# Patient Record
Sex: FEMALE | Race: WHITE | NOT HISPANIC OR LATINO | Employment: UNEMPLOYED | URBAN - METROPOLITAN AREA
[De-identification: names, ages, dates, MRNs, and addresses within clinical notes are randomized per-mention and may not be internally consistent; named-entity substitution may affect disease eponyms.]

---

## 2017-01-17 RX ORDER — HYDROCHLOROTHIAZIDE 25 MG/1
25 TABLET ORAL DAILY
Qty: 90 TABLET | Refills: 0 | Status: SHIPPED | OUTPATIENT
Start: 2017-01-17 | End: 2017-01-18 | Stop reason: SDUPTHER

## 2017-01-18 ENCOUNTER — OFFICE VISIT (OUTPATIENT)
Dept: CARDIOLOGY | Facility: CLINIC | Age: 57
End: 2017-01-18

## 2017-01-18 VITALS
SYSTOLIC BLOOD PRESSURE: 132 MMHG | HEART RATE: 64 BPM | DIASTOLIC BLOOD PRESSURE: 80 MMHG | BODY MASS INDEX: 24.99 KG/M2 | HEIGHT: 65 IN | WEIGHT: 150 LBS

## 2017-01-18 DIAGNOSIS — I10 ESSENTIAL HYPERTENSION: Primary | ICD-10-CM

## 2017-01-18 PROCEDURE — 99213 OFFICE O/P EST LOW 20 MIN: CPT | Performed by: INTERNAL MEDICINE

## 2017-01-18 PROCEDURE — 93000 ELECTROCARDIOGRAM COMPLETE: CPT | Performed by: INTERNAL MEDICINE

## 2017-01-18 RX ORDER — PAROXETINE HYDROCHLORIDE 20 MG/1
1 TABLET, FILM COATED ORAL DAILY
COMMUNITY
Start: 2017-01-04 | End: 2018-01-23 | Stop reason: ALTCHOICE

## 2017-01-18 RX ORDER — LOSARTAN POTASSIUM 50 MG/1
1 TABLET ORAL DAILY
COMMUNITY
Start: 2016-12-31 | End: 2017-01-31 | Stop reason: SDUPTHER

## 2017-01-18 RX ORDER — HYDROCHLOROTHIAZIDE 25 MG/1
25 TABLET ORAL DAILY
Qty: 90 TABLET | Refills: 3 | Status: SHIPPED | OUTPATIENT
Start: 2017-01-18 | End: 2018-02-13 | Stop reason: SDUPTHER

## 2017-01-18 NOTE — PROGRESS NOTES
Date of Office Visit: 2017  Encounter Provider: Yue Maradiaga MD  Place of Service: [facility]  Patient Name: Amanda Sherwood  :1960      Patient ID:  Amanda Sherwood is a 56 y.o. female is here for  followup for cardiac risks.         History of Present Illness    She has known hypertension for which she takes 3 medications. About 1-2 months ago, she also  had premature ventricular complexes which were new for her. She has a history of SVT.        She does get low platelets and low white counts when she is using ibuprofen. She has had  some gastroesophageal reflux disease, but it has been fairly well controlled.     Her father had myocardial infarction when he was 60s years old requiring angioplasty and  then went on to have coronary bypass grafting. She has never had vascular screening or  coronary calcium score done.     She had a lipid panel done in 2016 which revealed a total cholesterol of 227, triglycerides 61, HDL 91, VLDL 12.2, .  Her CMP was normal and her TSH was also normal at 1.22.      She is here today for followup.  She had bunion surgery and it failed.  She had to have a second bunion surgery and she is still dealing with the consequences of that, and because she missed so much work she lost her job at [employer].  She has gained about 25 pounds because she has not been able to exercise and she has not been eating well.  She denies any heart racing or skipping.  She has had no exertional chest tightness or pressure.  She has no exertional short windedness.  She feels like she has done okay from a cardiac standpoint; she is just very frustrated with everything that has happened with her left foot.          Past Medical History   Diagnosis Date   • Acute bilateral knee pain    • Allergic rhinitis    • Anemia    • Arthralgia of multiple sites    • Bilateral knee pain    • Chronic pain syndrome    • Chronic tension-type headache    •  Disease of jaw    • Fear of flying    • Food allergy    • GERD (gastroesophageal reflux disease)    • Headache, tension-type    • Health care maintenance    • Hyperlipidemia    • Hypertension    • Knee osteoarthritis    • Leukopenia    • Long-term use of high-risk medication    • Migraine    • Neck pain    • Osteoarthritis of knee          Past Surgical History   Procedure Laterality Date   • Tubal abdominal ligation     • Breast biopsy     • Salpingo oophorectomy     • Tonsillectomy         Current Outpatient Prescriptions on File Prior to Visit   Medication Sig Dispense Refill   • aspirin 81 MG tablet Take  by mouth daily.     • calcium carbonate (TUMS) 500 MG chewable tablet Chew 1 tablet daily.     • Cholecalciferol (VITAMIN D3) 5000 UNITS capsule capsule Take 5,000 Units by mouth daily.     • esomeprazole (NexIUM) 20 MG capsule Take  by mouth daily.     • Glucosamine HCl 1000 MG tablet Take 2 tablets by mouth daily.     • hydrochlorothiazide (HYDRODIURIL) 25 MG tablet Take 1 tablet by mouth Daily. Needs an appointment to receive more refills 90 tablet 0   • levocetirizine (XYZAL) 5 MG tablet TAKE ONE TABLET BY MOUTH ONCE DAILY 90 tablet 1   • Multiple Vitamins-Minerals (MULTIVITAMINS) chewable tablet Chew daily.     • Omega-3 Fatty Acids (FISH OIL) 1000 MG capsule capsule Take  by mouth daily.     • propranolol LA (INDERAL LA) 160 MG 24 hr capsule Take  by mouth daily.     • [DISCONTINUED] ALPRAZolam (XANAX) 0.5 MG tablet Take 1 tablet by mouth 2 (two) times a day as needed for anxiety. 2 tablet 0     No current facility-administered medications on file prior to visit.        Social History     Social History   • Marital status:      Spouse name: N/A   • Number of children: N/A   • Years of education: N/A     Occupational History   • Not on file.     Social History Main Topics   • Smoking status: Former Smoker   • Smokeless tobacco: Not on file      Comment: no caffine use   • Alcohol use Yes       "Comment: occasional use   • Drug use: No   • Sexual activity: Not on file     Other Topics Concern   • Not on file     Social History Narrative           Review of Systems   Constitution: Negative.   HENT: Negative for congestion and headaches.    Eyes: Negative for vision loss in left eye and vision loss in right eye.   Respiratory: Positive for cough. Negative for hemoptysis, shortness of breath, sleep disturbances due to breathing, snoring, sputum production and wheezing.    Endocrine: Negative.    Hematologic/Lymphatic: Negative.    Skin: Negative for poor wound healing and rash.   Musculoskeletal: Negative for falls, gout, muscle cramps and myalgias.   Gastrointestinal: Negative for abdominal pain, diarrhea, dysphagia, hematemesis, melena, nausea and vomiting.   Neurological: Negative for excessive daytime sleepiness, dizziness, light-headedness, loss of balance, seizures and vertigo.   Psychiatric/Behavioral: Negative for depression and substance abuse. The patient is not nervous/anxious.        Procedures    ECG 12 Lead  Date/Time: 1/18/2017 1:26 PM  Performed by: RUPINDER LIANG  Authorized by: RUPINDER LIANG   Comparison: compared with previous ECG   Similar to previous ECG  Rhythm: sinus rhythm  Clinical impression: normal ECG               Objective:      Vitals:    01/18/17 1317   BP: 132/80   BP Location: Right arm   Patient Position: Sitting   Pulse: 64   Weight: 150 lb (68 kg)   Height: 65\" (165.1 cm)     Body mass index is 24.96 kg/(m^2).    Physical Exam   Constitutional: She is oriented to person, place, and time. She appears well-developed and well-nourished. No distress.   HENT:   Head: Normocephalic and atraumatic.   Eyes: Conjunctivae are normal. No scleral icterus.   Neck: Neck supple. No JVD present. Carotid bruit is not present. No thyromegaly present.   Cardiovascular: Normal rate, regular rhythm, S1 normal, S2 normal, normal heart sounds and intact distal pulses.   No " extrasystoles are present. PMI is not displaced.  Exam reveals no gallop.    No murmur heard.  Pulses:       Carotid pulses are 2+ on the right side, and 2+ on the left side.       Radial pulses are 2+ on the right side, and 2+ on the left side.        Dorsalis pedis pulses are 2+ on the right side, and 2+ on the left side.        Posterior tibial pulses are 2+ on the right side, and 2+ on the left side.   Pulmonary/Chest: Effort normal and breath sounds normal. No respiratory distress. She has no wheezes. She has no rhonchi. She has no rales. She exhibits no tenderness.   Abdominal: Soft. Bowel sounds are normal. She exhibits no distension, no abdominal bruit and no mass. There is no tenderness.   Musculoskeletal: She exhibits no edema or deformity.   Lymphadenopathy:     She has no cervical adenopathy.   Neurological: She is alert and oriented to person, place, and time. No cranial nerve deficit.   Skin: Skin is warm and dry. No rash noted. She is not diaphoretic. No cyanosis. No pallor. Nails show no clubbing.   Psychiatric: She has a normal mood and affect. Judgment normal.   Vitals reviewed.      Lab Review:       Assessment:      Diagnosis Plan   1. Essential hypertension       1. Hypertension, well controlled. She is currently on hydrochlorothiazide, losartan and  propranolol for that.  2. Elevated LDL.   3. PVCs. Will check TSH and CBC, as well as CMP for this.  4. Family history of cardiovascular disease in her father.     Plan:       See back in 1 year, check labs in 6 months. Try to lose weight and exercise to control lipids.

## 2017-01-30 RX ORDER — PROPRANOLOL HYDROCHLORIDE 160 MG/1
CAPSULE, EXTENDED RELEASE ORAL
Qty: 90 CAPSULE | Refills: 2 | Status: SHIPPED | OUTPATIENT
Start: 2017-01-30 | End: 2017-10-23 | Stop reason: SDUPTHER

## 2017-01-31 RX ORDER — LOSARTAN POTASSIUM 50 MG/1
TABLET ORAL
Qty: 90 TABLET | Refills: 2 | Status: SHIPPED | OUTPATIENT
Start: 2017-01-31 | End: 2017-10-07 | Stop reason: SDUPTHER

## 2017-02-13 ENCOUNTER — OFFICE VISIT (OUTPATIENT)
Dept: FAMILY MEDICINE CLINIC | Facility: CLINIC | Age: 57
End: 2017-02-13

## 2017-02-13 VITALS
HEIGHT: 65 IN | HEART RATE: 65 BPM | OXYGEN SATURATION: 96 % | RESPIRATION RATE: 18 BRPM | BODY MASS INDEX: 28.32 KG/M2 | WEIGHT: 170 LBS | SYSTOLIC BLOOD PRESSURE: 140 MMHG | TEMPERATURE: 97.7 F | DIASTOLIC BLOOD PRESSURE: 85 MMHG

## 2017-02-13 DIAGNOSIS — F41.0 ANXIETY DISORDER DUE TO GENERAL MEDICAL CONDITION WITH PANIC ATTACK: ICD-10-CM

## 2017-02-13 DIAGNOSIS — I10 ESSENTIAL HYPERTENSION: Primary | ICD-10-CM

## 2017-02-13 DIAGNOSIS — F06.4 ANXIETY DISORDER DUE TO GENERAL MEDICAL CONDITION WITH PANIC ATTACK: ICD-10-CM

## 2017-02-13 DIAGNOSIS — M79.672 LEFT FOOT PAIN: ICD-10-CM

## 2017-02-13 DIAGNOSIS — R05.3 CHRONIC COUGH: ICD-10-CM

## 2017-02-13 PROCEDURE — 99214 OFFICE O/P EST MOD 30 MIN: CPT | Performed by: FAMILY MEDICINE

## 2017-06-23 RX ORDER — LEVOCETIRIZINE DIHYDROCHLORIDE 5 MG/1
TABLET, FILM COATED ORAL
Qty: 30 TABLET | Refills: 0 | Status: SHIPPED | OUTPATIENT
Start: 2017-06-23 | End: 2017-07-25 | Stop reason: SDUPTHER

## 2017-07-06 ENCOUNTER — OFFICE VISIT (OUTPATIENT)
Dept: FAMILY MEDICINE CLINIC | Facility: CLINIC | Age: 57
End: 2017-07-06

## 2017-07-06 VITALS
DIASTOLIC BLOOD PRESSURE: 78 MMHG | HEART RATE: 63 BPM | BODY MASS INDEX: 28.32 KG/M2 | SYSTOLIC BLOOD PRESSURE: 128 MMHG | OXYGEN SATURATION: 97 % | WEIGHT: 170 LBS | RESPIRATION RATE: 14 BRPM | HEIGHT: 65 IN | TEMPERATURE: 98.1 F

## 2017-07-06 DIAGNOSIS — M25.561 ACUTE PAIN OF RIGHT KNEE: Primary | ICD-10-CM

## 2017-07-06 DIAGNOSIS — H61.23 BILATERAL IMPACTED CERUMEN: ICD-10-CM

## 2017-07-06 PROCEDURE — 99214 OFFICE O/P EST MOD 30 MIN: CPT | Performed by: FAMILY MEDICINE

## 2017-07-06 NOTE — PROGRESS NOTES
Subjective   Amanda Sherwood is a 56 y.o. female.     Chief Complaint   Patient presents with   • Knee Pain     Patyient has right knee pain last week. She has done PT on it helped little bit.    • Ear Drainage     Patient feels she has water in her ears.        HPI     Patient is here to discuss a couple of problems that are new to me.  Patient states that she's had some on-and-off right knee pain for a few years.  Has a history of torn meniscus which was remedied with physical therapy.  She plays pretty competitive tennis.  Over the last 2 weeks she's had 2 incidents of twisting with the right knee which caused swelling and quite a bit of pain.  She tries to take ibuprofen and stay off of it but continues to have issues.  She has done physical therapy with good results in the past.  Patient also states that she feels like she's got irritation in bilateral ears.  Discuss some hearing loss and muffled sounds.    The following portions of the patient's history were reviewed and updated as appropriate: allergies, current medications, past family history, past medical history, past social history, past surgical history and problem list.    Review of Systems   HENT: Positive for ear discharge.    Musculoskeletal: Positive for joint swelling.        Right knee pain   All other systems reviewed and are negative.      Objective  Vitals:    07/06/17 1036   BP: 128/78   Pulse: 63   Resp: 14   Temp: 98.1 °F (36.7 °C)   SpO2: 97%        Physical Exam   Constitutional: She is oriented to person, place, and time. She appears well-developed and well-nourished. No distress.   HENT:   Head: Normocephalic and atraumatic.   Right Ear: External ear normal. A foreign body is present.   Left Ear: External ear normal. A foreign body is present.   Nose: Nose normal.   Mouth/Throat: Oropharynx is clear and moist.   Bilateral cerumen impaction   Eyes: Conjunctivae and EOM are normal. Pupils are equal, round, and reactive to light. Right eye  exhibits no discharge. Left eye exhibits no discharge. No scleral icterus.   Cardiovascular: Normal rate, regular rhythm and normal heart sounds.    Pulmonary/Chest: Effort normal and breath sounds normal. No respiratory distress. She has no wheezes. She has no rales.   Abdominal: Soft. Bowel sounds are normal. She exhibits no distension. There is no tenderness.   Musculoskeletal:        Right knee: She exhibits decreased range of motion, swelling, effusion and abnormal meniscus. She exhibits no ecchymosis, no deformity, no laceration, no erythema, normal alignment, no LCL laxity, normal patellar mobility, no bony tenderness and no MCL laxity. Tenderness found. Medial joint line and lateral joint line tenderness noted. No MCL, no LCL and no patellar tendon tenderness noted.   Neurological: She is alert and oriented to person, place, and time.   Skin: Skin is warm and dry. She is not diaphoretic.   Nursing note and vitals reviewed.        Current Outpatient Prescriptions:   •  aspirin 81 MG tablet, Take  by mouth daily., Disp: , Rfl:   •  calcium carbonate (TUMS) 500 MG chewable tablet, Chew 1 tablet daily., Disp: , Rfl:   •  Cholecalciferol (VITAMIN D3) 5000 UNITS capsule capsule, Take 5,000 Units by mouth daily., Disp: , Rfl:   •  esomeprazole (NexIUM) 20 MG capsule, Take  by mouth daily., Disp: , Rfl:   •  Glucosamine HCl 1000 MG tablet, Take 2 tablets by mouth daily., Disp: , Rfl:   •  hydrochlorothiazide (HYDRODIURIL) 25 MG tablet, Take 1 tablet by mouth Daily. Needs an appointment to receive more refills, Disp: 90 tablet, Rfl: 3  •  IBUPROFEN PO, Take 1 tablet by mouth As Needed., Disp: , Rfl:   •  losartan (COZAAR) 50 MG tablet, TAKE ONE TABLET BY MOUTH DAILY, Disp: 90 tablet, Rfl: 2  •  Multiple Vitamins-Minerals (MULTIVITAMINS) chewable tablet, Chew daily., Disp: , Rfl:   •  Omega-3 Fatty Acids (FISH OIL) 1000 MG capsule capsule, Take  by mouth daily., Disp: , Rfl:   •  PARoxetine (PAXIL) 20 MG tablet, Take  1 tablet by mouth Daily., Disp: , Rfl:   •  propranolol LA (INDERAL LA) 160 MG 24 hr capsule, TAKE ONE CAPSULE BY MOUTH DAILY, Disp: 90 capsule, Rfl: 2  •  albuterol (PROVENTIL HFA;VENTOLIN HFA) 108 (90 BASE) MCG/ACT inhaler, Inhale 2 puffs Every 4 (Four) Hours As Needed for wheezing., Disp: , Rfl:   •  levocetirizine (XYZAL) 5 MG tablet, TAKE ONE TABLET BY MOUTH DAILY, Disp: 30 tablet, Rfl: 0    Procedures    Lab Results (most recent)     None          Assessment/Plan   Amanda was seen today for knee pain and ear drainage.    Diagnoses and all orders for this visit:    Acute pain of right knee  -     MRI Knee Right Without Contrast; Future    Bilateral impacted cerumen    We'll get MRI to evaluate acute on chronic knee pain with swelling.  I anticipate that this may be an exacerbation of her previous meniscal injury but there may be some ligamentous damage as well due to the unstable bowel sensation in her knee.  Patient elects to use home drops for her bilateral cerumen impaction.  She declined removal in our office.    Return in about 4 weeks (around 8/3/2017) for Recheck.      Finesse Dao MD

## 2017-07-25 RX ORDER — LEVOCETIRIZINE DIHYDROCHLORIDE 5 MG/1
TABLET, FILM COATED ORAL
Qty: 30 TABLET | Refills: 0 | Status: SHIPPED | OUTPATIENT
Start: 2017-07-25 | End: 2017-08-21 | Stop reason: SDUPTHER

## 2017-07-25 RX ORDER — LEVOCETIRIZINE DIHYDROCHLORIDE 5 MG/1
TABLET, FILM COATED ORAL
Qty: 30 TABLET | Refills: 0 | Status: SHIPPED | OUTPATIENT
Start: 2017-07-25 | End: 2017-08-28 | Stop reason: SDUPTHER

## 2017-07-27 ENCOUNTER — HOSPITAL ENCOUNTER (OUTPATIENT)
Dept: MRI IMAGING | Facility: HOSPITAL | Age: 57
Discharge: HOME | End: 2017-07-27
Admitting: FAMILY MEDICINE

## 2017-07-27 DIAGNOSIS — M25.561 ACUTE PAIN OF RIGHT KNEE: ICD-10-CM

## 2017-07-27 PROCEDURE — 73721 MRI JNT OF LWR EXTRE W/O DYE: CPT

## 2017-08-21 ENCOUNTER — OFFICE VISIT (OUTPATIENT)
Dept: ORTHOPEDIC SURGERY | Facility: CLINIC | Age: 57
End: 2017-08-21

## 2017-08-21 VITALS — HEIGHT: 65 IN | TEMPERATURE: 100.6 F

## 2017-08-21 DIAGNOSIS — S83.209A MENISCUS TEAR: ICD-10-CM

## 2017-08-21 DIAGNOSIS — M17.11 OSTEOARTHROSIS, LOCALIZED, PRIMARY, KNEE, RIGHT: Primary | ICD-10-CM

## 2017-08-21 PROBLEM — M17.10 OSTEOARTHROSIS, LOCALIZED, PRIMARY, KNEE: Status: ACTIVE | Noted: 2017-08-21

## 2017-08-21 PROCEDURE — 73562 X-RAY EXAM OF KNEE 3: CPT | Performed by: ORTHOPAEDIC SURGERY

## 2017-08-21 PROCEDURE — 99203 OFFICE O/P NEW LOW 30 MIN: CPT | Performed by: ORTHOPAEDIC SURGERY

## 2017-08-21 NOTE — PROGRESS NOTES
New Right Knee      Patient: Amanda Sherwood        YOB: 1960    Medical Record Number: [id number]        Chief Complaints: Right Knee Pain  Chief Complaint   Patient presents with   • Right Knee - Establish Care           History of Present Illness: This is a  56 y.o. female who presents complaining of right knee pain that began in May she was playing tennis she ran for about felt immediate pain anterior medial.  She did have swelling she states she complaint tennis which she does take Advil to calm her symptoms down.  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   • aspirin 81 MG tablet Take  by mouth daily.   • calcium carbonate (TUMS) 500 MG chewable tablet Chew 1 tablet daily.   • Cholecalciferol (VITAMIN D3) 5000 UNITS capsule capsule Take 5,000 Units by mouth daily.   • esomeprazole (NexIUM) 20 MG capsule Take  by mouth daily.   • Glucosamine HCl 1000 MG tablet Take 2 tablets by mouth daily.   • hydrochlorothiazide (HYDRODIURIL) 25 MG tablet Take 1 tablet by mouth Daily. Needs an appointment to receive more refills   • IBUPROFEN PO Take 1 tablet by mouth As Needed.   • levocetirizine (XYZAL) 5 MG tablet TAKE ONE TABLET BY MOUTH DAILY   • losartan (COZAAR) 50 MG tablet TAKE ONE TABLET BY MOUTH DAILY   • Multiple Vitamins-Minerals (MULTIVITAMINS) chewable tablet Chew daily.   • Omega-3 Fatty Acids (FISH OIL) 1000 MG capsule capsule Take  by mouth daily.   • PARoxetine (PAXIL) 20 MG tablet Take 1 tablet by mouth  Daily.   • propranolol LA (INDERAL LA) 160 MG 24 hr capsule TAKE ONE CAPSULE BY MOUTH DAILY   • [DISCONTINUED] levocetirizine (XYZAL) 5 MG tablet TAKE ONE TABLET BY MOUTH DAILY     No current facility-administered medications on file prior to visit.      Current Medications:  Scheduled Meds:  Continuous Infusions:  No current facility-administered medications for this visit.   PRN Meds:.    Past Medical History:   Diagnosis Date   • Acute bilateral knee pain    • Allergic rhinitis    • Anemia    • Arthralgia of multiple sites    • Bilateral knee pain    • Chronic pain syndrome    • Chronic tension-type headache    • Disease of jaw    • Fear of flying    • Food allergy    • GERD (gastroesophageal reflux disease)    • Headache, tension-type    • Health care maintenance    • Hyperlipidemia    • Hypertension    • Knee osteoarthritis    • Leukocytopenia    • Leukopenia    • Long-term use of high-risk medication    • Migraine    • Neck pain    • Osteoarthritis of knee    • Reactive airway disease         Past Surgical History:   Procedure Laterality Date   • BREAST BIOPSY     • SALPINGO OOPHORECTOMY     • TONSILLECTOMY     • TUBAL ABDOMINAL LIGATION          Social History     Occupational History   • Not on file.     Social History Main Topics   • Smoking status: Former Smoker   • Smokeless tobacco: Never Used      Comment: no caffine use   • Alcohol use Yes      Comment: occasional use   • Drug use: No   • Sexual activity: Not on file    Social History     Social History Narrative        Family History   Problem Relation Age of Onset   • Dementia Mother    • Arthritis Mother    • Stroke Father    • Heart disease Father    • Hypertension Father    • Diabetes Father    • Cancer Father    • Colon cancer Father    • Breast cancer Sister              Review of Systems: 14 point review of systems are remarkable for the pertinent positives listed in the chart by the patient the remainder are negative    Review of  "Systems      Physical Exam: 56 y.o. female  General Appearance:    Alert, cooperative, in no acute distress                 Vitals:    08/21/17 1452   Temp: (!) 100.6 °F (38.1 °C)   TempSrc: Temporal Artery    Height: 65\" (165.1 cm)      Patient is alert and read ×3 no acute distress appears her above-listed at height weight and age.  Affect is normal respiratory rate is normal unlabored. Heart rate regular rate rhythm, sclera, dentition and hearing are normal for the purpose of this exam.        Ortho Exam Physical exam of the right knee reveals no effusion, no erythema.  The patient has no palpable tenderness along the medial joint line, no tenderness about the lateral joint line.  Patient does have crepitus with patellofemoral range of motion.  They also have subjective symptoms anteriorly during exam.  The patient has a negative bounce home, negative Cisco and a stable ligamentous exam.  Quad tone is reasonable and symmetric.  There are no overlying skin changes no lymphedema no lymphadenopathy.  There is good hip range of motion which is full symmetric and asymptomatic and a normal ankle exam.  Hamstrings and IT band are tight bilaterally.         Procedures             Radiology:   AP, Lateral and merchant views of the right knee  were ordered/reviewed to evauateknee pain.  I've no comparative films these show mild patellofemoral OA no evidence of any acute bony pathology      Assessment/Plan:    Right knee pain I really think this is more patellofemoral however meniscal pathology has to remain in the differential.  She is doing a little bit better.  I would like to put her on a therapeutic dose of an anti-inflammatory therefore I will give her Mobic with strict precautions were talked about stretching strengthening braces if she fails to improve we would consider an injection or an MRI                                "

## 2017-08-25 RX ORDER — MELOXICAM 15 MG/1
TABLET ORAL
Qty: 30 TABLET | Refills: 3 | Status: SHIPPED | OUTPATIENT
Start: 2017-08-25 | End: 2018-01-23

## 2017-08-28 RX ORDER — LEVOCETIRIZINE DIHYDROCHLORIDE 5 MG/1
TABLET, FILM COATED ORAL
Qty: 30 TABLET | Refills: 0 | Status: SHIPPED | OUTPATIENT
Start: 2017-08-28 | End: 2017-09-25 | Stop reason: SDUPTHER

## 2017-08-29 ENCOUNTER — TELEPHONE (OUTPATIENT)
Dept: FAMILY MEDICINE CLINIC | Facility: CLINIC | Age: 57
End: 2017-08-29

## 2017-08-29 NOTE — TELEPHONE ENCOUNTER
Informed pt that she needs to be seen for this per Dr Dao. She is going to schedule appt for this week.

## 2017-08-29 NOTE — TELEPHONE ENCOUNTER
----- Message from Finesse Dao MD sent at 8/28/2017  4:03 PM EDT -----  Regarding: RE: Xanax   Contact: [phone]  She will need to be seen for this.  ----- Message -----     From: Franklin Crain MA     Sent: 8/28/2017   2:51 PM       To: Finesse Dao MD  Subject: Xanax                                            Pt is going to fly on 09/08/2017 she needs to have some pills of Xanax for going and coming back.

## 2017-08-30 ENCOUNTER — OFFICE VISIT (OUTPATIENT)
Dept: FAMILY MEDICINE CLINIC | Facility: CLINIC | Age: 57
End: 2017-08-30

## 2017-08-30 VITALS
OXYGEN SATURATION: 99 % | HEIGHT: 65 IN | BODY MASS INDEX: 28.32 KG/M2 | TEMPERATURE: 98.3 F | SYSTOLIC BLOOD PRESSURE: 138 MMHG | RESPIRATION RATE: 14 BRPM | HEART RATE: 68 BPM | WEIGHT: 170 LBS | DIASTOLIC BLOOD PRESSURE: 86 MMHG

## 2017-08-30 DIAGNOSIS — F41.8 SITUATIONAL ANXIETY: Primary | ICD-10-CM

## 2017-08-30 PROCEDURE — 99214 OFFICE O/P EST MOD 30 MIN: CPT | Performed by: FAMILY MEDICINE

## 2017-08-30 RX ORDER — ALPRAZOLAM 0.25 MG/1
0.25 TABLET ORAL 2 TIMES DAILY PRN
Qty: 5 TABLET | Refills: 0 | Status: SHIPPED | OUTPATIENT
Start: 2017-08-30 | End: 2018-05-11 | Stop reason: SDUPTHER

## 2017-08-30 NOTE — PROGRESS NOTES
Subjective   Amanda Sherwood is a 56 y.o. female.     Chief Complaint   Patient presents with   • Med Refill     Patient needs new Rx Xanax for flying .        HPI     Patient presents today to discuss problem that is new to me.  Patient states that she suffers from situational anxiety surrounding airline travel.  Previously she's traveled via airplane and had a very difficult time.  She gets extremely anxious anticipating the flight and was well has difficulty on the flight with what she describes as panic attacks.  She has tried benzodiazepines for travel in the past with excellent results.  She has a trip that is upcoming and is wondering about possible medications for the flight.  She reports no adverse side effects or allergies to benzodiazepines.    The following portions of the patient's history were reviewed and updated as appropriate: allergies, current medications, past family history, past medical history, past social history, past surgical history and problem list.    Review of Systems   Constitutional: Negative for fatigue.   All other systems reviewed and are negative.      Objective  Vitals:    08/30/17 0822   BP: 138/86   Pulse: 68   Resp: 14   Temp: 98.3 °F (36.8 °C)   SpO2: 99%        Physical Exam   Constitutional: She is oriented to person, place, and time. She appears well-developed and well-nourished. No distress.   Neurological: She is alert and oriented to person, place, and time.   Skin: She is not diaphoretic.   Psychiatric: She has a normal mood and affect. Her behavior is normal.   Nursing note and vitals reviewed.        Current Outpatient Prescriptions:   •  aspirin 81 MG tablet, Take  by mouth daily., Disp: , Rfl:   •  calcium carbonate (TUMS) 500 MG chewable tablet, Chew 1 tablet daily., Disp: , Rfl:   •  Cholecalciferol (VITAMIN D3) 5000 UNITS capsule capsule, Take 5,000 Units by mouth daily., Disp: , Rfl:   •  esomeprazole (NexIUM) 20 MG capsule, Take  by mouth daily., Disp: , Rfl:    •  Glucosamine HCl 1000 MG tablet, Take 2 tablets by mouth daily., Disp: , Rfl:   •  hydrochlorothiazide (HYDRODIURIL) 25 MG tablet, Take 1 tablet by mouth Daily. Needs an appointment to receive more refills, Disp: 90 tablet, Rfl: 3  •  levocetirizine (XYZAL) 5 MG tablet, TAKE ONE TABLET BY MOUTH DAILY, Disp: 30 tablet, Rfl: 0  •  losartan (COZAAR) 50 MG tablet, TAKE ONE TABLET BY MOUTH DAILY, Disp: 90 tablet, Rfl: 2  •  meloxicam (MOBIC) 15 MG tablet, 1 PO Daily with food., Disp: 30 tablet, Rfl: 3  •  Multiple Vitamins-Minerals (MULTIVITAMINS) chewable tablet, Chew daily., Disp: , Rfl:   •  Omega-3 Fatty Acids (FISH OIL) 1000 MG capsule capsule, Take  by mouth daily., Disp: , Rfl:   •  PARoxetine (PAXIL) 20 MG tablet, Take 1 tablet by mouth Daily., Disp: , Rfl:   •  propranolol LA (INDERAL LA) 160 MG 24 hr capsule, TAKE ONE CAPSULE BY MOUTH DAILY, Disp: 90 capsule, Rfl: 2  •  albuterol (PROVENTIL HFA;VENTOLIN HFA) 108 (90 BASE) MCG/ACT inhaler, Inhale 2 puffs Every 4 (Four) Hours As Needed for wheezing., Disp: , Rfl:   •  ALPRAZolam (XANAX) 0.25 MG tablet, Take 1 tablet by mouth 2 (Two) Times a Day As Needed for Anxiety., Disp: 5 tablet, Rfl: 0    Procedures    Lab Results (most recent)     None          Assessment/Plan   Amanda was seen today for med refill.    Diagnoses and all orders for this visit:    Situational anxiety  -     ALPRAZolam (XANAX) 0.25 MG tablet; Take 1 tablet by mouth 2 (Two) Times a Day As Needed for Anxiety.      Prescription given to the patient for 5 pills of Xanax 0.25 mg as above.  Discussed potential adverse side effects.    Return for As needed.      Finesse Dao MD

## 2017-09-25 RX ORDER — LEVOCETIRIZINE DIHYDROCHLORIDE 5 MG/1
TABLET, FILM COATED ORAL
Qty: 30 TABLET | Refills: 0 | Status: SHIPPED | OUTPATIENT
Start: 2017-09-25 | End: 2017-10-26 | Stop reason: SDUPTHER

## 2017-10-09 RX ORDER — LOSARTAN POTASSIUM 50 MG/1
TABLET ORAL
Qty: 90 TABLET | Refills: 0 | Status: SHIPPED | OUTPATIENT
Start: 2017-10-09 | End: 2018-01-31 | Stop reason: SDUPTHER

## 2017-10-20 ENCOUNTER — TRANSCRIBE ORDERS (OUTPATIENT)
Dept: ADMINISTRATIVE | Facility: HOSPITAL | Age: 57
End: 2017-10-20

## 2017-10-20 DIAGNOSIS — Z12.31 ENCOUNTER FOR MAMMOGRAM TO ESTABLISH BASELINE MAMMOGRAM: Primary | ICD-10-CM

## 2017-10-23 RX ORDER — PROPRANOLOL HYDROCHLORIDE 160 MG/1
CAPSULE, EXTENDED RELEASE ORAL
Qty: 30 CAPSULE | Refills: 6 | Status: SHIPPED | OUTPATIENT
Start: 2017-10-23 | End: 2018-02-05 | Stop reason: SDUPTHER

## 2017-10-23 RX ORDER — LEVOCETIRIZINE DIHYDROCHLORIDE 5 MG/1
TABLET, FILM COATED ORAL
Qty: 30 TABLET | Refills: 0 | OUTPATIENT
Start: 2017-10-23

## 2017-10-24 ENCOUNTER — APPOINTMENT (OUTPATIENT)
Dept: MAMMOGRAPHY | Facility: HOSPITAL | Age: 57
End: 2017-10-24
Attending: OBSTETRICS & GYNECOLOGY

## 2017-10-26 RX ORDER — LEVOCETIRIZINE DIHYDROCHLORIDE 5 MG/1
5 TABLET, FILM COATED ORAL EVERY EVENING
Qty: 30 TABLET | Refills: 5 | Status: SHIPPED | OUTPATIENT
Start: 2017-10-26 | End: 2018-05-01 | Stop reason: SDUPTHER

## 2017-11-09 ENCOUNTER — OFFICE VISIT (OUTPATIENT)
Dept: FAMILY MEDICINE CLINIC | Facility: CLINIC | Age: 57
End: 2017-11-09

## 2017-11-09 VITALS
RESPIRATION RATE: 14 BRPM | HEART RATE: 73 BPM | OXYGEN SATURATION: 99 % | WEIGHT: 174 LBS | DIASTOLIC BLOOD PRESSURE: 72 MMHG | HEIGHT: 65 IN | SYSTOLIC BLOOD PRESSURE: 128 MMHG | TEMPERATURE: 98.3 F | BODY MASS INDEX: 28.99 KG/M2

## 2017-11-09 DIAGNOSIS — G89.29 CHRONIC RIGHT-SIDED LOW BACK PAIN WITHOUT SCIATICA: Primary | ICD-10-CM

## 2017-11-09 DIAGNOSIS — M54.50 CHRONIC RIGHT-SIDED LOW BACK PAIN WITHOUT SCIATICA: Primary | ICD-10-CM

## 2017-11-09 PROCEDURE — 73502 X-RAY EXAM HIP UNI 2-3 VIEWS: CPT | Performed by: FAMILY MEDICINE

## 2017-11-09 PROCEDURE — 99214 OFFICE O/P EST MOD 30 MIN: CPT | Performed by: FAMILY MEDICINE

## 2017-11-09 NOTE — PROGRESS NOTES
Subjective   Amanda Sherwood is a 56 y.o. female.     Chief Complaint   Patient presents with   • Back Pain     Patient is having lower back pain for 6 months. Patient is ganing weight since that is making it worse.        HPI     Patient presents today to the office with 6 month history of low back pain.  Pain is located on the right side.  Patient tates that the pain is a dull ache but can become sharp at times and radiates to the right hip.  It does limit her ability to do the activities that she would like to do.  She has a friend who is a physical therapist who has been doing some things which actually have helped a little bit and also NSAIDs seemed to help.    Th se following portions of the patient's history were reviewed and updated as appropriate: allergies, current medications, past family history, past medical history, past social history, past surgical history and problem list.    Review of Systems   Musculoskeletal: Positive for back pain.   All other systems reviewed and are negative.      Objective  Vitals:    11/09/17 1449   BP: 128/72   Pulse: 73   Resp: 14   Temp: 98.3 °F (36.8 °C)   SpO2: 99%        Physical Exam   Constitutional: She is oriented to person, place, and time. She appears well-developed and well-nourished. No distress.   Musculoskeletal:        Lumbar back: She exhibits decreased range of motion, tenderness, bony tenderness and pain. She exhibits no swelling, no edema, no deformity, no laceration, no spasm and normal pulse.        Back:    Neurological: She is alert and oriented to person, place, and time.   Skin: She is not diaphoretic.   Psychiatric: She has a normal mood and affect. Her behavior is normal.   Nursing note and vitals reviewed.        Current Outpatient Prescriptions:   •  aspirin 81 MG tablet, Take  by mouth daily., Disp: , Rfl:   •  calcium carbonate (TUMS) 500 MG chewable tablet, Chew 1 tablet daily., Disp: , Rfl:   •  Cholecalciferol (VITAMIN D3) 5000 UNITS  capsule capsule, Take 5,000 Units by mouth daily., Disp: , Rfl:   •  esomeprazole (NexIUM) 20 MG capsule, Take  by mouth daily., Disp: , Rfl:   •  Glucosamine HCl 1000 MG tablet, Take 2 tablets by mouth daily., Disp: , Rfl:   •  hydrochlorothiazide (HYDRODIURIL) 25 MG tablet, Take 1 tablet by mouth Daily. Needs an appointment to receive more refills, Disp: 90 tablet, Rfl: 3  •  levocetirizine (XYZAL) 5 MG tablet, Take 1 tablet by mouth Every Evening., Disp: 30 tablet, Rfl: 5  •  losartan (COZAAR) 50 MG tablet, TAKE ONE TABLET BY MOUTH DAILY, Disp: 90 tablet, Rfl: 0  •  Multiple Vitamins-Minerals (MULTIVITAMINS) chewable tablet, Chew daily., Disp: , Rfl:   •  Omega-3 Fatty Acids (FISH OIL) 1000 MG capsule capsule, Take  by mouth daily., Disp: , Rfl:   •  PARoxetine (PAXIL) 20 MG tablet, Take 1 tablet by mouth Daily., Disp: , Rfl:   •  propranolol LA (INDERAL LA) 160 MG 24 hr capsule, TAKE ONE CAPSULE BY MOUTH DAILY, Disp: 30 capsule, Rfl: 6  •  albuterol (PROVENTIL HFA;VENTOLIN HFA) 108 (90 BASE) MCG/ACT inhaler, Inhale 2 puffs Every 4 (Four) Hours As Needed for wheezing., Disp: , Rfl:   •  ALPRAZolam (XANAX) 0.25 MG tablet, Take 1 tablet by mouth 2 (Two) Times a Day As Needed for Anxiety., Disp: 5 tablet, Rfl: 0  •  meloxicam (MOBIC) 15 MG tablet, 1 PO Daily with food., Disp: 30 tablet, Rfl: 3    Procedures    Lab Results (most recent)     None          Assessment/Plan   Amanda was seen today for back pain.    Diagnoses and all orders for this visit:    Chronic right-sided low back pain without sciatica  -     XR Hip With or Without Pelvis 2 - 3 View Right      2 view x-ray done of the pelvis and right hip done in the office for pain.  No previous images available for comparison.  No acute osseous abnormality noted.  Evidence of possible SI joint arthritis.    I do believe that this isn't SI joint arthropathy.  Samples were given for ibuprofen 800 mg in combination with stomach acid reducer.  Advised patient to take  1 tablet before bed.  If no improvement will consider referral for SI joint injection.      Return in about 4 weeks (around 12/7/2017) for Recheck.      Finesse Dao MD

## 2017-11-28 ENCOUNTER — HOSPITAL ENCOUNTER (OUTPATIENT)
Dept: MAMMOGRAPHY | Facility: HOSPITAL | Age: 57
Discharge: HOME | End: 2017-11-28
Attending: OBSTETRICS & GYNECOLOGY | Admitting: OBSTETRICS & GYNECOLOGY

## 2017-11-28 DIAGNOSIS — Z12.31 ENCOUNTER FOR MAMMOGRAM TO ESTABLISH BASELINE MAMMOGRAM: ICD-10-CM

## 2017-11-28 PROCEDURE — G0202 SCR MAMMO BI INCL CAD: HCPCS

## 2018-01-23 ENCOUNTER — OFFICE VISIT (OUTPATIENT)
Dept: CARDIOLOGY | Facility: CLINIC | Age: 58
End: 2018-01-23

## 2018-01-23 VITALS
SYSTOLIC BLOOD PRESSURE: 148 MMHG | BODY MASS INDEX: 30.57 KG/M2 | HEART RATE: 65 BPM | DIASTOLIC BLOOD PRESSURE: 98 MMHG | HEIGHT: 65 IN | WEIGHT: 183.5 LBS

## 2018-01-23 DIAGNOSIS — I10 ESSENTIAL HYPERTENSION: Primary | ICD-10-CM

## 2018-01-23 DIAGNOSIS — I49.3 PVC (PREMATURE VENTRICULAR CONTRACTION): ICD-10-CM

## 2018-01-23 DIAGNOSIS — E78.5 HYPERLIPIDEMIA, UNSPECIFIED HYPERLIPIDEMIA TYPE: ICD-10-CM

## 2018-01-23 PROCEDURE — 99214 OFFICE O/P EST MOD 30 MIN: CPT | Performed by: NURSE PRACTITIONER

## 2018-01-23 PROCEDURE — 93000 ELECTROCARDIOGRAM COMPLETE: CPT | Performed by: NURSE PRACTITIONER

## 2018-01-23 NOTE — PROGRESS NOTES
Date of Office Visit: 2018  Encounter Provider: OLVIN Morgan  Place of Service: [facility] CARDIOLOGY  Patient Name: Amanda Sherwood  :1960    Chief Complaint   Patient presents with   • Hypertension   :     HPI: Amanda Sherwood is a 57 y.o. female comes in today for yearly follow-up for hypertension.  She is a patient of Dr. Maradiaga.  I'm seeing in her for the first time today and have reviewed her records.    She has a history of hypertension, hyperlipidemia, PVCs and a family history of cardiovascular disease.    She has a history of SVT and PVCs in the past.    Her father had a myocardial infarction when he was in his 60s requiring angioplasty then had coronary bypass grafting.    Denies palpitations or tachycardia,  edema, dizziness, chest pain, fatigue, orthopnea or PND. Has chronic cough.  Has had excessive weight gain.  This is due to a foot surgery and steroid use.  Does feel short of breath at time while playing tennis but more due to her weight gain and being out of shape.    Past Medical History:   Diagnosis Date   • Acute bilateral knee pain    • Allergic rhinitis    • Anemia    • Arthralgia of multiple sites    • Bilateral knee pain    • Chronic pain syndrome    • Chronic tension-type headache    • Disease of jaw    • Fear of flying    • Food allergy    • GERD (gastroesophageal reflux disease)    • Headache, tension-type    • Health care maintenance    • Hyperlipidemia    • Hypertension    • Knee osteoarthritis    • Leukocytopenia    • Leukopenia    • Long-term use of high-risk medication    • Migraine    • Neck pain    • Osteoarthritis of knee    • PVC (premature ventricular contraction)    • Reactive airway disease        Past Surgical History:   Procedure Laterality Date   • BREAST BIOPSY     • SALPINGO OOPHORECTOMY     • TONSILLECTOMY     • TUBAL ABDOMINAL LIGATION             Review of Systems   Respiratory: Positive for cough.      All other  "systems reviewed and are negative    Allergies   Allergen Reactions   • Celecoxib Hives and Swelling     hives   • Lisinopril Cough       All aspects of family and social history reviewed.          Objective:     Vitals:    01/23/18 0913   BP: 148/98   BP Location: Left arm   Pulse: 65   Weight: 83.2 kg (183 lb 8 oz)   Height: 165.1 cm (65\")     Body mass index is 30.54 kg/(m^2).    PHYSICAL EXAM:  Physical Exam   Constitutional: She is oriented to person, place, and time. She appears well-developed and well-nourished. No distress.   HENT:   Head: Normocephalic and atraumatic.   Eyes: Conjunctivae are normal. No scleral icterus.   Neck: Neck supple. No JVD present. Carotid bruit is not present. No thyromegaly present.   Cardiovascular: Normal rate, regular rhythm, S1 normal, S2 normal, normal heart sounds and intact distal pulses.   No extrasystoles are present. PMI is not displaced.  Exam reveals no gallop.    No murmur heard.  Pulses:       Carotid pulses are 2+ on the right side, and 2+ on the left side.       Radial pulses are 2+ on the right side, and 2+ on the left side.        Dorsalis pedis pulses are 2+ on the right side, and 2+ on the left side.        Posterior tibial pulses are 2+ on the right side, and 2+ on the left side.   Pulmonary/Chest: Effort normal and breath sounds normal. No respiratory distress. She has no wheezes. She has no rhonchi. She has no rales. She exhibits no tenderness.   Abdominal: Soft. Bowel sounds are normal. She exhibits no distension, no abdominal bruit and no mass. There is no tenderness.   Musculoskeletal: She exhibits no edema or deformity.   Lymphadenopathy:     She has no cervical adenopathy.   Neurological: She is alert and oriented to person, place, and time. No cranial nerve deficit.   Skin: Skin is warm and dry. No rash noted. She is not diaphoretic. No cyanosis. No pallor. Nails show no clubbing.   Psychiatric: She has a normal mood and affect. Judgment normal. "   Vitals reviewed.        ECG 12 Lead  Date/Time: 1/23/2018 9:40 AM  Performed by: NORTH RODRIGUEZ  Authorized by: NORTH RODRIGUEZ   Comparison: compared with previous ECG from 1/18/2017  Similar to previous ECG  Rhythm: sinus rhythm  Rate: normal  BPM: 65  Conduction: conduction normal  ST Segments: ST segments normal  T Waves: T waves normal  QRS axis: normal  Other: no other findings  Clinical impression: normal ECG  Comments: Indication: HTN                Assessment:       Diagnosis Plan   1. Essential hypertension     2. Hyperlipidemia, unspecified hyperlipidemia type     3. PVC (premature ventricular contraction)          Orders Placed This Encounter   Procedures   • ECG 12 Lead     This order was created via procedure documentation       Current Outpatient Prescriptions   Medication Sig Dispense Refill   • albuterol (PROVENTIL HFA;VENTOLIN HFA) 108 (90 BASE) MCG/ACT inhaler Inhale 2 puffs Every 4 (Four) Hours As Needed for wheezing.     • ALPRAZolam (XANAX) 0.25 MG tablet Take 1 tablet by mouth 2 (Two) Times a Day As Needed for Anxiety. 5 tablet 0   • aspirin 81 MG tablet Take  by mouth daily.     • calcium carbonate (TUMS) 500 MG chewable tablet Chew 1 tablet daily.     • Cholecalciferol (VITAMIN D3) 5000 UNITS capsule capsule Take 5,000 Units by mouth daily.     • esomeprazole (NexIUM) 20 MG capsule Take  by mouth daily.     • Glucosamine HCl 1000 MG tablet Take 2 tablets by mouth daily.     • hydrochlorothiazide (HYDRODIURIL) 25 MG tablet Take 1 tablet by mouth Daily. Needs an appointment to receive more refills 90 tablet 3   • levocetirizine (XYZAL) 5 MG tablet Take 1 tablet by mouth Every Evening. 30 tablet 5   • losartan (COZAAR) 50 MG tablet TAKE ONE TABLET BY MOUTH DAILY 90 tablet 0   • Multiple Vitamins-Minerals (MULTIVITAMINS) chewable tablet Chew daily.     • propranolol LA (INDERAL LA) 160 MG 24 hr capsule TAKE ONE CAPSULE BY MOUTH DAILY 30 capsule 6   • Vortioxetine HBr (TRINTELLIX) 10 MG  tablet Take 10 mg by mouth Daily.       No current facility-administered medications for this visit.             Plan:       1.  Hypertension-blood pressure high here today but controlled normally.  120s over 70s.  Continue current therapy.    2. Hyperlipidemia-follows with PCP.  Having lab work drawn today.    3. PVCs-on propanolol.  Has had no issues.    4. Weight gain-advised weight loss        Follow up in office in 1 year    As always, it has been a pleasure to participate in this patient's care.      Sincerely,      OLVIN Morgan

## 2018-01-31 RX ORDER — LOSARTAN POTASSIUM 50 MG/1
TABLET ORAL
Qty: 90 TABLET | Refills: 0 | Status: SHIPPED | OUTPATIENT
Start: 2018-01-31 | End: 2018-02-14 | Stop reason: SDUPTHER

## 2018-01-31 NOTE — TELEPHONE ENCOUNTER
Pt called and states that her BP has been elevated. BP reading Friday 190/120. Today BP is  140/94. Pt denies any symptoms.    Current med list   Losartan 50 mg pt is taking 100 mg  QD since Friday  HCTZ 25 mg QD  INDERAL 160 mg QD.      Thanks   Glo HOOPER

## 2018-02-05 ENCOUNTER — TELEPHONE (OUTPATIENT)
Dept: CARDIOLOGY | Facility: CLINIC | Age: 58
End: 2018-02-05

## 2018-02-05 RX ORDER — PROPRANOLOL HYDROCHLORIDE 160 MG/1
160 CAPSULE, EXTENDED RELEASE ORAL 2 TIMES DAILY
Qty: 60 CAPSULE | Refills: 6 | Status: SHIPPED | OUTPATIENT
Start: 2018-02-05 | End: 2018-09-24 | Stop reason: HOSPADM

## 2018-02-13 RX ORDER — HYDROCHLOROTHIAZIDE 25 MG/1
25 TABLET ORAL DAILY
Qty: 30 TABLET | Refills: 5 | Status: SHIPPED | OUTPATIENT
Start: 2018-02-13 | End: 2018-09-13 | Stop reason: SDUPTHER

## 2018-02-14 ENCOUNTER — TELEPHONE (OUTPATIENT)
Dept: CARDIOLOGY | Facility: CLINIC | Age: 58
End: 2018-02-14

## 2018-02-14 RX ORDER — LOSARTAN POTASSIUM 50 MG/1
100 TABLET ORAL DAILY
Qty: 180 TABLET | Refills: 1 | Status: SHIPPED | OUTPATIENT
Start: 2018-02-14 | End: 2018-06-15 | Stop reason: SDUPTHER

## 2018-02-14 NOTE — TELEPHONE ENCOUNTER
2/14/18  Pt left vmsg on [employer]'s vm - states she has almost run out of her losartan 50 and needs the new Rx for 100 mg called in to [facility].  I do not see where this was increased to 100 mg.  So I did not refill.  Please advise.  Pt ph [phone]/philippe

## 2018-05-01 RX ORDER — LEVOCETIRIZINE DIHYDROCHLORIDE 5 MG/1
TABLET, FILM COATED ORAL
Qty: 30 TABLET | Refills: 4 | Status: SHIPPED | OUTPATIENT
Start: 2018-05-01 | End: 2018-07-19

## 2018-05-11 ENCOUNTER — OFFICE VISIT (OUTPATIENT)
Dept: FAMILY MEDICINE CLINIC | Facility: CLINIC | Age: 58
End: 2018-05-11

## 2018-05-11 VITALS
RESPIRATION RATE: 14 BRPM | TEMPERATURE: 98.4 F | SYSTOLIC BLOOD PRESSURE: 124 MMHG | WEIGHT: 186 LBS | BODY MASS INDEX: 30.99 KG/M2 | DIASTOLIC BLOOD PRESSURE: 70 MMHG | HEART RATE: 74 BPM | OXYGEN SATURATION: 97 % | HEIGHT: 65 IN

## 2018-05-11 DIAGNOSIS — J40 SINOBRONCHITIS: Primary | ICD-10-CM

## 2018-05-11 DIAGNOSIS — F41.8 SITUATIONAL ANXIETY: ICD-10-CM

## 2018-05-11 DIAGNOSIS — J32.9 SINOBRONCHITIS: Primary | ICD-10-CM

## 2018-05-11 PROCEDURE — 90632 HEPA VACCINE ADULT IM: CPT | Performed by: FAMILY MEDICINE

## 2018-05-11 PROCEDURE — 90471 IMMUNIZATION ADMIN: CPT | Performed by: FAMILY MEDICINE

## 2018-05-11 PROCEDURE — 99214 OFFICE O/P EST MOD 30 MIN: CPT | Performed by: FAMILY MEDICINE

## 2018-05-11 RX ORDER — PREDNISONE 20 MG/1
40 TABLET ORAL DAILY
Qty: 10 TABLET | Refills: 0 | Status: SHIPPED | OUTPATIENT
Start: 2018-05-11 | End: 2018-05-17 | Stop reason: SDUPTHER

## 2018-05-11 RX ORDER — GUAIFENESIN AND CODEINE PHOSPHATE 100; 10 MG/5ML; MG/5ML
10 SOLUTION ORAL 3 TIMES DAILY PRN
Qty: 180 ML | Refills: 0 | Status: SHIPPED | OUTPATIENT
Start: 2018-05-11 | End: 2018-07-19

## 2018-05-11 RX ORDER — ALBUTEROL SULFATE 90 UG/1
2 AEROSOL, METERED RESPIRATORY (INHALATION) EVERY 4 HOURS PRN
Qty: 1 INHALER | Refills: 2 | Status: SHIPPED | OUTPATIENT
Start: 2018-05-11 | End: 2018-07-19

## 2018-05-11 RX ORDER — AZITHROMYCIN 250 MG/1
TABLET, FILM COATED ORAL
Qty: 6 TABLET | Refills: 0 | Status: SHIPPED | OUTPATIENT
Start: 2018-05-11 | End: 2018-07-19

## 2018-05-11 RX ORDER — ALPRAZOLAM 0.25 MG/1
0.25 TABLET ORAL 2 TIMES DAILY PRN
Qty: 5 TABLET | Refills: 0 | Status: SHIPPED | OUTPATIENT
Start: 2018-05-11 | End: 2018-10-12 | Stop reason: SDUPTHER

## 2018-05-11 NOTE — PROGRESS NOTES
Amanda Sherwood is a 57 y.o. female.     Chief Complaint   Patient presents with   • Cough     Patient has a cough, congestion, and sob.    • Hepatitis A     Patient needs a hep A vaccine.    • Anxiety     Patient needs a refill on Xanax she is going on trip next week.        HPI     Patient presents office today to discuss an issue that is new to me as well as history of anxiety.  Patient uses Xanax on a when necessary basis mostly when she is traveling.  She's about to go on a trip and would like a refill.  No adverse side effects from this medication.  Patient has 6 days of increasing shortness of breath, cough, congestion.  She's tried some over-the-counter therapies have not improved her symptoms.  Symptoms are getting worse.    The following portions of the patient's history were reviewed and updated as appropriate: allergies, current medications, past family history, past medical history, past social history, past surgical history and problem list.    Review of Systems   Respiratory: Positive for cough, chest tightness and shortness of breath.    All other systems reviewed and are negative.      Objective  Vitals:    05/11/18 1409   BP: 124/70   Pulse: 74   Resp: 14   Temp: 98.4 °F (36.9 °C)   SpO2: 97%       Physical Exam   Constitutional: She is oriented to person, place, and time. She appears well-developed and well-nourished. No distress.   HENT:   Head: Normocephalic and atraumatic.   Right Ear: Tympanic membrane, external ear and ear canal normal.   Left Ear: Tympanic membrane, external ear and ear canal normal.   Nose: Mucosal edema and rhinorrhea present. Right sinus exhibits no maxillary sinus tenderness and no frontal sinus tenderness. Left sinus exhibits no maxillary sinus tenderness and no frontal sinus tenderness.   Mouth/Throat: Uvula is midline. Posterior oropharyngeal erythema present. No oropharyngeal exudate, posterior oropharyngeal edema or tonsillar abscesses. No tonsillar exudate.    Eyes: Conjunctivae, EOM and lids are normal. Pupils are equal, round, and reactive to light.   Cardiovascular: Normal rate, regular rhythm and normal heart sounds.  Exam reveals no friction rub.    No murmur heard.  Pulmonary/Chest: Effort normal and breath sounds normal. No respiratory distress. She has no wheezes. She has no rales.   Abdominal: Soft. Bowel sounds are normal. She exhibits no distension. There is no tenderness. There is no rebound and no guarding.   Neurological: She is alert and oriented to person, place, and time.   Skin: Skin is warm and dry. She is not diaphoretic.   Nursing note and vitals reviewed.        Current Outpatient Prescriptions:   •  albuterol (PROVENTIL HFA;VENTOLIN HFA) 108 (90 Base) MCG/ACT inhaler, Inhale 2 puffs Every 4 (Four) Hours As Needed for Wheezing., Disp: 1 inhaler, Rfl: 02  •  ALPRAZolam (XANAX) 0.25 MG tablet, Take 1 tablet by mouth 2 (Two) Times a Day As Needed for Anxiety., Disp: 5 tablet, Rfl: 0  •  aspirin 81 MG tablet, Take  by mouth daily., Disp: , Rfl:   •  calcium carbonate (TUMS) 500 MG chewable tablet, Chew 1 tablet daily., Disp: , Rfl:   •  Cholecalciferol (VITAMIN D3) 5000 UNITS capsule capsule, Take 5,000 Units by mouth daily., Disp: , Rfl:   •  esomeprazole (NexIUM) 20 MG capsule, Take  by mouth daily., Disp: , Rfl:   •  Glucosamine HCl 1000 MG tablet, Take 2 tablets by mouth daily., Disp: , Rfl:   •  hydrochlorothiazide (HYDRODIURIL) 25 MG tablet, Take 1 tablet by mouth Daily., Disp: 30 tablet, Rfl: 5  •  levocetirizine (XYZAL) 5 MG tablet, TAKE ONE TABLET BY MOUTH EVERY EVENING, Disp: 30 tablet, Rfl: 4  •  losartan (COZAAR) 50 MG tablet, Take 2 tablets by mouth Daily., Disp: 180 tablet, Rfl: 1  •  Multiple Vitamins-Minerals (MULTIVITAMINS) chewable tablet, Chew daily., Disp: , Rfl:   •  propranolol LA (INDERAL LA) 160 MG 24 hr capsule, Take 1 capsule by mouth 2 (Two) Times a Day., Disp: 60 capsule, Rfl: 6  •  azithromycin (ZITHROMAX Z-ENRRIQUE) 250 MG  tablet, Take 2 tablets the first day, then 1 tablet daily for 4 days., Disp: 6 tablet, Rfl: 0  •  guaifenesin-codeine (GUAIFENESIN AC) 100-10 MG/5ML liquid, Take 10 mL by mouth 3 (Three) Times a Day As Needed for Cough., Disp: 180 mL, Rfl: 0  •  predniSONE (DELTASONE) 20 MG tablet, Take 2 tablets by mouth Daily., Disp: 10 tablet, Rfl: 0  •  Vortioxetine HBr (TRINTELLIX) 10 MG tablet, Take 10 mg by mouth Daily., Disp: , Rfl:     Procedures    Lab Results (most recent)     None              Amanda was seen today for cough, hepatitis a and anxiety.    Diagnoses and all orders for this visit:    Sinobronchitis  -     ALPRAZolam (XANAX) 0.25 MG tablet; Take 1 tablet by mouth 2 (Two) Times a Day As Needed for Anxiety.  -     predniSONE (DELTASONE) 20 MG tablet; Take 2 tablets by mouth Daily.  -     azithromycin (ZITHROMAX Z-ENRRIQUE) 250 MG tablet; Take 2 tablets the first day, then 1 tablet daily for 4 days.  -     guaifenesin-codeine (GUAIFENESIN AC) 100-10 MG/5ML liquid; Take 10 mL by mouth 3 (Three) Times a Day As Needed for Cough.  -     albuterol (PROVENTIL HFA;VENTOLIN HFA) 108 (90 Base) MCG/ACT inhaler; Inhale 2 puffs Every 4 (Four) Hours As Needed for Wheezing.    Situational anxiety  -     ALPRAZolam (XANAX) 0.25 MG tablet; Take 1 tablet by mouth 2 (Two) Times a Day As Needed for Anxiety.    Other orders  -     Hepatitis A Vaccine Adult IM    Extensive conversation had with patient getting treatment options.  We'll give steroids and azithromycin as above.  Also cough syrup.  Refills given for Xanax.      Return in about 2 weeks (around 5/25/2018) for Recheck.      Finesse Dao MD

## 2018-05-17 ENCOUNTER — OFFICE VISIT (OUTPATIENT)
Dept: FAMILY MEDICINE CLINIC | Facility: CLINIC | Age: 58
End: 2018-05-17

## 2018-05-17 VITALS
TEMPERATURE: 98.7 F | DIASTOLIC BLOOD PRESSURE: 82 MMHG | HEART RATE: 71 BPM | WEIGHT: 186 LBS | OXYGEN SATURATION: 99 % | RESPIRATION RATE: 14 BRPM | SYSTOLIC BLOOD PRESSURE: 150 MMHG | HEIGHT: 65 IN | BODY MASS INDEX: 30.99 KG/M2

## 2018-05-17 DIAGNOSIS — R06.2 WHEEZING: ICD-10-CM

## 2018-05-17 DIAGNOSIS — R06.02 SOB (SHORTNESS OF BREATH): ICD-10-CM

## 2018-05-17 DIAGNOSIS — J40 SINOBRONCHITIS: ICD-10-CM

## 2018-05-17 DIAGNOSIS — J32.9 SINOBRONCHITIS: ICD-10-CM

## 2018-05-17 DIAGNOSIS — R05.9 COUGH: Primary | ICD-10-CM

## 2018-05-17 PROCEDURE — 99214 OFFICE O/P EST MOD 30 MIN: CPT | Performed by: FAMILY MEDICINE

## 2018-05-17 PROCEDURE — 71046 X-RAY EXAM CHEST 2 VIEWS: CPT | Performed by: FAMILY MEDICINE

## 2018-05-17 RX ORDER — PREDNISONE 20 MG/1
40 TABLET ORAL DAILY
Qty: 10 TABLET | Refills: 0 | Status: SHIPPED | OUTPATIENT
Start: 2018-05-17 | End: 2018-07-19

## 2018-05-17 NOTE — PROGRESS NOTES
Amanda Sherwood is a 57 y.o. female.     Chief Complaint   Patient presents with   • Cough     Patient is following upon cough, sob, and wheezing. She is not feeling any better she did use Z laz ans prednison.        HPI     Patient presents the office today with acute exacerbation of cough, shortness of breath, wheezing.  She was seen in our office last week and diagnosed sinobronchitis.  She was given a prescription for azithromycin, prednisone, some cough syrup, and inhaler.  She's been using these as prescribed.  She states she feels about 50% better but still has a terrible cough and shortness of breath.    The following portions of the patient's history were reviewed and updated as appropriate: allergies, current medications, past family history, past medical history, past social history, past surgical history and problem list.    Review of Systems   HENT: Positive for congestion.    Respiratory: Positive for cough, chest tightness and shortness of breath.    All other systems reviewed and are negative.      Objective  Vitals:    05/17/18 1350   BP: 150/82   Pulse: 71   Resp: 14   Temp: 98.7 °F (37.1 °C)   SpO2: 99%       Physical Exam   Constitutional: She is oriented to person, place, and time. She appears well-developed and well-nourished. No distress.   HENT:   Head: Normocephalic and atraumatic.   Right Ear: Tympanic membrane, external ear and ear canal normal.   Left Ear: Tympanic membrane, external ear and ear canal normal.   Nose: Mucosal edema and rhinorrhea present. Right sinus exhibits no maxillary sinus tenderness and no frontal sinus tenderness. Left sinus exhibits no maxillary sinus tenderness and no frontal sinus tenderness.   Mouth/Throat: Uvula is midline. Posterior oropharyngeal erythema present. No oropharyngeal exudate, posterior oropharyngeal edema or tonsillar abscesses. No tonsillar exudate.   Eyes: Conjunctivae, EOM and lids are normal. Pupils are equal, round, and reactive to light.    Cardiovascular: Normal rate, regular rhythm and normal heart sounds.  Exam reveals no friction rub.    No murmur heard.  Pulmonary/Chest: Effort normal and breath sounds normal. No respiratory distress. She has no wheezes. She has no rales.   Abdominal: Soft. Bowel sounds are normal. She exhibits no distension. There is no tenderness. There is no rebound and no guarding.   Neurological: She is alert and oriented to person, place, and time.   Skin: Skin is warm and dry. She is not diaphoretic.   Nursing note and vitals reviewed.        Current Outpatient Prescriptions:   •  albuterol (PROVENTIL HFA;VENTOLIN HFA) 108 (90 Base) MCG/ACT inhaler, Inhale 2 puffs Every 4 (Four) Hours As Needed for Wheezing., Disp: 1 inhaler, Rfl: 02  •  ALPRAZolam (XANAX) 0.25 MG tablet, Take 1 tablet by mouth 2 (Two) Times a Day As Needed for Anxiety., Disp: 5 tablet, Rfl: 0  •  aspirin 81 MG tablet, Take  by mouth daily., Disp: , Rfl:   •  calcium carbonate (TUMS) 500 MG chewable tablet, Chew 1 tablet daily., Disp: , Rfl:   •  Cholecalciferol (VITAMIN D3) 5000 UNITS capsule capsule, Take 5,000 Units by mouth daily., Disp: , Rfl:   •  esomeprazole (NexIUM) 20 MG capsule, Take  by mouth daily., Disp: , Rfl:   •  Glucosamine HCl 1000 MG tablet, Take 2 tablets by mouth daily., Disp: , Rfl:   •  guaifenesin-codeine (GUAIFENESIN AC) 100-10 MG/5ML liquid, Take 10 mL by mouth 3 (Three) Times a Day As Needed for Cough., Disp: 180 mL, Rfl: 0  •  hydrochlorothiazide (HYDRODIURIL) 25 MG tablet, Take 1 tablet by mouth Daily., Disp: 30 tablet, Rfl: 5  •  levocetirizine (XYZAL) 5 MG tablet, TAKE ONE TABLET BY MOUTH EVERY EVENING, Disp: 30 tablet, Rfl: 4  •  losartan (COZAAR) 50 MG tablet, Take 2 tablets by mouth Daily., Disp: 180 tablet, Rfl: 1  •  propranolol LA (INDERAL LA) 160 MG 24 hr capsule, Take 1 capsule by mouth 2 (Two) Times a Day., Disp: 60 capsule, Rfl: 6  •  Vortioxetine HBr (TRINTELLIX) 10 MG tablet, Take 10 mg by mouth Daily., Disp: ,  Rfl:   •  azithromycin (ZITHROMAX Z-ENRRIQUE) 250 MG tablet, Take 2 tablets the first day, then 1 tablet daily for 4 days., Disp: 6 tablet, Rfl: 0  •  Multiple Vitamins-Minerals (MULTIVITAMINS) chewable tablet, Chew daily., Disp: , Rfl:   •  predniSONE (DELTASONE) 20 MG tablet, Take 2 tablets by mouth Daily., Disp: 10 tablet, Rfl: 0    Procedures    Lab Results (most recent)     None              Amanda was seen today for cough.    Diagnoses and all orders for this visit:    Cough  -     XR Chest PA & Lateral (In Office)    SOB (shortness of breath)  -     XR Chest PA & Lateral (In Office)    Wheezing  -     XR Chest PA & Lateral (In Office)    Sinobronchitis  -     predniSONE (DELTASONE) 20 MG tablet; Take 2 tablets by mouth Daily.    Chest x-ray done in the office 2 view for cough.  No comparisons available.  No acute pulmonary process noted.  Clear costophrenic angles.  Heart size is normal.    Physical exam is reassuring.  We'll try another 5 days of oral steroids.  If no improvement will try Augmentin.  Will also consider referral to specialist.      Return in about 1 week (around 5/24/2018) for Recheck.      Finesse Dao MD

## 2018-05-22 ENCOUNTER — TELEPHONE (OUTPATIENT)
Dept: FAMILY MEDICINE CLINIC | Facility: CLINIC | Age: 58
End: 2018-05-22

## 2018-05-23 ENCOUNTER — TELEPHONE (OUTPATIENT)
Dept: FAMILY MEDICINE CLINIC | Facility: CLINIC | Age: 58
End: 2018-05-23

## 2018-05-23 NOTE — TELEPHONE ENCOUNTER
Pt left message saying that she needs a letter to get reimbursement on her flight. She said she talked to Dr. mao about this during her visit.     She also said that she has conjunctivitis she needs a eye drop to be called into the pharmacy.

## 2018-05-25 RX ORDER — POLYMYXIN B SULFATE AND TRIMETHOPRIM 1; 10000 MG/ML; [USP'U]/ML
1 SOLUTION OPHTHALMIC EVERY 4 HOURS
Qty: 10 ML | Refills: 1 | Status: SHIPPED | OUTPATIENT
Start: 2018-05-25 | End: 2018-07-19

## 2018-05-25 NOTE — TELEPHONE ENCOUNTER
Would you please contact the patient and get the specifics for the note she needs for her flights?  I'm happy to write that but I can't remember the specific details as to when she had to cancel her trip so I can put those in the note.

## 2018-05-25 NOTE — TELEPHONE ENCOUNTER
Patient left another message requesting eye drops for pink eye. Will you prescribe something for this?      Also, checking the status for note to airlines.    Please advise.

## 2018-06-13 DIAGNOSIS — J44.9 CHRONIC RECURRENT BRONCHIOLITIS (HCC): Primary | ICD-10-CM

## 2018-06-13 DIAGNOSIS — J40 BRONCHITIS: Primary | ICD-10-CM

## 2018-06-15 RX ORDER — LOSARTAN POTASSIUM 50 MG/1
TABLET ORAL
Qty: 60 TABLET | Refills: 5 | Status: SHIPPED | OUTPATIENT
Start: 2018-06-15 | End: 2018-08-15

## 2018-07-10 ENCOUNTER — OFFICE VISIT (OUTPATIENT)
Dept: FAMILY MEDICINE CLINIC | Facility: CLINIC | Age: 58
End: 2018-07-10

## 2018-07-10 VITALS
RESPIRATION RATE: 14 BRPM | TEMPERATURE: 98 F | WEIGHT: 186 LBS | DIASTOLIC BLOOD PRESSURE: 60 MMHG | HEART RATE: 77 BPM | OXYGEN SATURATION: 97 % | SYSTOLIC BLOOD PRESSURE: 118 MMHG | HEIGHT: 65 IN | BODY MASS INDEX: 30.99 KG/M2

## 2018-07-10 DIAGNOSIS — M25.562 ACUTE PAIN OF LEFT KNEE: Primary | ICD-10-CM

## 2018-07-10 PROCEDURE — 73562 X-RAY EXAM OF KNEE 3: CPT | Performed by: FAMILY MEDICINE

## 2018-07-10 PROCEDURE — 99214 OFFICE O/P EST MOD 30 MIN: CPT | Performed by: FAMILY MEDICINE

## 2018-07-10 NOTE — PROGRESS NOTES
Amanda Sherwood is a 57 y.o. female.     Chief Complaint   Patient presents with   • Knee Pain     Patient has left knee pain she was playing tennis yesterday and stated hurting her.        HPI     Patient presented to the office today to discuss a new problem.  Patient experienced acute onset of left knee pain after playing tennis over the course of 4 days.  She does not recall any twisting injury but states that her knee began to feel unstable and became more and more painful over time.  Pain is worse getting up from sitting or lifting her leg up to get into her car.  Maybe noticed some minimal swelling.  Has tried some ibuprofen which has not helped her symptoms.  She is mostly concerned because the instability of the joint makes her nervous.    The following portions of the patient's history were reviewed and updated as appropriate: allergies, current medications, past family history, past medical history, past social history, past surgical history and problem list.    Review of Systems   Musculoskeletal: Positive for joint swelling.   All other systems reviewed and are negative.      Objective  Vitals:    07/10/18 1435   BP: 118/60   Pulse: 77   Resp: 14   Temp: 98 °F (36.7 °C)   SpO2: 97%       Physical Exam   Constitutional: She is oriented to person, place, and time. She appears well-developed and well-nourished. No distress.   Musculoskeletal:        Left knee: She exhibits decreased range of motion, swelling, effusion, bony tenderness and abnormal meniscus. She exhibits no ecchymosis, no deformity, no laceration, no erythema, normal alignment, no LCL laxity, normal patellar mobility and no MCL laxity. Tenderness found. Medial joint line, lateral joint line, MCL, LCL and patellar tendon tenderness noted.   Neurological: She is alert and oriented to person, place, and time.   Skin: She is not diaphoretic.   Psychiatric: She has a normal mood and affect. Her behavior is normal.   Nursing note and vitals  reviewed.        Current Outpatient Prescriptions:   •  albuterol (PROVENTIL HFA;VENTOLIN HFA) 108 (90 Base) MCG/ACT inhaler, Inhale 2 puffs Every 4 (Four) Hours As Needed for Wheezing., Disp: 1 inhaler, Rfl: 02  •  ALPRAZolam (XANAX) 0.25 MG tablet, Take 1 tablet by mouth 2 (Two) Times a Day As Needed for Anxiety., Disp: 5 tablet, Rfl: 0  •  aspirin 81 MG tablet, Take  by mouth daily., Disp: , Rfl:   •  azithromycin (ZITHROMAX Z-ENRRIQUE) 250 MG tablet, Take 2 tablets the first day, then 1 tablet daily for 4 days., Disp: 6 tablet, Rfl: 0  •  calcium carbonate (TUMS) 500 MG chewable tablet, Chew 1 tablet daily., Disp: , Rfl:   •  Cholecalciferol (VITAMIN D3) 5000 UNITS capsule capsule, Take 5,000 Units by mouth daily., Disp: , Rfl:   •  esomeprazole (NexIUM) 20 MG capsule, Take  by mouth daily., Disp: , Rfl:   •  Glucosamine HCl 1000 MG tablet, Take 2 tablets by mouth daily., Disp: , Rfl:   •  guaifenesin-codeine (GUAIFENESIN AC) 100-10 MG/5ML liquid, Take 10 mL by mouth 3 (Three) Times a Day As Needed for Cough., Disp: 180 mL, Rfl: 0  •  hydrochlorothiazide (HYDRODIURIL) 25 MG tablet, Take 1 tablet by mouth Daily., Disp: 30 tablet, Rfl: 5  •  levocetirizine (XYZAL) 5 MG tablet, TAKE ONE TABLET BY MOUTH EVERY EVENING, Disp: 30 tablet, Rfl: 4  •  losartan (COZAAR) 50 MG tablet, TAKE TWO TABLETS BY MOUTH DAILY, Disp: 60 tablet, Rfl: 5  •  Multiple Vitamins-Minerals (MULTIVITAMINS) chewable tablet, Chew daily., Disp: , Rfl:   •  predniSONE (DELTASONE) 20 MG tablet, Take 2 tablets by mouth Daily., Disp: 10 tablet, Rfl: 0  •  propranolol LA (INDERAL LA) 160 MG 24 hr capsule, Take 1 capsule by mouth 2 (Two) Times a Day., Disp: 60 capsule, Rfl: 6  •  trimethoprim-polymyxin b (POLYTRIM) 77576-6.1 UNIT/ML-% ophthalmic solution, Administer 1 drop to both eyes Every 4 (Four) Hours., Disp: 10 mL, Rfl: 1  •  Vortioxetine HBr (TRINTELLIX) 10 MG tablet, Take 10 mg by mouth Daily., Disp: , Rfl:     Procedures    Lab Results (most  recent)     None              Amanda was seen today for knee pain.    Diagnoses and all orders for this visit:    Acute pain of left knee  -     XR Knee 3 View Left  -     MRI knee left arthrogram; Future  -     FL Contrast Injection CT / MRI; Future    3 view x-rays of the left knee done in the office for pain.  No previous comparisons available..  To be a small joint effusion.  Some minimal osteoarthritis noted in the medial compartment.  Good joint spacing.  No acute osseous abnormality noted.    Physical exam somewhat concerning for possible ligamentous injury.  We'll proceed with MRI.  Discussed the use of nonsteroidal anti-inflammatories, rest, and ice.      Return in about 4 weeks (around 8/7/2018) for Recheck.      Finesse Dao MD

## 2018-07-11 ENCOUNTER — TELEPHONE (OUTPATIENT)
Dept: FAMILY MEDICINE CLINIC | Facility: CLINIC | Age: 58
End: 2018-07-11

## 2018-07-11 NOTE — TELEPHONE ENCOUNTER
Pt called and said she will be getting her arthrogram  Done next week and she cannot wait that long first she is in much pain and  has to work and second her daughter is getting  soon. She said if it ok to get MRI done instead since they will get her in sooner?

## 2018-07-12 NOTE — TELEPHONE ENCOUNTER
I'm not confident that a regular MRI would show exactly the ligaments and structure that we need with an arthrogram.  I'm happy to see if it can get you into an orthopedic surgeon before then though.  Please let us know.

## 2018-07-13 DIAGNOSIS — M25.562 ACUTE PAIN OF LEFT KNEE: Primary | ICD-10-CM

## 2018-07-13 NOTE — TELEPHONE ENCOUNTER
Pt said she needs to have regular MRI for now and after that she will get arthrogram. She thinks she has tear meniscus in her knee. She has appt scheduled already with orthopedic surgeon.   She is going to [facility] needs her order to be faxed over there.     Per Dr mao it is ok to order regular MRI.

## 2018-07-16 ENCOUNTER — APPOINTMENT (OUTPATIENT)
Dept: MRI IMAGING | Facility: HOSPITAL | Age: 58
End: 2018-07-16

## 2018-07-18 ENCOUNTER — APPOINTMENT (OUTPATIENT)
Dept: MRI IMAGING | Facility: HOSPITAL | Age: 58
End: 2018-07-18

## 2018-07-18 ENCOUNTER — HOSPITAL ENCOUNTER (OUTPATIENT)
Dept: GENERAL RADIOLOGY | Facility: HOSPITAL | Age: 58
Discharge: HOME | End: 2018-07-18

## 2018-07-19 ENCOUNTER — OFFICE VISIT (OUTPATIENT)
Dept: ORTHOPEDIC SURGERY | Facility: CLINIC | Age: 58
End: 2018-07-19

## 2018-07-19 VITALS — BODY MASS INDEX: 32.26 KG/M2 | HEIGHT: 65 IN | WEIGHT: 193.6 LBS

## 2018-07-19 DIAGNOSIS — S83.242A ACUTE MEDIAL MENISCUS TEAR OF LEFT KNEE, INITIAL ENCOUNTER: Primary | ICD-10-CM

## 2018-07-19 PROCEDURE — 99213 OFFICE O/P EST LOW 20 MIN: CPT | Performed by: ORTHOPAEDIC SURGERY

## 2018-07-19 RX ORDER — CETIRIZINE HYDROCHLORIDE 10 MG/1
10 TABLET ORAL AS NEEDED
COMMUNITY

## 2018-07-19 NOTE — PROGRESS NOTES
New Left Knee      Patient: Amanda Sherwood        YOB: 1960    Medical Record Number: [id number]        Chief Complaints: Left knee pain  Chief Complaint   Patient presents with   • Left Knee - Establish Care           History of Present Illness: This is a  57 y.o. 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   • calcium carbonate (TUMS) 500 MG chewable tablet Chew 1 tablet daily.   • Cholecalciferol (VITAMIN D3) 5000 UNITS capsule capsule Take 5,000 Units by mouth daily.   • esomeprazole (NexIUM) 20 MG capsule Take  by mouth daily.   • Glucosamine HCl 1000 MG tablet Take 2 tablets by mouth daily.   • hydrochlorothiazide (HYDRODIURIL) 25 MG tablet Take 1 tablet by mouth Daily.   • losartan (COZAAR) 50 MG tablet TAKE TWO TABLETS BY MOUTH DAILY   • Multiple Vitamins-Minerals (MULTIVITAMINS) chewable tablet Chew daily.   • propranolol LA (INDERAL LA) 160 MG 24 hr capsule Take 1 capsule by mouth 2 (Two) Times a Day.   • ALPRAZolam (XANAX) 0.25 MG tablet Take 1 tablet by mouth 2 (Two) Times a Day As Needed for Anxiety.   • [DISCONTINUED] albuterol (PROVENTIL HFA;VENTOLIN HFA) 108 (90 Base) MCG/ACT inhaler Inhale 2 puffs Every 4 (Four) Hours As Needed for Wheezing.   • [DISCONTINUED] azithromycin (ZITHROMAX Z-ENRRIQUE) 250 MG tablet Take 2 tablets the first day, then 1 tablet daily for 4 days.   • [DISCONTINUED] guaifenesin-codeine (GUAIFENESIN AC) 100-10 MG/5ML liquid Take 10 mL by mouth  3 (Three) Times a Day As Needed for Cough.   • [DISCONTINUED] levocetirizine (XYZAL) 5 MG tablet TAKE ONE TABLET BY MOUTH EVERY EVENING   • [DISCONTINUED] predniSONE (DELTASONE) 20 MG tablet Take 2 tablets by mouth Daily.   • [DISCONTINUED] trimethoprim-polymyxin b (POLYTRIM) 72372-1.1 UNIT/ML-% ophthalmic solution Administer 1 drop to both eyes Every 4 (Four) Hours.   • [DISCONTINUED] Vortioxetine HBr (TRINTELLIX) 10 MG tablet Take 10 mg by mouth Daily.     No current facility-administered medications on file prior to visit.      Current Medications:  Scheduled Meds:  Continuous Infusions:  No current facility-administered medications for this visit.   PRN Meds:.    Past Medical History:   Diagnosis Date   • Acute bilateral knee pain    • Allergic rhinitis    • Anemia    • Arthralgia of multiple sites    • Bilateral knee pain    • Chronic pain syndrome    • Chronic tension-type headache    • Disease of jaw    • Fear of flying    • Food allergy    • GERD (gastroesophageal reflux disease)    • Headache, tension-type    • Health care maintenance    • Hyperlipidemia    • Hypertension    • Knee osteoarthritis    • Leukocytopenia    • Leukopenia    • Long-term use of high-risk medication    • Migraine    • Neck pain    • Osteoarthritis of knee    • PVC (premature ventricular contraction)    • Reactive airway disease         Past Surgical History:   Procedure Laterality Date   • BREAST BIOPSY     • SALPINGO OOPHORECTOMY     • TONSILLECTOMY     • TUBAL ABDOMINAL LIGATION          Social History     Occupational History   • Not on file.     Social History Main Topics   • Smoking status: Former Smoker   • Smokeless tobacco: Never Used      Comment: no caffine use   • Alcohol use Yes      Comment: occasional use   • Drug use: No   • Sexual activity: Not on file    Social History     Social History Narrative   • No narrative on file        Family History   Problem Relation Age of Onset   • Dementia Mother    • Arthritis Mother  "   • Stroke Father    • Heart disease Father    • Hypertension Father    • Diabetes Father    • Cancer Father    • Colon cancer Father    • Breast cancer Sister              Review of Systems: 14 point review of systems remarkable for the knee pain only the remainder are negative    Review of Systems      Physical Exam: 57 y.o. female  General Appearance:    Alert, cooperative, in no acute distress                 Vitals:    07/19/18 1633   Weight: 87.8 kg (193 lb 9.6 oz)   Height: 165.1 cm (65\")      Patient is alert and read ×3 no acute distress appears her above-listed at height weight and age.  Affect is normal respiratory rate is normal unlabored. Heart rate regular rate rhythm, sclera, dentition and hearing are normal for the purpose of this exam.        Ortho Exam  Physical exam of the left knee reveals no effusion no redness.  The patient does have tenderness about the Medial joint line.  No tenderness about the lateral joint line.  A negative bounce home and a positive medial and lateral Cisco.    Patient has a stable ligamentous exam.  The patient has a negative Lachman and negative anterior drawer and a negative pivot shift.  Quads are reasonable and symmetric bilaterally.  Calf is soft and nontender.  There is no overlying skin changes no lymphedema lymphadenopathy.  Patient has good hip range of motion full symmetric and asymptomatic and a normal ankle exam      Procedures             Radiology:   AP, Lateral  of the left knee  were ordered/reviewed to evauateknee pain.  He able to review the reports of these these show some early degenerative changes of patellofemoral compartment also some marginal osteophyte formation around the medial compartment  Imaging Results (most recent)     None        Assessment/Plan:    Severe left knee pain I think this lady has a meniscus tear like to proceed with an MRI.  Her primary care as ordered an MR arthrogram of the knee do not think we need to arthrogram I will " attempt to get them to cancel this we'll put in a straightforward MRI we will proceed based on that we are going to call at [facility] and have him try to send her exercise of her x-rays to the PACS system

## 2018-07-20 ENCOUNTER — TELEPHONE (OUTPATIENT)
Dept: ORTHOPEDIC SURGERY | Facility: CLINIC | Age: 58
End: 2018-07-20

## 2018-07-20 NOTE — TELEPHONE ENCOUNTER
She has already been approved to have a left knee MRI that was ordered by Dr. Dao. [facility] will not approve our order unless Dr. Dao cancels his order and voids his auth, or calls [facility] and changes the facility to [facility] and schedules her there.  Those are our only two options at this point.  Patient was so advised, and is going to call Dr. Dao office to get this taken care of. Dr. Crockett is also aware of the situation.

## 2018-07-27 ENCOUNTER — TELEPHONE (OUTPATIENT)
Dept: FAMILY MEDICINE CLINIC | Facility: CLINIC | Age: 58
End: 2018-07-27

## 2018-08-06 ENCOUNTER — TELEPHONE (OUTPATIENT)
Dept: ORTHOPEDIC SURGERY | Facility: CLINIC | Age: 58
End: 2018-08-06

## 2018-08-06 DIAGNOSIS — S83.242D ACUTE MEDIAL MENISCUS TEAR OF LEFT KNEE, SUBSEQUENT ENCOUNTER: Primary | ICD-10-CM

## 2018-08-06 NOTE — TELEPHONE ENCOUNTER
Can you advise of MRI result that went to Dr. Dao since he was the one that ordered it. I can have her come in for f/u on Thursday or f/u. It shows meniscus tear.

## 2018-08-09 PROBLEM — S83.242A ACUTE MEDIAL MENISCUS TEAR OF LEFT KNEE: Status: ACTIVE | Noted: 2018-08-09

## 2018-08-15 ENCOUNTER — APPOINTMENT (OUTPATIENT)
Dept: PREADMISSION TESTING | Facility: HOSPITAL | Age: 58
End: 2018-08-15

## 2018-08-15 VITALS
HEIGHT: 65 IN | WEIGHT: 189 LBS | RESPIRATION RATE: 16 BRPM | OXYGEN SATURATION: 96 % | DIASTOLIC BLOOD PRESSURE: 91 MMHG | BODY MASS INDEX: 31.49 KG/M2 | TEMPERATURE: 97.8 F | HEART RATE: 68 BPM | SYSTOLIC BLOOD PRESSURE: 147 MMHG

## 2018-08-15 LAB
ANION GAP SERPL CALCULATED.3IONS-SCNC: 14.9 MMOL/L
BUN BLD-MCNC: 12 MG/DL (ref 6–20)
BUN/CREAT SERPL: 11.7 (ref 7–25)
CALCIUM SPEC-SCNC: 9.7 MG/DL (ref 8.6–10.5)
CHLORIDE SERPL-SCNC: 98 MMOL/L (ref 98–107)
CO2 SERPL-SCNC: 28.1 MMOL/L (ref 22–29)
CREAT BLD-MCNC: 1.03 MG/DL (ref 0.57–1)
DEPRECATED RDW RBC AUTO: 40.8 FL (ref 37–54)
ERYTHROCYTE [DISTWIDTH] IN BLOOD BY AUTOMATED COUNT: 11.5 % (ref 11.7–13)
GFR SERPL CREATININE-BSD FRML MDRD: 56 ML/MIN/1.73
GLUCOSE BLD-MCNC: 102 MG/DL (ref 65–99)
HCT VFR BLD AUTO: 42.8 % (ref 35.6–45.5)
HGB BLD-MCNC: 13.8 G/DL (ref 11.9–15.5)
MCH RBC QN AUTO: 31.4 PG (ref 26.9–32)
MCHC RBC AUTO-ENTMCNC: 32.2 G/DL (ref 32.4–36.3)
MCV RBC AUTO: 97.5 FL (ref 80.5–98.2)
PLATELET # BLD AUTO: 164 10*3/MM3 (ref 140–500)
PMV BLD AUTO: 11 FL (ref 6–12)
POTASSIUM BLD-SCNC: 4 MMOL/L (ref 3.5–5.2)
RBC # BLD AUTO: 4.39 10*6/MM3 (ref 3.9–5.2)
SODIUM BLD-SCNC: 141 MMOL/L (ref 136–145)
WBC NRBC COR # BLD: 2.74 10*3/MM3 (ref 4.5–10.7)

## 2018-08-15 PROCEDURE — 85027 COMPLETE CBC AUTOMATED: CPT | Performed by: ORTHOPAEDIC SURGERY

## 2018-08-15 PROCEDURE — 93005 ELECTROCARDIOGRAM TRACING: CPT

## 2018-08-15 PROCEDURE — 80048 BASIC METABOLIC PNL TOTAL CA: CPT | Performed by: ORTHOPAEDIC SURGERY

## 2018-08-15 PROCEDURE — 93010 ELECTROCARDIOGRAM REPORT: CPT | Performed by: INTERNAL MEDICINE

## 2018-08-15 PROCEDURE — 36415 COLL VENOUS BLD VENIPUNCTURE: CPT

## 2018-08-15 RX ORDER — BUDESONIDE AND FORMOTEROL FUMARATE DIHYDRATE 160; 4.5 UG/1; UG/1
2 AEROSOL RESPIRATORY (INHALATION)
COMMUNITY
End: 2019-06-06

## 2018-08-15 RX ORDER — LOSARTAN POTASSIUM 50 MG/1
100 TABLET ORAL DAILY
COMMUNITY
End: 2018-10-22

## 2018-08-15 NOTE — DISCHARGE INSTRUCTIONS
Take the following medications the morning of surgery with a small sip of water: SYMBICORT, NEXIUM, LOSARTAN, PROPRANOLOL        General Instructions:  • Do not eat solid food after midnight the night before surgery.  • You may drink clear liquids day of surgery but must stop at least one hour before your hospital arrival time.  • It is beneficial for you to have a clear drink that contains carbohydrates the day of surgery.  We suggest a 12 to 20 ounce bottle of Gatorade or Powerade for non-diabetic patients or a 12 to 20 ounce bottle of G2 or Powerade Zero for diabetic patients.     Clear liquids are liquids you can see through.  Nothing red in color.     Plain water                               Sports drinks  Sodas                                   Gelatin (Jell-O)  Fruit juices without pulp such as white grape juice and apple juice  Popsicles that contain no fruit or yogurt  Tea or coffee (no cream or milk added)  Gatorade / Powerade  G2 / Powerade Zero    • Do not smoke, use chewing tobacco or drink alcohol the day of surgery.   • Bring any papers given to you in the doctor’s office.  • Wear clean comfortable clothes and socks.  • Do not wear contact lenses or make-up.  Bring a case for your glasses.   • Remove all piercings.  Leave jewelry and any other valuables at home.  • The Pre-Admission Testing nurse will instruct you to bring medications if unable to obtain an accurate list in Pre-Admission Testing.            Preventing a Surgical Site Infection:  • For 2 to 3 days before surgery, avoid shaving with a razor because the razor can irritate skin and make it easier to develop an infection.    • Any areas of open skin can increase the risk of a post-operative wound infection by allowing bacteria to enter and travel throughout the body.  Notify your surgeon if you have any skin wounds / rashes even if it is not near the expected surgical site.  The area will need assessed to determine if surgery should be  delayed until it is healed.  • The night prior to surgery sleep in a clean bed with clean clothing.  Do not allow pets to sleep with you.  • Shower on the morning of surgery using a fresh bar of anti-bacterial soap (such as Dial) and clean washcloth.  Dry with a clean towel and dress in clean clothing.  • Ask your surgeon if you will be receiving antibiotics prior to surgery.  • Make sure you, your family, and all healthcare providers clean their hands with soap and water or an alcohol based hand  before caring for you or your wound.    Day of surgery: 8/22/2018. [facility]. ARRIVAL TIME 745AM  Upon arrival, a Pre-op nurse and Anesthesiologist will review your health history, obtain vital signs, and answer questions you may have.  The only belongings needed at this time will be your home medications and if applicable your C-PAP/BI-PAP machine.  If you are staying overnight your family can leave the rest of your belongings in the car and bring them to your room later.  A Pre-op nurse will start an IV and you may receive medication in preparation for surgery, including something to help you relax.  Your family will be able to see you in the Pre-op area.  While you are in surgery your family should notify the waiting room  if they leave the waiting room area and provide a contact phone number.    Please be aware that surgery does come with discomfort.  We want to make every effort to control your discomfort so please discuss any uncontrolled symptoms with your nurse.   Your doctor will most likely have prescribed pain medications.      If you are going home after surgery you will receive individualized written care instructions before being discharged.  A responsible adult must drive you to and from the hospital on the day of your surgery and stay with you for 24 hours.        You have received a list of surgical assistants for your reference.  If you have any questions please call Pre-Admission Testing  at [phone].  Deductibles and co-payments are collected on the day of service. Please be prepared to pay the required co-pay, deductible or deposit on the day of service as defined by your plan.

## 2018-08-22 ENCOUNTER — ANESTHESIA (OUTPATIENT)
Dept: PERIOP | Facility: HOSPITAL | Age: 58
End: 2018-08-22

## 2018-08-22 ENCOUNTER — HOSPITAL ENCOUNTER (OUTPATIENT)
Facility: HOSPITAL | Age: 58
Setting detail: HOSPITAL OUTPATIENT SURGERY
Discharge: HOME | End: 2018-08-22
Attending: ORTHOPAEDIC SURGERY | Admitting: ORTHOPAEDIC SURGERY

## 2018-08-22 ENCOUNTER — ANESTHESIA EVENT (OUTPATIENT)
Dept: PERIOP | Facility: HOSPITAL | Age: 58
End: 2018-08-22

## 2018-08-22 VITALS
RESPIRATION RATE: 16 BRPM | DIASTOLIC BLOOD PRESSURE: 92 MMHG | WEIGHT: 188.49 LBS | BODY MASS INDEX: 31.37 KG/M2 | OXYGEN SATURATION: 99 % | TEMPERATURE: 97.2 F | SYSTOLIC BLOOD PRESSURE: 157 MMHG | HEART RATE: 56 BPM

## 2018-08-22 PROCEDURE — 25010000002 FENTANYL CITRATE (PF) 100 MCG/2ML SOLUTION: Performed by: NURSE ANESTHETIST, CERTIFIED REGISTERED

## 2018-08-22 PROCEDURE — 25010000003 CEFAZOLIN IN DEXTROSE 2-4 GM/100ML-% SOLUTION: Performed by: ORTHOPAEDIC SURGERY

## 2018-08-22 PROCEDURE — 25010000002 PROPOFOL 10 MG/ML EMULSION: Performed by: NURSE ANESTHETIST, CERTIFIED REGISTERED

## 2018-08-22 PROCEDURE — 25010000002 ONDANSETRON PER 1 MG: Performed by: NURSE ANESTHETIST, CERTIFIED REGISTERED

## 2018-08-22 PROCEDURE — 25010000002 DEXAMETHASONE PER 1 MG: Performed by: NURSE ANESTHETIST, CERTIFIED REGISTERED

## 2018-08-22 PROCEDURE — 25010000002 METHYLPREDNISOLONE PER 80 MG: Performed by: ORTHOPAEDIC SURGERY

## 2018-08-22 PROCEDURE — 25010000002 MIDAZOLAM PER 1 MG: Performed by: ANESTHESIOLOGY

## 2018-08-22 PROCEDURE — 29881 ARTHRS KNE SRG MNISECTMY M/L: CPT | Performed by: ORTHOPAEDIC SURGERY

## 2018-08-22 RX ORDER — PROMETHAZINE HYDROCHLORIDE 25 MG/1
25 SUPPOSITORY RECTAL ONCE AS NEEDED
Status: DISCONTINUED | OUTPATIENT
Start: 2018-08-22 | End: 2018-08-22 | Stop reason: HOSPADM

## 2018-08-22 RX ORDER — FENTANYL CITRATE 50 UG/ML
50 INJECTION, SOLUTION INTRAMUSCULAR; INTRAVENOUS
Status: DISCONTINUED | OUTPATIENT
Start: 2018-08-22 | End: 2018-08-22 | Stop reason: HOSPADM

## 2018-08-22 RX ORDER — METHYLPREDNISOLONE ACETATE 80 MG/ML
INJECTION, SUSPENSION INTRA-ARTICULAR; INTRALESIONAL; INTRAMUSCULAR; SOFT TISSUE AS NEEDED
Status: DISCONTINUED | OUTPATIENT
Start: 2018-08-22 | End: 2018-08-22 | Stop reason: HOSPADM

## 2018-08-22 RX ORDER — EPHEDRINE SULFATE 50 MG/ML
5 INJECTION, SOLUTION INTRAVENOUS ONCE AS NEEDED
Status: DISCONTINUED | OUTPATIENT
Start: 2018-08-22 | End: 2018-08-22 | Stop reason: HOSPADM

## 2018-08-22 RX ORDER — PROMETHAZINE HYDROCHLORIDE 25 MG/ML
12.5 INJECTION, SOLUTION INTRAMUSCULAR; INTRAVENOUS ONCE AS NEEDED
Status: DISCONTINUED | OUTPATIENT
Start: 2018-08-22 | End: 2018-08-22 | Stop reason: HOSPADM

## 2018-08-22 RX ORDER — GLYCOPYRROLATE 0.2 MG/ML
INJECTION INTRAMUSCULAR; INTRAVENOUS AS NEEDED
Status: DISCONTINUED | OUTPATIENT
Start: 2018-08-22 | End: 2018-08-22 | Stop reason: SURG

## 2018-08-22 RX ORDER — SODIUM CHLORIDE, SODIUM LACTATE, POTASSIUM CHLORIDE, CALCIUM CHLORIDE 600; 310; 30; 20 MG/100ML; MG/100ML; MG/100ML; MG/100ML
9 INJECTION, SOLUTION INTRAVENOUS CONTINUOUS
Status: DISCONTINUED | OUTPATIENT
Start: 2018-08-22 | End: 2018-08-22 | Stop reason: HOSPADM

## 2018-08-22 RX ORDER — SODIUM CHLORIDE 0.9 % (FLUSH) 0.9 %
1-10 SYRINGE (ML) INJECTION AS NEEDED
Status: DISCONTINUED | OUTPATIENT
Start: 2018-08-22 | End: 2018-08-22 | Stop reason: HOSPADM

## 2018-08-22 RX ORDER — FENTANYL CITRATE 50 UG/ML
INJECTION, SOLUTION INTRAMUSCULAR; INTRAVENOUS AS NEEDED
Status: DISCONTINUED | OUTPATIENT
Start: 2018-08-22 | End: 2018-08-22 | Stop reason: SURG

## 2018-08-22 RX ORDER — ACETAMINOPHEN 325 MG/1
650 TABLET ORAL ONCE AS NEEDED
Status: DISCONTINUED | OUTPATIENT
Start: 2018-08-22 | End: 2018-08-22 | Stop reason: HOSPADM

## 2018-08-22 RX ORDER — FLUMAZENIL 0.1 MG/ML
0.2 INJECTION INTRAVENOUS AS NEEDED
Status: DISCONTINUED | OUTPATIENT
Start: 2018-08-22 | End: 2018-08-22 | Stop reason: HOSPADM

## 2018-08-22 RX ORDER — ONDANSETRON 2 MG/ML
INJECTION INTRAMUSCULAR; INTRAVENOUS AS NEEDED
Status: DISCONTINUED | OUTPATIENT
Start: 2018-08-22 | End: 2018-08-22 | Stop reason: SURG

## 2018-08-22 RX ORDER — DIPHENHYDRAMINE HYDROCHLORIDE 50 MG/ML
12.5 INJECTION INTRAMUSCULAR; INTRAVENOUS
Status: DISCONTINUED | OUTPATIENT
Start: 2018-08-22 | End: 2018-08-22 | Stop reason: HOSPADM

## 2018-08-22 RX ORDER — ONDANSETRON 2 MG/ML
4 INJECTION INTRAMUSCULAR; INTRAVENOUS ONCE AS NEEDED
Status: DISCONTINUED | OUTPATIENT
Start: 2018-08-22 | End: 2018-08-22 | Stop reason: HOSPADM

## 2018-08-22 RX ORDER — NALBUPHINE HCL 10 MG/ML
10 AMPUL (ML) INJECTION EVERY 4 HOURS PRN
Status: DISCONTINUED | OUTPATIENT
Start: 2018-08-22 | End: 2018-08-22 | Stop reason: HOSPADM

## 2018-08-22 RX ORDER — PROMETHAZINE HYDROCHLORIDE 25 MG/1
25 TABLET ORAL ONCE AS NEEDED
Status: DISCONTINUED | OUTPATIENT
Start: 2018-08-22 | End: 2018-08-22 | Stop reason: HOSPADM

## 2018-08-22 RX ORDER — ACETAMINOPHEN 650 MG/1
650 SUPPOSITORY RECTAL ONCE AS NEEDED
Status: DISCONTINUED | OUTPATIENT
Start: 2018-08-22 | End: 2018-08-22 | Stop reason: HOSPADM

## 2018-08-22 RX ORDER — SODIUM CHLORIDE, SODIUM LACTATE, POTASSIUM CHLORIDE, AND CALCIUM CHLORIDE .6; .31; .03; .02 G/100ML; G/100ML; G/100ML; G/100ML
IRRIGANT IRRIGATION AS NEEDED
Status: DISCONTINUED | OUTPATIENT
Start: 2018-08-22 | End: 2018-08-22 | Stop reason: HOSPADM

## 2018-08-22 RX ORDER — DEXAMETHASONE SODIUM PHOSPHATE 4 MG/ML
INJECTION, SOLUTION INTRA-ARTICULAR; INTRALESIONAL; INTRAMUSCULAR; INTRAVENOUS; SOFT TISSUE AS NEEDED
Status: DISCONTINUED | OUTPATIENT
Start: 2018-08-22 | End: 2018-08-22 | Stop reason: SURG

## 2018-08-22 RX ORDER — NALBUPHINE HCL 10 MG/ML
2 AMPUL (ML) INJECTION EVERY 4 HOURS PRN
Status: DISCONTINUED | OUTPATIENT
Start: 2018-08-22 | End: 2018-08-22 | Stop reason: HOSPADM

## 2018-08-22 RX ORDER — CEFAZOLIN SODIUM 2 G/100ML
2 INJECTION, SOLUTION INTRAVENOUS ONCE
Status: COMPLETED | OUTPATIENT
Start: 2018-08-22 | End: 2018-08-22

## 2018-08-22 RX ORDER — HYDROCODONE BITARTRATE AND ACETAMINOPHEN 5; 325 MG/1; MG/1
1 TABLET ORAL EVERY 4 HOURS PRN
Qty: 50 TABLET | Refills: 0 | Status: SHIPPED | OUTPATIENT
Start: 2018-08-22 | End: 2018-09-21

## 2018-08-22 RX ORDER — EPHEDRINE SULFATE 50 MG/ML
INJECTION, SOLUTION INTRAVENOUS AS NEEDED
Status: DISCONTINUED | OUTPATIENT
Start: 2018-08-22 | End: 2018-08-22 | Stop reason: SURG

## 2018-08-22 RX ORDER — OXYCODONE HYDROCHLORIDE AND ACETAMINOPHEN 5; 325 MG/1; MG/1
1 TABLET ORAL ONCE AS NEEDED
Status: DISCONTINUED | OUTPATIENT
Start: 2018-08-22 | End: 2018-08-22 | Stop reason: HOSPADM

## 2018-08-22 RX ORDER — NALOXONE HCL 0.4 MG/ML
0.4 VIAL (ML) INJECTION AS NEEDED
Status: DISCONTINUED | OUTPATIENT
Start: 2018-08-22 | End: 2018-08-22 | Stop reason: HOSPADM

## 2018-08-22 RX ORDER — PROMETHAZINE HYDROCHLORIDE 25 MG/ML
6.25 INJECTION, SOLUTION INTRAMUSCULAR; INTRAVENOUS ONCE AS NEEDED
Status: DISCONTINUED | OUTPATIENT
Start: 2018-08-22 | End: 2018-08-22 | Stop reason: HOSPADM

## 2018-08-22 RX ORDER — PROPOFOL 10 MG/ML
VIAL (ML) INTRAVENOUS AS NEEDED
Status: DISCONTINUED | OUTPATIENT
Start: 2018-08-22 | End: 2018-08-22 | Stop reason: SURG

## 2018-08-22 RX ORDER — NALOXONE HCL 0.4 MG/ML
0.2 VIAL (ML) INJECTION AS NEEDED
Status: DISCONTINUED | OUTPATIENT
Start: 2018-08-22 | End: 2018-08-22 | Stop reason: HOSPADM

## 2018-08-22 RX ORDER — ACETAMINOPHEN 325 MG/1
650 TABLET ORAL ONCE
Status: DISCONTINUED | OUTPATIENT
Start: 2018-08-22 | End: 2018-08-22 | Stop reason: HOSPADM

## 2018-08-22 RX ORDER — LIDOCAINE HYDROCHLORIDE 10 MG/ML
0.5 INJECTION, SOLUTION EPIDURAL; INFILTRATION; INTRACAUDAL; PERINEURAL ONCE AS NEEDED
Status: COMPLETED | OUTPATIENT
Start: 2018-08-22 | End: 2018-08-22

## 2018-08-22 RX ORDER — HYDROCODONE BITARTRATE AND ACETAMINOPHEN 7.5; 325 MG/1; MG/1
1 TABLET ORAL ONCE AS NEEDED
Status: COMPLETED | OUTPATIENT
Start: 2018-08-22 | End: 2018-08-22

## 2018-08-22 RX ORDER — BUPIVACAINE HYDROCHLORIDE AND EPINEPHRINE 2.5; 5 MG/ML; UG/ML
INJECTION, SOLUTION INFILTRATION; PERINEURAL AS NEEDED
Status: DISCONTINUED | OUTPATIENT
Start: 2018-08-22 | End: 2018-08-22 | Stop reason: HOSPADM

## 2018-08-22 RX ORDER — OXYCODONE AND ACETAMINOPHEN 7.5; 325 MG/1; MG/1
1 TABLET ORAL ONCE AS NEEDED
Status: DISCONTINUED | OUTPATIENT
Start: 2018-08-22 | End: 2018-08-22 | Stop reason: HOSPADM

## 2018-08-22 RX ORDER — MIDAZOLAM HYDROCHLORIDE 1 MG/ML
2 INJECTION INTRAMUSCULAR; INTRAVENOUS
Status: DISCONTINUED | OUTPATIENT
Start: 2018-08-22 | End: 2018-08-22 | Stop reason: HOSPADM

## 2018-08-22 RX ORDER — MIDAZOLAM HYDROCHLORIDE 1 MG/ML
1 INJECTION INTRAMUSCULAR; INTRAVENOUS
Status: DISCONTINUED | OUTPATIENT
Start: 2018-08-22 | End: 2018-08-22 | Stop reason: HOSPADM

## 2018-08-22 RX ORDER — LABETALOL HYDROCHLORIDE 5 MG/ML
5 INJECTION, SOLUTION INTRAVENOUS
Status: DISCONTINUED | OUTPATIENT
Start: 2018-08-22 | End: 2018-08-22 | Stop reason: HOSPADM

## 2018-08-22 RX ORDER — LIDOCAINE HYDROCHLORIDE 20 MG/ML
INJECTION, SOLUTION INFILTRATION; PERINEURAL AS NEEDED
Status: DISCONTINUED | OUTPATIENT
Start: 2018-08-22 | End: 2018-08-22 | Stop reason: SURG

## 2018-08-22 RX ORDER — PROMETHAZINE HYDROCHLORIDE 25 MG/1
12.5 TABLET ORAL ONCE AS NEEDED
Status: DISCONTINUED | OUTPATIENT
Start: 2018-08-22 | End: 2018-08-22 | Stop reason: HOSPADM

## 2018-08-22 RX ADMIN — GLYCOPYRROLATE 0.1 MG: 0.2 INJECTION INTRAMUSCULAR; INTRAVENOUS at 10:26

## 2018-08-22 RX ADMIN — EPHEDRINE SULFATE 10 MG: 50 INJECTION INTRAMUSCULAR; INTRAVENOUS; SUBCUTANEOUS at 10:26

## 2018-08-22 RX ADMIN — DEXAMETHASONE SODIUM PHOSPHATE 4 MG: 4 INJECTION, SOLUTION INTRAMUSCULAR; INTRAVENOUS at 10:14

## 2018-08-22 RX ADMIN — HYDROCODONE BITARTRATE AND ACETAMINOPHEN 1 TABLET: 7.5; 325 TABLET ORAL at 11:09

## 2018-08-22 RX ADMIN — GLYCOPYRROLATE 0.1 MG: 0.2 INJECTION INTRAMUSCULAR; INTRAVENOUS at 10:29

## 2018-08-22 RX ADMIN — LIDOCAINE HYDROCHLORIDE 100 MG: 20 INJECTION, SOLUTION INFILTRATION; PERINEURAL at 10:09

## 2018-08-22 RX ADMIN — LIDOCAINE HYDROCHLORIDE 0.5 ML: 10 INJECTION, SOLUTION EPIDURAL; INFILTRATION; INTRACAUDAL; PERINEURAL at 08:04

## 2018-08-22 RX ADMIN — ONDANSETRON 4 MG: 2 INJECTION INTRAMUSCULAR; INTRAVENOUS at 10:36

## 2018-08-22 RX ADMIN — MIDAZOLAM 2 MG: 1 INJECTION INTRAMUSCULAR; INTRAVENOUS at 08:11

## 2018-08-22 RX ADMIN — FENTANYL CITRATE 25 MCG: 50 INJECTION INTRAMUSCULAR; INTRAVENOUS at 10:09

## 2018-08-22 RX ADMIN — FENTANYL CITRATE 25 MCG: 50 INJECTION INTRAMUSCULAR; INTRAVENOUS at 10:13

## 2018-08-22 RX ADMIN — FENTANYL CITRATE 25 MCG: 50 INJECTION INTRAMUSCULAR; INTRAVENOUS at 10:35

## 2018-08-22 RX ADMIN — CEFAZOLIN SODIUM 2 G: 2 INJECTION, SOLUTION INTRAVENOUS at 10:09

## 2018-08-22 RX ADMIN — PROPOFOL 200 MG: 10 INJECTION, EMULSION INTRAVENOUS at 10:09

## 2018-08-22 RX ADMIN — EPHEDRINE SULFATE 10 MG: 50 INJECTION INTRAMUSCULAR; INTRAVENOUS; SUBCUTANEOUS at 10:21

## 2018-08-22 RX ADMIN — SODIUM CHLORIDE, POTASSIUM CHLORIDE, SODIUM LACTATE AND CALCIUM CHLORIDE 9 ML/HR: 600; 310; 30; 20 INJECTION, SOLUTION INTRAVENOUS at 08:04

## 2018-08-22 RX ADMIN — FENTANYL CITRATE 25 MCG: 50 INJECTION INTRAMUSCULAR; INTRAVENOUS at 10:36

## 2018-08-22 NOTE — ANESTHESIA POSTPROCEDURE EVALUATION
Patient: Amanda Sherwood    Procedure Summary     Date:  08/22/18 Room / Location:   [facility]  [facility]  [facility]    Anesthesia Start:  1006 Anesthesia Stop:  1058    Procedure:  KNEE ARTHROSCOPY LATERAL MENISECTOMY DEBRIDEMENT OF MEDIAL FEMORAL CONDYLE DEFECT DEBRIDEMENT OF ARTHRITIS (Left Knee) Diagnosis:       Acute medial meniscus tear of left knee, subsequent encounter      (Acute medial meniscus tear of left knee, subsequent encounter [S83.242D])    Surgeon:  Graciela Crockett MD Provider:  Lizzie, Alber Flower MD    Anesthesia Type:  general ASA Status:  2          Anesthesia Type: general  Last vitals  BP   157/92 (08/22/18 1154)   Temp   36.2 °C (97.2 °F) (08/22/18 1145)   Pulse   56 (08/22/18 1154)   Resp   16 (08/22/18 1154)     SpO2   99 % (08/22/18 1154)     Post Anesthesia Care and Evaluation    Patient location during evaluation: bedside  Patient participation: complete - patient participated  Level of consciousness: awake and alert  Pain management: adequate  Airway patency: patent  Anesthetic complications: No anesthetic complications    Cardiovascular status: acceptable  Respiratory status: acceptable  Hydration status: acceptable    Comments: /92 (BP Location: Right arm, Patient Position: Sitting)   Pulse 56   Temp 36.2 °C (97.2 °F) (Temporal Artery )   Resp 16   Wt 85.5 kg (188 lb 7.9 oz)   LMP 06/20/2016   SpO2 99%   BMI 31.37 kg/m²

## 2018-08-22 NOTE — ANESTHESIA POSTPROCEDURE EVALUATION
Patient: Amanda Sherwood    Procedure Summary     Date:  08/22/18 Room / Location:   [facility]  [facility]    Anesthesia Start:  1006 Anesthesia Stop:  1058    Procedure:  KNEE ARTHROSCOPY LATERAL MENISECTOMY DEBRIDEMENT OF MEDIAL FEMORAL CONDYLE DEFECT DEBRIDEMENT OF ARTHRITIS (Left Knee) Diagnosis:       Acute medial meniscus tear of left knee, subsequent encounter      (Acute medial meniscus tear of left knee, subsequent encounter [K91.420G])    Surgeon:  Graciela Crockett MD Provider:  Lizzie, Alber Flower MD    Anesthesia Type:  general ASA Status:  2          Anesthesia Type: general  Last vitals  BP   157/92 (08/22/18 1154)   Temp   36.2 °C (97.2 °F) (08/22/18 1145)   Pulse   56 (08/22/18 1154)   Resp   16 (08/22/18 1154)     SpO2   99 % (08/22/18 1154)     Post Anesthesia Care and Evaluation    Patient location during evaluation: bedside  Patient participation: complete - patient participated  Level of consciousness: awake  Pain score: 1  Pain management: adequate  Airway patency: patent  Anesthetic complications: No anesthetic complications    Cardiovascular status: acceptable  Respiratory status: acceptable  Hydration status: acceptable    Comments: --------------------            08/22/18               1154     --------------------   BP:       157/92     Pulse:      56       Resp:       16       Temp:                SpO2:      99%      --------------------

## 2018-08-22 NOTE — ANESTHESIA PREPROCEDURE EVALUATION
Anesthesia Evaluation     NPO Solid Status: > 8 hours  NPO Liquid Status: > 2 hours           Airway   Mallampati: II  TM distance: >3 FB  Neck ROM: full  Dental - normal exam     Pulmonary - normal exam     ROS comment: Hx of Bronchitis  Cardiovascular   Exercise tolerance: excellent (>7 METS)    ECG reviewed  Rhythm: regular    (+) hypertension, hyperlipidemia,   (-) murmur      Neuro/Psych  (+) headaches, psychiatric history Anxiety,     GI/Hepatic/Renal/Endo    (+)  GERD,      ROS Comment: Took nexium this am    Musculoskeletal     (+) neck pain,       ROS comment: Hx of TMJ but has good ROM  Abdominal  - normal exam   Substance History      OB/GYN          Other   (+) arthritis                     Anesthesia Plan    ASA 2     general   (  D/W R&B of GA including but not limited to: heart, lung, liver, kidney, neurologic problems, positioning injuries, dental damage, corneal abrasion and TMJ.  .)  intravenous induction

## 2018-08-22 NOTE — ANESTHESIA PROCEDURE NOTES
Airway  Urgency: elective    Date/Time: 8/22/2018 10:10 AM  Airway not difficult    General Information and Staff    Patient location during procedure: OR  Anesthesiologist: RENDER, MARTHA RAY  CRNA: JAVIER LOPEZ    Indications and Patient Condition  Indications for airway management: airway protection    Preoxygenated: yes  MILS maintained throughout  Mask difficulty assessment: 1 - vent by mask    Final Airway Details  Final airway type: supraglottic airway      Successful airway: classic      Number of attempts at approach: 1    Additional Comments  Patient in OR. Monitors on. BLVS. Pre 02 100%. SIVI. LMA placed with ease. No leak present. BBS and ETCO2 present. Secured. Teeth/lips as preop.

## 2018-08-31 ENCOUNTER — OFFICE VISIT (OUTPATIENT)
Dept: ORTHOPEDIC SURGERY | Facility: CLINIC | Age: 58
End: 2018-08-31

## 2018-08-31 VITALS — BODY MASS INDEX: 31.32 KG/M2 | WEIGHT: 188 LBS | HEIGHT: 65 IN | TEMPERATURE: 97.9 F

## 2018-08-31 DIAGNOSIS — Z98.890 S/P LEFT KNEE ARTHROSCOPY: Primary | ICD-10-CM

## 2018-08-31 PROCEDURE — 99024 POSTOP FOLLOW-UP VISIT: CPT | Performed by: ORTHOPAEDIC SURGERY

## 2018-09-04 DIAGNOSIS — Z78.0 POSTMENOPAUSAL: ICD-10-CM

## 2018-09-04 DIAGNOSIS — Z13.820 SCREENING FOR OSTEOPOROSIS: Primary | ICD-10-CM

## 2018-09-04 DIAGNOSIS — Z00.00 HEALTH MAINTENANCE EXAMINATION: ICD-10-CM

## 2018-09-04 PROBLEM — Z98.890 S/P LEFT KNEE ARTHROSCOPY: Status: ACTIVE | Noted: 2018-09-04

## 2018-09-13 RX ORDER — HYDROCHLOROTHIAZIDE 25 MG/1
TABLET ORAL
Qty: 30 TABLET | Refills: 4 | Status: SHIPPED | OUTPATIENT
Start: 2018-09-13 | End: 2018-10-22

## 2018-09-21 ENCOUNTER — OFFICE VISIT (OUTPATIENT)
Dept: ORTHOPEDIC SURGERY | Facility: CLINIC | Age: 58
End: 2018-09-21

## 2018-09-21 VITALS — WEIGHT: 193 LBS | HEIGHT: 65 IN | TEMPERATURE: 98.1 F | BODY MASS INDEX: 32.15 KG/M2

## 2018-09-21 DIAGNOSIS — Z98.890 S/P ARTHROSCOPY OF KNEE: Primary | ICD-10-CM

## 2018-09-21 PROCEDURE — 99024 POSTOP FOLLOW-UP VISIT: CPT | Performed by: ORTHOPAEDIC SURGERY

## 2018-09-21 RX ORDER — NAPROXEN 500 MG/1
500 TABLET ORAL 2 TIMES DAILY
Qty: 60 TABLET | Refills: 0 | Status: SHIPPED | OUTPATIENT
Start: 2018-09-21 | End: 2018-09-24 | Stop reason: HOSPADM

## 2018-09-21 NOTE — PROGRESS NOTES
Left Knee Scope follow Up       Patient: Amanda Sherwood        YOB: 1960      Chief Complaints:left  knee pain  Chief Complaint   Patient presents with   • Left Knee - Post-op         History of Present Illness: Pt is here f/u knee arthroscopy she was doing great but thinks she overdid it she doing a lot of activity and I suspect irritated her knee.  She is currently not taking any dedicated anti-inflammatory to therapeutic dose since she has backed off a little her symptoms have improved        Allergies:   Allergies   Allergen Reactions   • Celecoxib Hives and Swelling     hives   • Lisinopril Cough       Medications:   Home Medications:  Current Outpatient Prescriptions on File Prior to Visit   Medication Sig   • ALPRAZolam (XANAX) 0.25 MG tablet Take 1 tablet by mouth 2 (Two) Times a Day As Needed for Anxiety.   • aspirin 81 MG tablet Take 81 mg by mouth Daily. HOLD FOR SURGERY   • budesonide-formoterol (SYMBICORT) 160-4.5 MCG/ACT inhaler Inhale 2 puffs 2 (Two) Times a Day.   • calcium carbonate (TUMS) 500 MG chewable tablet Chew 1 tablet daily.   • cetirizine (zyrTEC) 10 MG tablet Take 10 mg by mouth Daily.   • Cholecalciferol (VITAMIN D3) 5000 UNITS capsule capsule Take 5,000 Units by mouth daily.   • esomeprazole (NexIUM) 20 MG capsule Take 20 mg by mouth Daily.   • Glucosamine HCl 1000 MG tablet Take 2 tablets by mouth daily.   • hydrochlorothiazide (HYDRODIURIL) 25 MG tablet TAKE ONE TABLET BY MOUTH DAILY   • losartan (COZAAR) 50 MG tablet Take 100 mg by mouth Daily.   • Multiple Vitamins-Minerals (MULTIVITAMINS) chewable tablet Chew 1 tablet Daily.   • propranolol LA (INDERAL LA) 160 MG 24 hr capsule Take 1 capsule by mouth 2 (Two) Times a Day.   • [DISCONTINUED] HYDROcodone-acetaminophen (NORCO) 5-325 MG per tablet Take 1 tablet by mouth Every 4 (Four) Hours As Needed for Severe Pain .     No current facility-administered medications on file prior to visit.      Current  "Medications:  Scheduled Meds:  Continuous Infusions:  No current facility-administered medications for this visit.   PRN Meds:.          Physical Exam: 57 y.o. female  General Appearance:    Alert, cooperative, in no acute distress                 Vitals:    09/21/18 1032   Temp: 98.1 °F (36.7 °C)   Weight: 87.5 kg (193 lb)   Height: 165.1 cm (65\")      Patient is alert and oriented ×3 no acute distress normal mood physical exam.  Physical exam of the knee, incisions looked good there is no erythema, calf is soft and non-tender.  No sign or sx of DVT      Assessment  S/P knee scope.  Overall doing well.  I do think she's overdone it a little bit        Plan: Continue with strengthening, I would like her backing off her activities little bit I will put her on Naprosyn with strict precautions I will see her back in 4 weeks    "

## 2018-09-22 ENCOUNTER — HOSPITAL ENCOUNTER (OUTPATIENT)
Facility: HOSPITAL | Age: 58
Setting detail: OBSERVATION
Discharge: HOME | End: 2018-09-24
Attending: EMERGENCY MEDICINE | Admitting: EMERGENCY MEDICINE

## 2018-09-22 ENCOUNTER — APPOINTMENT (OUTPATIENT)
Dept: GENERAL RADIOLOGY | Facility: HOSPITAL | Age: 58
End: 2018-09-22

## 2018-09-22 DIAGNOSIS — R07.9 CHEST PAIN, UNSPECIFIED TYPE: Primary | ICD-10-CM

## 2018-09-22 LAB
ALBUMIN SERPL-MCNC: 4.7 G/DL (ref 3.5–5.2)
ALBUMIN/GLOB SERPL: 1.8 G/DL
ALP SERPL-CCNC: 63 U/L (ref 39–117)
ALT SERPL W P-5'-P-CCNC: 59 U/L (ref 1–33)
ANION GAP SERPL CALCULATED.3IONS-SCNC: 15.2 MMOL/L
AST SERPL-CCNC: 78 U/L (ref 1–32)
BASOPHILS # BLD AUTO: 0.01 10*3/MM3 (ref 0–0.2)
BASOPHILS NFR BLD AUTO: 0.3 % (ref 0–1.5)
BILIRUB SERPL-MCNC: 1 MG/DL (ref 0.1–1.2)
BUN BLD-MCNC: 17 MG/DL (ref 6–20)
BUN/CREAT SERPL: 16.8 (ref 7–25)
CALCIUM SPEC-SCNC: 9.6 MG/DL (ref 8.6–10.5)
CHLORIDE SERPL-SCNC: 98 MMOL/L (ref 98–107)
CO2 SERPL-SCNC: 28.8 MMOL/L (ref 22–29)
CREAT BLD-MCNC: 1.01 MG/DL (ref 0.57–1)
DEPRECATED RDW RBC AUTO: 40.1 FL (ref 37–54)
EOSINOPHIL # BLD AUTO: 0.04 10*3/MM3 (ref 0–0.7)
EOSINOPHIL NFR BLD AUTO: 1 % (ref 0.3–6.2)
ERYTHROCYTE [DISTWIDTH] IN BLOOD BY AUTOMATED COUNT: 11.9 % (ref 11.7–13)
GFR SERPL CREATININE-BSD FRML MDRD: 57 ML/MIN/1.73
GLOBULIN UR ELPH-MCNC: 2.6 GM/DL
GLUCOSE BLD-MCNC: 113 MG/DL (ref 65–99)
HCT VFR BLD AUTO: 41.6 % (ref 35.6–45.5)
HGB BLD-MCNC: 14.7 G/DL (ref 11.9–15.5)
IMM GRANULOCYTES # BLD: 0.01 10*3/MM3 (ref 0–0.03)
IMM GRANULOCYTES NFR BLD: 0.3 % (ref 0–0.5)
LYMPHOCYTES # BLD AUTO: 1.01 10*3/MM3 (ref 0.9–4.8)
LYMPHOCYTES NFR BLD AUTO: 26.2 % (ref 19.6–45.3)
MCH RBC QN AUTO: 32.9 PG (ref 26.9–32)
MCHC RBC AUTO-ENTMCNC: 35.3 G/DL (ref 32.4–36.3)
MCV RBC AUTO: 93.1 FL (ref 80.5–98.2)
MONOCYTES # BLD AUTO: 0.33 10*3/MM3 (ref 0.2–1.2)
MONOCYTES NFR BLD AUTO: 8.5 % (ref 5–12)
NEUTROPHILS # BLD AUTO: 2.47 10*3/MM3 (ref 1.9–8.1)
NEUTROPHILS NFR BLD AUTO: 64 % (ref 42.7–76)
PLATELET # BLD AUTO: 169 10*3/MM3 (ref 140–500)
PMV BLD AUTO: 10.7 FL (ref 6–12)
POTASSIUM BLD-SCNC: 3.5 MMOL/L (ref 3.5–5.2)
PROT SERPL-MCNC: 7.3 G/DL (ref 6–8.5)
RBC # BLD AUTO: 4.47 10*6/MM3 (ref 3.9–5.2)
SODIUM BLD-SCNC: 142 MMOL/L (ref 136–145)
TROPONIN T SERPL-MCNC: <0.01 NG/ML (ref 0–0.03)
WBC NRBC COR # BLD: 3.86 10*3/MM3 (ref 4.5–10.7)

## 2018-09-22 PROCEDURE — 80053 COMPREHEN METABOLIC PANEL: CPT | Performed by: EMERGENCY MEDICINE

## 2018-09-22 PROCEDURE — G0378 HOSPITAL OBSERVATION PER HR: HCPCS

## 2018-09-22 PROCEDURE — 93010 ELECTROCARDIOGRAM REPORT: CPT | Performed by: INTERNAL MEDICINE

## 2018-09-22 PROCEDURE — 25010000002 ONDANSETRON PER 1 MG: Performed by: EMERGENCY MEDICINE

## 2018-09-22 PROCEDURE — 96374 THER/PROPH/DIAG INJ IV PUSH: CPT

## 2018-09-22 PROCEDURE — 99284 EMERGENCY DEPT VISIT MOD MDM: CPT

## 2018-09-22 PROCEDURE — 93005 ELECTROCARDIOGRAM TRACING: CPT | Performed by: EMERGENCY MEDICINE

## 2018-09-22 PROCEDURE — 71046 X-RAY EXAM CHEST 2 VIEWS: CPT

## 2018-09-22 PROCEDURE — 84484 ASSAY OF TROPONIN QUANT: CPT | Performed by: EMERGENCY MEDICINE

## 2018-09-22 PROCEDURE — 93005 ELECTROCARDIOGRAM TRACING: CPT

## 2018-09-22 PROCEDURE — 85025 COMPLETE CBC W/AUTO DIFF WBC: CPT | Performed by: EMERGENCY MEDICINE

## 2018-09-22 RX ORDER — SODIUM CHLORIDE 0.9 % (FLUSH) 0.9 %
10 SYRINGE (ML) INJECTION AS NEEDED
Status: DISCONTINUED | OUTPATIENT
Start: 2018-09-22 | End: 2018-09-24 | Stop reason: HOSPADM

## 2018-09-22 RX ORDER — ONDANSETRON 2 MG/ML
4 INJECTION INTRAMUSCULAR; INTRAVENOUS ONCE
Status: COMPLETED | OUTPATIENT
Start: 2018-09-22 | End: 2018-09-22

## 2018-09-22 RX ADMIN — ONDANSETRON 4 MG: 2 INJECTION INTRAMUSCULAR; INTRAVENOUS at 20:24

## 2018-09-22 NOTE — ED TRIAGE NOTES
"Ambulates to triage with steady gait and c/o hypertension at home with SOA and \"a feeling or pressure in my chest\". First look vitals obtained. PT placed in RM 23. Report to RN.  "

## 2018-09-22 NOTE — ED PROVIDER NOTES
EMERGENCY DEPARTMENT ENCOUNTER    CHIEF COMPLAINT  Chief Complaint: Chest pain  History given by: patient   History limited by: n/a  Room Number: 2210/1  PMD: Finesse Dao MD      HPI:  Pt is a 57 y.o. female who presents complaining of intermittent chest pressure for the past day. Pt states that she has noticed that she has been hypertensive along with the episodes of pressure. She also reports associated SOA and nausea. Currently, she denies CP. Pt states that she is being treated for asthma by Dr Jordon Seth, and for hypertension by Dr Maradiaga.     Duration:  1 day   Onset: gradual  Timing: intermittent   Location: mid sternal   Radiation: n/a  Quality: pressure   Intensity/Severity: mild   Progression: unchanged   Associated Symptoms: nausea, SOA, hypertension   Aggravating Factors: none  Alleviating Factors: none    PAST MEDICAL HISTORY  Active Ambulatory Problems     Diagnosis Date Noted   • Headache 03/18/2016   • Neck pain 03/18/2016   • Temporomandibular joint (TMJ) pain 03/18/2016   • Chronic cough 09/08/2016   • Anxiety disorder due to general medical condition with panic attack 09/08/2016   • Pain of left thigh 09/08/2016   • Bone pain 09/08/2016   • Vitamin D deficiency disease 09/08/2016   • Screening for osteoporosis 09/08/2016   • Health care maintenance 09/08/2016   • Essential hypertension 09/08/2016   • Thinning hair 09/08/2016   • Osteoarthrosis, localized, primary, knee 08/21/2017   • PVC (premature ventricular contraction)    • Hyperlipidemia    • Acute medial meniscus tear of left knee 08/09/2018   • S/P left knee arthroscopy 09/04/2018     Resolved Ambulatory Problems     Diagnosis Date Noted   • No Resolved Ambulatory Problems     Past Medical History:   Diagnosis Date   • Arthritis    • GERD (gastroesophageal reflux disease)    • History of bronchitis    • History of migraine    • Hyperlipidemia    • Hypertension    • Leukopenia    • Neck pain    • PVC (premature ventricular  contraction)    • Seasonal allergies    • Torn meniscus        PAST SURGICAL HISTORY  Past Surgical History:   Procedure Laterality Date   • BREAST BIOPSY      BENIGN. HORACE BREAST   • BUNIONECTOMY Left    • BUNIONECTOMY Left     REVISION   • KNEE ARTHROSCOPY Left 8/22/2018    Procedure: KNEE ARTHROSCOPY LATERAL MENISECTOMY DEBRIDEMENT OF MEDIAL FEMORAL CONDYLE DEFECT DEBRIDEMENT OF ARTHRITIS;  Surgeon: Graciela Crockett MD;  Location: [facility] OR [facility] [place];  Service: Orthopedics   • SALPINGO OOPHORECTOMY Left    • TONSILLECTOMY     • TUBAL ABDOMINAL LIGATION         FAMILY HISTORY  Family History   Problem Relation Age of Onset   • Dementia Mother    • Arthritis Mother    • Stroke Father    • Heart disease Father    • Hypertension Father    • Diabetes Father    • Cancer Father    • Colon cancer Father    • Breast cancer Sister    • Malig Hyperthermia Neg Hx        SOCIAL HISTORY  Social History     Social History   • Marital status:      Spouse name: N/A   • Number of children: N/A   • Years of education: N/A     Occupational History   • Not on file.     Social History Main Topics   • Smoking status: Former Smoker   • Smokeless tobacco: Never Used      Comment: IN [employer]IGHS[employer]OL   • Alcohol use Yes      Comment: occasional use   • Drug use: No   • Sexual activity: Not on file     Other Topics Concern   • Not on file     Social History Narrative   • No narrative on file       ALLERGIES  Celecoxib; Ace inhibitors; and Lisinopril    REVIEW OF SYSTEMS  Review of Systems   Constitutional: Negative for fever.   HENT: Negative for sore throat.    Eyes: Negative.    Respiratory: Positive for shortness of breath. Negative for cough.    Cardiovascular: Positive for chest pain.   Gastrointestinal: Positive for nausea. Negative for abdominal pain, diarrhea and vomiting.   Genitourinary: Negative for dysuria.   Musculoskeletal: Negative for neck pain.   Skin: Negative for rash.   Allergic/Immunologic: Negative.    Neurological:  Negative for weakness, numbness and headaches.   Hematological: Negative.    Psychiatric/Behavioral: Negative.    All other systems reviewed and are negative.      PHYSICAL EXAM  ED Triage Vitals   Temp Heart Rate Resp BP SpO2   09/22/18 1922 09/22/18 1922 09/22/18 1922 09/22/18 1932 09/22/18 1922   97.9 °F (36.6 °C) 83 18 (!) 175/108 99 %      Temp src Heart Rate Source Patient Position BP Location FiO2 (%)   09/22/18 1922 09/22/18 1922 -- -- --   Tympanic Monitor          Physical Exam   Constitutional: She is oriented to person, place, and time. No distress.   HENT:   Head: Normocephalic and atraumatic.   Eyes: Pupils are equal, round, and reactive to light. EOM are normal.   Neck: Normal range of motion. Neck supple.   Cardiovascular: Normal rate, regular rhythm and normal heart sounds.    Pulmonary/Chest: Effort normal and breath sounds normal. No respiratory distress.   Abdominal: Soft. There is no tenderness. There is no rebound and no guarding.   Musculoskeletal: Normal range of motion. She exhibits no edema.   Neurological: She is alert and oriented to person, place, and time. She has normal sensation and normal strength.   Skin: Skin is warm and dry. No rash noted.   Psychiatric: Mood and affect normal.   Nursing note and vitals reviewed.      LAB RESULTS  Lab Results (last 24 hours)     Procedure Component Value Units Date/Time    CBC & Differential [[id number]] Collected:  09/22/18 2015    Specimen:  Blood Updated:  09/22/18 2032    Narrative:       The following orders were created for panel order CBC & Differential.  Procedure                               Abnormality         Status                     ---------                               -----------         ------                     CBC Auto Differential[[id number]]        Abnormal            Final result                 Please view results for these tests on the individual orders.    Comprehensive Metabolic Panel [[id number]]  (Abnormal) Collected:   09/22/18 2015    Specimen:  Blood Updated:  09/22/18 2052     Glucose 113 (H) mg/dL      BUN 17 mg/dL      Creatinine 1.01 (H) mg/dL      Sodium 142 mmol/L      Potassium 3.5 mmol/L      Chloride 98 mmol/L      CO2 28.8 mmol/L      Calcium 9.6 mg/dL      Total Protein 7.3 g/dL      Albumin 4.70 g/dL      ALT (SGPT) 59 (H) U/L      AST (SGOT) 78 (H) U/L      Alkaline Phosphatase 63 U/L      Total Bilirubin 1.0 mg/dL      eGFR Non African Amer 57 (L) mL/min/1.73      Globulin 2.6 gm/dL      A/G Ratio 1.8 g/dL      BUN/Creatinine Ratio 16.8     Anion Gap 15.2 mmol/L     Troponin [[id number]]  (Normal) Collected:  09/22/18 2015    Specimen:  Blood Updated:  09/22/18 2052     Troponin T <0.010 ng/mL     Narrative:       Troponin T Reference Ranges:  Less than 0.03 ng/mL:    Negative for AMI  0.03 to 0.09 ng/mL:      Indeterminant for AMI  Greater than 0.09 ng/mL: Positive for AMI    CBC Auto Differential [[id number]]  (Abnormal) Collected:  09/22/18 2015    Specimen:  Blood Updated:  09/22/18 2032     WBC 3.86 (L) 10*3/mm3      RBC 4.47 10*6/mm3      Hemoglobin 14.7 g/dL      Hematocrit 41.6 %      MCV 93.1 fL      MCH 32.9 (H) pg      MCHC 35.3 g/dL      RDW 11.9 %      RDW-SD 40.1 fl      MPV 10.7 fL      Platelets 169 10*3/mm3      Neutrophil % 64.0 %      Lymphocyte % 26.2 %      Monocyte % 8.5 %      Eosinophil % 1.0 %      Basophil % 0.3 %      Immature Grans % 0.3 %      Neutrophils, Absolute 2.47 10*3/mm3      Lymphocytes, Absolute 1.01 10*3/mm3      Monocytes, Absolute 0.33 10*3/mm3      Eosinophils, Absolute 0.04 10*3/mm3      Basophils, Absolute 0.01 10*3/mm3      Immature Grans, Absolute 0.01 10*3/mm3           I ordered the above labs and reviewed the results    RADIOLOGY  XR Chest 2 View   Preliminary Result   No acute findings.                   I ordered the above noted radiological studies. Interpreted by radiologist. Reviewed by me in PACS.       PROCEDURES  Procedures    EKG           EKG time:  19:24  Rhythm/Rate: NSR 69  P waves and OK: nml  QRS, axis: nml   ST and T waves: nml     Interpreted Contemporaneously by me, independently viewed  Unchanged compared to prior 8/15/18    PROGRESS AND CONSULTS       19:31  CXR, EKG, CBC, CMP, troponin, and EKG ordered.     19:40  BP- (!) 175/108 HR- 83 Temp- 97.9 °F (36.6 °C) (Tympanic) O2 sat- 99%  Informed pt of the plan for labs and CXR, EKG shows NAD. Pt understands and agrees with the plan, all questions answered.    20:53  Call placed to cardiology for consult.     21:27  Discussed pt's case with Dr Cosme (cardiology), who agrees to admit the pt     21:31  BP- 172/100 HR- 66 Temp- 97.9 °F (36.6 °C) (Tympanic) O2 sat- 95%  Rechecked the patient who is in NAD and is resting comfortably. Informed pt that the work up in the ED shows NAD. Informed her of my consult with cardiology, and the plan for admission. Pt understands and agrees with the plan, all questions answered.    MEDICAL DECISION MAKING  Results were reviewed/discussed with the patient and they were also made aware of online access. Pt also made aware that some labs, such as cultures, will not be resulted during ER visit and follow up with PMD is necessary.     MDM  Number of Diagnoses or Management Options  Chest pain, unspecified type:      Amount and/or Complexity of Data Reviewed  Clinical lab tests: ordered and reviewed (Troponin is <0.010)  Tests in the radiology section of CPT®: reviewed and ordered (CXR shows NAD)  Tests in the medicine section of CPT®: ordered and reviewed (See EKG procedure note. )  Decide to obtain previous medical records or to obtain history from someone other than the patient: yes  Review and summarize past medical records: yes (No prior ED visits )  Discuss the patient with other providers: yes (Dr Cosme, cardiology)    Patient Progress  Patient progress: stable         DIAGNOSIS  Final diagnoses:   Chest pain, unspecified type        DISPOSITION  ADMISSION    Discussed treatment plan and reason for admission with pt/family and admitting physician.  Pt/family voiced understanding of the plan for admission for further testing/treatment as needed.     Latest Documented Vital Signs:  As of 2:18 AM  BP- (!) 172/111 HR- 79 Temp- 97.6 °F (36.4 °C) (Oral) O2 sat- 97%    --  Documentation assistance provided by nabil Walker for Dr Pat.  Information recorded by the scribe was done at my direction and has been verified and validated by me.       Jess Walker  09/22/18 9209       Smith Pat MD  09/23/18 3448

## 2018-09-23 ENCOUNTER — APPOINTMENT (OUTPATIENT)
Dept: NUCLEAR MEDICINE | Facility: HOSPITAL | Age: 58
End: 2018-09-23

## 2018-09-23 ENCOUNTER — APPOINTMENT (OUTPATIENT)
Dept: CARDIOLOGY | Facility: HOSPITAL | Age: 58
End: 2018-09-23
Attending: INTERNAL MEDICINE

## 2018-09-23 LAB
BH CV ECHO MEAS - ACS: 1.8 CM
BH CV ECHO MEAS - AO MAX PG (FULL): 3.2 MMHG
BH CV ECHO MEAS - AO MAX PG: 7.4 MMHG
BH CV ECHO MEAS - AO MEAN PG (FULL): 1 MMHG
BH CV ECHO MEAS - AO MEAN PG: 3 MMHG
BH CV ECHO MEAS - AO ROOT AREA (BSA CORRECTED): 1.4
BH CV ECHO MEAS - AO ROOT AREA: 6.2 CM^2
BH CV ECHO MEAS - AO ROOT DIAM: 2.8 CM
BH CV ECHO MEAS - AO V2 MAX: 136 CM/SEC
BH CV ECHO MEAS - AO V2 MEAN: 81.7 CM/SEC
BH CV ECHO MEAS - AO V2 VTI: 27.4 CM
BH CV ECHO MEAS - AVA(I,A): 2.4 CM^2
BH CV ECHO MEAS - AVA(I,D): 2.4 CM^2
BH CV ECHO MEAS - AVA(V,A): 2.4 CM^2
BH CV ECHO MEAS - AVA(V,D): 2.4 CM^2
BH CV ECHO MEAS - BSA(HAYCOCK): 2 M^2
BH CV ECHO MEAS - BSA: 2 M^2
BH CV ECHO MEAS - BZI_BMI: 32.4 KILOGRAMS/M^2
BH CV ECHO MEAS - BZI_METRIC_HEIGHT: 165.1 CM
BH CV ECHO MEAS - BZI_METRIC_WEIGHT: 88.5 KG
BH CV ECHO MEAS - EDV(CUBED): 39.3 ML
BH CV ECHO MEAS - EDV(MOD-SP2): 41 ML
BH CV ECHO MEAS - EDV(MOD-SP4): 54 ML
BH CV ECHO MEAS - EDV(TEICH): 47.4 ML
BH CV ECHO MEAS - EF(CUBED): 64.8 %
BH CV ECHO MEAS - EF(MOD-BP): 62 %
BH CV ECHO MEAS - EF(MOD-SP2): 61 %
BH CV ECHO MEAS - EF(MOD-SP4): 61.1 %
BH CV ECHO MEAS - EF(TEICH): 57.5 %
BH CV ECHO MEAS - ESV(CUBED): 13.8 ML
BH CV ECHO MEAS - ESV(MOD-SP2): 16 ML
BH CV ECHO MEAS - ESV(MOD-SP4): 21 ML
BH CV ECHO MEAS - ESV(TEICH): 20.2 ML
BH CV ECHO MEAS - FS: 29.4 %
BH CV ECHO MEAS - IVS/LVPW: 1
BH CV ECHO MEAS - IVSD: 1.1 CM
BH CV ECHO MEAS - LAT PEAK E' VEL: 8 CM/SEC
BH CV ECHO MEAS - LV DIASTOLIC VOL/BSA (35-75): 27.6 ML/M^2
BH CV ECHO MEAS - LV MASS(C)D: 114 GRAMS
BH CV ECHO MEAS - LV MASS(C)DI: 58.3 GRAMS/M^2
BH CV ECHO MEAS - LV MAX PG: 4.2 MMHG
BH CV ECHO MEAS - LV MEAN PG: 2 MMHG
BH CV ECHO MEAS - LV SYSTOLIC VOL/BSA (12-30): 10.7 ML/M^2
BH CV ECHO MEAS - LV V1 MAX: 103 CM/SEC
BH CV ECHO MEAS - LV V1 MEAN: 61 CM/SEC
BH CV ECHO MEAS - LV V1 VTI: 20.5 CM
BH CV ECHO MEAS - LVIDD: 3.4 CM
BH CV ECHO MEAS - LVIDS: 2.4 CM
BH CV ECHO MEAS - LVLD AP2: 6.7 CM
BH CV ECHO MEAS - LVLD AP4: 6.8 CM
BH CV ECHO MEAS - LVLS AP2: 5.6 CM
BH CV ECHO MEAS - LVLS AP4: 5.5 CM
BH CV ECHO MEAS - LVOT AREA (M): 3.1 CM^2
BH CV ECHO MEAS - LVOT AREA: 3.1 CM^2
BH CV ECHO MEAS - LVOT DIAM: 2 CM
BH CV ECHO MEAS - LVPWD: 1.1 CM
BH CV ECHO MEAS - MED PEAK E' VEL: 8 CM/SEC
BH CV ECHO MEAS - MV A DUR: 0.11 SEC
BH CV ECHO MEAS - MV A MAX VEL: 62.9 CM/SEC
BH CV ECHO MEAS - MV DEC SLOPE: 513 CM/SEC^2
BH CV ECHO MEAS - MV DEC TIME: 0.22 SEC
BH CV ECHO MEAS - MV E MAX VEL: 62.9 CM/SEC
BH CV ECHO MEAS - MV E/A: 1
BH CV ECHO MEAS - MV MAX PG: 3.5 MMHG
BH CV ECHO MEAS - MV MEAN PG: 1 MMHG
BH CV ECHO MEAS - MV P1/2T MAX VEL: 88.6 CM/SEC
BH CV ECHO MEAS - MV P1/2T: 50.6 MSEC
BH CV ECHO MEAS - MV V2 MAX: 93.1 CM/SEC
BH CV ECHO MEAS - MV V2 MEAN: 49.7 CM/SEC
BH CV ECHO MEAS - MV V2 VTI: 23.7 CM
BH CV ECHO MEAS - MVA P1/2T LCG: 2.5 CM^2
BH CV ECHO MEAS - MVA(P1/2T): 4.3 CM^2
BH CV ECHO MEAS - MVA(VTI): 2.7 CM^2
BH CV ECHO MEAS - PA ACC TIME: 0.14 SEC
BH CV ECHO MEAS - PA MAX PG (FULL): 1.5 MMHG
BH CV ECHO MEAS - PA MAX PG: 3.5 MMHG
BH CV ECHO MEAS - PA PR(ACCEL): 17.4 MMHG
BH CV ECHO MEAS - PA V2 MAX: 93 CM/SEC
BH CV ECHO MEAS - PULM A REVS DUR: 0.14 SEC
BH CV ECHO MEAS - PULM A REVS VEL: 21.9 CM/SEC
BH CV ECHO MEAS - PULM DIAS VEL: 28.4 CM/SEC
BH CV ECHO MEAS - PULM S/D: 1.5
BH CV ECHO MEAS - PULM SYS VEL: 42.2 CM/SEC
BH CV ECHO MEAS - PVA(V,A): 1.7 CM^2
BH CV ECHO MEAS - PVA(V,D): 1.7 CM^2
BH CV ECHO MEAS - QP/QS: 0.6
BH CV ECHO MEAS - RAP SYSTOLE: 3 MMHG
BH CV ECHO MEAS - RV MAX PG: 1.9 MMHG
BH CV ECHO MEAS - RV MEAN PG: 1 MMHG
BH CV ECHO MEAS - RV V1 MAX: 69.5 CM/SEC
BH CV ECHO MEAS - RV V1 MEAN: 43.7 CM/SEC
BH CV ECHO MEAS - RV V1 VTI: 17 CM
BH CV ECHO MEAS - RVOT AREA: 2.3 CM^2
BH CV ECHO MEAS - RVOT DIAM: 1.7 CM
BH CV ECHO MEAS - RVSP: 19.3 MMHG
BH CV ECHO MEAS - SI(AO): 86.2 ML/M^2
BH CV ECHO MEAS - SI(CUBED): 13 ML/M^2
BH CV ECHO MEAS - SI(LVOT): 32.9 ML/M^2
BH CV ECHO MEAS - SI(MOD-SP2): 12.8 ML/M^2
BH CV ECHO MEAS - SI(MOD-SP4): 16.9 ML/M^2
BH CV ECHO MEAS - SI(TEICH): 13.9 ML/M^2
BH CV ECHO MEAS - SV(AO): 168.7 ML
BH CV ECHO MEAS - SV(CUBED): 25.5 ML
BH CV ECHO MEAS - SV(LVOT): 64.4 ML
BH CV ECHO MEAS - SV(MOD-SP2): 25 ML
BH CV ECHO MEAS - SV(MOD-SP4): 33 ML
BH CV ECHO MEAS - SV(RVOT): 38.6 ML
BH CV ECHO MEAS - SV(TEICH): 27.3 ML
BH CV ECHO MEAS - TAPSE (>1.6): 1.9 CM2
BH CV ECHO MEAS - TR MAX VEL: 202 CM/SEC
BH CV ECHO MEASUREMENTS AVERAGE E/E' RATIO: 7.86
BH CV NUCLEAR PRIOR STUDY: 3
BH CV STRESS COMMENTS STAGE 1: NORMAL
BH CV STRESS DOSE REGADENOSON STAGE 1: 0.4
BH CV STRESS DURATION MIN STAGE 1: 0
BH CV STRESS DURATION SEC STAGE 1: 10
BH CV STRESS PROTOCOL 1: NORMAL
BH CV STRESS RECOVERY BP: NORMAL MMHG
BH CV STRESS RECOVERY HR: 83 BPM
BH CV STRESS STAGE 1: 1
BH CV VAS BP RIGHT ARM: NORMAL MMHG
BH CV XLRA - RV BASE: 3.4 CM
BH CV XLRA - TDI S': 11 CM/SEC
LEFT ATRIUM VOLUME INDEX: 11 ML/M2
LV EF NUC BP: 60 %
MAXIMAL PREDICTED HEART RATE: 165 BPM
MAXIMAL PREDICTED HEART RATE: 165 BPM
NT-PROBNP SERPL-MCNC: 338.7 PG/ML (ref 5–900)
PERCENT MAX PREDICTED HR: 59.39 %
STRESS BASELINE BP: NORMAL MMHG
STRESS BASELINE HR: 68 BPM
STRESS PERCENT HR: 70 %
STRESS POST PEAK BP: NORMAL MMHG
STRESS POST PEAK HR: 98 BPM
STRESS TARGET HR: 140 BPM
STRESS TARGET HR: 140 BPM
TROPONIN T SERPL-MCNC: <0.01 NG/ML (ref 0–0.03)

## 2018-09-23 PROCEDURE — 78452 HT MUSCLE IMAGE SPECT MULT: CPT

## 2018-09-23 PROCEDURE — 84484 ASSAY OF TROPONIN QUANT: CPT | Performed by: INTERNAL MEDICINE

## 2018-09-23 PROCEDURE — 96375 TX/PRO/DX INJ NEW DRUG ADDON: CPT

## 2018-09-23 PROCEDURE — 78452 HT MUSCLE IMAGE SPECT MULT: CPT | Performed by: INTERNAL MEDICINE

## 2018-09-23 PROCEDURE — 93306 TTE W/DOPPLER COMPLETE: CPT

## 2018-09-23 PROCEDURE — 93005 ELECTROCARDIOGRAM TRACING: CPT | Performed by: INTERNAL MEDICINE

## 2018-09-23 PROCEDURE — 99219 PR INITIAL OBSERVATION CARE/DAY 50 MINUTES: CPT | Performed by: INTERNAL MEDICINE

## 2018-09-23 PROCEDURE — G0378 HOSPITAL OBSERVATION PER HR: HCPCS

## 2018-09-23 PROCEDURE — 93017 CV STRESS TEST TRACING ONLY: CPT

## 2018-09-23 PROCEDURE — 93018 CV STRESS TEST I&R ONLY: CPT | Performed by: INTERNAL MEDICINE

## 2018-09-23 PROCEDURE — 94799 UNLISTED PULMONARY SVC/PX: CPT

## 2018-09-23 PROCEDURE — A9500 TC99M SESTAMIBI: HCPCS | Performed by: INTERNAL MEDICINE

## 2018-09-23 PROCEDURE — 93016 CV STRESS TEST SUPVJ ONLY: CPT | Performed by: INTERNAL MEDICINE

## 2018-09-23 PROCEDURE — 0 TECHNETIUM SESTAMIBI: Performed by: INTERNAL MEDICINE

## 2018-09-23 PROCEDURE — 83880 ASSAY OF NATRIURETIC PEPTIDE: CPT | Performed by: INTERNAL MEDICINE

## 2018-09-23 PROCEDURE — 25010000002 REGADENOSON 0.4 MG/5ML SOLUTION: Performed by: INTERNAL MEDICINE

## 2018-09-23 PROCEDURE — 63710000001 DIPHENHYDRAMINE PER 50 MG: Performed by: INTERNAL MEDICINE

## 2018-09-23 PROCEDURE — 93010 ELECTROCARDIOGRAM REPORT: CPT | Performed by: INTERNAL MEDICINE

## 2018-09-23 PROCEDURE — 93306 TTE W/DOPPLER COMPLETE: CPT | Performed by: INTERNAL MEDICINE

## 2018-09-23 RX ORDER — DIPHENHYDRAMINE HCL 25 MG
25 CAPSULE ORAL NIGHTLY PRN
Status: DISCONTINUED | OUTPATIENT
Start: 2018-09-23 | End: 2018-09-24 | Stop reason: HOSPADM

## 2018-09-23 RX ORDER — AMLODIPINE BESYLATE 2.5 MG/1
2.5 TABLET ORAL
Status: DISCONTINUED | OUTPATIENT
Start: 2018-09-23 | End: 2018-09-24 | Stop reason: HOSPADM

## 2018-09-23 RX ORDER — ACETAMINOPHEN 325 MG/1
650 TABLET ORAL EVERY 4 HOURS PRN
Status: DISCONTINUED | OUTPATIENT
Start: 2018-09-23 | End: 2018-09-24 | Stop reason: HOSPADM

## 2018-09-23 RX ORDER — SODIUM CHLORIDE 0.9 % (FLUSH) 0.9 %
1-10 SYRINGE (ML) INJECTION AS NEEDED
Status: DISCONTINUED | OUTPATIENT
Start: 2018-09-23 | End: 2018-09-24 | Stop reason: HOSPADM

## 2018-09-23 RX ORDER — ALPRAZOLAM 0.25 MG/1
0.25 TABLET ORAL 2 TIMES DAILY PRN
Status: DISCONTINUED | OUTPATIENT
Start: 2018-09-23 | End: 2018-09-24 | Stop reason: HOSPADM

## 2018-09-23 RX ORDER — CALCIUM CARBONATE 200(500)MG
1 TABLET,CHEWABLE ORAL DAILY
Status: DISCONTINUED | OUTPATIENT
Start: 2018-09-23 | End: 2018-09-24 | Stop reason: HOSPADM

## 2018-09-23 RX ORDER — ASPIRIN 81 MG/1
81 TABLET, CHEWABLE ORAL ONCE
Status: COMPLETED | OUTPATIENT
Start: 2018-09-23 | End: 2018-09-23

## 2018-09-23 RX ORDER — LOSARTAN POTASSIUM 100 MG/1
100 TABLET ORAL DAILY
Status: DISCONTINUED | OUTPATIENT
Start: 2018-09-23 | End: 2018-09-23

## 2018-09-23 RX ORDER — LOSARTAN POTASSIUM 100 MG/1
100 TABLET ORAL
Status: DISCONTINUED | OUTPATIENT
Start: 2018-09-23 | End: 2018-09-24 | Stop reason: HOSPADM

## 2018-09-23 RX ORDER — NEBIVOLOL 10 MG/1
20 TABLET ORAL
Status: DISCONTINUED | OUTPATIENT
Start: 2018-09-23 | End: 2018-09-24 | Stop reason: HOSPADM

## 2018-09-23 RX ORDER — BUDESONIDE AND FORMOTEROL FUMARATE DIHYDRATE 160; 4.5 UG/1; UG/1
2 AEROSOL RESPIRATORY (INHALATION)
Status: DISCONTINUED | OUTPATIENT
Start: 2018-09-23 | End: 2018-09-24 | Stop reason: HOSPADM

## 2018-09-23 RX ORDER — DIPHENHYDRAMINE HCL 25 MG
25 CAPSULE ORAL NIGHTLY PRN
COMMUNITY
End: 2021-08-09 | Stop reason: HOSPADM

## 2018-09-23 RX ORDER — ONDANSETRON 4 MG/1
4 TABLET, FILM COATED ORAL EVERY 6 HOURS PRN
Status: DISCONTINUED | OUTPATIENT
Start: 2018-09-23 | End: 2018-09-24 | Stop reason: HOSPADM

## 2018-09-23 RX ORDER — LABETALOL HYDROCHLORIDE 5 MG/ML
10 INJECTION, SOLUTION INTRAVENOUS EVERY 4 HOURS PRN
Status: DISCONTINUED | OUTPATIENT
Start: 2018-09-23 | End: 2018-09-24 | Stop reason: HOSPADM

## 2018-09-23 RX ORDER — PROPRANOLOL HYDROCHLORIDE 80 MG/1
160 CAPSULE, EXTENDED RELEASE ORAL 2 TIMES DAILY
Status: DISCONTINUED | OUTPATIENT
Start: 2018-09-23 | End: 2018-09-23

## 2018-09-23 RX ORDER — PANTOPRAZOLE SODIUM 40 MG/1
40 TABLET, DELAYED RELEASE ORAL EVERY MORNING
Status: DISCONTINUED | OUTPATIENT
Start: 2018-09-23 | End: 2018-09-24 | Stop reason: HOSPADM

## 2018-09-23 RX ORDER — MULTIPLE VITAMINS W/ MINERALS TAB 9MG-400MCG
1 TAB ORAL DAILY
Status: DISCONTINUED | OUTPATIENT
Start: 2018-09-23 | End: 2018-09-24 | Stop reason: HOSPADM

## 2018-09-23 RX ORDER — HYDROCHLOROTHIAZIDE 25 MG/1
25 TABLET ORAL DAILY
Status: DISCONTINUED | OUTPATIENT
Start: 2018-09-23 | End: 2018-09-24 | Stop reason: HOSPADM

## 2018-09-23 RX ORDER — CETIRIZINE HYDROCHLORIDE 10 MG/1
10 TABLET ORAL DAILY
Status: DISCONTINUED | OUTPATIENT
Start: 2018-09-23 | End: 2018-09-24 | Stop reason: HOSPADM

## 2018-09-23 RX ADMIN — TECHNETIUM TC 99M SESTAMIBI 1 DOSE: 1 INJECTION INTRAVENOUS at 09:30

## 2018-09-23 RX ADMIN — MULTIPLE VITAMINS W/ MINERALS TAB 1 TABLET: TAB at 11:28

## 2018-09-23 RX ADMIN — TECHNETIUM TC 99M SESTAMIBI 1 DOSE: 1 INJECTION INTRAVENOUS at 07:20

## 2018-09-23 RX ADMIN — REGADENOSON 0.4 MG: 0.08 INJECTION, SOLUTION INTRAVENOUS at 09:30

## 2018-09-23 RX ADMIN — LABETALOL HYDROCHLORIDE 10 MG: 5 INJECTION, SOLUTION INTRAVENOUS at 04:50

## 2018-09-23 RX ADMIN — ONDANSETRON 4 MG: 4 TABLET, FILM COATED ORAL at 04:49

## 2018-09-23 RX ADMIN — LOSARTAN POTASSIUM 100 MG: 100 TABLET, FILM COATED ORAL at 08:21

## 2018-09-23 RX ADMIN — CETIRIZINE HYDROCHLORIDE 10 MG: 10 TABLET, FILM COATED ORAL at 11:27

## 2018-09-23 RX ADMIN — METOPROLOL TARTRATE 25 MG: 25 TABLET ORAL at 02:27

## 2018-09-23 RX ADMIN — HYDROCHLOROTHIAZIDE 25 MG: 25 TABLET ORAL at 11:27

## 2018-09-23 RX ADMIN — NEBIVOLOL HYDROCHLORIDE 20 MG: 10 TABLET ORAL at 20:11

## 2018-09-23 RX ADMIN — Medication 1 TABLET: at 08:21

## 2018-09-23 RX ADMIN — ASPIRIN 81 MG: 81 TABLET, CHEWABLE ORAL at 14:36

## 2018-09-23 RX ADMIN — PANTOPRAZOLE SODIUM 40 MG: 40 TABLET, DELAYED RELEASE ORAL at 07:41

## 2018-09-23 RX ADMIN — AMLODIPINE BESYLATE 2.5 MG: 2.5 TABLET ORAL at 14:37

## 2018-09-23 RX ADMIN — BUDESONIDE AND FORMOTEROL FUMARATE DIHYDRATE 2 PUFF: 160; 4.5 AEROSOL RESPIRATORY (INHALATION) at 21:02

## 2018-09-23 RX ADMIN — DIPHENHYDRAMINE HYDROCHLORIDE 25 MG: 25 CAPSULE ORAL at 02:27

## 2018-09-23 RX ADMIN — DIPHENHYDRAMINE HYDROCHLORIDE 25 MG: 25 CAPSULE ORAL at 22:48

## 2018-09-23 RX ADMIN — ALPRAZOLAM 0.25 MG: 0.25 TABLET ORAL at 22:48

## 2018-09-23 RX ADMIN — ONDANSETRON 4 MG: 4 TABLET, FILM COATED ORAL at 17:40

## 2018-09-23 RX ADMIN — ALPRAZOLAM 0.25 MG: 0.25 TABLET ORAL at 07:41

## 2018-09-23 NOTE — PLAN OF CARE
Problem: Patient Care Overview  Goal: Plan of Care Review  Outcome: Ongoing (interventions implemented as appropriate)   09/23/18 1578   Coping/Psychosocial   Plan of Care Reviewed With patient   OTHER   Outcome Summary vss. without c/o pain. npo except sips with meds. cardiolite stress today.

## 2018-09-23 NOTE — PLAN OF CARE
Problem: Patient Care Overview  Goal: Plan of Care Review  Outcome: Ongoing (interventions implemented as appropriate)   09/23/18 0077   Coping/Psychosocial   Plan of Care Reviewed With patient   OTHER   Outcome Summary Stress test today. BP better afte meds adjusted. No c/o pain. Poss DC home tomorrow. Will continue to monitor    Plan of Care Review   Progress improving

## 2018-09-23 NOTE — ED NOTES
Attempted to call report to floor, was told nurse was in another patient's room and unable to take report at this time. Will call back in 5 minutes      Jill Santamaria RN  09/22/18 6000

## 2018-09-23 NOTE — PLAN OF CARE
Problem: Cardiac: ACS (Acute Coronary Syndrome) (Adult)  Goal: Signs and Symptoms of Listed Potential Problems Will be Absent, Minimized or Managed (Cardiac: ACS)  Outcome: Ongoing (interventions implemented as appropriate)   09/23/18 0245   Goal/Outcome Evaluation   Problems Assessed (Acute Coronary Syndrome) all   Problems Present (Acute Coronary Syn) none       Problem: Hypertensive Disease/Crisis (Arterial) (Adult)  Goal: Signs and Symptoms of Listed Potential Problems Will be Absent, Minimized or Managed (Hypertensive Disease/Crisis)  Outcome: Ongoing (interventions implemented as appropriate)   09/23/18 0245   Goal/Outcome Evaluation   Problems Assessed (Hypertensive Disease/Crisis (Arterial)) all   Problems Present (Hypertensive Disease) situational response

## 2018-09-23 NOTE — H&P
Date of Hospital Visit: 18  Encounter Provider: Medina Moncada, RN EXTENDER  Place of Service: [facility] CARDIOLOGY  Patient Name: Amanda Sherwood  :[id number]  [id number]      Chief complaint: chest pain and high blood pressure.      History of Present Illness:  Ms. Sherwood is a 57 year old woman who follows with Dr. Maradiaga and has a history of hypertension, hyperlipidemia, PVCs, SVT, low platelet and WBC counts, asthma, and GERD. Her rhythm issues have been treated with propranolol. She was last seen in office by OLVIN Garrison in 2018; her BP was controlled and rhythm regular.     She presented to ED last night with intermittent chest pressure for one day. These episodes are associated with elevated BP, SOA, and nausea. Upon arrival, /108 and HR 83; ekg showed sinus rhythm. Labs: troponin <0.010 x 2, proBNP 339, BUN 17, Crea 1.01, ALT 59, AST 78, WBC 3.86, Hgb 14.7, Hct 41.6, Plt 469. CXR showed no acute findings. She was treated with IV labetalol. She is currently sinus rhythm on monitor with HR 64 and /105.    Past Medical History:   Diagnosis Date   • Arthritis     OSTEO   • GERD (gastroesophageal reflux disease)     ON OCC   • History of bronchitis    • History of migraine    • Hyperlipidemia     NO MEDS CURRENTLY. WORKING ON   • Hypertension    • Leukopenia     PER HISTORY. HAS SEEN CBC IN THE PAST   • Neck pain    • PVC (premature ventricular contraction)    • Seasonal allergies    • Torn meniscus     LEFT         Past Surgical History:   Procedure Laterality Date   • BREAST BIOPSY      BENIGN. HORACE BREAST   • BUNIONECTOMY Left    • BUNIONECTOMY Left     REVISION   • KNEE ARTHROSCOPY Left 2018    Procedure: KNEE ARTHROSCOPY LATERAL MENISECTOMY DEBRIDEMENT OF MEDIAL FEMORAL CONDYLE DEFECT DEBRIDEMENT OF ARTHRITIS;  Surgeon: Graciela Crockett MD;  [place] [facility] [place] [facility] [place]  Service: Orthopedics   • SALPINGO OOPHORECTOMY Left    •  TONSILLECTOMY     • TUBAL ABDOMINAL LIGATION         Prescriptions Prior to Admission   Medication Sig Dispense Refill Last Dose   • ALPRAZolam (XANAX) 0.25 MG tablet Take 1 tablet by mouth 2 (Two) Times a Day As Needed for Anxiety. 5 tablet 0 Taking   • aspirin 81 MG tablet Take 81 mg by mouth Daily. HOLD FOR SURGERY   Past Month at Unknown time   • budesonide-formoterol (SYMBICORT) 160-4.5 MCG/ACT inhaler Inhale 2 puffs 2 (Two) Times a Day.   9/22/2018 at  0900   • calcium carbonate (TUMS) 500 MG chewable tablet Chew 1 tablet daily.   9/23/2018 at 0900   • cetirizine (zyrTEC) 10 MG tablet Take 10 mg by mouth Daily.   9/23/2018 at 0900   • Cholecalciferol (VITAMIN D3) 5000 UNITS capsule capsule Take 5,000 Units by mouth daily.   9/23/2018 at 0900   • diphenhydrAMINE (BENADRYL) 25 mg capsule Take 25 mg by mouth At Night As Needed for Sleep.   9/22/2018 at 2100   • esomeprazole (NexIUM) 20 MG capsule Take 20 mg by mouth Daily.   9/23/2018 at 0900   • Glucosamine HCl 1000 MG tablet Take 2 tablets by mouth daily.   Taking   • hydrochlorothiazide (HYDRODIURIL) 25 MG tablet TAKE ONE TABLET BY MOUTH DAILY 30 tablet 4 9/23/2018 at 0900   • losartan (COZAAR) 50 MG tablet Take 100 mg by mouth Daily.   9/23/2018 at 0900   • Multiple Vitamins-Minerals (MULTIVITAMINS) chewable tablet Chew 1 tablet Daily.   Taking   • naproxen (NAPROSYN) 500 MG tablet Take 1 tablet by mouth 2 (Two) Times a Day. 60 tablet 0    • propranolol LA (INDERAL LA) 160 MG 24 hr capsule Take 1 capsule by mouth 2 (Two) Times a Day. 60 capsule 6 9/23/2018 at 1700       Current Meds  No current facility-administered medications on file prior to encounter.      Current Outpatient Prescriptions on File Prior to Encounter   Medication Sig Dispense Refill   • ALPRAZolam (XANAX) 0.25 MG tablet Take 1 tablet by mouth 2 (Two) Times a Day As Needed for Anxiety. 5 tablet 0   • aspirin 81 MG tablet Take 81 mg by mouth Daily. HOLD FOR SURGERY     • budesonide-formoterol  (SYMBICORT) 160-4.5 MCG/ACT inhaler Inhale 2 puffs 2 (Two) Times a Day.     • calcium carbonate (TUMS) 500 MG chewable tablet Chew 1 tablet daily.     • cetirizine (zyrTEC) 10 MG tablet Take 10 mg by mouth Daily.     • Cholecalciferol (VITAMIN D3) 5000 UNITS capsule capsule Take 5,000 Units by mouth daily.     • esomeprazole (NexIUM) 20 MG capsule Take 20 mg by mouth Daily.     • Glucosamine HCl 1000 MG tablet Take 2 tablets by mouth daily.     • hydrochlorothiazide (HYDRODIURIL) 25 MG tablet TAKE ONE TABLET BY MOUTH DAILY 30 tablet 4   • losartan (COZAAR) 50 MG tablet Take 100 mg by mouth Daily.     • Multiple Vitamins-Minerals (MULTIVITAMINS) chewable tablet Chew 1 tablet Daily.     • naproxen (NAPROSYN) 500 MG tablet Take 1 tablet by mouth 2 (Two) Times a Day. 60 tablet 0   • propranolol LA (INDERAL LA) 160 MG 24 hr capsule Take 1 capsule by mouth 2 (Two) Times a Day. 60 capsule 6         Social History     Social History   • Marital status:      Spouse name: N/A   • Number of children: N/A   • Years of education: N/A     Occupational History   • Not on file.     Social History Main Topics   • Smoking status: Former Smoker   • Smokeless tobacco: Never Used      Comment: IN [employer]IG[employer]   • Alcohol use Yes      Comment: occasional use   • Drug use: No   • Sexual activity: Not on file     Other Topics Concern   • Not on file     Social History Narrative   • No narrative on file       Family Hx: Non-contributory      REVIEW OF SYSTEMS:   ROS was performed and is negative except as outlined in HPI     REVIEW OF SYSTEMS:   CONSTITUTIONAL: No weight loss, fever, chills, weakness or fatigue.   HEENT: Eyes: No visual loss, blurred vision, double vision or yellow sclerae. Ears, Nose, Throat: No hearing loss, sneezing, congestion, runny nose or sore throat.   SKIN: No rash or itching.     RESPIRATORY: No shortness of breath, hemoptysis, cough or sputum.   GASTROINTESTINAL: No anorexia, nausea, vomiting or diarrhea. No  "abdominal pain, bright red blood per rectum or melena.  NEUROLOGICAL: No headache, dizziness, syncope, paralysis, numbness or tingling in the extremities.  MUSCULOSKELETAL: No muscle, back pain, joint pain or stiffness.   HEMATOLOGIC: No anemia, bleeding or bruising.   LYMPHATICS: No enlarged nodes.  PSYCHIATRIC: No history of depression, anxiety, hallucinations.   ENDOCRINOLOGIC: No reports of sweating, cold or heat intolerance. No polyuria or polydipsia.        Objective:     Vitals:    09/22/18 2320 09/23/18 0028 09/23/18 0227 09/23/18 0449   BP:  (!) 172/111 (!) 181/98 (!) 167/105   BP Location:  Right arm     Patient Position:  Lying     Pulse: 65 79 69 64   Resp:  20     Temp:  97.6 °F (36.4 °C)     TempSrc:  Oral     SpO2: 95% 97%     Weight:  88.2 kg (194 lb 8 oz)     Height:  165.1 cm (65\")       Body mass index is 32.37 kg/m².  Flowsheet Rows      First Filed Value   Admission Height  165.1 cm (65\") Documented at 09/22/2018 1922   Admission Weight  87.5 kg (193 lb) Documented at 09/22/2018 2025          General Appearance:    Alert, oriented x 3, in no acute distress   Head:    Normocephalic, without obvious abnormality, atraumatic   Ears:    Ears appear intact with no abnormalities noted   Throat:   No oral lesions, dentition good   Neck:   No adenopathy, supple, trachea midline, no thyromegaly, no carotid bruit, no JVD   Lungs:    Breath sounds are equal and  clear to auscultation    Heart:   Normal S1 and S2, RRR, no murmur/gallop or rub   Abdomen:    Normal bowel sounds, obese, soft non-tender, non-distended, no organomegaly, no guarding   Extremities:   Moves all extremities well, no edema, no cyanosis, no redness   Pulses:   Pulses palpable and equal bilaterally. Normal radial pulses   Skin:   No bleeding, bruising or rash   Lymph nodes:   No palpable adenopathy     Telemetry:        EKG:            I personally viewed and interpreted the patient's EKG/Telemetry data    Assessment:  Active Hospital " Problems    Diagnosis Date Noted   • Chest pain [R07.9] 09/22/2018      Resolved Hospital Problems    Diagnosis Date Noted Date Resolved   No resolved problems to display.         Plan:  1. Chest pain    57-year-old white female admitted to the hospital with tightness and pressure sensations in the chest moderate in intensity associated with the shortness of breath had a echo and stress test which are normal    2.  HTN    The blood pressure is extremely high    Will change the medication beta blockers to Bystolic 20 mg one once a day    Would like to switch losartan to the Benicar, but not available on formulary    Will add Norvasc 2.5 mg one once a day

## 2018-09-23 NOTE — ED NOTES
IV attempt X1, unsuccessful. Pt requested IV team come insert her IV. Explained to patient that I would need to check with charge nurse why typically will do IV's if nurse is unable, patient again expressed she wanted IV team to come and put her IV in. Charge nurse aware, call placed to IV team, will come down and put IV in. Patient aware.      Jill Santamaria, TASNEEM  09/22/18 2005       Jill Santamaria RN  09/22/18 0135

## 2018-09-23 NOTE — ED NOTES
Pt updated on plan of care. Awaiting inpatient bed to be cleaned. Will continue to monitor pt and will have pt transported upstairs when bed is available.      Lorrie Fisher RN  09/22/18 4627

## 2018-09-24 VITALS
TEMPERATURE: 97.4 F | HEIGHT: 65 IN | BODY MASS INDEX: 32.4 KG/M2 | SYSTOLIC BLOOD PRESSURE: 149 MMHG | DIASTOLIC BLOOD PRESSURE: 90 MMHG | OXYGEN SATURATION: 97 % | HEART RATE: 69 BPM | WEIGHT: 194.5 LBS | RESPIRATION RATE: 20 BRPM

## 2018-09-24 PROCEDURE — 99217 PR OBSERVATION CARE DISCHARGE MANAGEMENT: CPT | Performed by: NURSE PRACTITIONER

## 2018-09-24 PROCEDURE — G0378 HOSPITAL OBSERVATION PER HR: HCPCS

## 2018-09-24 RX ORDER — AMLODIPINE BESYLATE 2.5 MG/1
2.5 TABLET ORAL
Qty: 30 TABLET | Refills: 11 | Status: SHIPPED | OUTPATIENT
Start: 2018-09-25 | End: 2018-10-15 | Stop reason: SDUPTHER

## 2018-09-24 RX ORDER — NEBIVOLOL 20 MG/1
20 TABLET ORAL
Qty: 30 TABLET | Refills: 11 | Status: SHIPPED | OUTPATIENT
Start: 2018-09-25 | End: 2019-09-11 | Stop reason: SDUPTHER

## 2018-09-24 RX ADMIN — NEBIVOLOL HYDROCHLORIDE 20 MG: 10 TABLET ORAL at 08:55

## 2018-09-24 RX ADMIN — BUDESONIDE AND FORMOTEROL FUMARATE DIHYDRATE 2 PUFF: 160; 4.5 AEROSOL RESPIRATORY (INHALATION) at 07:40

## 2018-09-24 RX ADMIN — MULTIPLE VITAMINS W/ MINERALS TAB 1 TABLET: TAB at 08:55

## 2018-09-24 RX ADMIN — LOSARTAN POTASSIUM 100 MG: 100 TABLET, FILM COATED ORAL at 08:54

## 2018-09-24 RX ADMIN — AMLODIPINE BESYLATE 2.5 MG: 2.5 TABLET ORAL at 08:55

## 2018-09-24 NOTE — DISCHARGE SUMMARY
Hospital Discharge    Patient Name: Amanda Sherwood  Age/Sex: 57 y.o. female  : 1960  MRN: [id number]    Encounter Provider: OLVIN Knowles  Referring Provider: Renato Cosme MD  Place of Service: [facility] CARDIOLOGY  Patient Care Team:  Finesse Dao MD as PCP - General (Family Medicine)         Date of Discharge:  2018   Date of Admit: 2018    Discharge Condition: Stable  Discharge Diagnosis:  Active Problems:    Chest pain      Hospital Course:   Amanda Sherwood is a 57 y.o. female followed by Dr. Maradiaga for HTN and SVT. She presented to the ER on  with complaints of chest tightness and was noted to have significantly elevated blood pressure. Stress and echo were essentially unremarkable. BP medications were changed (amlodipine added and propranolol changed to bystolic). Blood pressure gradually improved. She has not had any additional chest tightness since improvement in blood pressure. She is stable for discharge. She has an upcoming appointment with Dr. Maradiaga on 10/22 which she will keep.       Objective:  Temp:  [97.2 °F (36.2 °C)-97.8 °F (36.6 °C)] 97.4 °F (36.3 °C)  Heart Rate:  [66-74] 69  Resp:  [14-20] 20  BP: (143-164)/() 149/90  No intake or output data in the 24 hours ending 18 0953  Body mass index is 32.37 kg/m².  1    18  0028   Weight: 87.5 kg (193 lb) 88.2 kg (194 lb 8 oz)     Weight change:     Physical Exam:  Constitutional: She is oriented to person, place, and time. She appears well-developed. She does not appear ill.   Neck: No JVD present. Carotid bruit is not present.   Cardiovascular: Normal rate, regular rhythm and normal heart sounds.    Pulses:       Posterior tibial pulses are 2+ on the right side, and 2+ on the left side.   Pulmonary/Chest: Effort normal and breath sounds normal.   Abdominal: Soft. Normal appearance and bowel sounds are normal. There is no tenderness.    Musculoskeletal: Normal range of motion.        Right shoulder: She exhibits no deformity.        Left shoulder: She exhibits no deformity.   Neurological: She is alert and oriented to person, place, and time. She has normal strength.   Skin: Skin is warm, dry and intact. No rash noted.   Psychiatric: She has a normal mood and affect. Her behavior is normal. Thought content normal.   Vitals reviewed      Procedures Performed  Stress  · Findings consistent with a normal ECG stress test.  · Left ventricular ejection fraction is normal (Calculated EF = 60%).  · Myocardial perfusion imaging indicates a normal myocardial perfusion study with no evidence of ischemia.  · Impressions are consistent with a low risk study.     Echo  · Left ventricular systolic function is normal.  · Calculated EF = 62%.  · Normal echo  Consults:  Consults     Date and Time Order Name Status Description    9/22/2018 2053 [facility] [place] unless specified[place] Completed           Pertinent Test Results:    Results from last 7 days  Lab Units 09/22/18 2015   SODIUM mmol/L 142   POTASSIUM mmol/L 3.5   CHLORIDE mmol/L 98   CO2 mmol/L 28.8   BUN mg/dL 17   CREATININE mg/dL 1.01*   GLUCOSE mg/dL 113*   CALCIUM mg/dL 9.6   AST (SGOT) U/L 78*   ALT (SGPT) U/L 59*       Results from last 7 days  Lab Units 09/23/18 0517 09/22/18 2015   TROPONIN T ng/mL <0.010 <0.010       Results from last 7 days  Lab Units 09/22/18 2015   WBC 10*3/mm3 3.86*   HEMOGLOBIN g/dL 14.7   HEMATOCRIT % 41.6   PLATELETS 10*3/mm3 169                   Invalid input(s): LDLCALC    Results from last 7 days  Lab Units 09/23/18 0517   PROBNP pg/mL 338.7           Discharge Medications     Discharge Medications      ASK your doctor about these medications      Instructions Start Date   ALPRAZolam 0.25 MG tablet  Commonly known as:  XANAX   0.25 mg, Oral, 2 Times Daily PRN      aspirin 81 MG tablet   81 mg, Oral, Daily, HOLD FOR SURGERY      budesonide-formoterol 160-4.5 MCG/ACT  inhaler  Commonly known as:  SYMBICORT   2 puffs, Inhalation, 2 Times Daily - RT      calcium carbonate 500 MG chewable tablet  Commonly known as:  TUMS   1 tablet, Oral, Daily      cetirizine 10 MG tablet  Commonly known as:  zyrTEC   10 mg, Oral, Daily      diphenhydrAMINE 25 mg capsule  Commonly known as:  BENADRYL   25 mg, Oral, Nightly PRN      esomeprazole 20 MG capsule  Commonly known as:  nexIUM   20 mg, Oral, Daily      Glucosamine HCl 1000 MG tablet   2 tablets, Oral, Daily      hydrochlorothiazide 25 MG tablet  Commonly known as:  HYDRODIURIL   TAKE ONE TABLET BY MOUTH DAILY      losartan 50 MG tablet  Commonly known as:  COZAAR   100 mg, Oral, Daily      MULTIVITAMINS chewable tablet   1 tablet, Oral, Daily      naproxen 500 MG tablet  Commonly known as:  NAPROSYN   500 mg, Oral, 2 Times Daily      propranolol  MG 24 hr capsule  Commonly known as:  INDERAL LA   160 mg, Oral, 2 Times Daily      vitamin D3 5000 units capsule capsule   5,000 Units, Oral, Daily             Discharge Diet:    Dietary Orders     Start     Ordered    09/23/18 1354  Diet Regular  Diet Effective Now     Question:  Diet Texture / Consistency  Answer:  Regular    09/23/18 1353          Activity at Discharge:   as tolerated    Discharge disposition: home     Discharge Instructions and Follow ups:  Future Appointments  Date Time Provider Department Center   10/4/2018 10:50 AM Graciela Crockett MD [facility] None   10/22/2018 2:15 PM Yue Maradiaga MD [facility] None   11/29/2018 10:00 AM [facility] MAMM 1  [facility]   11/29/2018 11:00 AM [facility] DEXA 1  [facility]   1/29/2019 10:00 AM Yue Maradiaga MD [facility] None         Test Results Pending at Discharge: none     OLVIN Knowles  09/24/18  9:53 AM

## 2018-09-24 NOTE — PLAN OF CARE
Problem: Patient Care Overview  Goal: Plan of Care Review  Outcome: Ongoing (interventions implemented as appropriate)   09/24/18 0518   Coping/Psychosocial   Plan of Care Reviewed With patient   OTHER   Outcome Summary Pt's BP remains elevated but improved, dbp in th 90s, denies pain, discomfort or soa, rested off and on through out night, poss discharge today.    Plan of Care Review   Progress no change     Goal: Discharge Needs Assessment  Outcome: Ongoing (interventions implemented as appropriate)      Problem: Cardiac: ACS (Acute Coronary Syndrome) (Adult)  Goal: Signs and Symptoms of Listed Potential Problems Will be Absent, Minimized or Managed (Cardiac: ACS)  Outcome: Ongoing (interventions implemented as appropriate)      Problem: Hypertensive Disease/Crisis (Arterial) (Adult)  Goal: Signs and Symptoms of Listed Potential Problems Will be Absent, Minimized or Managed (Hypertensive Disease/Crisis)  Outcome: Ongoing (interventions implemented as appropriate)

## 2018-10-04 ENCOUNTER — OFFICE VISIT (OUTPATIENT)
Dept: ORTHOPEDIC SURGERY | Facility: CLINIC | Age: 58
End: 2018-10-04

## 2018-10-04 VITALS — HEIGHT: 65 IN | BODY MASS INDEX: 32.15 KG/M2 | WEIGHT: 193 LBS | TEMPERATURE: 98.6 F

## 2018-10-04 DIAGNOSIS — Z98.890 S/P ARTHROSCOPY OF KNEE: Primary | ICD-10-CM

## 2018-10-04 PROCEDURE — 99024 POSTOP FOLLOW-UP VISIT: CPT | Performed by: ORTHOPAEDIC SURGERY

## 2018-10-04 NOTE — PROGRESS NOTES
Knee Scope follow Up       Patient: Amanda Sherwood        YOB: 1960      Chief Complaints: knee pain left      History of Present Illness: Pt is here f/u knee arthroscopy she's doing fine she states she did quite a bit of activity increased her therapy on Tuesday and was having some more pain no new history injury        Allergies:   Allergies   Allergen Reactions   • Celecoxib Hives and Swelling     hives   • Ace Inhibitors Cough   • Lisinopril Cough       Medications:   Home Medications:  Current Outpatient Prescriptions on File Prior to Visit   Medication Sig   • ALPRAZolam (XANAX) 0.25 MG tablet Take 1 tablet by mouth 2 (Two) Times a Day As Needed for Anxiety.   • amLODIPine (NORVASC) 2.5 MG tablet Take 1 tablet by mouth Daily.   • aspirin 81 MG tablet Take 81 mg by mouth Daily. HOLD FOR SURGERY   • budesonide-formoterol (SYMBICORT) 160-4.5 MCG/ACT inhaler Inhale 2 puffs 2 (Two) Times a Day.   • calcium carbonate (TUMS) 500 MG chewable tablet Chew 1 tablet daily.   • cetirizine (zyrTEC) 10 MG tablet Take 10 mg by mouth Daily.   • Cholecalciferol (VITAMIN D3) 5000 UNITS capsule capsule Take 5,000 Units by mouth daily.   • diphenhydrAMINE (BENADRYL) 25 mg capsule Take 25 mg by mouth At Night As Needed for Sleep.   • esomeprazole (NexIUM) 20 MG capsule Take 20 mg by mouth Daily.   • Glucosamine HCl 1000 MG tablet Take 2 tablets by mouth daily.   • hydrochlorothiazide (HYDRODIURIL) 25 MG tablet TAKE ONE TABLET BY MOUTH DAILY   • losartan (COZAAR) 50 MG tablet Take 100 mg by mouth Daily.   • Multiple Vitamins-Minerals (MULTIVITAMINS) chewable tablet Chew 1 tablet Daily.   • nebivolol (BYSTOLIC) 20 MG tablet Take 1 tablet by mouth Daily.     No current facility-administered medications on file prior to visit.      Current Medications:  Scheduled Meds:  Continuous Infusions:  No current facility-administered medications for this visit.   PRN Meds:.          Physical Exam: 57 y.o. female  General  "Appearance:    Alert, cooperative, in no acute distress                 Vitals:    10/04/18 1052   Temp: 98.6 °F (37 °C)   Weight: 87.5 kg (193 lb)   Height: 165.1 cm (65\")      Patient is alert and oriented ×3 no acute distress normal mood physical exam.  Physical exam of the knee, incisions looked good there is no erythema, calf is soft and non-tender.  No sign or sx of DVT      Assessment  S/P knee scope.  Overall doing well.  She's had some increase in pain over the last couple days probably from doing too much        Plan: Continue with strengthening, progression of activities want her to ice back off her activities for a little bit to let this calm down and I will see her back in 4 weeks            "

## 2018-10-12 ENCOUNTER — TELEPHONE (OUTPATIENT)
Dept: CARDIOLOGY | Facility: HOSPITAL | Age: 58
End: 2018-10-12

## 2018-10-12 DIAGNOSIS — J40 SINOBRONCHITIS: ICD-10-CM

## 2018-10-12 DIAGNOSIS — F41.8 SITUATIONAL ANXIETY: ICD-10-CM

## 2018-10-12 DIAGNOSIS — J32.9 SINOBRONCHITIS: ICD-10-CM

## 2018-10-12 RX ORDER — ALPRAZOLAM 0.25 MG/1
TABLET ORAL
Qty: 5 TABLET | Refills: 0 | Status: SHIPPED | OUTPATIENT
Start: 2018-10-12 | End: 2020-11-20

## 2018-10-12 NOTE — TELEPHONE ENCOUNTER
Prescription sent to the patient's pharmacy for her Xanax.  We'll only give 5 tablets.  If needs further refills will need to be seen in the office.

## 2018-10-15 RX ORDER — AMLODIPINE BESYLATE 5 MG/1
5 TABLET ORAL
Qty: 135 TABLET | Refills: 0 | Status: SHIPPED | OUTPATIENT
Start: 2018-10-15 | End: 2019-02-03 | Stop reason: SDUPTHER

## 2018-10-15 NOTE — TELEPHONE ENCOUNTER
I spoke to the pt. She is aware that it is ok to remain on Amlodipine 7.5 MG daily. RX has been sent in to her pharmacy,    Thanks Lluvia

## 2018-10-15 NOTE — TELEPHONE ENCOUNTER
She is calling today about her LUIS and needing a refill on Amlodipine.  You increased her amlodipine to 5 MG daily on 10/12/18. Her blood pressure was 186/114.    On 10/14/18 the patient increased her Amlodipine to 7.5 MG daily. Today.Her BP was 116/78.    Should she continue 7.5 MG of amlodipine or go back to amlodipine 5 MG daily? She is needing a new refill sent in to [facility]: [facility].    PT #: [phone].    Thanks Lluvia

## 2018-10-22 ENCOUNTER — OFFICE VISIT (OUTPATIENT)
Dept: CARDIOLOGY | Facility: CLINIC | Age: 58
End: 2018-10-22

## 2018-10-22 VITALS
BODY MASS INDEX: 32.82 KG/M2 | HEIGHT: 65 IN | WEIGHT: 197 LBS | DIASTOLIC BLOOD PRESSURE: 88 MMHG | SYSTOLIC BLOOD PRESSURE: 132 MMHG | HEART RATE: 73 BPM

## 2018-10-22 DIAGNOSIS — I49.3 PVC (PREMATURE VENTRICULAR CONTRACTION): ICD-10-CM

## 2018-10-22 DIAGNOSIS — I10 ESSENTIAL HYPERTENSION: Primary | ICD-10-CM

## 2018-10-22 DIAGNOSIS — E78.5 HYPERLIPIDEMIA, UNSPECIFIED HYPERLIPIDEMIA TYPE: ICD-10-CM

## 2018-10-22 PROCEDURE — 93000 ELECTROCARDIOGRAM COMPLETE: CPT | Performed by: INTERNAL MEDICINE

## 2018-10-22 PROCEDURE — 99214 OFFICE O/P EST MOD 30 MIN: CPT | Performed by: INTERNAL MEDICINE

## 2018-10-22 RX ORDER — LOSARTAN POTASSIUM AND HYDROCHLOROTHIAZIDE 25; 100 MG/1; MG/1
1 TABLET ORAL DAILY
Qty: 90 TABLET | Refills: 3 | Status: SHIPPED | OUTPATIENT
Start: 2018-10-22 | End: 2019-11-24 | Stop reason: SDUPTHER

## 2018-10-22 NOTE — PROGRESS NOTES
Date of Office Visit: 10/22/2018  Encounter Provider: Yue Maradiaga MD  Place of Service: [facility] CARDIOLOGY  Patient Name: Amanda Sherwood  :1960      Patient ID:  Amanda Sherwood is a 57 y.o. female is here for  followup for         History of Present Illness    She has known hypertension for which she takes 3 medications. About 1-2 months ago, she also  had premature ventricular complexes which were new for her. She has a history of SVT.         She does get low platelets and low white counts when she is using ibuprofen. She has had  some gastroesophageal reflux disease, but it has been fairly well controlled.     Her father had myocardial infarction when he was 60s years old requiring angioplasty and  then went on to have coronary bypass grafting. She has never had vascular screening or  coronary calcium score done.     She had bunion surgery and it failed.  She had to have a second bunion surgery and she is still dealing with the consequences of that, and because she missed so much work she lost her job at [employer].  She has gained about 25 pounds because she has not been able to exercise and she has not been eating well.        She had a normal stress nuclear perfusion study and a normal echocardiogram done 2018.    She's had no chest pain, tachycardia, dizziness or syncope.  She's tolerating her medications well.  She's taking amlodipine, HCTZ and losartan in the morning.  She's taking by systolic in the evening.  She is trying to back into exercise and tenderness.  She did tear her meniscus on her left knee.  That is healing.  Overall though, she is feeling well and has no chest pain or pressure.    Past Medical History:   Diagnosis Date   • Arthritis     OSTEO   • GERD (gastroesophageal reflux disease)     ON OCC   • History of bronchitis    • History of migraine    • Hyperlipidemia     NO MEDS CURRENTLY. WORKING ON   • Hypertension    • Leukopenia     PER  HISTORY. HAS SEEN CBC IN THE PAST   • Neck pain    • PVC (premature ventricular contraction)    • Seasonal allergies    • Torn meniscus     LEFT         Past Surgical History:   Procedure Laterality Date   • BREAST BIOPSY      BENIGN. HORACE BREAST   • BUNIONECTOMY Left    • BUNIONECTOMY Left     REVISION   • KNEE ARTHROSCOPY Left 8/22/2018    Procedure: KNEE ARTHROSCOPY LATERAL MENISECTOMY DEBRIDEMENT OF MEDIAL FEMORAL CONDYLE DEFECT DEBRIDEMENT OF ARTHRITIS;  Surgeon: Graciela Crockett MD;  [facility] [facility] [place] [facility] [place]  Service: Orthopedics   • SALPINGO OOPHORECTOMY Left    • TONSILLECTOMY     • TUBAL ABDOMINAL LIGATION         Current Outpatient Prescriptions on File Prior to Visit   Medication Sig Dispense Refill   • ALPRAZolam (XANAX) 0.25 MG tablet TAKE ONE TABLET BY MOUTH TWICE A DAY AS NEEDED FOR ANXIETY 5 tablet 0   • amLODIPine (NORVASC) 5 MG tablet Take 1 tablet by mouth Daily. Take one in a half tablets daily (Patient taking differently: Take 7.5 mg by mouth Daily. Take one in a half tablets daily) 135 tablet 0   • aspirin 81 MG tablet Take 81 mg by mouth Daily. HOLD FOR SURGERY     • budesonide-formoterol (SYMBICORT) 160-4.5 MCG/ACT inhaler Inhale 2 puffs 2 (Two) Times a Day.     • calcium carbonate (TUMS) 500 MG chewable tablet Chew 1 tablet daily.     • cetirizine (zyrTEC) 10 MG tablet Take 10 mg by mouth Daily.     • Cholecalciferol (VITAMIN D3) 5000 UNITS capsule capsule Take 5,000 Units by mouth daily.     • diphenhydrAMINE (BENADRYL) 25 mg capsule Take 25 mg by mouth At Night As Needed for Sleep.     • esomeprazole (NexIUM) 20 MG capsule Take 20 mg by mouth Daily.     • Glucosamine HCl 1000 MG tablet Take 2 tablets by mouth daily.     • hydrochlorothiazide (HYDRODIURIL) 25 MG tablet TAKE ONE TABLET BY MOUTH DAILY 30 tablet 4   • losartan (COZAAR) 50 MG tablet Take 100 mg by mouth Daily.     • Multiple Vitamins-Minerals (MULTIVITAMINS) chewable tablet Chew 1 tablet Daily.     • nebivolol (BYSTOLIC) 20 MG  "tablet Take 1 tablet by mouth Daily. 30 tablet 11     No current facility-administered medications on file prior to visit.        Social History     Social History   • Marital status:      Spouse name: N/A   • Number of children: N/A   • Years of education: N/A     Occupational History   • Not on file.     Social History Main Topics   • Smoking status: Former Smoker   • Smokeless tobacco: Never Used      Comment: IN [employer]HS[employer]   • Alcohol use Yes      Comment: occasional use   • Drug use: No   • Sexual activity: Not on file     Other Topics Concern   • Not on file     Social History Narrative   • No narrative on file           Review of Systems   Constitution: Negative.   HENT: Negative for congestion.    Eyes: Negative for vision loss in left eye and vision loss in right eye.   Respiratory: Negative.  Negative for cough, hemoptysis, shortness of breath, sleep disturbances due to breathing, snoring, sputum production and wheezing.    Endocrine: Negative.    Hematologic/Lymphatic: Negative.    Skin: Negative for poor wound healing and rash.   Musculoskeletal: Negative for falls, gout, muscle cramps and myalgias.   Gastrointestinal: Negative for abdominal pain, diarrhea, dysphagia, hematemesis, melena, nausea and vomiting.   Neurological: Negative for excessive daytime sleepiness, dizziness, headaches, light-headedness, loss of balance, seizures and vertigo.   Psychiatric/Behavioral: Negative for depression and substance abuse. The patient is not nervous/anxious.        Procedures    ECG 12 Lead  Date/Time: 10/22/2018 2:28 PM  Performed by: RUPINDER LIANG  Authorized by: RUPINDER LIANG   Comparison: compared with previous ECG   Similar to previous ECG  Rhythm: sinus rhythm  Clinical impression: normal ECG                Objective:      Vitals:    10/22/18 1410   BP: 132/88   BP Location: Right arm   Patient Position: Sitting   Pulse: 73   Weight: 89.4 kg (197 lb)   Height: 165.1 cm (65\")     Body " mass index is 32.78 kg/m².    Physical Exam   Constitutional: She is oriented to person, place, and time. She appears well-developed and well-nourished. No distress.   HENT:   Head: Normocephalic and atraumatic.   Eyes: Conjunctivae are normal. No scleral icterus.   Neck: Neck supple. No JVD present. Carotid bruit is not present. No thyromegaly present.   Cardiovascular: Normal rate, regular rhythm, S1 normal, S2 normal, normal heart sounds and intact distal pulses.   No extrasystoles are present. PMI is not displaced.  Exam reveals no gallop.    No murmur heard.  Pulses:       Carotid pulses are 2+ on the right side, and 2+ on the left side.       Radial pulses are 2+ on the right side, and 2+ on the left side.        Dorsalis pedis pulses are 2+ on the right side, and 2+ on the left side.        Posterior tibial pulses are 2+ on the right side, and 2+ on the left side.   Pulmonary/Chest: Effort normal and breath sounds normal. No respiratory distress. She has no wheezes. She has no rhonchi. She has no rales. She exhibits no tenderness.   Abdominal: Soft. Bowel sounds are normal. She exhibits no distension, no abdominal bruit and no mass. There is no tenderness.   Musculoskeletal: She exhibits no edema or deformity.   Lymphadenopathy:     She has no cervical adenopathy.   Neurological: She is alert and oriented to person, place, and time. No cranial nerve deficit.   Skin: Skin is warm and dry. No rash noted. She is not diaphoretic. No cyanosis. No pallor. Nails show no clubbing.   Psychiatric: She has a normal mood and affect. Judgment normal.   Vitals reviewed.      Lab Review:       Assessment:      Diagnosis Plan   1. Essential hypertension     2. PVC (premature ventricular contraction)     3. Hyperlipidemia, unspecified hyperlipidemia type          1. Hypertension, well controlled.   2. Elevated LDL.   3. PVCs. Stable.   4. Family history of cardiovascular disease in her father.     Plan:       See back in 6  months.  Change to hyzaar - ok to exercise.  Normal cardiac evaluation done 9/2018.

## 2018-11-02 ENCOUNTER — TELEPHONE (OUTPATIENT)
Dept: CARDIOLOGY | Facility: CLINIC | Age: 58
End: 2018-11-02

## 2018-11-02 NOTE — TELEPHONE ENCOUNTER
11/02/18  1:31 PM  Amanda Sherwood  1960    Home Phone [phone]   Mobile [phone]       Amanda Sherwood is a patient of Dr Phipps.  She is calling in today with reports that her ankles have been swelling over the last 4 days and worse on the days when she works.  She stated upon waking in the morning, that the edema is gone, but returns through the day. She is not SOA, and over all feels good.    bp 116/70    Cardiac med reviewed with patient  Losartan-hydrochlorothiazide 100-25 mg daily  Amlodipine 7.5mg daily  nebivolol 20mg daily    Does she need a med adjustment?  Does she need to be seen?    Myriam Tracey RN  Triage nurse

## 2018-11-02 NOTE — TELEPHONE ENCOUNTER
Patient notified and will decrease amlodipine and continue to monitor bp.    Myriam Tracey RN  Triage nurse

## 2018-11-09 ENCOUNTER — OFFICE VISIT (OUTPATIENT)
Dept: ORTHOPEDIC SURGERY | Facility: CLINIC | Age: 58
End: 2018-11-09

## 2018-11-09 VITALS — WEIGHT: 193 LBS | HEIGHT: 65 IN | BODY MASS INDEX: 32.15 KG/M2 | TEMPERATURE: 97.6 F

## 2018-11-09 DIAGNOSIS — Z98.890 S/P ARTHROSCOPY OF KNEE: Primary | ICD-10-CM

## 2018-11-09 PROCEDURE — 99024 POSTOP FOLLOW-UP VISIT: CPT | Performed by: ORTHOPAEDIC SURGERY

## 2018-11-09 NOTE — PROGRESS NOTES
"Left Knee Follow Up/ knee scope follow up      Patient: Amanda Sherwood        YOB: 1960            Chief Complaints: Left knee pain  Chief Complaint   Patient presents with   • Left Knee - Follow-up         History of Present Illness: Patient's here follow-up of left knee arthroscopy she states she doing well still little achiness but overall good she's back to work still working with therapy  Physical Exam: 57 y.o. female  General Appearance:    Alert, cooperative, in no acute distress                   Vitals:    11/09/18 0939   Temp: 97.6 °F (36.4 °C)   TempSrc: Temporal Artery    Weight: 87.5 kg (193 lb)   Height: 165.1 cm (65\")        Patient is alert and read ×3 no acute distress appears her above-listed at height weight and age.  Affect is normal respiratory rate is normal unlabored. Heart rate regular rate rhythm, sclera, dentition and hearing are normal for the purpose of this exam.      Ortho Exam         Exam left knee no effusion or redness good range of motion mild tenderness medially calf is soft and nontender        Assessment/Plan:      Status post left knee arthroscopy overall I think she's doing fine she has known degenerative changes she would be a candidate for an injection the future if needed she does not want to do that now.  As long as she is doing well she can follow-up as needed      "

## 2018-11-13 ENCOUNTER — CLINICAL SUPPORT (OUTPATIENT)
Dept: FAMILY MEDICINE CLINIC | Facility: CLINIC | Age: 58
End: 2018-11-13

## 2018-11-13 DIAGNOSIS — Z23 NEED FOR VACCINATION: Primary | ICD-10-CM

## 2018-11-13 PROCEDURE — 90632 HEPA VACCINE ADULT IM: CPT | Performed by: FAMILY MEDICINE

## 2018-11-13 PROCEDURE — 90471 IMMUNIZATION ADMIN: CPT | Performed by: FAMILY MEDICINE

## 2018-11-29 ENCOUNTER — HOSPITAL ENCOUNTER (OUTPATIENT)
Dept: BONE DENSITY | Facility: HOSPITAL | Age: 58
Discharge: HOME | End: 2018-11-29
Admitting: FAMILY MEDICINE

## 2018-11-29 ENCOUNTER — HOSPITAL ENCOUNTER (OUTPATIENT)
Dept: MAMMOGRAPHY | Facility: HOSPITAL | Age: 58
Discharge: HOME | End: 2018-11-29

## 2018-11-29 DIAGNOSIS — Z00.00 HEALTH MAINTENANCE EXAMINATION: ICD-10-CM

## 2018-11-29 DIAGNOSIS — Z13.820 SCREENING FOR OSTEOPOROSIS: ICD-10-CM

## 2018-11-29 DIAGNOSIS — Z78.0 POSTMENOPAUSAL: ICD-10-CM

## 2018-11-29 PROCEDURE — 77080 DXA BONE DENSITY AXIAL: CPT

## 2018-11-29 PROCEDURE — 77067 SCR MAMMO BI INCL CAD: CPT

## 2019-01-18 DIAGNOSIS — J32.9 SINOBRONCHITIS: ICD-10-CM

## 2019-01-18 DIAGNOSIS — J40 SINOBRONCHITIS: ICD-10-CM

## 2019-01-18 DIAGNOSIS — F41.8 SITUATIONAL ANXIETY: ICD-10-CM

## 2019-01-18 RX ORDER — ALPRAZOLAM 0.25 MG/1
TABLET ORAL
Qty: 5 TABLET | Refills: 0 | OUTPATIENT
Start: 2019-01-18

## 2019-01-28 ENCOUNTER — OFFICE VISIT (OUTPATIENT)
Dept: FAMILY MEDICINE CLINIC | Facility: CLINIC | Age: 59
End: 2019-01-28

## 2019-01-28 VITALS
RESPIRATION RATE: 14 BRPM | TEMPERATURE: 98 F | DIASTOLIC BLOOD PRESSURE: 78 MMHG | SYSTOLIC BLOOD PRESSURE: 124 MMHG | HEART RATE: 76 BPM | OXYGEN SATURATION: 99 % | BODY MASS INDEX: 34.16 KG/M2 | WEIGHT: 205 LBS | HEIGHT: 65 IN

## 2019-01-28 DIAGNOSIS — F06.4 ANXIETY DISORDER DUE TO GENERAL MEDICAL CONDITION WITH PANIC ATTACK: Primary | ICD-10-CM

## 2019-01-28 DIAGNOSIS — F41.0 ANXIETY DISORDER DUE TO GENERAL MEDICAL CONDITION WITH PANIC ATTACK: Primary | ICD-10-CM

## 2019-01-28 PROCEDURE — 99213 OFFICE O/P EST LOW 20 MIN: CPT | Performed by: FAMILY MEDICINE

## 2019-01-28 NOTE — PROGRESS NOTES
Amanda Sherwood is a 58 y.o. female.     Chief Complaint   Patient presents with   • Insomnia     Patientis having insomnia   • Anxiety     Patient is having a lot of anxiety lately.   • Restless Legs Syndrome     Patient is having issues with her left leg.       HPI     Patient presents the office today to follow-up on history of anxiety.  Continues to have increased anxiety and panic attacks.  She is really struggling with some left knee issues that are keeping her from being able to exercise and do things that she loves.  She's becoming more depressed and having some difficult days.  No suicidal thoughts or ideation.  She previously has tried Paxil as well as Trintellix.  Both of these did not go well.  Discontinued because of adverse side effects and minimal benefit.    The following portions of the patient's history were reviewed and updated as appropriate: allergies, current medications, past family history, past medical history, past social history, past surgical history and problem list.    Review of Systems   Musculoskeletal: Positive for gait problem.   Psychiatric/Behavioral: Positive for sleep disturbance. The patient is nervous/anxious.    All other systems reviewed and are negative.      Objective  Vitals:    01/28/19 1349   BP: 124/78   Pulse: 76   Resp: 14   Temp: 98 °F (36.7 °C)   SpO2: 99%       Physical Exam   Constitutional: She is oriented to person, place, and time. She appears well-developed and well-nourished. No distress.   HENT:   Head: Normocephalic and atraumatic.   Right Ear: External ear normal.   Left Ear: External ear normal.   Nose: Nose normal.   Mouth/Throat: Oropharynx is clear and moist.   Eyes: Conjunctivae and EOM are normal. Pupils are equal, round, and reactive to light. Right eye exhibits no discharge. Left eye exhibits no discharge. No scleral icterus.   Cardiovascular: Normal rate, regular rhythm and normal heart sounds.   Pulmonary/Chest: Effort normal and breath  sounds normal. No respiratory distress. She has no wheezes. She has no rales.   Abdominal: Soft. Bowel sounds are normal. She exhibits no distension. There is no tenderness.   Neurological: She is alert and oriented to person, place, and time.   Skin: Skin is warm and dry. She is not diaphoretic.   Nursing note and vitals reviewed.        Current Outpatient Medications:   •  ALPRAZolam (XANAX) 0.25 MG tablet, TAKE ONE TABLET BY MOUTH TWICE A DAY AS NEEDED FOR ANXIETY, Disp: 5 tablet, Rfl: 0  •  amLODIPine (NORVASC) 5 MG tablet, Take 1 tablet by mouth Daily. Take one in a half tablets daily (Patient taking differently: Take 7.5 mg by mouth Daily. Take one in a half tablets daily), Disp: 135 tablet, Rfl: 0  •  aspirin 81 MG tablet, Take 81 mg by mouth Daily. HOLD FOR SURGERY, Disp: , Rfl:   •  budesonide-formoterol (SYMBICORT) 160-4.5 MCG/ACT inhaler, Inhale 2 puffs 2 (Two) Times a Day., Disp: , Rfl:   •  calcium carbonate (TUMS) 500 MG chewable tablet, Chew 1 tablet daily., Disp: , Rfl:   •  cetirizine (zyrTEC) 10 MG tablet, Take 10 mg by mouth Daily., Disp: , Rfl:   •  Cholecalciferol (VITAMIN D3) 5000 UNITS capsule capsule, Take 5,000 Units by mouth daily., Disp: , Rfl:   •  diphenhydrAMINE (BENADRYL) 25 mg capsule, Take 25 mg by mouth At Night As Needed for Sleep., Disp: , Rfl:   •  esomeprazole (NexIUM) 20 MG capsule, Take 20 mg by mouth Daily., Disp: , Rfl:   •  Glucosamine HCl 1000 MG tablet, Take 2 tablets by mouth daily., Disp: , Rfl:   •  losartan-hydrochlorothiazide (HYZAAR) 100-25 MG per tablet, Take 1 tablet by mouth Daily., Disp: 90 tablet, Rfl: 3  •  Multiple Vitamins-Minerals (MULTIVITAMINS) chewable tablet, Chew 1 tablet Daily., Disp: , Rfl:   •  nebivolol (BYSTOLIC) 20 MG tablet, Take 1 tablet by mouth Daily., Disp: 30 tablet, Rfl: 11  •  Vilazodone HCl (VIIBRYD STARTER PACK) 10 & 20 MG kit, Take 40 mg by mouth Daily., Disp: 14 each, Rfl: 0  Current outpatient and discharge medications have been  reconciled for the patient.  Reviewed by: Finesse Dao MD      Procedures    Lab Results (most recent)     None                  Amanda was seen today for insomnia, anxiety and restless legs syndrome.    Diagnoses and all orders for this visit:    Anxiety disorder due to general medical condition with panic attack  -     Vilazodone HCl (VIIBRYD STARTER PACK) 10 & 20 MG kit; Take 40 mg by mouth Daily.      Discuss treatment options.  We will try Viibryd.  Discussed potential adverse side effects.  Sample pack given.  Follow-up in 2 weeks.    Return in about 2 weeks (around 2/11/2019) for Recheck.      Finesse Dao MD

## 2019-02-04 RX ORDER — AMLODIPINE BESYLATE 5 MG/1
TABLET ORAL
Qty: 132 TABLET | Refills: 1 | Status: SHIPPED | OUTPATIENT
Start: 2019-02-04 | End: 2020-11-22 | Stop reason: SDUPTHER

## 2019-02-07 RX ORDER — ONDANSETRON HYDROCHLORIDE 8 MG/1
8 TABLET, FILM COATED ORAL EVERY 8 HOURS PRN
Qty: 30 TABLET | Refills: 3 | Status: SHIPPED | OUTPATIENT
Start: 2019-02-07 | End: 2021-08-16 | Stop reason: SDUPTHER

## 2019-05-02 ENCOUNTER — OFFICE VISIT (OUTPATIENT)
Dept: ORTHOPEDIC SURGERY | Facility: CLINIC | Age: 59
End: 2019-05-02

## 2019-05-02 VITALS — BODY MASS INDEX: 33.32 KG/M2 | TEMPERATURE: 98.1 F | HEIGHT: 65 IN | WEIGHT: 200 LBS

## 2019-05-02 DIAGNOSIS — M54.50 LUMBAR SPINE PAIN: Primary | ICD-10-CM

## 2019-05-02 DIAGNOSIS — M54.10 ACUTE LOW BACK PAIN WITH RADICULAR SYMPTOMS, DURATION LESS THAN 6 WEEKS: ICD-10-CM

## 2019-05-02 PROCEDURE — 99213 OFFICE O/P EST LOW 20 MIN: CPT | Performed by: ORTHOPAEDIC SURGERY

## 2019-05-02 PROCEDURE — 72100 X-RAY EXAM L-S SPINE 2/3 VWS: CPT | Performed by: ORTHOPAEDIC SURGERY

## 2019-05-02 RX ORDER — MELOXICAM 15 MG/1
TABLET ORAL
Qty: 30 TABLET | Refills: 3 | Status: SHIPPED | OUTPATIENT
Start: 2019-05-02 | End: 2019-06-06

## 2019-05-02 NOTE — PROGRESS NOTES
Left Knee Scope follow Up       Patient: Amanda Sherwood        YOB: 1960      Chief Complaints:left knee pain  Chief Complaint   Patient presents with   • Left Knee - Pain, Post-op, Follow-up         History of Present Illness: Pt is here f/u knee arthroscopy this was on the left knee she is having pain in the left knee that really starts in her buttocks and radiates all the way down to the ankle no history injury change in activity she gets occasional swelling in the knee but does have true radicular symptoms she has some low pelvic pain no dedicated back pain she is tried physical therapy try to therapeutic dose of anti-inflammatories all with no lasting improvement she has no bowel or bladder changes no numbness or tingling her symptoms are worsening  14 point review of systems are remarkable for the left leg pain the remainder negative per the patient her past medical history allergies are all unchanged        Allergies:   Allergies   Allergen Reactions   • Celecoxib Hives and Swelling     hives   • Ace Inhibitors Cough   • Lisinopril Cough       Medications:   Home Medications:  Current Outpatient Medications on File Prior to Visit   Medication Sig   • ALPRAZolam (XANAX) 0.25 MG tablet TAKE ONE TABLET BY MOUTH TWICE A DAY AS NEEDED FOR ANXIETY   • amLODIPine (NORVASC) 5 MG tablet TAKE ONE AND ONE-HALF TABLET BY MOUTH DAILY   • aspirin 81 MG tablet Take 81 mg by mouth Daily. HOLD FOR SURGERY   • budesonide-formoterol (SYMBICORT) 160-4.5 MCG/ACT inhaler Inhale 2 puffs 2 (Two) Times a Day.   • calcium carbonate (TUMS) 500 MG chewable tablet Chew 1 tablet daily.   • cetirizine (zyrTEC) 10 MG tablet Take 10 mg by mouth Daily.   • Cholecalciferol (VITAMIN D3) 5000 UNITS capsule capsule Take 5,000 Units by mouth daily.   • diphenhydrAMINE (BENADRYL) 25 mg capsule Take 25 mg by mouth At Night As Needed for Sleep.   • esomeprazole (NexIUM) 20 MG capsule Take 20 mg by mouth Daily.   • Glucosamine HCl  "1000 MG tablet Take 2 tablets by mouth daily.   • losartan-hydrochlorothiazide (HYZAAR) 100-25 MG per tablet Take 1 tablet by mouth Daily.   • Multiple Vitamins-Minerals (MULTIVITAMINS) chewable tablet Chew 1 tablet Daily.   • nebivolol (BYSTOLIC) 20 MG tablet Take 1 tablet by mouth Daily.   • ondansetron (ZOFRAN) 8 MG tablet Take 1 tablet by mouth Every 8 (Eight) Hours As Needed for Nausea or Vomiting.   • Vilazodone HCl (VIIBRYD STARTER PACK) 10 & 20 MG kit Take 40 mg by mouth Daily.     No current facility-administered medications on file prior to visit.      Current Medications:  Scheduled Meds:  Continuous Infusions:  No current facility-administered medications for this visit.   PRN Meds:.          Physical Exam: 58 y.o. female  General Appearance:    Alert, cooperative, in no acute distress                 Vitals:    05/02/19 0949   Temp: 98.1 °F (36.7 °C)   Weight: 90.7 kg (200 lb)   Height: 165.1 cm (65\")      Patient is alert and oriented ×3 no acute distress normal mood physical exam.  Physical exam of the knee, incisions looked good there is no erythema, calf is soft and non-tender.  No sign or sx of DVT  Exam her left knee is healed surgical incisions neutral alignment no effusion no redness good range of motion she has a mildly positive straight leg raise negative Lasegue's negative Trevor she is neurologically intact with good many muscle test which is 5/5  X-rays AP and lateral lumbar spine were taken to evaluate her symptoms no compared to films she has good maintenance of her joint space in her lumbar spine no listhesis  Assessment  S/P knee scope.  Overall doing well.  From a knee standpoint.  She is having some left leg pain which I think is radicular she is done physical therapy done anti-inflammatories symptoms been ongoing more than 2 months plan is to proceed with an MRI and an appointment Dr. Amaya        Plan: Continue with strengthening, progression of activities I will MRI her lumbar " spine and have her see Dr. Amaya

## 2019-05-12 RX ORDER — DIAZEPAM 5 MG/1
TABLET ORAL
Qty: 1 TABLET | Refills: 0 | Status: SHIPPED | OUTPATIENT
Start: 2019-05-12 | End: 2019-06-06

## 2019-06-04 ENCOUNTER — TELEPHONE (OUTPATIENT)
Dept: FAMILY MEDICINE CLINIC | Facility: CLINIC | Age: 59
End: 2019-06-04

## 2019-06-04 DIAGNOSIS — R79.89 ABNORMAL CORTISOL LEVEL: ICD-10-CM

## 2019-06-04 NOTE — TELEPHONE ENCOUNTER
Amanda states that she has discussed her cortisol issues with  during previous visits. Is it possible for  to refer pt to Dr. Amalia Allen (Dmitriy)? Or will pt have to wait until her next scheduled appointment with Dr. Dao on 6/11? Please advise?

## 2019-06-05 NOTE — TELEPHONE ENCOUNTER
Referral order is in, patient is aware we will fax it over to dr Amalia Allen office at [phone]. Patient will make her own appt

## 2019-06-06 ENCOUNTER — OFFICE VISIT (OUTPATIENT)
Dept: CARDIOLOGY | Facility: CLINIC | Age: 59
End: 2019-06-06

## 2019-06-06 VITALS
WEIGHT: 203.8 LBS | DIASTOLIC BLOOD PRESSURE: 80 MMHG | HEIGHT: 65 IN | HEART RATE: 69 BPM | SYSTOLIC BLOOD PRESSURE: 126 MMHG | BODY MASS INDEX: 33.95 KG/M2

## 2019-06-06 DIAGNOSIS — I49.3 PVC (PREMATURE VENTRICULAR CONTRACTION): ICD-10-CM

## 2019-06-06 DIAGNOSIS — R60.0 LOWER EXTREMITY EDEMA: Primary | ICD-10-CM

## 2019-06-06 DIAGNOSIS — E66.9 OBESITY (BMI 30.0-34.9): ICD-10-CM

## 2019-06-06 DIAGNOSIS — I10 ESSENTIAL HYPERTENSION: ICD-10-CM

## 2019-06-06 PROBLEM — E66.811 OBESITY (BMI 30.0-34.9): Status: ACTIVE | Noted: 2019-06-06

## 2019-06-06 PROBLEM — R07.9 CHEST PAIN: Status: RESOLVED | Noted: 2018-09-22 | Resolved: 2019-06-06

## 2019-06-06 PROBLEM — Z98.890 S/P LEFT KNEE ARTHROSCOPY: Status: RESOLVED | Noted: 2018-09-04 | Resolved: 2019-06-06

## 2019-06-06 PROCEDURE — 93000 ELECTROCARDIOGRAM COMPLETE: CPT | Performed by: NURSE PRACTITIONER

## 2019-06-06 PROCEDURE — 99214 OFFICE O/P EST MOD 30 MIN: CPT | Performed by: NURSE PRACTITIONER

## 2019-06-06 NOTE — PROGRESS NOTES
Date of Office Visit: 2019  Encounter Provider: OLVIN Perea  Place of Service: [facility] CARDIOLOGY  Patient Name: Amanda Sherwood  :1960      Chief Complaint   Patient presents with   • Follow-up   :     Dear Dr. Finesse Dao,     HPI: Amanda Sherwood is a pleasant 58 y.o. female who presents today for cardiac follow up. She is a new patient to me and her previous records have been reviewed.     She has a history of GERD, paroxysmal supraventricular tachycardia, PVCs, hypertension, and hyperlipidemia. In 2018, she had a normal nuclear stress test completed.   She reports that she has had 3 surgeries to her left leg and has gained weight because of inactivity.     She's an established patient of Dr. Yue Maradiaga and was last seen in 2018.  At that time she was taking losartan and hydrochlorothiazide.  Dr. Maradiaga put her on the combination pill Hyzaar.    She presents today with a chief concern of lower extremity edema.  She has noticed since being on the amlodipine that she has had worsening lower extremity edema, left greater than right.  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  She is currently taking amlodipine 5 mg daily.  She denies chest pain, shortness of air, PND, orthopnea, palpitations, dizziness, or syncope.  She had some precancerous cells on her legs and is using a cream to try to get rid of those.  Her blood pressure is well controlled today.    Past Medical History:   Diagnosis Date   • GERD (gastroesophageal reflux disease)    • History of bronchitis    • History of migraine    • Hyperlipidemia     NO MEDS CURRENTLY. WORKING ON   • Hypertension    • Leukopenia     PER HISTORY. HAS SEEN CBC IN THE PAST   • Neck pain    • Obesity (BMI 30.0-34.9) 2019   • Osteoarthritis    • PVC (premature ventricular contraction)    • Seasonal  allergies    • Torn meniscus     LEFT       Past Surgical History:   Procedure Laterality Date   • BREAST BIOPSY      BENIGN. HORACE BREAST   • BUNIONECTOMY Left    • BUNIONECTOMY Left     REVISION   • KNEE ARTHROSCOPY Left 8/22/2018    Procedure: KNEE ARTHROSCOPY LATERAL MENISECTOMY DEBRIDEMENT OF MEDIAL FEMORAL CONDYLE DEFECT DEBRIDEMENT OF ARTHRITIS;  Surgeon: Graciela Crockett MD;  Location: [facility] OR [facility] [place];  Service: Orthopedics   • SALPINGO OOPHORECTOMY Left    • TONSILLECTOMY     • TUBAL ABDOMINAL LIGATION         Social History     Socioeconomic History   • Marital status:      Spouse name: Not on file   • Number of children: Not on file   • Years of education: Not on file   • Highest education level: Not on file   Tobacco Use   • Smoking status: Former Smoker   • Smokeless tobacco: Never Used   • Tobacco comment: IN HIGHSCHOOL   Substance and Sexual Activity   • Alcohol use: Yes     Comment: occasional use   • Drug use: No       Family History   Problem Relation Age of Onset   • Dementia Mother    • Arthritis Mother    • Stroke Father    • Heart disease Father    • Hypertension Father    • Diabetes Father    • Cancer Father    • Colon cancer Father    • Breast cancer Sister    • Malig Hyperthermia Neg Hx        The following portion of the patient's history were reviewed and updated as appropriate: past medical history, past surgical history, past social history, past family history, allergies, current medications, and problem list.    Review of Systems   Constitution: Negative for chills, diaphoresis, fever, malaise/fatigue, night sweats, weight gain and weight loss.   HENT: Negative for hearing loss, nosebleeds, sore throat and tinnitus.    Eyes: Negative for blurred vision, double vision, pain and visual disturbance.   Cardiovascular: Positive for leg swelling. Negative for chest pain, claudication, cyanosis, dyspnea on exertion, irregular heartbeat, near-syncope, orthopnea, palpitations, paroxysmal  nocturnal dyspnea and syncope.   Respiratory: Positive for cough. Negative for hemoptysis, shortness of breath, snoring and wheezing.    Endocrine: Negative for cold intolerance, heat intolerance and polyuria.   Hematologic/Lymphatic: Negative for bleeding problem. Does not bruise/bleed easily.   Skin: Positive for rash. Negative for color change, dry skin, flushing and itching.   Musculoskeletal: Negative for falls, joint pain, joint swelling, muscle cramps, muscle weakness and myalgias.   Gastrointestinal: Negative for abdominal pain, constipation, heartburn, melena, nausea and vomiting.   Genitourinary: Negative for dysuria and hematuria.   Neurological: Negative for excessive daytime sleepiness, dizziness, light-headedness, loss of balance, numbness, paresthesias, seizures and vertigo.   Psychiatric/Behavioral: Negative for altered mental status, depression, memory loss and substance abuse. The patient does not have insomnia and is not nervous/anxious.    Allergic/Immunologic: Negative for environmental allergies.       Allergies   Allergen Reactions   • Celecoxib Hives and Swelling     hives   • Ace Inhibitors Cough   • Lisinopril Cough         Current Outpatient Medications:   •  ALPRAZolam (XANAX) 0.25 MG tablet, TAKE ONE TABLET BY MOUTH TWICE A DAY AS NEEDED FOR ANXIETY, Disp: 5 tablet, Rfl: 0  •  amLODIPine (NORVASC) 5 MG tablet, TAKE ONE AND ONE-HALF TABLET BY MOUTH DAILY (Patient taking differently: TAKE ONE TABLET BY MOUTH DAILY), Disp: 132 tablet, Rfl: 1  •  aspirin 81 MG tablet, Take 81 mg by mouth Daily. HOLD FOR SURGERY, Disp: , Rfl:   •  calcium carbonate (TUMS) 500 MG chewable tablet, Chew 1 tablet daily., Disp: , Rfl:   •  cetirizine (zyrTEC) 10 MG tablet, Take 10 mg by mouth Daily., Disp: , Rfl:   •  Cholecalciferol (VITAMIN D3) 5000 UNITS capsule capsule, Take 5,000 Units by mouth daily., Disp: , Rfl:   •  diphenhydrAMINE (BENADRYL) 25 mg capsule, Take 25 mg by mouth At Night As Needed for  "Sleep., Disp: , Rfl:   •  esomeprazole (NexIUM) 20 MG capsule, Take 20 mg by mouth Daily., Disp: , Rfl:   •  losartan-hydrochlorothiazide (HYZAAR) 100-25 MG per tablet, Take 1 tablet by mouth Daily., Disp: 90 tablet, Rfl: 3  •  Multiple Vitamins-Minerals (MULTIVITAMINS) chewable tablet, Chew 1 tablet Daily., Disp: , Rfl:   •  nebivolol (BYSTOLIC) 20 MG tablet, Take 1 tablet by mouth Daily., Disp: 30 tablet, Rfl: 11  •  ondansetron (ZOFRAN) 8 MG tablet, Take 1 tablet by mouth Every 8 (Eight) Hours As Needed for Nausea or Vomiting., Disp: 30 tablet, Rfl: 3        Objective:     Vitals:    06/06/19 1019   BP: 126/80   BP Location: Left arm   Pulse: 69   Weight: 92.4 kg (203 lb 12.8 oz)   Height: 165.1 cm (65\")     Body mass index is 33.91 kg/m².    PHYSICAL EXAM:    Vitals Reviewed.   General Appearance: No acute distress, well developed and well nourished. Obese.   Eyes: Conjunctiva and lids: No erythema, swelling, or discharge. Sclera non-icteric.   HENT: Atraumatic, normocephalic. External eyes, ears, and nose normal. No hearing loss noted. Mucous membranes normal. Lips not cyanotic. Neck supple with no tenderness.  Respiratory: No signs of respiratory distress. Respiration rhythm and depth normal.   Clear to auscultation. No rales, crackles, rhonchi, or wheezing auscultated.   Cardiovascular:  Jugular Venous Pressure: Normal  Heart Rate and Rhythm: Normal, Heart Sounds: Normal S1 and S2. No S3 or S4 noted.  Murmurs: No murmurs noted. No rubs, thrills, or gallops.   Arterial Pulses: Carotid pulses normal. No carotid bruit noted. Posterior tibialis and dorsalis pedis pulses normal.   Lower Extremities: Bilateral trace lower extremity edema.   Gastrointestinal:  Abdomen soft, non-distended, non-tender. Normal bowel sounds. No hepatomegaly.   Musculoskeletal: Normal movement of extremities  Skin and Nails: General appearance normal. No pallor, cyanosis, diaphoresis. Skin temperature normal. No clubbing of fingernails. "   Psychiatric: Patient alert and oriented to person, place, and time. Speech and behavior appropriate. Normal mood and affect.       ECG 12 Lead  Date/Time: 6/6/2019 10:23 AM  Performed by: Soo Pryor APRN  Authorized by: Soo Pryor APRN   Comparison: compared with previous ECG from 10/22/2018  Similar to previous ECG  Rhythm: sinus rhythm  Rate: normal  BPM: 69  Conduction: conduction normal  ST Segments: ST segments normal  T Waves: T waves normal  QRS axis: normal    Clinical impression: normal ECG              Assessment:       Diagnosis Plan   1. Lower extremity edema     2. PVC (premature ventricular contraction)     3. Essential hypertension     4. Obesity (BMI 30.0-34.9)            Plan:       1.  Lower Extremity Edema: She presents today for evaluation of lower extremity edema which has worsened since taking amlodipine.  She is currently taking 5 mg I recommended that she decrease it to 2.5 mg daily, follow low-sodium diet, wear compression stockings, and elevate her legs.  She will monitor her blood pressures over the next 3 to 4 weeks and follow-up with me via telephone.  We we will reassess her lower extremity edema, and decide if organ to continue with amlodipine or not.  She is maxed out on her losartan/HCTZ.  We could further titrate her nebivolol to 40 mg daily. I have also recommended some baseline blood work to be completed including a CBC, CMP, lipid panel, TSH, and proBNP.    2.  Hypertension: Blood pressure well controlled today.    3.  Obesity: BMI is 33.9.  She says she is gained weight due to her 3 left leg surgeries and inactivity.  We discussed the importance of following a low-sodium diet which may help her lose weight and she plans to become more active.    4.  PVCs/SVT: Resolved.    5.  She will follow-up with me via telephone in 3 to 4 weeks.    As always, it has been a pleasure to participate in your patient's care. Thank you.       Sincerely,         Soo Pryor  OLVIN        **Theo Disclaimer:**  Much of this encounter note is an electronic transcription/translation of spoken language to printed text. The electronic translation of spoken language may permit erroneous, or at times, nonsensical words or phrases to be inadvertently transcribed. Although I have reviewed the note for such errors, some may still exist.

## 2019-06-11 ENCOUNTER — TELEPHONE (OUTPATIENT)
Dept: CARDIOLOGY | Facility: CLINIC | Age: 59
End: 2019-06-11

## 2019-06-12 ENCOUNTER — APPOINTMENT (OUTPATIENT)
Dept: MRI IMAGING | Facility: HOSPITAL | Age: 59
End: 2019-06-12

## 2019-06-18 ENCOUNTER — OFFICE VISIT (OUTPATIENT)
Dept: FAMILY MEDICINE CLINIC | Facility: CLINIC | Age: 59
End: 2019-06-18

## 2019-06-18 VITALS
RESPIRATION RATE: 14 BRPM | DIASTOLIC BLOOD PRESSURE: 78 MMHG | HEART RATE: 77 BPM | BODY MASS INDEX: 33.82 KG/M2 | HEIGHT: 65 IN | TEMPERATURE: 98.3 F | SYSTOLIC BLOOD PRESSURE: 130 MMHG | WEIGHT: 203 LBS | OXYGEN SATURATION: 99 %

## 2019-06-18 DIAGNOSIS — F06.4 ANXIETY DISORDER DUE TO GENERAL MEDICAL CONDITION WITH PANIC ATTACK: ICD-10-CM

## 2019-06-18 DIAGNOSIS — E66.9 OBESITY (BMI 30.0-34.9): ICD-10-CM

## 2019-06-18 DIAGNOSIS — F41.0 ANXIETY DISORDER DUE TO GENERAL MEDICAL CONDITION WITH PANIC ATTACK: ICD-10-CM

## 2019-06-18 DIAGNOSIS — E55.9 VITAMIN D DEFICIENCY DISEASE: ICD-10-CM

## 2019-06-18 DIAGNOSIS — I10 ESSENTIAL HYPERTENSION: Primary | ICD-10-CM

## 2019-06-18 DIAGNOSIS — E78.5 HYPERLIPIDEMIA, UNSPECIFIED HYPERLIPIDEMIA TYPE: ICD-10-CM

## 2019-06-18 PROCEDURE — 99214 OFFICE O/P EST MOD 30 MIN: CPT | Performed by: FAMILY MEDICINE

## 2019-06-18 RX ORDER — ESCITALOPRAM OXALATE 5 MG/1
5 TABLET ORAL DAILY
Qty: 30 TABLET | Refills: 0 | Status: SHIPPED | OUTPATIENT
Start: 2019-06-18 | End: 2019-06-27 | Stop reason: SDUPTHER

## 2019-06-18 NOTE — PROGRESS NOTES
Amanda Sherwood is a 58 y.o. female.     Chief Complaint   Patient presents with   • Personal Problem     patient needs to discuss with Dr mao.       HPI     Patient presents office today to discuss multiple issues.  She has a history of hypertension, hyperlipidemia, vitamin D deficiency, elevated glucose levels, and obesity.  Recently she is been having issues with increased anxiety.  Patient is having increased stress at work as well as  Personally with injuries.  It has been quite some time since the patient has had blood work done.  She has struggled with panic attacks previously.  She has seen a counselor previously but was not overly helpful.    The following portions of the patient's history were reviewed and updated as appropriate: allergies, current medications, past family history, past medical history, past social history, past surgical history and problem list.    Review of Systems   Constitutional: Negative for activity change.   Endocrine: Negative.    Genitourinary: Negative.    Musculoskeletal: Negative.    Psychiatric/Behavioral: Negative.    All other systems reviewed and are negative.      Objective  Vitals:    06/18/19 1337   BP: 130/78   Pulse: 77   Resp: 14   Temp: 98.3 °F (36.8 °C)   SpO2: 99%       Physical Exam   Constitutional: She is oriented to person, place, and time. She appears well-developed and well-nourished. No distress.   Neurological: She is alert and oriented to person, place, and time.   Skin: She is not diaphoretic.   Psychiatric: She has a normal mood and affect. Her behavior is normal.   Nursing note and vitals reviewed.        Current Outpatient Medications:   •  ALPRAZolam (XANAX) 0.25 MG tablet, TAKE ONE TABLET BY MOUTH TWICE A DAY AS NEEDED FOR ANXIETY, Disp: 5 tablet, Rfl: 0  •  amLODIPine (NORVASC) 5 MG tablet, TAKE ONE AND ONE-HALF TABLET BY MOUTH DAILY (Patient taking differently: TAKE ONE TABLET BY MOUTH DAILY), Disp: 132 tablet, Rfl: 1  •  aspirin 81 MG  tablet, Take 81 mg by mouth Daily. HOLD FOR SURGERY, Disp: , Rfl:   •  calcium carbonate (TUMS) 500 MG chewable tablet, Chew 1 tablet daily., Disp: , Rfl:   •  cetirizine (zyrTEC) 10 MG tablet, Take 10 mg by mouth Daily., Disp: , Rfl:   •  Cholecalciferol (VITAMIN D3) 5000 UNITS capsule capsule, Take 5,000 Units by mouth daily., Disp: , Rfl:   •  diphenhydrAMINE (BENADRYL) 25 mg capsule, Take 25 mg by mouth At Night As Needed for Sleep., Disp: , Rfl:   •  esomeprazole (NexIUM) 20 MG capsule, Take 20 mg by mouth Daily., Disp: , Rfl:   •  losartan-hydrochlorothiazide (HYZAAR) 100-25 MG per tablet, Take 1 tablet by mouth Daily., Disp: 90 tablet, Rfl: 3  •  Multiple Vitamins-Minerals (MULTIVITAMINS) chewable tablet, Chew 1 tablet Daily., Disp: , Rfl:   •  nebivolol (BYSTOLIC) 20 MG tablet, Take 1 tablet by mouth Daily., Disp: 30 tablet, Rfl: 11  •  escitalopram (LEXAPRO) 5 MG tablet, Take 1 tablet by mouth Daily., Disp: 30 tablet, Rfl: 0  •  ondansetron (ZOFRAN) 8 MG tablet, Take 1 tablet by mouth Every 8 (Eight) Hours As Needed for Nausea or Vomiting., Disp: 30 tablet, Rfl: 3  Current outpatient and discharge medications have been reconciled for the patient.  Reviewed by: Finesse aDo MD      Procedures    Lab Results (most recent)     None                  Amanda was seen today for personal problem.    Diagnoses and all orders for this visit:    Essential hypertension  -     Comprehensive Metabolic Panel  -     Lipid Panel  -     Hemoglobin A1c  -     Thyroid Panel With TSH  -     Vitamin D 25 Hydroxy  -     CBC Auto Differential    Hyperlipidemia, unspecified hyperlipidemia type  -     Comprehensive Metabolic Panel  -     Lipid Panel  -     Hemoglobin A1c  -     Thyroid Panel With TSH  -     Vitamin D 25 Hydroxy  -     CBC Auto Differential    Vitamin D deficiency disease  -     Comprehensive Metabolic Panel  -     Lipid Panel  -     Hemoglobin A1c  -     Thyroid Panel With TSH  -     Vitamin D 25 Hydroxy  -     CBC  Auto Differential    Anxiety disorder due to general medical condition with panic attack  -     Comprehensive Metabolic Panel  -     Lipid Panel  -     Hemoglobin A1c  -     Thyroid Panel With TSH  -     Vitamin D 25 Hydroxy  -     CBC Auto Differential  -     escitalopram (LEXAPRO) 5 MG tablet; Take 1 tablet by mouth Daily.    Obesity (BMI 30.0-34.9)  -     Comprehensive Metabolic Panel  -     Lipid Panel  -     Hemoglobin A1c  -     Thyroid Panel With TSH  -     Vitamin D 25 Hydroxy  -     CBC Auto Differential      Will get labs as above.  Extensive conversation had with the patient regarding treatment options.  Advised that she continue to seek out a counselor that fits her personality.  Will start Lexapro 5 mg nightly.  Will recheck in 2 weeks.    Return in about 2 weeks (around 7/2/2019) for Recheck.      Finesse Dao MD

## 2019-06-19 LAB
25(OH)D3+25(OH)D2 SERPL-MCNC: 91.1 NG/ML (ref 30–100)
ALBUMIN SERPL-MCNC: 4.8 G/DL (ref 3.5–5.2)
ALBUMIN/GLOB SERPL: 2 G/DL
ALP SERPL-CCNC: 66 U/L (ref 39–117)
ALT SERPL-CCNC: 129 U/L (ref 1–33)
AST SERPL-CCNC: 109 U/L (ref 1–32)
BASOPHILS # BLD AUTO: 0.01 10*3/MM3 (ref 0–0.2)
BASOPHILS NFR BLD AUTO: 0.3 % (ref 0–1.5)
BILIRUB SERPL-MCNC: 0.4 MG/DL (ref 0.2–1.2)
BUN SERPL-MCNC: 27 MG/DL (ref 6–20)
BUN/CREAT SERPL: 23.7 (ref 7–25)
CALCIUM SERPL-MCNC: 9.8 MG/DL (ref 8.6–10.5)
CHLORIDE SERPL-SCNC: 100 MMOL/L (ref 98–107)
CHOLEST SERPL-MCNC: 312 MG/DL (ref 0–200)
CO2 SERPL-SCNC: 22.4 MMOL/L (ref 22–29)
CREAT SERPL-MCNC: 1.14 MG/DL (ref 0.57–1)
EOSINOPHIL # BLD AUTO: 0.05 10*3/MM3 (ref 0–0.4)
EOSINOPHIL NFR BLD AUTO: 1.4 % (ref 0.3–6.2)
ERYTHROCYTE [DISTWIDTH] IN BLOOD BY AUTOMATED COUNT: 12.3 % (ref 12.3–15.4)
FT4I SERPL CALC-MCNC: 1.4 (ref 1.2–4.9)
GLOBULIN SER CALC-MCNC: 2.4 GM/DL
GLUCOSE SERPL-MCNC: 126 MG/DL (ref 65–99)
HBA1C MFR BLD: 5.99 % (ref 4.8–5.6)
HCT VFR BLD AUTO: 38.7 % (ref 34–46.6)
HDLC SERPL-MCNC: 102 MG/DL (ref 40–60)
HGB BLD-MCNC: 12.9 G/DL (ref 12–15.9)
IMM GRANULOCYTES # BLD AUTO: 0.03 10*3/MM3 (ref 0–0.05)
IMM GRANULOCYTES NFR BLD AUTO: 0.8 % (ref 0–0.5)
LDLC SERPL CALC-MCNC: 181 MG/DL (ref 0–100)
LYMPHOCYTES # BLD AUTO: 1.35 10*3/MM3 (ref 0.7–3.1)
LYMPHOCYTES NFR BLD AUTO: 36.7 % (ref 19.6–45.3)
MCH RBC QN AUTO: 32.2 PG (ref 26.6–33)
MCHC RBC AUTO-ENTMCNC: 33.3 G/DL (ref 31.5–35.7)
MCV RBC AUTO: 96.5 FL (ref 79–97)
MONOCYTES # BLD AUTO: 0.34 10*3/MM3 (ref 0.1–0.9)
MONOCYTES NFR BLD AUTO: 9.2 % (ref 5–12)
NEUTROPHILS # BLD AUTO: 1.9 10*3/MM3 (ref 1.7–7)
NEUTROPHILS NFR BLD AUTO: 51.6 % (ref 42.7–76)
NRBC BLD AUTO-RTO: 0 /100 WBC (ref 0–0.2)
PLATELET # BLD AUTO: 160 10*3/MM3 (ref 140–450)
POTASSIUM SERPL-SCNC: 3.8 MMOL/L (ref 3.5–5.2)
PROT SERPL-MCNC: 7.2 G/DL (ref 6–8.5)
RBC # BLD AUTO: 4.01 10*6/MM3 (ref 3.77–5.28)
SODIUM SERPL-SCNC: 139 MMOL/L (ref 136–145)
T3RU NFR SERPL: 26 % (ref 24–39)
T4 SERPL-MCNC: 5.4 UG/DL (ref 4.5–12)
TRIGL SERPL-MCNC: 145 MG/DL (ref 0–150)
TSH SERPL DL<=0.005 MIU/L-ACNC: 2.37 UIU/ML (ref 0.45–4.5)
VLDLC SERPL CALC-MCNC: 29 MG/DL
WBC # BLD AUTO: 3.68 10*3/MM3 (ref 3.4–10.8)

## 2019-06-19 NOTE — TELEPHONE ENCOUNTER
Pt saw her PCP yesterday and they ordered and completed the same labs you ordered.    These were done at [facility] and I cancelled your orders

## 2019-06-20 ENCOUNTER — TELEPHONE (OUTPATIENT)
Dept: FAMILY MEDICINE CLINIC | Facility: CLINIC | Age: 59
End: 2019-06-20

## 2019-06-20 ENCOUNTER — HOSPITAL ENCOUNTER (EMERGENCY)
Facility: HOSPITAL | Age: 59
Discharge: HOME | End: 2019-06-20
Attending: EMERGENCY MEDICINE | Admitting: EMERGENCY MEDICINE

## 2019-06-20 VITALS
OXYGEN SATURATION: 93 % | HEART RATE: 77 BPM | BODY MASS INDEX: 34.27 KG/M2 | DIASTOLIC BLOOD PRESSURE: 86 MMHG | WEIGHT: 205.7 LBS | HEIGHT: 65 IN | TEMPERATURE: 98.2 F | SYSTOLIC BLOOD PRESSURE: 153 MMHG | RESPIRATION RATE: 20 BRPM

## 2019-06-20 DIAGNOSIS — F41.9 ANXIETY: ICD-10-CM

## 2019-06-20 DIAGNOSIS — F10.930 ALCOHOL WITHDRAWAL SYNDROME WITHOUT COMPLICATION (HCC): Primary | ICD-10-CM

## 2019-06-20 LAB
ALBUMIN SERPL-MCNC: 4.9 G/DL (ref 3.5–5.2)
ALBUMIN/GLOB SERPL: 2 G/DL
ALP SERPL-CCNC: 67 U/L (ref 39–117)
ALT SERPL W P-5'-P-CCNC: 132 U/L (ref 1–33)
AMPHET+METHAMPHET UR QL: NEGATIVE
ANION GAP SERPL CALCULATED.3IONS-SCNC: 16.6 MMOL/L
AST SERPL-CCNC: 96 U/L (ref 1–32)
BACTERIA UR QL AUTO: ABNORMAL /HPF
BARBITURATES UR QL SCN: NEGATIVE
BASOPHILS # BLD AUTO: 0.01 10*3/MM3 (ref 0–0.2)
BASOPHILS NFR BLD AUTO: 0.2 % (ref 0–1.5)
BENZODIAZ UR QL SCN: NEGATIVE
BILIRUB SERPL-MCNC: 1.1 MG/DL (ref 0.2–1.2)
BILIRUB UR QL STRIP: NEGATIVE
BUN BLD-MCNC: 23 MG/DL (ref 6–20)
BUN/CREAT SERPL: 17.7 (ref 7–25)
CALCIUM SPEC-SCNC: 10.3 MG/DL (ref 8.6–10.5)
CANNABINOIDS SERPL QL: POSITIVE
CHLORIDE SERPL-SCNC: 96 MMOL/L (ref 98–107)
CLARITY UR: CLEAR
CO2 SERPL-SCNC: 24.4 MMOL/L (ref 22–29)
COCAINE UR QL: NEGATIVE
COLOR UR: YELLOW
CREAT BLD-MCNC: 1.3 MG/DL (ref 0.57–1)
DEPRECATED RDW RBC AUTO: 40.3 FL (ref 37–54)
EOSINOPHIL # BLD AUTO: 0.08 10*3/MM3 (ref 0–0.4)
EOSINOPHIL NFR BLD AUTO: 1.8 % (ref 0.3–6.2)
ERYTHROCYTE [DISTWIDTH] IN BLOOD BY AUTOMATED COUNT: 11.9 % (ref 12.3–15.4)
ETHANOL BLD-MCNC: <10 MG/DL (ref 0–10)
ETHANOL UR QL: <0.01 %
GFR SERPL CREATININE-BSD FRML MDRD: 42 ML/MIN/1.73
GLOBULIN UR ELPH-MCNC: 2.4 GM/DL
GLUCOSE BLD-MCNC: 113 MG/DL (ref 65–99)
GLUCOSE UR STRIP-MCNC: NEGATIVE MG/DL
HCT VFR BLD AUTO: 40.6 % (ref 34–46.6)
HGB BLD-MCNC: 13.7 G/DL (ref 12–15.9)
HGB UR QL STRIP.AUTO: ABNORMAL
HYALINE CASTS UR QL AUTO: ABNORMAL /LPF
IMM GRANULOCYTES # BLD AUTO: 0.02 10*3/MM3 (ref 0–0.05)
IMM GRANULOCYTES NFR BLD AUTO: 0.5 % (ref 0–0.5)
KETONES UR QL STRIP: ABNORMAL
LEUKOCYTE ESTERASE UR QL STRIP.AUTO: ABNORMAL
LYMPHOCYTES # BLD AUTO: 1.23 10*3/MM3 (ref 0.7–3.1)
LYMPHOCYTES NFR BLD AUTO: 27.8 % (ref 19.6–45.3)
MAGNESIUM SERPL-MCNC: 1.8 MG/DL (ref 1.6–2.6)
MCH RBC QN AUTO: 31.4 PG (ref 26.6–33)
MCHC RBC AUTO-ENTMCNC: 33.7 G/DL (ref 31.5–35.7)
MCV RBC AUTO: 92.9 FL (ref 79–97)
METHADONE UR QL SCN: NEGATIVE
MONOCYTES # BLD AUTO: 0.44 10*3/MM3 (ref 0.1–0.9)
MONOCYTES NFR BLD AUTO: 10 % (ref 5–12)
NEUTROPHILS # BLD AUTO: 2.64 10*3/MM3 (ref 1.7–7)
NEUTROPHILS NFR BLD AUTO: 59.7 % (ref 42.7–76)
NITRITE UR QL STRIP: NEGATIVE
NRBC BLD AUTO-RTO: 0.2 /100 WBC (ref 0–0.2)
OPIATES UR QL: NEGATIVE
OXYCODONE UR QL SCN: NEGATIVE
PH UR STRIP.AUTO: 7 [PH] (ref 5–8)
PLATELET # BLD AUTO: 180 10*3/MM3 (ref 140–450)
PMV BLD AUTO: 10.4 FL (ref 6–12)
POTASSIUM BLD-SCNC: 3.9 MMOL/L (ref 3.5–5.2)
PROT SERPL-MCNC: 7.3 G/DL (ref 6–8.5)
PROT UR QL STRIP: ABNORMAL
RBC # BLD AUTO: 4.37 10*6/MM3 (ref 3.77–5.28)
RBC # UR: ABNORMAL /HPF
REF LAB TEST METHOD: ABNORMAL
SODIUM BLD-SCNC: 137 MMOL/L (ref 136–145)
SP GR UR STRIP: 1.02 (ref 1–1.03)
SQUAMOUS #/AREA URNS HPF: ABNORMAL /HPF
TROPONIN T SERPL-MCNC: <0.01 NG/ML (ref 0–0.03)
UROBILINOGEN UR QL STRIP: ABNORMAL
WBC NRBC COR # BLD: 4.42 10*3/MM3 (ref 3.4–10.8)
WBC UR QL AUTO: ABNORMAL /HPF

## 2019-06-20 PROCEDURE — 80307 DRUG TEST PRSMV CHEM ANLYZR: CPT | Performed by: EMERGENCY MEDICINE

## 2019-06-20 PROCEDURE — 25010000002 THIAMINE PER 100 MG: Performed by: EMERGENCY MEDICINE

## 2019-06-20 PROCEDURE — 25010000002 MAGNESIUM SULFATE PER 500 MG OF MAGNESIUM: Performed by: EMERGENCY MEDICINE

## 2019-06-20 PROCEDURE — 90791 PSYCH DIAGNOSTIC EVALUATION: CPT

## 2019-06-20 PROCEDURE — 93010 ELECTROCARDIOGRAM REPORT: CPT | Performed by: INTERNAL MEDICINE

## 2019-06-20 PROCEDURE — 85025 COMPLETE CBC W/AUTO DIFF WBC: CPT | Performed by: EMERGENCY MEDICINE

## 2019-06-20 PROCEDURE — 81001 URINALYSIS AUTO W/SCOPE: CPT | Performed by: EMERGENCY MEDICINE

## 2019-06-20 PROCEDURE — 25010000002 LORAZEPAM PER 2 MG: Performed by: EMERGENCY MEDICINE

## 2019-06-20 PROCEDURE — 80053 COMPREHEN METABOLIC PANEL: CPT | Performed by: EMERGENCY MEDICINE

## 2019-06-20 PROCEDURE — 96365 THER/PROPH/DIAG IV INF INIT: CPT

## 2019-06-20 PROCEDURE — 83735 ASSAY OF MAGNESIUM: CPT | Performed by: EMERGENCY MEDICINE

## 2019-06-20 PROCEDURE — 93005 ELECTROCARDIOGRAM TRACING: CPT | Performed by: EMERGENCY MEDICINE

## 2019-06-20 PROCEDURE — 84484 ASSAY OF TROPONIN QUANT: CPT | Performed by: EMERGENCY MEDICINE

## 2019-06-20 PROCEDURE — 96376 TX/PRO/DX INJ SAME DRUG ADON: CPT

## 2019-06-20 PROCEDURE — 99284 EMERGENCY DEPT VISIT MOD MDM: CPT

## 2019-06-20 PROCEDURE — 96375 TX/PRO/DX INJ NEW DRUG ADDON: CPT

## 2019-06-20 PROCEDURE — 96366 THER/PROPH/DIAG IV INF ADDON: CPT

## 2019-06-20 RX ORDER — LORAZEPAM 2 MG/ML
0.5 INJECTION INTRAMUSCULAR ONCE
Status: COMPLETED | OUTPATIENT
Start: 2019-06-20 | End: 2019-06-20

## 2019-06-20 RX ORDER — CHLORDIAZEPOXIDE HYDROCHLORIDE 25 MG/1
25 CAPSULE, GELATIN COATED ORAL 4 TIMES DAILY PRN
Qty: 15 CAPSULE | Refills: 0 | Status: SHIPPED | OUTPATIENT
Start: 2019-06-20 | End: 2019-07-09

## 2019-06-20 RX ORDER — LORAZEPAM 2 MG/ML
1 INJECTION INTRAMUSCULAR ONCE
Status: COMPLETED | OUTPATIENT
Start: 2019-06-20 | End: 2019-06-20

## 2019-06-20 RX ADMIN — LORAZEPAM 1 MG: 2 INJECTION INTRAMUSCULAR; INTRAVENOUS at 17:44

## 2019-06-20 RX ADMIN — SODIUM CHLORIDE 1000 ML: 9 INJECTION, SOLUTION INTRAVENOUS at 17:44

## 2019-06-20 RX ADMIN — MAGNESIUM SULFATE HEPTAHYDRATE 1000 ML/HR: 500 INJECTION, SOLUTION INTRAMUSCULAR; INTRAVENOUS at 18:38

## 2019-06-20 RX ADMIN — LORAZEPAM 0.5 MG: 2 INJECTION INTRAMUSCULAR; INTRAVENOUS at 21:00

## 2019-06-20 NOTE — TELEPHONE ENCOUNTER
Unfortunately I will not be able to prescribe her any controlled substances at this time.  I would like to see her back in the office to discuss her recent visit to the ER.

## 2019-06-20 NOTE — TELEPHONE ENCOUNTER
Patient left message saying she is having sever anxiety, she went to ER yesterday she was told to get Xanax through her PCP till her Lexapro starts working. Herrera advise patient??

## 2019-06-27 ENCOUNTER — OFFICE VISIT (OUTPATIENT)
Dept: FAMILY MEDICINE CLINIC | Facility: CLINIC | Age: 59
End: 2019-06-27

## 2019-06-27 VITALS
OXYGEN SATURATION: 99 % | RESPIRATION RATE: 14 BRPM | WEIGHT: 205 LBS | DIASTOLIC BLOOD PRESSURE: 68 MMHG | SYSTOLIC BLOOD PRESSURE: 124 MMHG | HEIGHT: 65 IN | TEMPERATURE: 98.6 F | HEART RATE: 64 BPM | BODY MASS INDEX: 34.16 KG/M2

## 2019-06-27 DIAGNOSIS — F41.0 ANXIETY DISORDER DUE TO GENERAL MEDICAL CONDITION WITH PANIC ATTACK: ICD-10-CM

## 2019-06-27 DIAGNOSIS — F10.939 ALCOHOL WITHDRAWAL SYNDROME WITH COMPLICATION (HCC): ICD-10-CM

## 2019-06-27 DIAGNOSIS — F10.10 ALCOHOL ABUSE: Primary | ICD-10-CM

## 2019-06-27 DIAGNOSIS — F06.4 ANXIETY DISORDER DUE TO GENERAL MEDICAL CONDITION WITH PANIC ATTACK: ICD-10-CM

## 2019-06-27 PROCEDURE — 99214 OFFICE O/P EST MOD 30 MIN: CPT | Performed by: FAMILY MEDICINE

## 2019-06-27 RX ORDER — CHLORDIAZEPOXIDE HYDROCHLORIDE 10 MG/1
10 CAPSULE, GELATIN COATED ORAL 3 TIMES DAILY PRN
Qty: 60 CAPSULE | Refills: 0 | Status: SHIPPED | OUTPATIENT
Start: 2019-06-27 | End: 2019-08-13 | Stop reason: SDUPTHER

## 2019-06-27 RX ORDER — CHLORDIAZEPOXIDE HYDROCHLORIDE 25 MG/1
25 CAPSULE, GELATIN COATED ORAL 4 TIMES DAILY
COMMUNITY
Start: 2019-06-20 | End: 2019-07-09

## 2019-06-27 RX ORDER — ESCITALOPRAM OXALATE 10 MG/1
5 TABLET ORAL DAILY
Qty: 30 TABLET | Refills: 0 | Status: SHIPPED | OUTPATIENT
Start: 2019-06-27 | End: 2019-07-09

## 2019-06-27 NOTE — PROGRESS NOTES
Amanda Sherwood is a 58 y.o. female.     Chief Complaint   Patient presents with   • Anxiety     patient needs FMLA filled out in regard her anxiety.       HPI     Patient is here today to follow-up on anxiety.  Since her last office visit patient has gone to the ER twice.  He was found that the first time patient had elevated blood alcohol levels in the 300s.  Patient went back the next day after experiencing increased symptoms of shaking.  It was thought that she was experiencing acute alcohol withdrawal.  Patient states that she is been drinking heavily up to 1-2 bottles of wine nightly for roughly 8 months.  She has been in contact with a therapist.  Plans to quit drinking.  Has gone 7 days without alcohol.  Upon discharge from the hospital she was given prescriptions for Librium 25 mg.  She has been taking these tablets but they seem to be too strong and then make her quite sleepy.  She is also continuing to take her Lexapro 5 mg.    The following portions of the patient's history were reviewed and updated as appropriate: allergies, current medications, past family history, past medical history, past social history, past surgical history and problem list.    Review of Systems   Psychiatric/Behavioral: The patient is nervous/anxious.    All other systems reviewed and are negative.      Objective  Vitals:    06/27/19 1331   BP: 124/68   Pulse: 64   Resp: 14   Temp: 98.6 °F (37 °C)   SpO2: 99%       Physical Exam   Constitutional: She is oriented to person, place, and time. She appears well-developed and well-nourished. No distress.   Neurological: She is alert and oriented to person, place, and time.   Skin: She is not diaphoretic.   Psychiatric: She has a normal mood and affect. Her behavior is normal.   Nursing note and vitals reviewed.        Current Outpatient Medications:   •  amLODIPine (NORVASC) 5 MG tablet, TAKE ONE AND ONE-HALF TABLET BY MOUTH DAILY (Patient taking differently: TAKE ONE TABLET BY  MOUTH DAILY), Disp: 132 tablet, Rfl: 1  •  aspirin 81 MG tablet, Take 81 mg by mouth Daily. HOLD FOR SURGERY, Disp: , Rfl:   •  calcium carbonate (TUMS) 500 MG chewable tablet, Chew 1 tablet daily., Disp: , Rfl:   •  chlordiazePOXIDE (LIBRIUM) 25 MG capsule, Take 1 capsule by mouth 4 (Four) Times a Day As Needed for Anxiety., Disp: 15 capsule, Rfl: 0  •  chlordiazePOXIDE (LIBRIUM) 25 MG capsule, Take 25 mg by mouth 4 (Four) Times a Day., Disp: , Rfl:   •  Cholecalciferol (VITAMIN D3) 5000 UNITS capsule capsule, Take 5,000 Units by mouth daily., Disp: , Rfl:   •  diphenhydrAMINE (BENADRYL) 25 mg capsule, Take 25 mg by mouth At Night As Needed for Sleep., Disp: , Rfl:   •  escitalopram (LEXAPRO) 10 MG tablet, Take 0.5 tablets by mouth Daily., Disp: 30 tablet, Rfl: 0  •  esomeprazole (NexIUM) 20 MG capsule, Take 20 mg by mouth Daily., Disp: , Rfl:   •  losartan-hydrochlorothiazide (HYZAAR) 100-25 MG per tablet, Take 1 tablet by mouth Daily., Disp: 90 tablet, Rfl: 3  •  Multiple Vitamins-Minerals (MULTIVITAMINS) chewable tablet, Chew 1 tablet Daily., Disp: , Rfl:   •  nebivolol (BYSTOLIC) 20 MG tablet, Take 1 tablet by mouth Daily., Disp: 30 tablet, Rfl: 11  •  ALPRAZolam (XANAX) 0.25 MG tablet, TAKE ONE TABLET BY MOUTH TWICE A DAY AS NEEDED FOR ANXIETY, Disp: 5 tablet, Rfl: 0  •  cetirizine (zyrTEC) 10 MG tablet, Take 10 mg by mouth Daily., Disp: , Rfl:   •  chlordiazePOXIDE (LIBRIUM) 10 MG capsule, Take 1 capsule by mouth 3 (Three) Times a Day As Needed for Anxiety., Disp: 60 capsule, Rfl: 0  •  ondansetron (ZOFRAN) 8 MG tablet, Take 1 tablet by mouth Every 8 (Eight) Hours As Needed for Nausea or Vomiting., Disp: 30 tablet, Rfl: 3  Current outpatient and discharge medications have been reconciled for the patient.  Reviewed by: Finesse Dao MD      Procedures    Lab Results (most recent)     None                  Amanda was seen today for anxiety.    Diagnoses and all orders for this visit:    Alcohol abuse  -      escitalopram (LEXAPRO) 10 MG tablet; Take 0.5 tablets by mouth Daily.  -     chlordiazePOXIDE (LIBRIUM) 10 MG capsule; Take 1 capsule by mouth 3 (Three) Times a Day As Needed for Anxiety.    Anxiety disorder due to general medical condition with panic attack  -     escitalopram (LEXAPRO) 10 MG tablet; Take 0.5 tablets by mouth Daily.  -     chlordiazePOXIDE (LIBRIUM) 10 MG capsule; Take 1 capsule by mouth 3 (Three) Times a Day As Needed for Anxiety.    Alcohol withdrawal syndrome with complication (CMS/HCC)  -     escitalopram (LEXAPRO) 10 MG tablet; Take 0.5 tablets by mouth Daily.  -     chlordiazePOXIDE (LIBRIUM) 10 MG capsule; Take 1 capsule by mouth 3 (Three) Times a Day As Needed for Anxiety.      I expressed my concern for the patient and her situation.  We will give prescription for Librium 10 mg tablets to be used on an as-needed basis.  We will also increase Lexapro to 10 mg and have short interval follow-up.  Discussed concerning signs and symptoms and reasons for repeat ER visit.    Return in about 1 week (around 7/4/2019) for Recheck.      Finesse Dao MD

## 2019-07-09 ENCOUNTER — OFFICE VISIT (OUTPATIENT)
Dept: FAMILY MEDICINE CLINIC | Facility: CLINIC | Age: 59
End: 2019-07-09

## 2019-07-09 VITALS
OXYGEN SATURATION: 98 % | HEIGHT: 65 IN | TEMPERATURE: 97.6 F | SYSTOLIC BLOOD PRESSURE: 110 MMHG | HEART RATE: 74 BPM | DIASTOLIC BLOOD PRESSURE: 62 MMHG | WEIGHT: 193.3 LBS | BODY MASS INDEX: 32.21 KG/M2

## 2019-07-09 DIAGNOSIS — F10.939 ALCOHOL WITHDRAWAL SYNDROME WITH COMPLICATION (HCC): ICD-10-CM

## 2019-07-09 DIAGNOSIS — F06.4 ANXIETY DISORDER DUE TO GENERAL MEDICAL CONDITION WITH PANIC ATTACK: ICD-10-CM

## 2019-07-09 DIAGNOSIS — F10.10 ALCOHOL ABUSE: ICD-10-CM

## 2019-07-09 DIAGNOSIS — F41.0 ANXIETY DISORDER DUE TO GENERAL MEDICAL CONDITION WITH PANIC ATTACK: ICD-10-CM

## 2019-07-09 PROCEDURE — 99213 OFFICE O/P EST LOW 20 MIN: CPT | Performed by: FAMILY MEDICINE

## 2019-07-09 RX ORDER — ESCITALOPRAM OXALATE 10 MG/1
10 TABLET ORAL DAILY
Qty: 30 TABLET | Refills: 0 | Status: SHIPPED | OUTPATIENT
Start: 2019-07-09 | End: 2019-08-08 | Stop reason: SDUPTHER

## 2019-07-09 NOTE — PROGRESS NOTES
Amanda Sherwood is a 58 y.o. female.     Chief Complaint   Patient presents with   • Anxiety     Patient is here  for f/u        HPI     Patient here today to follow-up on anxiety and alcohol abuse.  Has not had a drink since June 21.  Has been using her Librium sparingly.  Increasing the Lexapro to 10 mg seems to have helped greatly.  Has appointment with psychiatry today.    The following portions of the patient's history were reviewed and updated as appropriate: allergies, current medications, past family history, past medical history, past social history, past surgical history and problem list.    Review of Systems   Cardiovascular: Negative for chest pain, palpitations and leg swelling.   All other systems reviewed and are negative.      Objective  Vitals:    07/09/19 0929   BP: 110/62   Pulse: 74   Temp: 97.6 °F (36.4 °C)   SpO2: 98%       Physical Exam   Constitutional: She is oriented to person, place, and time. She appears well-developed and well-nourished. No distress.   HENT:   Head: Normocephalic and atraumatic.   Right Ear: External ear normal.   Left Ear: External ear normal.   Nose: Nose normal.   Mouth/Throat: Oropharynx is clear and moist.   Eyes: Conjunctivae and EOM are normal. Pupils are equal, round, and reactive to light. Right eye exhibits no discharge. Left eye exhibits no discharge. No scleral icterus.   Cardiovascular: Normal rate, regular rhythm and normal heart sounds.   Pulmonary/Chest: Effort normal and breath sounds normal. No respiratory distress. She has no wheezes. She has no rales.   Abdominal: Soft. Bowel sounds are normal. She exhibits no distension. There is no tenderness.   Neurological: She is alert and oriented to person, place, and time.   Skin: Skin is warm and dry. She is not diaphoretic.   Nursing note and vitals reviewed.        Current Outpatient Medications:   •  ALPRAZolam (XANAX) 0.25 MG tablet, TAKE ONE TABLET BY MOUTH TWICE A DAY AS NEEDED FOR ANXIETY, Disp: 5  tablet, Rfl: 0  •  amLODIPine (NORVASC) 5 MG tablet, TAKE ONE AND ONE-HALF TABLET BY MOUTH DAILY (Patient taking differently: TAKE ONE TABLET BY MOUTH DAILY), Disp: 132 tablet, Rfl: 1  •  aspirin 81 MG tablet, Take 81 mg by mouth Daily. HOLD FOR SURGERY, Disp: , Rfl:   •  calcium carbonate (TUMS) 500 MG chewable tablet, Chew 1 tablet daily., Disp: , Rfl:   •  cetirizine (zyrTEC) 10 MG tablet, Take 10 mg by mouth Daily., Disp: , Rfl:   •  chlordiazePOXIDE (LIBRIUM) 10 MG capsule, Take 1 capsule by mouth 3 (Three) Times a Day As Needed for Anxiety., Disp: 60 capsule, Rfl: 0  •  Cholecalciferol (VITAMIN D3) 5000 UNITS capsule capsule, Take 5,000 Units by mouth daily., Disp: , Rfl:   •  diphenhydrAMINE (BENADRYL) 25 mg capsule, Take 25 mg by mouth At Night As Needed for Sleep., Disp: , Rfl:   •  escitalopram (LEXAPRO) 10 MG tablet, Take 1 tablet by mouth Daily., Disp: 30 tablet, Rfl: 0  •  esomeprazole (NexIUM) 20 MG capsule, Take 20 mg by mouth Daily., Disp: , Rfl:   •  losartan-hydrochlorothiazide (HYZAAR) 100-25 MG per tablet, Take 1 tablet by mouth Daily., Disp: 90 tablet, Rfl: 3  •  Multiple Vitamins-Minerals (MULTIVITAMINS) chewable tablet, Chew 1 tablet Daily., Disp: , Rfl:   •  nebivolol (BYSTOLIC) 20 MG tablet, Take 1 tablet by mouth Daily., Disp: 30 tablet, Rfl: 11  •  ondansetron (ZOFRAN) 8 MG tablet, Take 1 tablet by mouth Every 8 (Eight) Hours As Needed for Nausea or Vomiting., Disp: 30 tablet, Rfl: 3  Current outpatient and discharge medications have been reconciled for the patient.  Reviewed by: Finesse Dao MD      Procedures    Lab Results (most recent)     None                  Amanda was seen today for anxiety.    Diagnoses and all orders for this visit:    Anxiety disorder due to general medical condition with panic attack  -     escitalopram (LEXAPRO) 10 MG tablet; Take 1 tablet by mouth Daily.    Alcohol abuse  -     escitalopram (LEXAPRO) 10 MG tablet; Take 1 tablet by mouth Daily.    Alcohol  withdrawal syndrome with complication (CMS/HCC)  -     escitalopram (LEXAPRO) 10 MG tablet; Take 1 tablet by mouth Daily.      Continue current therapy.    Return in about 2 weeks (around 7/23/2019) for Recheck.      Finesse Dao MD

## 2019-08-08 DIAGNOSIS — F41.0 ANXIETY DISORDER DUE TO GENERAL MEDICAL CONDITION WITH PANIC ATTACK: ICD-10-CM

## 2019-08-08 DIAGNOSIS — F10.10 ALCOHOL ABUSE: ICD-10-CM

## 2019-08-08 DIAGNOSIS — F10.939 ALCOHOL WITHDRAWAL SYNDROME WITH COMPLICATION (HCC): ICD-10-CM

## 2019-08-08 DIAGNOSIS — F06.4 ANXIETY DISORDER DUE TO GENERAL MEDICAL CONDITION WITH PANIC ATTACK: ICD-10-CM

## 2019-08-08 RX ORDER — ESCITALOPRAM OXALATE 10 MG/1
10 TABLET ORAL DAILY
Qty: 90 TABLET | Refills: 3 | Status: SHIPPED | OUTPATIENT
Start: 2019-08-08 | End: 2019-08-13 | Stop reason: SDUPTHER

## 2019-08-13 ENCOUNTER — OFFICE VISIT (OUTPATIENT)
Dept: FAMILY MEDICINE CLINIC | Facility: CLINIC | Age: 59
End: 2019-08-13

## 2019-08-13 VITALS
RESPIRATION RATE: 16 BRPM | DIASTOLIC BLOOD PRESSURE: 68 MMHG | TEMPERATURE: 98.6 F | BODY MASS INDEX: 32.17 KG/M2 | SYSTOLIC BLOOD PRESSURE: 128 MMHG | HEART RATE: 78 BPM | OXYGEN SATURATION: 99 % | HEIGHT: 65 IN

## 2019-08-13 DIAGNOSIS — F10.10 ALCOHOL ABUSE: ICD-10-CM

## 2019-08-13 DIAGNOSIS — F06.4 ANXIETY DISORDER DUE TO GENERAL MEDICAL CONDITION WITH PANIC ATTACK: ICD-10-CM

## 2019-08-13 DIAGNOSIS — F10.939 ALCOHOL WITHDRAWAL SYNDROME WITH COMPLICATION (HCC): ICD-10-CM

## 2019-08-13 DIAGNOSIS — F41.0 ANXIETY DISORDER DUE TO GENERAL MEDICAL CONDITION WITH PANIC ATTACK: ICD-10-CM

## 2019-08-13 PROCEDURE — 99214 OFFICE O/P EST MOD 30 MIN: CPT | Performed by: FAMILY MEDICINE

## 2019-08-13 RX ORDER — ESCITALOPRAM OXALATE 20 MG/1
20 TABLET ORAL DAILY
Qty: 90 TABLET | Refills: 0 | Status: SHIPPED | OUTPATIENT
Start: 2021-10-14 | End: 2022-01-25 | Stop reason: SDUPTHER

## 2019-08-13 RX ORDER — CHLORDIAZEPOXIDE HYDROCHLORIDE 5 MG/1
5 CAPSULE, GELATIN COATED ORAL 3 TIMES DAILY PRN
Qty: 15 CAPSULE | Refills: 0 | Status: SHIPPED | OUTPATIENT
Start: 2019-08-13 | End: 2019-09-18 | Stop reason: SDUPTHER

## 2019-08-13 NOTE — PROGRESS NOTES
Amanda Sherwood is a 58 y.o. female.     Chief Complaint   Patient presents with   • Anxiety     patient is rechecking anxiety and depression. she needs to discuss increasing lexapro .    • Depression       HPI     Patient presents the office today to follow-up on continued anxiety and alcohol use.  Patient has not had alcohol since her last office visit.  Has been taking and tolerating Lexapro 10 mg as well as Librium 10 mg on a as needed basis.  Does feel like the Librium is too strong and sedates her quite a bit.  Continues to have some issues with feeling overwhelmed and anxious.  Is wondering about increasing the dose of Lexapro to 20 mg.  No suicidal thoughts or ideation.    The following portions of the patient's history were reviewed and updated as appropriate: allergies, current medications, past family history, past medical history, past social history, past surgical history and problem list.    Review of Systems   Psychiatric/Behavioral: Positive for dysphoric mood and sleep disturbance. The patient is nervous/anxious.    All other systems reviewed and are negative.    Reviewed and agree with documentation.    Objective  Vitals:    08/13/19 1402   BP: 128/68   Pulse: 78   Resp: 16   Temp: 98.6 °F (37 °C)   SpO2: 99%       Physical Exam   Constitutional: She is oriented to person, place, and time. She appears well-developed and well-nourished. No distress.   HENT:   Head: Normocephalic and atraumatic.   Right Ear: External ear normal.   Left Ear: External ear normal.   Nose: Nose normal.   Mouth/Throat: Oropharynx is clear and moist.   Eyes: Conjunctivae and EOM are normal. Pupils are equal, round, and reactive to light. Right eye exhibits no discharge. Left eye exhibits no discharge. No scleral icterus.   Cardiovascular: Normal rate, regular rhythm and normal heart sounds.   Pulmonary/Chest: Effort normal and breath sounds normal. No respiratory distress. She has no wheezes. She has no rales.    Abdominal: Soft. Bowel sounds are normal. She exhibits no distension. There is no tenderness.   Neurological: She is alert and oriented to person, place, and time.   Skin: Skin is warm and dry. She is not diaphoretic.   Nursing note and vitals reviewed.        Current Outpatient Medications:   •  amLODIPine (NORVASC) 5 MG tablet, TAKE ONE AND ONE-HALF TABLET BY MOUTH DAILY (Patient taking differently: TAKE ONE TABLET BY MOUTH DAILY), Disp: 132 tablet, Rfl: 1  •  aspirin 81 MG tablet, Take 81 mg by mouth Daily. HOLD FOR SURGERY, Disp: , Rfl:   •  calcium carbonate (TUMS) 500 MG chewable tablet, Chew 1 tablet daily., Disp: , Rfl:   •  cetirizine (zyrTEC) 10 MG tablet, Take 10 mg by mouth Daily., Disp: , Rfl:   •  chlordiazePOXIDE (LIBRIUM) 5 MG capsule, Take 1 capsule by mouth 3 (Three) Times a Day As Needed for Anxiety., Disp: 15 capsule, Rfl: 0  •  Cholecalciferol (VITAMIN D3) 5000 UNITS capsule capsule, Take 5,000 Units by mouth daily., Disp: , Rfl:   •  diphenhydrAMINE (BENADRYL) 25 mg capsule, Take 25 mg by mouth At Night As Needed for Sleep., Disp: , Rfl:   •  escitalopram (LEXAPRO) 20 MG tablet, Take 1 tablet by mouth Daily., Disp: 90 tablet, Rfl: 3  •  esomeprazole (NexIUM) 20 MG capsule, Take 20 mg by mouth Daily., Disp: , Rfl:   •  losartan-hydrochlorothiazide (HYZAAR) 100-25 MG per tablet, Take 1 tablet by mouth Daily., Disp: 90 tablet, Rfl: 3  •  Multiple Vitamins-Minerals (MULTIVITAMINS) chewable tablet, Chew 1 tablet Daily., Disp: , Rfl:   •  nebivolol (BYSTOLIC) 20 MG tablet, Take 1 tablet by mouth Daily., Disp: 30 tablet, Rfl: 11  •  ondansetron (ZOFRAN) 8 MG tablet, Take 1 tablet by mouth Every 8 (Eight) Hours As Needed for Nausea or Vomiting., Disp: 30 tablet, Rfl: 3  •  ALPRAZolam (XANAX) 0.25 MG tablet, TAKE ONE TABLET BY MOUTH TWICE A DAY AS NEEDED FOR ANXIETY, Disp: 5 tablet, Rfl: 0  Current outpatient and discharge medications have been reconciled for the patient.  Reviewed by: Finesse Dao,  MD      Procedures    Lab Results (most recent)     None                  Amanda was seen today for anxiety and depression.    Diagnoses and all orders for this visit:    Anxiety disorder due to general medical condition with panic attack  -     chlordiazePOXIDE (LIBRIUM) 5 MG capsule; Take 1 capsule by mouth 3 (Three) Times a Day As Needed for Anxiety.  -     escitalopram (LEXAPRO) 20 MG tablet; Take 1 tablet by mouth Daily.    Alcohol withdrawal syndrome with complication (CMS/HCC)  -     chlordiazePOXIDE (LIBRIUM) 5 MG capsule; Take 1 capsule by mouth 3 (Three) Times a Day As Needed for Anxiety.  -     escitalopram (LEXAPRO) 20 MG tablet; Take 1 tablet by mouth Daily.    Alcohol abuse  -     escitalopram (LEXAPRO) 20 MG tablet; Take 1 tablet by mouth Daily.      Will increase the dose of Lexapro to 20 mg.  We will decrease the dose of Librium to 5 mg.  Prescriptions given as above.  Follow-up in 4 weeks.    Return in about 4 weeks (around 9/10/2019) for Recheck.      Finesse Dao MD

## 2019-09-11 RX ORDER — NEBIVOLOL HYDROCHLORIDE 20 MG/1
TABLET ORAL
Qty: 30 TABLET | Refills: 5 | Status: SHIPPED | OUTPATIENT
Start: 2019-09-11 | End: 2020-03-03

## 2019-09-17 ENCOUNTER — OFFICE VISIT (OUTPATIENT)
Dept: FAMILY MEDICINE CLINIC | Facility: CLINIC | Age: 59
End: 2019-09-17

## 2019-09-17 VITALS
DIASTOLIC BLOOD PRESSURE: 80 MMHG | HEART RATE: 61 BPM | HEIGHT: 65 IN | WEIGHT: 196 LBS | SYSTOLIC BLOOD PRESSURE: 116 MMHG | BODY MASS INDEX: 32.65 KG/M2 | RESPIRATION RATE: 15 BRPM | TEMPERATURE: 98 F | OXYGEN SATURATION: 98 %

## 2019-09-17 DIAGNOSIS — F32.A ANXIETY AND DEPRESSION: ICD-10-CM

## 2019-09-17 DIAGNOSIS — F41.9 ANXIETY AND DEPRESSION: ICD-10-CM

## 2019-09-17 DIAGNOSIS — F10.20 ALCOHOLIC (HCC): Primary | ICD-10-CM

## 2019-09-17 PROCEDURE — 99214 OFFICE O/P EST MOD 30 MIN: CPT | Performed by: FAMILY MEDICINE

## 2019-09-17 RX ORDER — ESTRADIOL 0.1 MG/G
1 CREAM VAGINAL 2 TIMES WEEKLY
COMMUNITY
Start: 2019-09-05 | End: 2020-09-04

## 2019-09-17 NOTE — PROGRESS NOTES
Amanda Sherwood is a 58 y.o. female.     Chief Complaint   Patient presents with   • Anxiety   • Depression       HPI     Patient presents to the office today to follow-up on alcoholism as well as anxiety depression.  Taking Lexapro 20 mg daily.  Patient states that she feels like this has improved.  Had one episode of binge drinking.  Is now going to AA.  Taking Librium as prescribed.  No adverse side effects.    The following portions of the patient's history were reviewed and updated as appropriate: allergies, current medications, past family history, past medical history, past social history, past surgical history and problem list.    Review of Systems   Constitutional: Negative.    HENT: Negative.    Eyes: Negative.    Respiratory: Negative.    Cardiovascular: Negative.    Gastrointestinal: Negative.    Endocrine: Negative.    Genitourinary: Negative.    Musculoskeletal: Negative.    Skin: Negative.    Allergic/Immunologic: Negative.    Neurological: Negative.    Hematological: Negative.    Psychiatric/Behavioral: Negative.    All other systems reviewed and are negative.    I have reviewed and agree with documentation of above ROS.    Objective  Vitals:    09/17/19 1325   BP: 116/80   Pulse: 61   Resp: 15   Temp: 98 °F (36.7 °C)   SpO2: 98%       Physical Exam   Constitutional: She is oriented to person, place, and time. She appears well-developed and well-nourished. No distress.   HENT:   Head: Normocephalic and atraumatic.   Right Ear: External ear normal.   Left Ear: External ear normal.   Nose: Nose normal.   Mouth/Throat: Oropharynx is clear and moist.   Eyes: Conjunctivae and EOM are normal. Pupils are equal, round, and reactive to light. Right eye exhibits no discharge. Left eye exhibits no discharge. No scleral icterus.   Cardiovascular: Normal rate, regular rhythm and normal heart sounds.   Pulmonary/Chest: Effort normal and breath sounds normal. No respiratory distress. She has no wheezes. She  has no rales.   Abdominal: Soft. Bowel sounds are normal. She exhibits no distension. There is no tenderness.   Neurological: She is alert and oriented to person, place, and time.   Skin: Skin is warm and dry. She is not diaphoretic.   Nursing note and vitals reviewed.        Current Outpatient Medications:   •  ALPRAZolam (XANAX) 0.25 MG tablet, TAKE ONE TABLET BY MOUTH TWICE A DAY AS NEEDED FOR ANXIETY, Disp: 5 tablet, Rfl: 0  •  amLODIPine (NORVASC) 5 MG tablet, TAKE ONE AND ONE-HALF TABLET BY MOUTH DAILY (Patient taking differently: TAKE ONE TABLET BY MOUTH DAILY), Disp: 132 tablet, Rfl: 1  •  aspirin 81 MG tablet, Take 81 mg by mouth Daily. HOLD FOR SURGERY, Disp: , Rfl:   •  BYSTOLIC 20 MG tablet, TAKE ONE TABLET BY MOUTH DAILY, Disp: 30 tablet, Rfl: 5  •  calcium carbonate (TUMS) 500 MG chewable tablet, Chew 1 tablet daily., Disp: , Rfl:   •  cetirizine (zyrTEC) 10 MG tablet, Take 10 mg by mouth Daily., Disp: , Rfl:   •  chlordiazePOXIDE (LIBRIUM) 5 MG capsule, Take 1 capsule by mouth 3 (Three) Times a Day As Needed for Anxiety., Disp: 15 capsule, Rfl: 0  •  Cholecalciferol (VITAMIN D3) 5000 UNITS capsule capsule, Take 5,000 Units by mouth daily., Disp: , Rfl:   •  diphenhydrAMINE (BENADRYL) 25 mg capsule, Take 25 mg by mouth At Night As Needed for Sleep., Disp: , Rfl:   •  escitalopram (LEXAPRO) 20 MG tablet, Take 1 tablet by mouth Daily., Disp: 90 tablet, Rfl: 3  •  esomeprazole (NexIUM) 20 MG capsule, Take 20 mg by mouth Daily., Disp: , Rfl:   •  estradiol (ESTRACE) 0.1 MG/GM vaginal cream, Insert 1 g into the vagina 2 (Two) Times a Week., Disp: , Rfl:   •  losartan-hydrochlorothiazide (HYZAAR) 100-25 MG per tablet, Take 1 tablet by mouth Daily., Disp: 90 tablet, Rfl: 3  •  Multiple Vitamins-Minerals (MULTIVITAMINS) chewable tablet, Chew 1 tablet Daily., Disp: , Rfl:   •  ondansetron (ZOFRAN) 8 MG tablet, Take 1 tablet by mouth Every 8 (Eight) Hours As Needed for Nausea or Vomiting., Disp: 30 tablet, Rfl:  3  Current outpatient and discharge medications have been reconciled for the patient.  Reviewed by: Finesse Dao MD      Procedures    Lab Results (most recent)     None                  Amanda was seen today for anxiety and depression.    Diagnoses and all orders for this visit:    Alcoholic (CMS/HCC)    Anxiety and depression    Extensive conversation had with respect to the need to continue to surround herself with good things.  Keep going to AA.  I do advise her to see a psychiatrist.  States that she will make an appointment.  30 minutes was spent of the 30 minute visit in direct face to face counseling and coordination of care regarding:   Encounter Diagnoses   Name Primary?   • Alcoholic (CMS/HCC) Yes   • Anxiety and depression          Return in about 4 weeks (around 10/15/2019) for Recheck.      Finesse Dao MD

## 2019-09-18 DIAGNOSIS — F10.939 ALCOHOL WITHDRAWAL SYNDROME WITH COMPLICATION (HCC): ICD-10-CM

## 2019-09-18 DIAGNOSIS — F41.0 ANXIETY DISORDER DUE TO GENERAL MEDICAL CONDITION WITH PANIC ATTACK: ICD-10-CM

## 2019-09-18 DIAGNOSIS — F06.4 ANXIETY DISORDER DUE TO GENERAL MEDICAL CONDITION WITH PANIC ATTACK: ICD-10-CM

## 2019-09-19 RX ORDER — CHLORDIAZEPOXIDE HYDROCHLORIDE 5 MG/1
CAPSULE, GELATIN COATED ORAL
Qty: 15 CAPSULE | Refills: 0 | Status: SHIPPED | OUTPATIENT
Start: 2019-09-19 | End: 2020-01-07 | Stop reason: ALTCHOICE

## 2019-10-10 ENCOUNTER — OFFICE VISIT (OUTPATIENT)
Dept: ORTHOPEDIC SURGERY | Facility: CLINIC | Age: 59
End: 2019-10-10

## 2019-10-10 VITALS — BODY MASS INDEX: 33.62 KG/M2 | HEIGHT: 65 IN | WEIGHT: 201.8 LBS | TEMPERATURE: 97.5 F

## 2019-10-10 DIAGNOSIS — M25.861 PATELLOFEMORAL DYSFUNCTION OF RIGHT KNEE: ICD-10-CM

## 2019-10-10 DIAGNOSIS — M25.561 RIGHT KNEE PAIN, UNSPECIFIED CHRONICITY: Primary | ICD-10-CM

## 2019-10-10 PROCEDURE — 73562 X-RAY EXAM OF KNEE 3: CPT | Performed by: ORTHOPAEDIC SURGERY

## 2019-10-10 PROCEDURE — 99213 OFFICE O/P EST LOW 20 MIN: CPT | Performed by: ORTHOPAEDIC SURGERY

## 2019-10-10 NOTE — PROGRESS NOTES
New Right Knee      Patient: Amanda Sherwood        YOB: 1960    Medical Record Number: [id number]        Chief Complaints: right knee pain  Chief Complaint   Patient presents with   • Right Knee - Pain           History of Present Illness: This is a 58-year-old who presents complaining of right knee pain began about 1 months ago when she fell onto the anterior aspect of her left knee on the concrete.  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 past medical history smart for anemia high blood pressure and cancer        Allergies:   Allergies   Allergen Reactions   • Celecoxib Hives and Swelling     hives   • Ace Inhibitors Cough   • Lisinopril Cough       Medications:   Home Medications:  Current Outpatient Medications on File Prior to Visit   Medication Sig   • ALPRAZolam (XANAX) 0.25 MG tablet TAKE ONE TABLET BY MOUTH TWICE A DAY AS NEEDED FOR ANXIETY   • amLODIPine (NORVASC) 5 MG tablet TAKE ONE AND ONE-HALF TABLET BY MOUTH DAILY (Patient taking differently: TAKE ONE TABLET BY MOUTH DAILY)   • aspirin 81 MG tablet Take 81 mg by mouth Daily. HOLD FOR SURGERY   • BYSTOLIC 20 MG tablet TAKE ONE TABLET BY MOUTH DAILY   • calcium carbonate (TUMS) 500 MG chewable tablet Chew 1 tablet daily.   • cetirizine (zyrTEC) 10 MG tablet Take 10 mg by mouth Daily.   • chlordiazePOXIDE (LIBRIUM) 5 MG capsule TAKE ONE CAPSULE BY MOUTH THREE TIMES A DAY  AS NEEDED FOR ANXIETY   • Cholecalciferol (VITAMIN D3) 5000 UNITS capsule capsule Take 5,000 Units by mouth daily.   • diphenhydrAMINE (BENADRYL) 25 mg capsule Take 25 mg by mouth At Night As Needed for Sleep.   • escitalopram (LEXAPRO) 20 MG tablet Take 1 tablet by mouth Daily.   • esomeprazole (NexIUM) 20 MG capsule Take 20 mg by mouth Daily.   •  estradiol (ESTRACE) 0.1 MG/GM vaginal cream Insert 1 g into the vagina 2 (Two) Times a Week.   • losartan-hydrochlorothiazide (HYZAAR) 100-25 MG per tablet Take 1 tablet by mouth Daily.   • Multiple Vitamins-Minerals (MULTIVITAMINS) chewable tablet Chew 1 tablet Daily.   • ondansetron (ZOFRAN) 8 MG tablet Take 1 tablet by mouth Every 8 (Eight) Hours As Needed for Nausea or Vomiting.     No current facility-administered medications on file prior to visit.      Current Medications:  Scheduled Meds:  Continuous Infusions:  No current facility-administered medications for this visit.   PRN Meds:.    Past Medical History:   Diagnosis Date   • GERD (gastroesophageal reflux disease)    • History of bronchitis    • History of migraine    • Hyperlipidemia     NO MEDS CURRENTLY. WORKING ON   • Hypertension    • Leukopenia     PER HISTORY. HAS SEEN CBC IN THE PAST   • Neck pain    • Obesity (BMI 30.0-34.9) 6/6/2019   • Osteoarthritis    • PVC (premature ventricular contraction)    • Seasonal allergies    • Torn meniscus     LEFT        Past Surgical History:   Procedure Laterality Date   • BREAST BIOPSY      BENIGN. HORACE BREAST   • BUNIONECTOMY Left    • BUNIONECTOMY Left     REVISION   • KNEE ARTHROSCOPY Left 8/22/2018    Procedure: KNEE ARTHROSCOPY LATERAL MENISECTOMY DEBRIDEMENT OF MEDIAL FEMORAL CONDYLE DEFECT DEBRIDEMENT OF ARTHRITIS;  Surgeon: Graciela Crockett MD;  Location: [facility] OR [facility] [place];  Service: Orthopedics   • SALPINGO OOPHORECTOMY Left    • TONSILLECTOMY     • TUBAL ABDOMINAL LIGATION          Social History     Occupational History   • Not on file   Tobacco Use   • Smoking status: Former Smoker   • Smokeless tobacco: Never Used   • Tobacco comment: IN HIGHSCHOOL   Substance and Sexual Activity   • Alcohol use: Yes     Comment: occasional use   • Drug use: No   • Sexual activity: Not on file      Social History     Social History Narrative   • Not on file        Family History   Problem Relation Age of Onset   •  "Dementia Mother    • Arthritis Mother    • Stroke Father    • Heart disease Father    • Hypertension Father    • Diabetes Father    • Cancer Father    • Colon cancer Father    • Breast cancer Sister    • Malig Hyperthermia Neg Hx              Review of Systems: 14 point review of systems are remarkable for the knee pain only the remainder negative per the patient    Review of Systems      Physical Exam: 58 y.o. female  General Appearance:    Alert, cooperative, in no acute distress                   Vitals:    10/10/19 1038   Temp: 97.5 °F (36.4 °C)   TempSrc: Temporal   Weight: 91.5 kg (201 lb 12.8 oz)   Height: 165.1 cm (65\")      Patient is alert and read ×3 no acute distress appears her above-listed at height weight and age.  Affect is normal respiratory rate is normal unlabored. Heart rate regular rate rhythm, sclera, dentition and hearing are normal for the purpose of this exam.        Ortho Exam Physical exam of the right knee reveals no effusion, no erythema.  The patient has no palpable tenderness along the medial joint line, no tenderness about the lateral joint line.  Patient does have crepitus with patellofemoral range of motion.  They also have subjective symptoms anteriorly during exam.  The patient has a negative bounce home, negative [employer] and a stable ligamentous exam.  Quad tone is reasonable and symmetric.  There are no overlying skin changes no lymphedema no lymphadenopathy.  There is good hip range of motion which is full symmetric and asymptomatic and a normal ankle exam.  Hamstrings and IT band are tight bilaterally.        Procedures             Radiology:   AP, Lateral and merchant views of the right knee  were ordered/reviewed to evauateknee pain.  I have no compared to films readily available she does have mild patellofemoral OA otherwise no acute bony pathology  Imaging Results (most recent)     Procedure Component Value Units Date/Time    XR Knee 3 View Right [[id number]] Resulted:  " 10/10/19 1030     Updated:  10/10/19 1033    Impression:       Ordering physician's impression is located in the Encounter Note dated 10/10/19. X-ray performed in the DR room.          Assessment/Plan:  Right knee pain I think this is mostly patellofemoral she is getting better we talked about injection she does not want to do that today I did show her a J brace which she liked and took with her she will follow-up if her symptoms do not resolve

## 2019-11-19 ENCOUNTER — TELEPHONE (OUTPATIENT)
Dept: FAMILY MEDICINE CLINIC | Facility: CLINIC | Age: 59
End: 2019-11-19

## 2019-11-19 NOTE — TELEPHONE ENCOUNTER
Pt called in regard her anxiety and needs FMLA filled out , she said she is going to loss her job if she does not get this FMLA done soon, Dr mao scheduled if full she cannot get appt any time soon, she is asking if possible working her in or filling out her FMLA without seeing her. Patient had FMLA done previously couple months ago     please advise??

## 2019-11-19 NOTE — TELEPHONE ENCOUNTER
Please inform patient that if she wants to drop off the paperwork with the new dates that she needs I am happy to try and fill out the paperwork best that I can.

## 2019-11-22 ENCOUNTER — TELEPHONE (OUTPATIENT)
Dept: FAMILY MEDICINE CLINIC | Facility: CLINIC | Age: 59
End: 2019-11-22

## 2019-11-22 NOTE — TELEPHONE ENCOUNTER
Pt called again saying she was told that her FMLA denied due not enough hours she makes, she may be qualified for personal medical leave she is asking if this something can be done. Informed pt that Dr mao is out for a week, she has to wait till next week, pt voiced unhappy and crying she said is going to loss her job if she cannot get it done sooner than this.

## 2019-11-25 RX ORDER — LOSARTAN POTASSIUM AND HYDROCHLOROTHIAZIDE 25; 100 MG/1; MG/1
TABLET ORAL
Qty: 30 TABLET | Refills: 5 | Status: SHIPPED | OUTPATIENT
Start: 2019-11-25 | End: 2020-01-07 | Stop reason: ALTCHOICE

## 2020-01-02 ENCOUNTER — TRANSCRIBE ORDERS (OUTPATIENT)
Dept: ADMINISTRATIVE | Facility: HOSPITAL | Age: 60
End: 2020-01-02

## 2020-01-02 DIAGNOSIS — Z12.31 SCREENING MAMMOGRAM, ENCOUNTER FOR: Primary | ICD-10-CM

## 2020-01-07 ENCOUNTER — OFFICE VISIT (OUTPATIENT)
Dept: GASTROENTEROLOGY | Facility: CLINIC | Age: 60
End: 2020-01-07

## 2020-01-07 VITALS — BODY MASS INDEX: 31.16 KG/M2 | HEIGHT: 65 IN | WEIGHT: 187 LBS | TEMPERATURE: 98.1 F

## 2020-01-07 DIAGNOSIS — R12 HEARTBURN: ICD-10-CM

## 2020-01-07 DIAGNOSIS — Z80.0 FH: COLON CANCER: Primary | ICD-10-CM

## 2020-01-07 PROCEDURE — 99203 OFFICE O/P NEW LOW 30 MIN: CPT | Performed by: INTERNAL MEDICINE

## 2020-01-07 RX ORDER — TRAZODONE HYDROCHLORIDE 50 MG/1
50 TABLET ORAL NIGHTLY
COMMUNITY
Start: 2019-12-12 | End: 2021-10-26 | Stop reason: SDUPTHER

## 2020-01-07 RX ORDER — ARIPIPRAZOLE 2 MG/1
TABLET ORAL
COMMUNITY
Start: 2019-12-19 | End: 2020-11-22

## 2020-01-07 NOTE — PROGRESS NOTES
Subjective   Chief Complaint   Patient presents with   • Heartburn   • FHx Colon Cancer       Amanda Sherwood is a  59 y.o. female here for a follow an initial visit for heartburn and family history of colon cancer.  Patient is a family history of colon cancer in her father who was diagnosed at age 78.  She is never had a colonoscopy.  She has no bowel issues or blood in her stool.  She denies abdominal pain.  She has longstanding acid reflux.  It happens more when she eats late or she eats spicy food.  She has been maintained on Nexium for some time with good relief.  No difficulty swallowing.  She has had no issues with anesthesia in the past.  She is lost about 22 pounds with increasing her activity.  HPI  Past Medical History:   Diagnosis Date   • Depression    • GERD (gastroesophageal reflux disease)    • History of bronchitis    • History of migraine    • Hyperlipidemia     NO MEDS CURRENTLY. WORKING ON   • Hypertension    • Leukopenia     PER HISTORY. HAS SEEN CBC IN THE PAST   • Neck pain    • Obesity (BMI 30.0-34.9) 6/6/2019   • Osteoarthritis    • PVC (premature ventricular contraction)    • Seasonal allergies    • Torn meniscus     LEFT     Past Surgical History:   Procedure Laterality Date   • BREAST BIOPSY      BENIGN. HORACE BREAST   • BUNIONECTOMY Left    • BUNIONECTOMY Left     REVISION   • KNEE ARTHROSCOPY Left 8/22/2018    Procedure: KNEE ARTHROSCOPY LATERAL MENISECTOMY DEBRIDEMENT OF MEDIAL FEMORAL CONDYLE DEFECT DEBRIDEMENT OF ARTHRITIS;  Surgeon: Graciela Crockett MD;  Location: [facility] OR [facility] [place];  Service: Orthopedics   • SALPINGO OOPHORECTOMY Left    • TONSILLECTOMY     • TUBAL ABDOMINAL LIGATION         Current Outpatient Medications:   •  ALPRAZolam (XANAX) 0.25 MG tablet, TAKE ONE TABLET BY MOUTH TWICE A DAY AS NEEDED FOR ANXIETY, Disp: 5 tablet, Rfl: 0  •  amLODIPine (NORVASC) 5 MG tablet, TAKE ONE AND ONE-HALF TABLET BY MOUTH DAILY (Patient taking differently: TAKE ONE TABLET BY MOUTH DAILY),  Disp: 132 tablet, Rfl: 1  •  ARIPiprazole (ABILIFY) 2 MG tablet, , Disp: , Rfl:   •  BYSTOLIC 20 MG tablet, TAKE ONE TABLET BY MOUTH DAILY, Disp: 30 tablet, Rfl: 5  •  calcium carbonate (TUMS) 500 MG chewable tablet, Chew 1 tablet daily., Disp: , Rfl:   •  cetirizine (zyrTEC) 10 MG tablet, Take 10 mg by mouth Daily., Disp: , Rfl:   •  Cholecalciferol (VITAMIN D3) 5000 UNITS capsule capsule, Take 5,000 Units by mouth daily., Disp: , Rfl:   •  diphenhydrAMINE (BENADRYL) 25 mg capsule, Take 25 mg by mouth At Night As Needed for Sleep., Disp: , Rfl:   •  escitalopram (LEXAPRO) 20 MG tablet, Take 1 tablet by mouth Daily., Disp: 90 tablet, Rfl: 3  •  esomeprazole (NexIUM) 20 MG capsule, Take 20 mg by mouth Daily., Disp: , Rfl:   •  estradiol (ESTRACE) 0.1 MG/GM vaginal cream, Insert 1 g into the vagina 2 (Two) Times a Week., Disp: , Rfl:   •  Multiple Vitamins-Minerals (MULTIVITAMINS) chewable tablet, Chew 1 tablet Daily., Disp: , Rfl:   •  ondansetron (ZOFRAN) 8 MG tablet, Take 1 tablet by mouth Every 8 (Eight) Hours As Needed for Nausea or Vomiting., Disp: 30 tablet, Rfl: 3  •  traZODone (DESYREL) 50 MG tablet, , Disp: , Rfl:   PRN Meds:.  Allergies   Allergen Reactions   • Celecoxib Hives and Swelling     hives   • Ace Inhibitors Cough   • Lisinopril Cough     Social History     Socioeconomic History   • Marital status:      Spouse name: Not on file   • Number of children: Not on file   • Years of education: Not on file   • Highest education level: Not on file   Tobacco Use   • Smoking status: Former Smoker   • Smokeless tobacco: Never Used   • Tobacco comment: IN HIGHSCHOOL   Substance and Sexual Activity   • Alcohol use: Not Currently   • Drug use: No     Family History   Problem Relation Age of Onset   • Dementia Mother    • Arthritis Mother    • Stroke Father    • Heart disease Father    • Hypertension Father    • Diabetes Father    • Cancer Father    • Colon cancer Father    • Breast cancer Sister    •  Malig Hyperthermia Neg Hx      Review of Systems   Constitutional: Negative for appetite change and unexpected weight change.   Gastrointestinal: Negative for abdominal pain and blood in stool.   All other systems reviewed and are negative.    Vitals:    01/07/20 1306   Temp: 98.1 °F (36.7 °C)         01/07/20  1306   Weight: 84.8 kg (187 lb)       Objective   Physical Exam   Constitutional: She appears well-developed and well-nourished.   HENT:   Head: Normocephalic and atraumatic.   Eyes: No scleral icterus.   Pulmonary/Chest: Effort normal.   Abdominal: Soft. She exhibits no distension.   Neurological: She is alert.   Skin: Skin is warm and dry.   Psychiatric: She has a normal mood and affect.     No images are attached to the encounter.    Assessment/Plan   Diagnoses and all orders for this visit:    FH: colon cancer  -     Case Request; Standing  -     Case Request    Heartburn  -     Case Request; Standing  -     Case Request    Other orders  -     ARIPiprazole (ABILIFY) 2 MG tablet  -     traZODone (DESYREL) 50 MG tablet  -     Follow Anesthesia Guidelines / Standing Orders; Future  -     Obtain Informed Consent; Future  -     Implement Anesthesia orders day of procedure.; Standing  -     Obtain informed consent; Standing  -     Verify bowel prep was successful; Standing  -     Give tap water enema if bowel prep was insufficient; Standing      Plan:  · Continue current medications and lifestyle modification.  Continue efforts at weight loss which will decrease acid reflux as well.  · We will plan for an EGD and colonoscopy at her convenience for further evaluation of her heartburn and for screening due to family history of colorectal cancer

## 2020-01-23 PROBLEM — R12 HEARTBURN: Status: ACTIVE | Noted: 2020-01-23

## 2020-01-23 PROBLEM — Z80.0 FH: COLON CANCER: Status: ACTIVE | Noted: 2020-01-23

## 2020-02-01 ENCOUNTER — ANESTHESIA (OUTPATIENT)
Dept: GASTROENTEROLOGY | Facility: HOSPITAL | Age: 60
End: 2020-02-01

## 2020-02-01 ENCOUNTER — ANESTHESIA EVENT (OUTPATIENT)
Dept: GASTROENTEROLOGY | Facility: HOSPITAL | Age: 60
End: 2020-02-01

## 2020-02-01 ENCOUNTER — HOSPITAL ENCOUNTER (OUTPATIENT)
Facility: HOSPITAL | Age: 60
Setting detail: HOSPITAL OUTPATIENT SURGERY
Discharge: HOME | End: 2020-02-01
Attending: INTERNAL MEDICINE | Admitting: INTERNAL MEDICINE

## 2020-02-01 VITALS
DIASTOLIC BLOOD PRESSURE: 94 MMHG | TEMPERATURE: 98 F | WEIGHT: 188.13 LBS | HEART RATE: 65 BPM | OXYGEN SATURATION: 97 % | HEIGHT: 65 IN | BODY MASS INDEX: 31.34 KG/M2 | SYSTOLIC BLOOD PRESSURE: 142 MMHG | RESPIRATION RATE: 18 BRPM

## 2020-02-01 DIAGNOSIS — Z80.0 FH: COLON CANCER: ICD-10-CM

## 2020-02-01 DIAGNOSIS — R12 HEARTBURN: ICD-10-CM

## 2020-02-01 PROCEDURE — 45380 COLONOSCOPY AND BIOPSY: CPT | Performed by: INTERNAL MEDICINE

## 2020-02-01 PROCEDURE — S0260 H&P FOR SURGERY: HCPCS | Performed by: INTERNAL MEDICINE

## 2020-02-01 PROCEDURE — 43239 EGD BIOPSY SINGLE/MULTIPLE: CPT | Performed by: INTERNAL MEDICINE

## 2020-02-01 PROCEDURE — 25010000002 PROPOFOL 10 MG/ML EMULSION: Performed by: ANESTHESIOLOGY

## 2020-02-01 PROCEDURE — 88305 TISSUE EXAM BY PATHOLOGIST: CPT | Performed by: INTERNAL MEDICINE

## 2020-02-01 RX ORDER — SODIUM CHLORIDE, SODIUM LACTATE, POTASSIUM CHLORIDE, CALCIUM CHLORIDE 600; 310; 30; 20 MG/100ML; MG/100ML; MG/100ML; MG/100ML
1000 INJECTION, SOLUTION INTRAVENOUS CONTINUOUS
Status: DISCONTINUED | OUTPATIENT
Start: 2020-02-01 | End: 2020-02-01 | Stop reason: HOSPADM

## 2020-02-01 RX ORDER — PROPOFOL 10 MG/ML
VIAL (ML) INTRAVENOUS CONTINUOUS PRN
Status: DISCONTINUED | OUTPATIENT
Start: 2020-02-01 | End: 2020-02-01 | Stop reason: SURG

## 2020-02-01 RX ORDER — LIDOCAINE HYDROCHLORIDE 20 MG/ML
INJECTION, SOLUTION INFILTRATION; PERINEURAL AS NEEDED
Status: DISCONTINUED | OUTPATIENT
Start: 2020-02-01 | End: 2020-02-01 | Stop reason: SURG

## 2020-02-01 RX ORDER — PROPOFOL 10 MG/ML
VIAL (ML) INTRAVENOUS AS NEEDED
Status: DISCONTINUED | OUTPATIENT
Start: 2020-02-01 | End: 2020-02-01 | Stop reason: SURG

## 2020-02-01 RX ADMIN — PROPOFOL 40 MG: 10 INJECTION, EMULSION INTRAVENOUS at 09:05

## 2020-02-01 RX ADMIN — PROPOFOL 40 MG: 10 INJECTION, EMULSION INTRAVENOUS at 09:06

## 2020-02-01 RX ADMIN — PROPOFOL 80 MG: 10 INJECTION, EMULSION INTRAVENOUS at 09:03

## 2020-02-01 RX ADMIN — LIDOCAINE HYDROCHLORIDE 60 MG: 20 INJECTION, SOLUTION INFILTRATION; PERINEURAL at 09:02

## 2020-02-01 RX ADMIN — PROPOFOL 180 MCG/KG/MIN: 10 INJECTION, EMULSION INTRAVENOUS at 09:03

## 2020-02-01 RX ADMIN — SODIUM CHLORIDE, POTASSIUM CHLORIDE, SODIUM LACTATE AND CALCIUM CHLORIDE 1000 ML: 600; 310; 30; 20 INJECTION, SOLUTION INTRAVENOUS at 08:29

## 2020-02-01 NOTE — ANESTHESIA PREPROCEDURE EVALUATION
Anesthesia Evaluation                  Airway   Mallampati: II  TM distance: >3 FB  Neck ROM: full  No difficulty expected  Dental - normal exam     Pulmonary    (-) rhonchi, decreased breath sounds, wheezes  Cardiovascular     Rhythm: regular  Rate: normal    (+) hypertension, hyperlipidemia,   (-) murmur      Neuro/Psych  (+) psychiatric history Anxiety,     GI/Hepatic/Renal/Endo    (+)  GERD,      Musculoskeletal     Abdominal     Abdomen: soft.   Substance History      OB/GYN          Other                        Anesthesia Plan    ASA 3     MAC   total IV anesthesia  intravenous induction     Anesthetic plan, all risks, benefits, and alternatives have been provided, discussed and informed consent has been obtained with: patient.

## 2020-02-01 NOTE — H&P
[facility]  Pre Procedure History & Physical    Chief Complaint:   Gerd, fh CRC    Subjective     HPI:   Amanda Sherwood is a  59 y.o. female here for a follow an initial visit for heartburn and family history of colon cancer.  Patient has a family history of colon cancer in her father who was diagnosed at age 78.  She has never had a colonoscopy.  She has no bowel issues or blood in her stool.  She denies abdominal pain.  She has longstanding acid reflux.  It happens more when she eats late or she eats spicy food.  She has been maintained on Nexium for some time with good relief.  No difficulty swallowing.  She has had no issues with anesthesia in the past.  She has lost about 22 pounds with increasing her activity.    Past Medical History:   Past Medical History:   Diagnosis Date   • Depression    • GERD (gastroesophageal reflux disease)    • History of bronchitis    • History of migraine    • Hyperlipidemia     NO MEDS CURRENTLY. WORKING ON   • Hypertension    • Leukopenia     PER HISTORY. HAS SEEN CBC IN THE PAST   • Neck pain    • Obesity (BMI 30.0-34.9) 6/6/2019   • Osteoarthritis    • PVC (premature ventricular contraction)    • Seasonal allergies    • Torn meniscus     LEFT       Past Surgical History:  Past Surgical History:   Procedure Laterality Date   • BREAST BIOPSY      BENIGN. HORACE BREAST   • BUNIONECTOMY Left    • BUNIONECTOMY Left     REVISION   • KNEE ARTHROSCOPY Left 8/22/2018    Procedure: KNEE ARTHROSCOPY LATERAL MENISECTOMY DEBRIDEMENT OF MEDIAL FEMORAL CONDYLE DEFECT DEBRIDEMENT OF ARTHRITIS;  Surgeon: Graciela Crockett MD;  [place] [facility] [place] [facility] [place]  Service: Orthopedics   • SALPINGO OOPHORECTOMY Left    • TONSILLECTOMY     • TUBAL ABDOMINAL LIGATION         Family History:  Family History   Problem Relation Age of Onset   • Dementia Mother    • Arthritis Mother    • Stroke Father    • Heart disease Father    • Hypertension Father    • Diabetes Father    • Cancer  Father    • Colon cancer Father    • Breast cancer Sister    • Malig Hyperthermia Neg Hx        Social History:   reports that she has quit smoking. She has never used smokeless tobacco. She reports that she drank alcohol. She reports that she does not use drugs.    Medications:   Medications Prior to Admission   Medication Sig Dispense Refill Last Dose   • amLODIPine (NORVASC) 5 MG tablet TAKE ONE AND ONE-HALF TABLET BY MOUTH DAILY (Patient taking differently: TAKE ONE TABLET BY MOUTH DAILY) 132 tablet 1 1/31/2020 at Unknown time   • ARIPiprazole (ABILIFY) 2 MG tablet    1/31/2020 at Unknown time   • BYSTOLIC 20 MG tablet TAKE ONE TABLET BY MOUTH DAILY 30 tablet 5 2/1/2020 at Unknown time   • calcium carbonate (TUMS) 500 MG chewable tablet Chew 1 tablet daily.   Past Month at Unknown time   • cetirizine (zyrTEC) 10 MG tablet Take 10 mg by mouth Daily.   1/31/2020 at Unknown time   • Cholecalciferol (VITAMIN D3) 5000 UNITS capsule capsule Take 5,000 Units by mouth daily.   Past Week at Unknown time   • escitalopram (LEXAPRO) 20 MG tablet Take 1 tablet by mouth Daily. 90 tablet 3 1/31/2020 at Unknown time   • Multiple Vitamins-Minerals (MULTIVITAMINS) chewable tablet Chew 1 tablet Daily.   Past Week at Unknown time   • ondansetron (ZOFRAN) 8 MG tablet Take 1 tablet by mouth Every 8 (Eight) Hours As Needed for Nausea or Vomiting. 30 tablet 3 Past Week at Unknown time   • traZODone (DESYREL) 50 MG tablet    1/31/2020 at Unknown time   • ALPRAZolam (XANAX) 0.25 MG tablet TAKE ONE TABLET BY MOUTH TWICE A DAY AS NEEDED FOR ANXIETY 5 tablet 0 More than a month at Unknown time   • diphenhydrAMINE (BENADRYL) 25 mg capsule Take 25 mg by mouth At Night As Needed for Sleep.   More than a month at Unknown time   • esomeprazole (NexIUM) 20 MG capsule Take 20 mg by mouth Daily.   1/30/2020   • estradiol (ESTRACE) 0.1 MG/GM vaginal cream Insert 1 g into the vagina 2 (Two) Times a Week.   Taking       Allergies:  Celecoxib; Ace  "inhibitors; and Lisinopril    ROS:    Pertinent items are noted in HPI, all other systems reviewed and negative     Objective     Blood pressure 145/91, pulse 69, temperature 98 °F (36.7 °C), temperature source Oral, resp. rate 19, height 165.1 cm (65\"), weight 85.3 kg (188 lb 2 oz), last menstrual period 06/20/2016, SpO2 95 %.    Physical Exam   Constitutional: Pt is oriented to person, place, and time and well-developed, well-nourished, and in no distress.   Mouth/Throat: Oropharynx is clear and moist.   Neck: Normal range of motion.   Cardiovascular: Normal rate, regular rhythm and normal heart sounds.    Pulmonary/Chest: Effort normal and breath sounds normal.   Abdominal: Soft. Nontender  Skin: Skin is warm and dry.   Psychiatric: Mood, memory, affect and judgment normal.     Assessment/Plan     Diagnosis:  Gerd, fh CRC    Anticipated Surgical Procedure:  egd/colonoscopy    The risks, benefits, and alternatives of this procedure have been discussed with the patient or the responsible party- the patient understands and agrees to proceed.                                                            "

## 2020-02-01 NOTE — ANESTHESIA POSTPROCEDURE EVALUATION
"Patient: Amanda Sherwood    Procedure Summary     Date:  02/01/20 Room / [facility]:   [facility]    Anesthesia Start:  0858 Anesthesia Stop:  0928    Procedures:       COLONOSCOPY TO CECUM AND INTO TERMINAL ILEUM WITH COLD BIOPSY POLYPECTOMY (N/A )      ESOPHAGOGASTRODUODENOSCOPY WITH COLD BIOPSIES (N/A Esophagus) Diagnosis:       FH: colon cancer      Heartburn      (FH: colon cancer [Z80.0])      (Heartburn [R12])    Surgeon:  Munira Montes MD Provider:  Dillon Harrison MD    Anesthesia Type:  MAC ASA Status:  3          Anesthesia Type: MAC    Vitals  Vitals Value Taken Time   /94 2/1/2020  9:50 AM   Temp     Pulse 65 2/1/2020  9:50 AM   Resp 18 2/1/2020  9:50 AM   SpO2 97 % 2/1/2020  9:50 AM           Post Anesthesia Care and Evaluation    Patient location during evaluation: bedside  Patient participation: complete - patient participated  Level of consciousness: awake and alert  Pain management: adequate  Airway patency: patent  Anesthetic complications: No anesthetic complications  PONV Status: none  Cardiovascular status: acceptable  Respiratory status: acceptable  Hydration status: acceptable    Comments: /94 (BP Location: Left arm, Patient Position: Lying)   Pulse 65   Temp 36.7 °C (98 °F) (Oral)   Resp 18   Ht 165.1 cm (65\")   Wt 85.3 kg (188 lb 2 oz)   LMP 06/20/2016   SpO2 97%   BMI 31.31 kg/m²         "

## 2020-02-03 ENCOUNTER — TELEPHONE (OUTPATIENT)
Dept: GASTROENTEROLOGY | Facility: CLINIC | Age: 60
End: 2020-02-03

## 2020-02-03 LAB
CYTO UR: NORMAL
LAB AP CASE REPORT: NORMAL
PATH REPORT.FINAL DX SPEC: NORMAL
PATH REPORT.GROSS SPEC: NORMAL

## 2020-02-03 NOTE — TELEPHONE ENCOUNTER
Mild inflammation seen in the stomach and small bowel on biopsy.  Recommend continue Nexium.  Avoid NSAIDs if she is taking these.    The polyp(s) showed hyperplastic change, which is non-cancerous and not pre-cancerous. Follow-up colonoscopy in 5 years is advised.

## 2020-02-04 NOTE — TELEPHONE ENCOUNTER
Call to pt.  Advise per Dr Montes that mild inflammation seen in the stomach and small bowel on bx.  Recommend continue nexium.  Avoid NSAIDs if taking these.     Polyp(s) showed hyperplastic change, which is noncancerous and not precancerous.  F/u c/s in 5 ys is advised.  Verb understanding.    C/s for 2/1/25 is in recall.

## 2020-02-06 ENCOUNTER — HOSPITAL ENCOUNTER (OUTPATIENT)
Dept: MAMMOGRAPHY | Facility: HOSPITAL | Age: 60
Discharge: HOME | End: 2020-02-06
Admitting: OBSTETRICS & GYNECOLOGY

## 2020-02-06 DIAGNOSIS — Z12.31 SCREENING MAMMOGRAM, ENCOUNTER FOR: ICD-10-CM

## 2020-02-06 PROCEDURE — 77063 BREAST TOMOSYNTHESIS BI: CPT

## 2020-02-06 PROCEDURE — 77067 SCR MAMMO BI INCL CAD: CPT

## 2020-03-03 RX ORDER — NEBIVOLOL HYDROCHLORIDE 20 MG/1
TABLET ORAL
Qty: 30 TABLET | Refills: 0 | Status: SHIPPED | OUTPATIENT
Start: 2020-03-03 | End: 2020-03-27

## 2020-03-19 ENCOUNTER — TRANSCRIBE ORDERS (OUTPATIENT)
Dept: ADMINISTRATIVE | Facility: HOSPITAL | Age: 60
End: 2020-03-19

## 2020-03-19 DIAGNOSIS — R94.4 DECREASED GFR: Primary | ICD-10-CM

## 2020-03-27 RX ORDER — NEBIVOLOL HYDROCHLORIDE 20 MG/1
TABLET ORAL
Qty: 30 TABLET | Refills: 3 | Status: SHIPPED | OUTPATIENT
Start: 2020-03-27 | End: 2020-03-30

## 2020-03-30 RX ORDER — NEBIVOLOL HYDROCHLORIDE 20 MG/1
TABLET ORAL
Qty: 30 TABLET | Refills: 0 | Status: SHIPPED | OUTPATIENT
Start: 2020-03-30 | End: 2020-09-01

## 2020-04-30 ENCOUNTER — HOSPITAL ENCOUNTER (OUTPATIENT)
Dept: ULTRASOUND IMAGING | Facility: HOSPITAL | Age: 60
End: 2020-04-30

## 2020-05-04 ENCOUNTER — HOSPITAL ENCOUNTER (OUTPATIENT)
Dept: ULTRASOUND IMAGING | Facility: HOSPITAL | Age: 60
End: 2020-05-04

## 2020-06-01 RX ORDER — LOSARTAN POTASSIUM AND HYDROCHLOROTHIAZIDE 25; 100 MG/1; MG/1
TABLET ORAL
Qty: 30 TABLET | Refills: 4 | Status: SHIPPED | OUTPATIENT
Start: 2020-06-01 | End: 2020-11-02

## 2020-08-12 ENCOUNTER — HOSPITAL ENCOUNTER (OUTPATIENT)
Dept: ULTRASOUND IMAGING | Facility: HOSPITAL | Age: 60
Discharge: HOME | End: 2020-08-12
Admitting: FAMILY MEDICINE

## 2020-08-12 DIAGNOSIS — R94.4 DECREASED GFR: ICD-10-CM

## 2020-08-12 PROCEDURE — 76775 US EXAM ABDO BACK WALL LIM: CPT

## 2020-08-13 NOTE — TELEPHONE ENCOUNTER
I reviewed the lab work but will defer to her PCP, Dr. Dao since he order the labs.  FYI-a proBNP was not drawn.   "Radiology Post-Procedure Note    Pre Op Diagnosis: PCN obstruction  Post Op Diagnosis: Same    Procedure: Bilatreral PCN exchnage    Procedure performed by: Silva    Written Informed Consent Obtained: Yes  Specimen Removed: NO  Estimated Blood Loss: Minimal    Findings:   Left PCN was calyceal in position. Successful exchange of bilateral PCNs. Recommend exchange in 8 weeks    Patient tolerated procedure well.    Refugio Ascencio MD ([facility])  Interventional Radiology  [phone]        "

## 2020-09-01 RX ORDER — NEBIVOLOL HYDROCHLORIDE 20 MG/1
TABLET ORAL
Qty: 30 TABLET | Refills: 0 | Status: SHIPPED | OUTPATIENT
Start: 2020-09-01 | End: 2020-10-07

## 2020-10-01 NOTE — TELEPHONE ENCOUNTER
10/1/20- Pt will call back. She is requesting LAG office with Alessandro. Once she gets home, she will call me back.

## 2020-10-07 RX ORDER — NEBIVOLOL HYDROCHLORIDE 20 MG/1
TABLET ORAL
Qty: 30 TABLET | Refills: 0 | Status: SHIPPED | OUTPATIENT
Start: 2020-10-07 | End: 2020-11-03

## 2020-11-02 RX ORDER — LOSARTAN POTASSIUM AND HYDROCHLOROTHIAZIDE 25; 100 MG/1; MG/1
TABLET ORAL
Qty: 30 TABLET | Refills: 11 | Status: SHIPPED | OUTPATIENT
Start: 2020-11-02 | End: 2020-11-22 | Stop reason: SDUPTHER

## 2020-11-02 NOTE — TELEPHONE ENCOUNTER
Next appointment 11/19/2020           Assessment:        Diagnosis Plan   1. Lower extremity edema      2. PVC (premature ventricular contraction)      3. Essential hypertension      4. Obesity (BMI 30.0-34.9)               Plan:       1.  Lower Extremity Edema: She presents today for evaluation of lower extremity edema which has worsened since taking amlodipine.  She is currently taking 5 mg I recommended that she decrease it to 2.5 mg daily, follow low-sodium diet, wear compression stockings, and elevate her legs.  She will monitor her blood pressures over the next 3 to 4 weeks and follow-up with me via telephone.  We we will reassess her lower extremity edema, and decide if organ to continue with amlodipine or not.  She is maxed out on her losartan/HCTZ.  We could further titrate her nebivolol to 40 mg daily. I have also recommended some baseline blood work to be completed including a CBC, CMP, lipid panel, TSH, and proBNP.     2.  Hypertension: Blood pressure well controlled today.     3.  Obesity: BMI is 33.9.  She says she is gained weight due to her 3 left leg surgeries and inactivity.  We discussed the importance of following a low-sodium diet which may help her lose weight and she plans to become more active.     4.  PVCs/SVT: Resolved.     5.  She will follow-up with me via telephone in 3 to 4 weeks.

## 2020-11-03 RX ORDER — NEBIVOLOL HYDROCHLORIDE 20 MG/1
TABLET ORAL
Qty: 90 TABLET | Refills: 3 | Status: SHIPPED | OUTPATIENT
Start: 2020-11-03 | End: 2021-11-08 | Stop reason: SDUPTHER

## 2020-11-20 ENCOUNTER — OFFICE VISIT (OUTPATIENT)
Dept: CARDIOLOGY | Facility: CLINIC | Age: 60
End: 2020-11-20

## 2020-11-20 VITALS
HEIGHT: 65 IN | BODY MASS INDEX: 33.32 KG/M2 | DIASTOLIC BLOOD PRESSURE: 80 MMHG | WEIGHT: 200 LBS | HEART RATE: 76 BPM | SYSTOLIC BLOOD PRESSURE: 134 MMHG

## 2020-11-20 DIAGNOSIS — I10 ESSENTIAL HYPERTENSION: Primary | ICD-10-CM

## 2020-11-20 DIAGNOSIS — E78.2 MIXED HYPERLIPIDEMIA: ICD-10-CM

## 2020-11-20 DIAGNOSIS — I47.1 PSVT (PAROXYSMAL SUPRAVENTRICULAR TACHYCARDIA) (HCC): ICD-10-CM

## 2020-11-20 DIAGNOSIS — I49.3 PVC (PREMATURE VENTRICULAR CONTRACTION): ICD-10-CM

## 2020-11-20 PROCEDURE — 99443 PR PHYS/QHP TELEPHONE EVALUATION 21-30 MIN: CPT | Performed by: NURSE PRACTITIONER

## 2020-11-20 RX ORDER — ARIPIPRAZOLE 10 MG/1
10 TABLET ORAL DAILY
COMMUNITY
End: 2021-08-06

## 2020-11-20 NOTE — PROGRESS NOTES
Telehealth Visit    Date of Visit: 2020  Encounter Provider: OLVIN Perea  Place of Service: [facility] CARDIOLOGY  Patient Name: Amanda Sherwood  :1960  Primary Cardiologist: Dr. Yue Maradiaga     Chief Complaint   Patient presents with   • Essential hypertension   • Follow-up   :     Dear Dr. Angelica Betancourt,     HPI: Amanda Sherwood is a pleasant 59 y.o. female who is an established patient of our practice. Due to COVID-19 virus, I am conducting a telehealth visit via audio only with patient and she has consented to this visit today.      She has a known history of GERD, paroxysmal supraventricular tachycardia, PVCs, hypertension, and hyperlipidemia. In 2018, she had a normal nuclear stress test completed.  She was previously on amlodipine 5 mg daily and developed worsening lower extremity edema so it was decreased to 2.5 mg daily.    Today is her follow-up audio visit.  She feels that her blood pressure has been well controlled and is only taking the amlodipine on an as-needed basis.  She feels that her anxiety and depression are better controlled.  She denies chest pain, shortness of breath, palpitations, edema, dizziness, syncope, or bleeding.  She is a registered nurse at [employer] [address] and and she feels that her unit has been well supported with the COVID-19 pandemic.    I reviewed her last blood work from 2020: TSH normal.  CMP normal except for chloride of 96 and calcium 10.3.    Past Medical History:   Diagnosis Date   • Depression    • GERD (gastroesophageal reflux disease)    • History of bronchitis    • History of migraine    • Hyperlipidemia     NO MEDS CURRENTLY. WORKING ON   • Hypertension    • Leukopenia     PER HISTORY. HAS SEEN CBC IN THE PAST   • Neck pain    • Obesity (BMI 30.0-34.9) 2019   • Osteoarthritis    • PVC (premature ventricular contraction)    • Seasonal allergies    • Torn meniscus     LEFT        Past Surgical History:   Procedure Laterality Date   • BREAST BIOPSY      BENIGN. HORACE BREAST   • BUNIONECTOMY Left    • BUNIONECTOMY Left     REVISION   • COLONOSCOPY N/A 2/1/2020    Procedure: COLONOSCOPY TO CECUM AND INTO TERMINAL ILEUM WITH COLD BIOPSY POLYPECTOMY;  Surgeon: Munira Montes MD;  Location: [facility] ENDOSCOPY;  Service: Gastroenterology   • ENDOSCOPY N/A 2/1/2020    Procedure: ESOPHAGOGASTRODUODENOSCOPY WITH COLD BIOPSIES;  Surgeon: Munira Montes MD;  [facility] END[facility];  Service: Gastroenterology   • KNEE ARTHROSCOPY Left 8/22/2018    Procedure: KNEE ARTHROSCOPY LATERAL MENISECTOMY DEBRIDEMENT OF MEDIAL FEMORAL CONDYLE DEFECT DEBRIDEMENT OF ARTHRITIS;  Surgeon: Graciela Crockett MD;  [place]  [facility] [place] [facility]  Service: Orthopedics   • SALPINGO OOPHORECTOMY Left    • TONSILLECTOMY     • TUBAL ABDOMINAL LIGATION         Social History     Socioeconomic History   • Marital status:      Spouse name: Not on file   • Number of children: Not on file   • Years of education: Not on file   • Highest education level: Not on file   Tobacco Use   • Smoking status: Former Smoker   • Smokeless tobacco: Never Used   • Tobacco comment: caffeine - tea   Substance and Sexual Activity   • Alcohol use: Yes     Alcohol/week: 2.0 standard drinks     Types: 2 Cans of beer per week     Binge frequency: Weekly   • Drug use: No   • Sexual activity: Defer       Family History   Problem Relation Age of Onset   • Dementia Mother    • Arthritis Mother    • Stroke Father    • Heart disease Father    • Hypertension Father    • Diabetes Father    • Cancer Father    • Colon cancer Father    • Breast cancer Sister    • Atrial fibrillation Sister    • Ovarian cancer Maternal Aunt    • Malig Hyperthermia Neg Hx        The following portion of the patient's history were reviewed and updated as appropriate: past medical history, past surgical history, past social history, past family history, allergies, current  "medications, and problem list.    Review of Systems   Constitution: Negative.   Cardiovascular: Negative.    Respiratory: Negative.    Neurological: Negative.        Allergies   Allergen Reactions   • Celecoxib Hives and Swelling     hives   • Ace Inhibitors Cough   • Lisinopril Cough         Current Outpatient Medications:   •  amLODIPine (NORVASC) 5 MG tablet, Take 1 tablet by mouth 1 (One) Time As Needed (for elevated blood pressure)., Disp: 90 tablet, Rfl: 3  •  ARIPiprazole (ABILIFY) 10 MG tablet, Take 10 mg by mouth Daily., Disp: , Rfl:   •  Bystolic 20 MG tablet, TAKE ONE TABLET BY MOUTH DAILY, Disp: 90 tablet, Rfl: 3  •  calcium carbonate (TUMS) 500 MG chewable tablet, Chew 1 tablet daily., Disp: , Rfl:   •  cetirizine (zyrTEC) 10 MG tablet, Take 10 mg by mouth Daily., Disp: , Rfl:   •  Cholecalciferol (VITAMIN D3) 5000 UNITS capsule capsule, Take 5,000 Units by mouth daily., Disp: , Rfl:   •  diphenhydrAMINE (BENADRYL) 25 mg capsule, Take 25 mg by mouth At Night As Needed for Sleep., Disp: , Rfl:   •  escitalopram (LEXAPRO) 20 MG tablet, Take 1 tablet by mouth Daily., Disp: 90 tablet, Rfl: 3  •  esomeprazole (NexIUM) 20 MG capsule, Take 20 mg by mouth Daily., Disp: , Rfl:   •  losartan-hydrochlorothiazide (HYZAAR) 100-25 MG per tablet, TAKE ONE TABLET BY MOUTH DAILY, Disp: 30 tablet, Rfl: 11  •  Multiple Vitamins-Minerals (MULTIVITAMINS) chewable tablet, Chew 1 tablet Daily., Disp: , Rfl:   •  ondansetron (ZOFRAN) 8 MG tablet, Take 1 tablet by mouth Every 8 (Eight) Hours As Needed for Nausea or Vomiting., Disp: 30 tablet, Rfl: 3  •  traZODone (DESYREL) 50 MG tablet, Take 50 mg by mouth Every Night., Disp: , Rfl:         Objective:     Vitals:    11/20/20 1051   BP: 134/80   BP Location: Left arm   Patient Position: Standing   Pulse: 76   Weight: 90.7 kg (200 lb)   Height: 165.1 cm (65\")     Body mass index is 33.28 kg/m².    Due to telehealth visit, there was no EKG, vitals, or weight performed in our " office.  Vitals/Weight were reported by the patient and conducted at home.       Assessment:       Diagnosis Plan   1. Essential hypertension     2. PVC (premature ventricular contraction)     3. PSVT (paroxysmal supraventricular tachycardia) (CMS/HCC)     4. Mixed hyperlipidemia            Plan:       1.  Hypertension: Blood pressure is mildly elevated today.  I recommended a blood pressure goal less than 120/80.  She is currently taking amlodipine 5 mg as needed for elevated blood pressure.  I have encouraged her to monitor her blood pressure over the next month and call with an update.    2.  PVCs: Resolved.    3.  PSVT: Resolved.      4. Hyperlipidemia: She is currently controlled with diet.    5.  I have recommended a follow-up appointment with Dr. Maradiaga in 1 year, unless otherwise needed sooner.     This patient has consented to a telehealth visit via audio only. The visit was scheduled as a audio only visit to comply with patient safety concerns in accordance with CDC recommendations.  All vitals recorded within this visit are reported by the patient.  I spent 25 minutes in total including but not limited to the 10 minutes spent in direct conversation with this patient.      As always, it has been a pleasure to participate in your patient's care. Thank you.       Sincerely,         OLVIN Short        Dictated utilizing Dragon dictation

## 2020-11-22 RX ORDER — AMLODIPINE BESYLATE 5 MG/1
5 TABLET ORAL ONCE AS NEEDED
Qty: 90 TABLET | Refills: 3 | Status: SHIPPED | OUTPATIENT
Start: 2020-11-22 | End: 2021-10-19 | Stop reason: SDUPTHER

## 2020-11-22 RX ORDER — LOSARTAN POTASSIUM AND HYDROCHLOROTHIAZIDE 25; 100 MG/1; MG/1
1 TABLET ORAL DAILY
Qty: 90 TABLET | Refills: 3 | Status: SHIPPED | OUTPATIENT
Start: 2020-11-22 | End: 2021-08-09 | Stop reason: HOSPADM

## 2021-01-28 ENCOUNTER — IMMUNIZATION (OUTPATIENT)
Dept: VACCINE CLINIC | Facility: HOSPITAL | Age: 61
End: 2021-01-28

## 2021-01-28 PROCEDURE — 0001A: CPT | Performed by: INTERNAL MEDICINE

## 2021-01-28 PROCEDURE — 91300 HC SARSCOV02 VAC 30MCG/0.3ML IM: CPT | Performed by: INTERNAL MEDICINE

## 2021-02-18 ENCOUNTER — IMMUNIZATION (OUTPATIENT)
Dept: VACCINE CLINIC | Facility: HOSPITAL | Age: 61
End: 2021-02-18

## 2021-02-18 PROCEDURE — 0002A: CPT | Performed by: INTERNAL MEDICINE

## 2021-02-18 PROCEDURE — 91300 HC SARSCOV02 VAC 30MCG/0.3ML IM: CPT | Performed by: INTERNAL MEDICINE

## 2021-08-06 ENCOUNTER — HOSPITAL ENCOUNTER (INPATIENT)
Facility: HOSPITAL | Age: 61
LOS: 3 days | Discharge: HOME | End: 2021-08-09
Attending: EMERGENCY MEDICINE | Admitting: HOSPITALIST

## 2021-08-06 ENCOUNTER — APPOINTMENT (OUTPATIENT)
Dept: GENERAL RADIOLOGY | Facility: HOSPITAL | Age: 61
End: 2021-08-06

## 2021-08-06 DIAGNOSIS — N17.9 AKI (ACUTE KIDNEY INJURY) (HCC): Primary | ICD-10-CM

## 2021-08-06 DIAGNOSIS — R11.15 CYCLICAL VOMITING: ICD-10-CM

## 2021-08-06 DIAGNOSIS — N39.0 ACUTE UTI: ICD-10-CM

## 2021-08-06 LAB
ALBUMIN SERPL-MCNC: 4 G/DL (ref 3.5–5.2)
ALBUMIN/GLOB SERPL: 1.2 G/DL
ALP SERPL-CCNC: 74 U/L (ref 39–117)
ALT SERPL W P-5'-P-CCNC: 27 U/L (ref 1–33)
AMPHET+METHAMPHET UR QL: NEGATIVE
ANION GAP SERPL CALCULATED.3IONS-SCNC: 18.4 MMOL/L (ref 5–15)
AST SERPL-CCNC: 19 U/L (ref 1–32)
B PARAPERT DNA SPEC QL NAA+PROBE: NOT DETECTED
B PERT DNA SPEC QL NAA+PROBE: NOT DETECTED
BACTERIA UR QL AUTO: ABNORMAL /HPF
BARBITURATES UR QL SCN: NEGATIVE
BASOPHILS # BLD AUTO: 0.02 10*3/MM3 (ref 0–0.2)
BASOPHILS NFR BLD AUTO: 0.4 % (ref 0–1.5)
BENZODIAZ UR QL SCN: NEGATIVE
BILIRUB SERPL-MCNC: 0.8 MG/DL (ref 0–1.2)
BILIRUB UR QL STRIP: NEGATIVE
BUN SERPL-MCNC: 52 MG/DL (ref 6–20)
BUN/CREAT SERPL: 19.9 (ref 7–25)
C PNEUM DNA NPH QL NAA+NON-PROBE: NOT DETECTED
CALCIUM SPEC-SCNC: 10.1 MG/DL (ref 8.6–10.5)
CANNABINOIDS SERPL QL: POSITIVE
CHLORIDE SERPL-SCNC: 93 MMOL/L (ref 98–107)
CLARITY UR: ABNORMAL
CO2 SERPL-SCNC: 20.6 MMOL/L (ref 22–29)
COCAINE UR QL: NEGATIVE
COLOR UR: YELLOW
CREAT SERPL-MCNC: 2.61 MG/DL (ref 0.57–1)
D-LACTATE SERPL-SCNC: 2.4 MMOL/L (ref 0.5–2)
DEPRECATED RDW RBC AUTO: 41.7 FL (ref 37–54)
EOSINOPHIL # BLD AUTO: 0.03 10*3/MM3 (ref 0–0.4)
EOSINOPHIL NFR BLD AUTO: 0.6 % (ref 0.3–6.2)
ERYTHROCYTE [DISTWIDTH] IN BLOOD BY AUTOMATED COUNT: 12.3 % (ref 12.3–15.4)
ETHANOL BLD-MCNC: <10 MG/DL (ref 0–10)
ETHANOL UR QL: <0.01 %
FLUAV SUBTYP SPEC NAA+PROBE: NOT DETECTED
FLUBV RNA ISLT QL NAA+PROBE: NOT DETECTED
GFR SERPL CREATININE-BSD FRML MDRD: 19 ML/MIN/1.73
GLOBULIN UR ELPH-MCNC: 3.3 GM/DL
GLUCOSE SERPL-MCNC: 99 MG/DL (ref 65–99)
GLUCOSE UR STRIP-MCNC: NEGATIVE MG/DL
HADV DNA SPEC NAA+PROBE: NOT DETECTED
HCOV 229E RNA SPEC QL NAA+PROBE: NOT DETECTED
HCOV HKU1 RNA SPEC QL NAA+PROBE: NOT DETECTED
HCOV NL63 RNA SPEC QL NAA+PROBE: NOT DETECTED
HCOV OC43 RNA SPEC QL NAA+PROBE: NOT DETECTED
HCT VFR BLD AUTO: 43.5 % (ref 34–46.6)
HGB BLD-MCNC: 14.9 G/DL (ref 12–15.9)
HGB UR QL STRIP.AUTO: ABNORMAL
HMPV RNA NPH QL NAA+NON-PROBE: NOT DETECTED
HOLD SPECIMEN: NORMAL
HOLD SPECIMEN: NORMAL
HPIV1 RNA SPEC QL NAA+PROBE: NOT DETECTED
HPIV2 RNA SPEC QL NAA+PROBE: NOT DETECTED
HPIV3 RNA NPH QL NAA+PROBE: NOT DETECTED
HPIV4 P GENE NPH QL NAA+PROBE: NOT DETECTED
HYALINE CASTS UR QL AUTO: ABNORMAL /LPF
IMM GRANULOCYTES # BLD AUTO: 0.01 10*3/MM3 (ref 0–0.05)
IMM GRANULOCYTES NFR BLD AUTO: 0.2 % (ref 0–0.5)
KETONES UR QL STRIP: ABNORMAL
LEUKOCYTE ESTERASE UR QL STRIP.AUTO: ABNORMAL
LYMPHOCYTES # BLD AUTO: 1.12 10*3/MM3 (ref 0.7–3.1)
LYMPHOCYTES NFR BLD AUTO: 22.6 % (ref 19.6–45.3)
M PNEUMO IGG SER IA-ACNC: NOT DETECTED
MAGNESIUM SERPL-MCNC: 2.3 MG/DL (ref 1.6–2.6)
MAGNESIUM SERPL-MCNC: 2.4 MG/DL (ref 1.6–2.6)
MCH RBC QN AUTO: 31.8 PG (ref 26.6–33)
MCHC RBC AUTO-ENTMCNC: 34.3 G/DL (ref 31.5–35.7)
MCV RBC AUTO: 92.8 FL (ref 79–97)
METHADONE UR QL SCN: NEGATIVE
MONOCYTES # BLD AUTO: 0.48 10*3/MM3 (ref 0.1–0.9)
MONOCYTES NFR BLD AUTO: 9.7 % (ref 5–12)
NEUTROPHILS NFR BLD AUTO: 3.3 10*3/MM3 (ref 1.7–7)
NEUTROPHILS NFR BLD AUTO: 66.5 % (ref 42.7–76)
NITRITE UR QL STRIP: POSITIVE
NRBC BLD AUTO-RTO: 0 /100 WBC (ref 0–0.2)
OPIATES UR QL: NEGATIVE
OXYCODONE UR QL SCN: NEGATIVE
PH UR STRIP.AUTO: 6 [PH] (ref 5–8)
PLATELET # BLD AUTO: 373 10*3/MM3 (ref 140–450)
PMV BLD AUTO: 10.7 FL (ref 6–12)
POTASSIUM SERPL-SCNC: 3.5 MMOL/L (ref 3.5–5.2)
PROT SERPL-MCNC: 7.3 G/DL (ref 6–8.5)
PROT UR QL STRIP: ABNORMAL
QT INTERVAL: 461 MS
RBC # BLD AUTO: 4.69 10*6/MM3 (ref 3.77–5.28)
RBC # UR: ABNORMAL /HPF
REF LAB TEST METHOD: ABNORMAL
RHINOVIRUS RNA SPEC NAA+PROBE: NOT DETECTED
RSV RNA NPH QL NAA+NON-PROBE: NOT DETECTED
SARS-COV-2 RNA NPH QL NAA+NON-PROBE: NOT DETECTED
SODIUM SERPL-SCNC: 132 MMOL/L (ref 136–145)
SP GR UR STRIP: 1.01 (ref 1–1.03)
SQUAMOUS #/AREA URNS HPF: ABNORMAL /HPF
TROPONIN T SERPL-MCNC: 0.01 NG/ML (ref 0–0.03)
UROBILINOGEN UR QL STRIP: ABNORMAL
WBC # BLD AUTO: 4.96 10*3/MM3 (ref 3.4–10.8)
WBC UR QL AUTO: ABNORMAL /HPF
WHOLE BLOOD HOLD SPECIMEN: NORMAL

## 2021-08-06 PROCEDURE — 82077 ASSAY SPEC XCP UR&BREATH IA: CPT | Performed by: EMERGENCY MEDICINE

## 2021-08-06 PROCEDURE — 80307 DRUG TEST PRSMV CHEM ANLYZR: CPT | Performed by: EMERGENCY MEDICINE

## 2021-08-06 PROCEDURE — 83605 ASSAY OF LACTIC ACID: CPT | Performed by: EMERGENCY MEDICINE

## 2021-08-06 PROCEDURE — 80053 COMPREHEN METABOLIC PANEL: CPT

## 2021-08-06 PROCEDURE — 0202U NFCT DS 22 TRGT SARS-COV-2: CPT | Performed by: EMERGENCY MEDICINE

## 2021-08-06 PROCEDURE — 87186 SC STD MICRODIL/AGAR DIL: CPT | Performed by: EMERGENCY MEDICINE

## 2021-08-06 PROCEDURE — 83735 ASSAY OF MAGNESIUM: CPT

## 2021-08-06 PROCEDURE — 99285 EMERGENCY DEPT VISIT HI MDM: CPT

## 2021-08-06 PROCEDURE — 81001 URINALYSIS AUTO W/SCOPE: CPT | Performed by: EMERGENCY MEDICINE

## 2021-08-06 PROCEDURE — 87086 URINE CULTURE/COLONY COUNT: CPT | Performed by: EMERGENCY MEDICINE

## 2021-08-06 PROCEDURE — 25010000002 CEFTRIAXONE PER 250 MG: Performed by: EMERGENCY MEDICINE

## 2021-08-06 PROCEDURE — 93005 ELECTROCARDIOGRAM TRACING: CPT | Performed by: EMERGENCY MEDICINE

## 2021-08-06 PROCEDURE — 36415 COLL VENOUS BLD VENIPUNCTURE: CPT

## 2021-08-06 PROCEDURE — 87077 CULTURE AEROBIC IDENTIFY: CPT | Performed by: EMERGENCY MEDICINE

## 2021-08-06 PROCEDURE — 84484 ASSAY OF TROPONIN QUANT: CPT

## 2021-08-06 PROCEDURE — 85025 COMPLETE CBC W/AUTO DIFF WBC: CPT

## 2021-08-06 PROCEDURE — 71045 X-RAY EXAM CHEST 1 VIEW: CPT

## 2021-08-06 PROCEDURE — 93005 ELECTROCARDIOGRAM TRACING: CPT

## 2021-08-06 PROCEDURE — 87040 BLOOD CULTURE FOR BACTERIA: CPT | Performed by: EMERGENCY MEDICINE

## 2021-08-06 PROCEDURE — 83735 ASSAY OF MAGNESIUM: CPT | Performed by: EMERGENCY MEDICINE

## 2021-08-06 PROCEDURE — 93010 ELECTROCARDIOGRAM REPORT: CPT | Performed by: INTERNAL MEDICINE

## 2021-08-06 RX ORDER — SODIUM CHLORIDE 9 MG/ML
100 INJECTION, SOLUTION INTRAVENOUS CONTINUOUS
Status: DISCONTINUED | OUTPATIENT
Start: 2021-08-06 | End: 2021-08-09 | Stop reason: HOSPADM

## 2021-08-06 RX ORDER — SODIUM CHLORIDE 0.9 % (FLUSH) 0.9 %
10 SYRINGE (ML) INJECTION EVERY 12 HOURS SCHEDULED
Status: DISCONTINUED | OUTPATIENT
Start: 2021-08-06 | End: 2021-08-09 | Stop reason: HOSPADM

## 2021-08-06 RX ORDER — CEFTRIAXONE SODIUM 1 G/50ML
1 INJECTION, SOLUTION INTRAVENOUS EVERY 24 HOURS
Status: DISCONTINUED | OUTPATIENT
Start: 2021-08-07 | End: 2021-08-09

## 2021-08-06 RX ORDER — ONDANSETRON 2 MG/ML
4 INJECTION INTRAMUSCULAR; INTRAVENOUS EVERY 6 HOURS PRN
Status: DISCONTINUED | OUTPATIENT
Start: 2021-08-06 | End: 2021-08-09 | Stop reason: HOSPADM

## 2021-08-06 RX ORDER — TRAZODONE HYDROCHLORIDE 50 MG/1
50 TABLET ORAL NIGHTLY
Status: DISCONTINUED | OUTPATIENT
Start: 2021-08-07 | End: 2021-08-09 | Stop reason: HOSPADM

## 2021-08-06 RX ORDER — CEFTRIAXONE SODIUM 1 G/50ML
1 INJECTION, SOLUTION INTRAVENOUS ONCE
Status: COMPLETED | OUTPATIENT
Start: 2021-08-06 | End: 2021-08-06

## 2021-08-06 RX ORDER — PANTOPRAZOLE SODIUM 40 MG/1
40 TABLET, DELAYED RELEASE ORAL EVERY MORNING
Status: DISCONTINUED | OUTPATIENT
Start: 2021-08-07 | End: 2021-08-09 | Stop reason: HOSPADM

## 2021-08-06 RX ORDER — ACETAMINOPHEN 650 MG/1
650 SUPPOSITORY RECTAL EVERY 4 HOURS PRN
Status: DISCONTINUED | OUTPATIENT
Start: 2021-08-06 | End: 2021-08-09 | Stop reason: HOSPADM

## 2021-08-06 RX ORDER — SODIUM CHLORIDE 0.9 % (FLUSH) 0.9 %
10 SYRINGE (ML) INJECTION AS NEEDED
Status: DISCONTINUED | OUTPATIENT
Start: 2021-08-06 | End: 2021-08-09 | Stop reason: HOSPADM

## 2021-08-06 RX ORDER — NEBIVOLOL 10 MG/1
20 TABLET ORAL DAILY
Status: DISCONTINUED | OUTPATIENT
Start: 2021-08-07 | End: 2021-08-09 | Stop reason: HOSPADM

## 2021-08-06 RX ORDER — CALCIUM CARBONATE 200(500)MG
1 TABLET,CHEWABLE ORAL 2 TIMES DAILY PRN
Status: DISCONTINUED | OUTPATIENT
Start: 2021-08-06 | End: 2021-08-09 | Stop reason: HOSPADM

## 2021-08-06 RX ORDER — BUPROPION HYDROCHLORIDE 300 MG/1
300 TABLET ORAL EVERY MORNING
Status: DISCONTINUED | OUTPATIENT
Start: 2021-08-07 | End: 2021-08-09 | Stop reason: HOSPADM

## 2021-08-06 RX ORDER — ACETAMINOPHEN 160 MG/5ML
650 SOLUTION ORAL EVERY 4 HOURS PRN
Status: DISCONTINUED | OUTPATIENT
Start: 2021-08-06 | End: 2021-08-09 | Stop reason: HOSPADM

## 2021-08-06 RX ORDER — AMLODIPINE BESYLATE 10 MG/1
10 TABLET ORAL ONCE AS NEEDED
Status: DISCONTINUED | OUTPATIENT
Start: 2021-08-06 | End: 2021-08-09 | Stop reason: HOSPADM

## 2021-08-06 RX ORDER — NITROGLYCERIN 0.4 MG/1
0.4 TABLET SUBLINGUAL
Status: DISCONTINUED | OUTPATIENT
Start: 2021-08-06 | End: 2021-08-06

## 2021-08-06 RX ORDER — BUPROPION HYDROCHLORIDE 150 MG/1
300 TABLET ORAL EVERY MORNING
COMMUNITY
End: 2021-10-26

## 2021-08-06 RX ORDER — ESCITALOPRAM OXALATE 20 MG/1
20 TABLET ORAL DAILY
Status: DISCONTINUED | OUTPATIENT
Start: 2021-08-07 | End: 2021-08-09 | Stop reason: HOSPADM

## 2021-08-06 RX ORDER — ACETAMINOPHEN 325 MG/1
650 TABLET ORAL EVERY 4 HOURS PRN
Status: DISCONTINUED | OUTPATIENT
Start: 2021-08-06 | End: 2021-08-09 | Stop reason: HOSPADM

## 2021-08-06 RX ADMIN — CEFTRIAXONE SODIUM 1 G: 1 INJECTION, SOLUTION INTRAVENOUS at 18:47

## 2021-08-06 RX ADMIN — SODIUM CHLORIDE 100 ML/HR: 9 INJECTION, SOLUTION INTRAVENOUS at 22:58

## 2021-08-06 RX ADMIN — TRAZODONE HYDROCHLORIDE 50 MG: 50 TABLET ORAL at 23:40

## 2021-08-06 RX ADMIN — SODIUM CHLORIDE 1000 ML: 9 INJECTION, SOLUTION INTRAVENOUS at 18:21

## 2021-08-06 RX ADMIN — SODIUM CHLORIDE, PRESERVATIVE FREE 10 ML: 5 INJECTION INTRAVENOUS at 22:57

## 2021-08-06 NOTE — ED TRIAGE NOTES
Pt came to ED from home with c/o generalized weakness, loss of appetite (food and drink). Pt said weakness started around 2 weeks ago, when pt started feeling nauseous at work, then she came home and vomited for the next couple of days. Pt said she gets very dizzy when she stands up, and a couple days after symptoms began she fainted and hit her head on the bedside table. Pt is AAO x4 but shows visible signs of distress including crying and mild shaking. 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

## 2021-08-06 NOTE — ED PROVIDER NOTES
" EMERGENCY DEPARTMENT ENCOUNTER    Room Number:  P696/1  Date of encounter:  8/6/2021  PCP: Provider, No Known  Historian: Patient      HPI:  Chief Complaint: Nausea vomiting  A complete HPI/ROS/PMH/PSH/SH/FH are unobtainable due to: Nothing    Context: Amanda Sherwood is a 60 y.o. female who presents to the ED c/o nausea vomiting now for several days duration, described as intractable and severe.  Patient is also had diarrhea which she said is watery and multiple times a day.  Patient says she feels very weak, very shaky, and has no energy.  Patient also says that everything she tries to eat \"tastes like cardboard\".    Patient says she has had no fever, has a chronic cough that is no different than usual, no shortness of breath, and is fully vaccinated for COVID-19.  States that there is no blood or mucus in the stools, and that \"I do not hurt anywhere\".    Patient has not taken anything for it, and states very clearly \"I should have come in here a couple weeks ago\".      PAST MEDICAL HISTORY  Active Ambulatory Problems     Diagnosis Date Noted   • Neck pain 03/18/2016   • Temporomandibular joint (TMJ) pain 03/18/2016   • Chronic cough 09/08/2016   • Anxiety disorder due to general medical condition with panic attack 09/08/2016   • Bone pain 09/08/2016   • Vitamin D deficiency disease 09/08/2016   • Health care maintenance 09/08/2016   • Essential hypertension 09/08/2016   • Thinning hair 09/08/2016   • Osteoarthrosis, localized, primary, knee 08/21/2017   • Hyperlipidemia    • Acute medial meniscus tear of left knee 08/09/2018   • Lower extremity edema 06/06/2019   • Obesity (BMI 30.0-34.9) 06/06/2019   • Alcohol withdrawal syndrome with complication (CMS/HCC) 08/13/2019   • FH: colon cancer 01/23/2020   • Heartburn 01/23/2020     Resolved Ambulatory Problems     Diagnosis Date Noted   • Headache 03/18/2016   • Pain of left thigh 09/08/2016   • Screening for osteoporosis 09/08/2016   • PVC (premature ventricular " contraction)    • S/P left knee arthroscopy 09/04/2018   • Chest pain 09/22/2018     Past Medical History:   Diagnosis Date   • Depression    • GERD (gastroesophageal reflux disease)    • History of bronchitis    • History of migraine    • Hypertension    • Leukopenia    • Osteoarthritis    • Seasonal allergies    • Torn meniscus          PAST SURGICAL HISTORY  Past Surgical History:   Procedure Laterality Date   • BREAST BIOPSY      BENIGN. HORACE BREAST   • BUNIONECTOMY Left    • BUNIONECTOMY Left     REVISION   • COLONOSCOPY N/A 2/1/2020    Procedure: COLONOSCOPY TO CECUM AND INTO TERMINAL ILEUM WITH COLD BIOPSY POLYPECTOMY;  Surgeon: Munira Montes MD;  Location: [facility] ENDOSCOPY;  Service: Gastroenterology   • ENDOSCOPY N/A 2/1/2020    Procedure: ESOPHAGOGASTRODUODENOSCOPY WITH COLD BIOPSIES;  Surgeon: Munira Montes MD;  Location: [facility] ENDOSCOPY;  Service: Gastroenterology   • KNEE ARTHROSCOPY Left 8/22/2018    Procedure: KNEE ARTHROSCOPY LATERAL MENISECTOMY DEBRIDEMENT OF MEDIAL FEMORAL CONDYLE DEFECT DEBRIDEMENT OF ARTHRITIS;  Surgeon: Graciela Crockett MD;  [place] [facility] [place] [facility]  Service: Orthopedics   • SALPINGO OOPHORECTOMY Left    • TONSILLECTOMY     • TUBAL ABDOMINAL LIGATION           FAMILY HISTORY  Family History   Problem Relation Age of Onset   • Dementia Mother    • Arthritis Mother    • Stroke Father    • Heart disease Father    • Hypertension Father    • Diabetes Father    • Cancer Father    • Colon cancer Father    • Breast cancer Sister    • Atrial fibrillation Sister    • Ovarian cancer Maternal Aunt    • Malig Hyperthermia Neg Hx          SOCIAL HISTORY  Social History     Socioeconomic History   • Marital status:      Spouse name: Not on file   • Number of children: Not on file   • Years of education: Not on file   • Highest education level: Not on file   Tobacco Use   • Smoking status: Former Smoker   • Smokeless tobacco: Never Used   • Tobacco comment: caffeine - tea    Substance and Sexual Activity   • Alcohol use: Yes     Alcohol/week: 2.0 standard drinks     Types: 2 Cans of beer per week   • Drug use: No   • Sexual activity: Defer         ALLERGIES  Celecoxib, Ace inhibitors, and Lisinopril        REVIEW OF SYSTEMS  Review of Systems     All systems reviewed and negative except for those discussed in HPI.       PHYSICAL EXAM    I have reviewed the triage vital signs and nursing notes.    ED Triage Vitals   Temp Heart Rate Resp BP SpO2   08/06/21 1258 08/06/21 1258 08/06/21 1258 08/06/21 1258 08/06/21 1258   97.1 °F (36.2 °C) 88 20 103/66 96 %      Temp src Heart Rate Source Patient Position BP Location FiO2 (%)   08/06/21 1258 08/06/21 1630 08/06/21 1630 08/06/21 1630 --   Tympanic Monitor Sitting Left arm        Physical Exam  GENERAL: Awake and alert, very anxious, hyperventilating  HENT: nares patent, dry mucous membrane  EYES: no scleral icterus  CV: regular rhythm, regular rate  RESPIRATORY: normal effort  ABDOMEN: soft, nontender palpation, bowel sounds present  MUSCULOSKELETAL: no deformity  NEURO: alert, moves all extremities, follows commands  SKIN: warm, dry        LAB RESULTS  Recent Results (from the past 24 hour(s))   Comprehensive Metabolic Panel    Collection Time: 08/06/21  1:52 PM    Specimen: Blood   Result Value Ref Range    Glucose 99 65 - 99 mg/dL    BUN 52 (H) 6 - 20 mg/dL    Creatinine 2.61 (H) 0.57 - 1.00 mg/dL    Sodium 132 (L) 136 - 145 mmol/L    Potassium 3.5 3.5 - 5.2 mmol/L    Chloride 93 (L) 98 - 107 mmol/L    CO2 20.6 (L) 22.0 - 29.0 mmol/L    Calcium 10.1 8.6 - 10.5 mg/dL    Total Protein 7.3 6.0 - 8.5 g/dL    Albumin 4.00 3.50 - 5.20 g/dL    ALT (SGPT) 27 1 - 33 U/L    AST (SGOT) 19 1 - 32 U/L    Alkaline Phosphatase 74 39 - 117 U/L    Total Bilirubin 0.8 0.0 - 1.2 mg/dL    eGFR Non African Amer 19 (L) >60 mL/min/1.73    Globulin 3.3 gm/dL    A/G Ratio 1.2 g/dL    BUN/Creatinine Ratio 19.9 7.0 - 25.0    Anion Gap 18.4 (H) 5.0 - 15.0 mmol/L    Troponin    Collection Time: 08/06/21  1:52 PM    Specimen: Blood   Result Value Ref Range    Troponin T 0.015 0.000 - 0.030 ng/mL   Magnesium    Collection Time: 08/06/21  1:52 PM    Specimen: Blood   Result Value Ref Range    Magnesium 2.3 1.6 - 2.6 mg/dL   Green Top (Gel)    Collection Time: 08/06/21  1:52 PM   Result Value Ref Range    Extra Tube Hold for add-ons.    Lavender Top    Collection Time: 08/06/21  1:52 PM   Result Value Ref Range    Extra Tube hold for add-on    Gold Top - SST    Collection Time: 08/06/21  1:52 PM   Result Value Ref Range    Extra Tube Hold for add-ons.    CBC Auto Differential    Collection Time: 08/06/21  1:52 PM    Specimen: Blood   Result Value Ref Range    WBC 4.96 3.40 - 10.80 10*3/mm3    RBC 4.69 3.77 - 5.28 10*6/mm3    Hemoglobin 14.9 12.0 - 15.9 g/dL    Hematocrit 43.5 34.0 - 46.6 %    MCV 92.8 79.0 - 97.0 fL    MCH 31.8 26.6 - 33.0 pg    MCHC 34.3 31.5 - 35.7 g/dL    RDW 12.3 12.3 - 15.4 %    RDW-SD 41.7 37.0 - 54.0 fl    MPV 10.7 6.0 - 12.0 fL    Platelets 373 140 - 450 10*3/mm3    Neutrophil % 66.5 42.7 - 76.0 %    Lymphocyte % 22.6 19.6 - 45.3 %    Monocyte % 9.7 5.0 - 12.0 %    Eosinophil % 0.6 0.3 - 6.2 %    Basophil % 0.4 0.0 - 1.5 %    Immature Grans % 0.2 0.0 - 0.5 %    Neutrophils, Absolute 3.30 1.70 - 7.00 10*3/mm3    Lymphocytes, Absolute 1.12 0.70 - 3.10 10*3/mm3    Monocytes, Absolute 0.48 0.10 - 0.90 10*3/mm3    Eosinophils, Absolute 0.03 0.00 - 0.40 10*3/mm3    Basophils, Absolute 0.02 0.00 - 0.20 10*3/mm3    Immature Grans, Absolute 0.01 0.00 - 0.05 10*3/mm3    nRBC 0.0 0.0 - 0.2 /100 WBC   ECG 12 Lead    Collection Time: 08/06/21  4:57 PM   Result Value Ref Range    QT Interval 461 ms   Urinalysis With Microscopic If Indicated (No Culture) - Urine, Clean Catch    Collection Time: 08/06/21  5:52 PM    Specimen: Urine, Clean Catch   Result Value Ref Range    Color, UA Yellow Yellow, Straw    Appearance, UA Turbid (A) Clear    pH, UA 6.0 5.0 - 8.0     Specific Gravity, UA 1.013 1.005 - 1.030    Glucose, UA Negative Negative    Ketones, UA 15 mg/dL (1+) (A) Negative    Bilirubin, UA Negative Negative    Blood, UA Small (1+) (A) Negative    Protein, UA Trace (A) Negative    Leuk Esterase, UA Large (3+) (A) Negative    Nitrite, UA Positive (A) Negative    Urobilinogen, UA 0.2 E.U./dL 0.2 - 1.0 E.U./dL   Urine Drug Screen - Urine, Clean Catch    Collection Time: 08/06/21  5:52 PM    Specimen: Urine, Clean Catch   Result Value Ref Range    Amphet/Methamphet, Screen Negative Negative    Barbiturates Screen, Urine Negative Negative    Benzodiazepine Screen, Urine Negative Negative    Cocaine Screen, Urine Negative Negative    Opiate Screen Negative Negative    THC, Screen, Urine Positive (A) Negative    Methadone Screen, Urine Negative Negative    Oxycodone Screen, Urine Negative Negative   Urinalysis, Microscopic Only - Urine, Clean Catch    Collection Time: 08/06/21  5:52 PM    Specimen: Urine, Clean Catch   Result Value Ref Range    RBC, UA 0-2 None Seen, 0-2 /HPF    WBC, UA Too Numerous to Count (A) None Seen, 0-2 /HPF    Bacteria, UA 4+ (A) None Seen /HPF    Squamous Epithelial Cells, UA 0-2 None Seen, 0-2 /HPF    Hyaline Casts, UA 3-6 None Seen /LPF    Methodology Automated Microscopy    Ethanol    Collection Time: 08/06/21  6:20 PM    Specimen: Blood   Result Value Ref Range    Ethanol <10 0 - 10 mg/dL    Ethanol % <0.010 %   Magnesium    Collection Time: 08/06/21  6:20 PM    Specimen: Blood   Result Value Ref Range    Magnesium 2.4 1.6 - 2.6 mg/dL   Lactic Acid, Plasma    Collection Time: 08/06/21  6:36 PM    Specimen: Blood   Result Value Ref Range    Lactate 2.4 (C) 0.5 - 2.0 mmol/L   Respiratory Panel PCR w/COVID-19(SARS-CoV-2) [facility]/ARIANA/BALDEV/PAD/COR/MAD/ALENA In-House, NP Swab in [facility], 3-4 HR TAT - Swab, Nasopharynx    Collection Time: 08/06/21  6:44 PM    Specimen: Nasopharynx; Swab   Result Value Ref Range    ADENOVIRUS, PCR Not Detected Not Detected     Coronavirus 229E Not Detected Not Detected    Coronavirus HKU1 Not Detected Not Detected    Coronavirus NL63 Not Detected Not Detected    Coronavirus OC43 Not Detected Not Detected    COVID19 Not Detected Not Detected - Ref. Range    Human Metapneumovirus Not Detected Not Detected    Human Rhinovirus/Enterovirus Not Detected Not Detected    Influenza A PCR Not Detected Not Detected    Influenza B PCR Not Detected Not Detected    Parainfluenza Virus 1 Not Detected Not Detected    Parainfluenza Virus 2 Not Detected Not Detected    Parainfluenza Virus 3 Not Detected Not Detected    Parainfluenza Virus 4 Not Detected Not Detected    RSV, PCR Not Detected Not Detected    Bordetella pertussis pcr Not Detected Not Detected    Bordetella parapertussis PCR Not Detected Not Detected    Chlamydophila pneumoniae PCR Not Detected Not Detected    Mycoplasma pneumo by PCR Not Detected Not Detected       Ordered the above labs and independently reviewed the results.        RADIOLOGY  XR Chest 1 View    Result Date: 8/6/2021  ONE VIEW PORTABLE CHEST  HISTORY: Weakness and dizziness. Hypertension.  FINDINGS: The lungs are well-expanded and clear and the heart and hilar structures are normal. There is no acute disease or change from 09/22/2018.  This report was finalized on 8/6/2021 3:43 PM by Dr. Tyrel Burr M.D.        I ordered the above noted radiological studies. Reviewed by me and discussed with radiologist.  See dictation for official radiology interpretation.      PROCEDURES    Procedures      MEDICATIONS GIVEN IN ER    Medications   sodium chloride 0.9 % flush 10 mL (has no administration in time range)   sodium chloride 0.9 % flush 10 mL (10 mL Intravenous Given 8/6/21 6238)   sodium chloride 0.9 % flush 10 mL (has no administration in time range)   acetaminophen (TYLENOL) tablet 650 mg (has no administration in time range)     Or   acetaminophen (TYLENOL) 160 MG/5ML solution 650 mg (has no administration in time range)      Or   acetaminophen (TYLENOL) suppository 650 mg (has no administration in time range)   sodium chloride 0.9 % infusion (100 mL/hr Intravenous New Bag 8/6/21 2258)   ondansetron (ZOFRAN) injection 4 mg (has no administration in time range)   cefTRIAXone (ROCEPHIN) IVPB 1 g (has no administration in time range)   amLODIPine (NORVASC) tablet 10 mg (has no administration in time range)   buPROPion XL (WELLBUTRIN XL) 24 hr tablet 300 mg (has no administration in time range)   nebivolol (BYSTOLIC) tablet 20 mg (has no administration in time range)   calcium carbonate (TUMS) chewable tablet 500 mg (200 mg elemental) (has no administration in time range)   escitalopram (LEXAPRO) tablet 20 mg (has no administration in time range)   pantoprazole (PROTONIX) EC tablet 40 mg (has no administration in time range)   traZODone (DESYREL) tablet 50 mg (has no administration in time range)   sodium chloride 0.9 % bolus 1,000 mL (0 mL Intravenous Stopped 8/6/21 1907)   cefTRIAXone (ROCEPHIN) IVPB 1 g (0 g Intravenous Stopped 8/6/21 1936)         PROGRESS, DATA ANALYSIS, CONSULTS, AND MEDICAL DECISION MAKING    All labs have been independently reviewed by me.  All radiology studies have been reviewed by me and discussed with radiologist dictating the report.   EKG's independently viewed and interpreted by me.  Discussion below represents my analysis of pertinent findings related to patient's condition, differential diagnosis, treatment plan and final disposition.        ED Course as of Aug 06 2321   Fri Aug 06, 2021   2319 CBC is unremarkable, chemistry shows a significant FANNY with creatinine of 2.6 and a baseline of 1    [DP]   2320 Urinalysis shows 4+ bacteria and too numerous to count whites    [DP]   2320 Urine drug screen interestingly positive for THC    [DP]   2320 Patient received IV Zofran, Tylenol, IV fluids and IV Rocephin    [DP]   2320 She was still quite anxious and hyperventilating, and I asked her if she needed to  talk about anything and she declined    [DP]   3120 I spoke with nurse practitioner from [facility] who agrees to meet the patient on behalf of Dr. Calderon.  I think she safe for a [facility] bed and should improve with IV fluids and antibiotics    [DP]      ED Course User Index  [DP] Martínez Vázquez MD           PPE: Both the patient and I wore a surgical mask throughout the entire patient encounter. I wore protective goggles.     AS OF 23:21 EDT VITALS:    BP - 126/72  HR - 67  TEMP - 97.6 °F (36.4 °C) (Oral)  O2 SATS - 98%        DIAGNOSIS  Final diagnoses:   FANNY (acute kidney injury) (CMS/[facility])   Acute UTI   Cyclical vomiting         DISPOSITION  Admit           Martínez Vázquez MD  08/06/21 3488

## 2021-08-06 NOTE — ED NOTES
Pt arrived via PV with c/o for N&V and generalized weakness that started eight days ago. Pt denies SOB, abdominal pain, fever.    Pt placed in mask, this RN in mask.      Luna Mrain, RN  08/06/21 9347

## 2021-08-07 LAB
ANION GAP SERPL CALCULATED.3IONS-SCNC: 11.2 MMOL/L (ref 5–15)
BUN SERPL-MCNC: 44 MG/DL (ref 6–20)
BUN/CREAT SERPL: 23.7 (ref 7–25)
CALCIUM SPEC-SCNC: 8.9 MG/DL (ref 8.6–10.5)
CHLORIDE SERPL-SCNC: 104 MMOL/L (ref 98–107)
CO2 SERPL-SCNC: 20.8 MMOL/L (ref 22–29)
CREAT SERPL-MCNC: 1.86 MG/DL (ref 0.57–1)
D-LACTATE SERPL-SCNC: 1.1 MMOL/L (ref 0.5–2)
DEPRECATED RDW RBC AUTO: 41.5 FL (ref 37–54)
ERYTHROCYTE [DISTWIDTH] IN BLOOD BY AUTOMATED COUNT: 11.9 % (ref 12.3–15.4)
GFR SERPL CREATININE-BSD FRML MDRD: 28 ML/MIN/1.73
GLUCOSE SERPL-MCNC: 102 MG/DL (ref 65–99)
HCT VFR BLD AUTO: 38.8 % (ref 34–46.6)
HGB BLD-MCNC: 12.9 G/DL (ref 12–15.9)
MCH RBC QN AUTO: 31.2 PG (ref 26.6–33)
MCHC RBC AUTO-ENTMCNC: 33.2 G/DL (ref 31.5–35.7)
MCV RBC AUTO: 93.9 FL (ref 79–97)
PLATELET # BLD AUTO: 259 10*3/MM3 (ref 140–450)
PMV BLD AUTO: 10.7 FL (ref 6–12)
POTASSIUM SERPL-SCNC: 2.9 MMOL/L (ref 3.5–5.2)
RBC # BLD AUTO: 4.13 10*6/MM3 (ref 3.77–5.28)
SODIUM SERPL-SCNC: 136 MMOL/L (ref 136–145)
WBC # BLD AUTO: 3.81 10*3/MM3 (ref 3.4–10.8)

## 2021-08-07 PROCEDURE — 85027 COMPLETE CBC AUTOMATED: CPT | Performed by: NURSE PRACTITIONER

## 2021-08-07 PROCEDURE — 80048 BASIC METABOLIC PNL TOTAL CA: CPT | Performed by: NURSE PRACTITIONER

## 2021-08-07 PROCEDURE — 25010000002 CEFTRIAXONE PER 250 MG: Performed by: NURSE PRACTITIONER

## 2021-08-07 RX ORDER — POTASSIUM CHLORIDE 750 MG/1
40 TABLET, FILM COATED, EXTENDED RELEASE ORAL ONCE
Status: COMPLETED | OUTPATIENT
Start: 2021-08-07 | End: 2021-08-07

## 2021-08-07 RX ADMIN — ESCITALOPRAM 20 MG: 20 TABLET, FILM COATED ORAL at 08:20

## 2021-08-07 RX ADMIN — PANTOPRAZOLE SODIUM 40 MG: 40 TABLET, DELAYED RELEASE ORAL at 06:12

## 2021-08-07 RX ADMIN — NEBIVOLOL HYDROCHLORIDE 20 MG: 10 TABLET ORAL at 08:19

## 2021-08-07 RX ADMIN — POTASSIUM CHLORIDE 40 MEQ: 750 TABLET, EXTENDED RELEASE ORAL at 12:19

## 2021-08-07 RX ADMIN — BUPROPION HYDROCHLORIDE 300 MG: 300 TABLET, EXTENDED RELEASE ORAL at 06:11

## 2021-08-07 RX ADMIN — SODIUM CHLORIDE 100 ML/HR: 9 INJECTION, SOLUTION INTRAVENOUS at 18:06

## 2021-08-07 RX ADMIN — TRAZODONE HYDROCHLORIDE 50 MG: 50 TABLET ORAL at 20:07

## 2021-08-07 RX ADMIN — SODIUM CHLORIDE 100 ML/HR: 9 INJECTION, SOLUTION INTRAVENOUS at 08:22

## 2021-08-07 RX ADMIN — CEFTRIAXONE SODIUM 1 G: 1 INJECTION, SOLUTION INTRAVENOUS at 18:06

## 2021-08-07 NOTE — PLAN OF CARE
Goal Outcome Evaluation:  Plan of Care Reviewed With: patient        Progress: improving  Outcome Summary: VS stable, afebrile, alert and oriented x4, denies pain or nausea tonight, room air, am labs ordered, up w/ assist x1, voiding w/o difficulty CT pelvis ordered

## 2021-08-07 NOTE — PLAN OF CARE
Goal Outcome Evaluation:  Plan of Care Reviewed With: patient        Progress: improving  Outcome Summary: vss and afebrile, ivf infusing, iv abx administered as ordered, patient states she feels stronger today, denies nausea, denies pain, diet advanced to regular diet and she tolerated it well, still waiting for her to have CT scan

## 2021-08-07 NOTE — H&P
Patient Name:  Amanda Sherwood  YOB: 1960  MRN:  [id number]  Admit Date:  8/6/2021  Patient Care Team:  Provider, No Known as PCP - Yue Beauchamp MD as Consulting Physician (Cardiology)      Subjective   History Present Illness     Chief Complaint   Patient presents with   • Weakness - Generalized   • Vomiting     History of Present Illness   Mrs. Sherwood is a 60-year-old female with history of hypertension, anxiety, vitamin D deficiency, obesity who presents to the emergency room with generalized weakness and fatigue.  Patient states she had nausea vomiting diarrhea about 2 weeks ago that lasted for about 3 days, since then she has had no appetite, she states for about 5 days she did not eat anything, but in the last few days have been trying to force herself to eat.  She states she is continue to get weaker and increased fatigue so decided to come to the emergency room for evaluation.  Patient denies any type of pain, no abdominal pain.  She denies any blood in her stool, the last time she had diarrhea was more than a week ago, since then she has been having normal bowel movements.  She denies any urinary symptoms, no burning no frequency no urgency, denies any pelvic or back pain.  No history of urinary tract infections or kidney stones.  Patient has been vaccinated against COVID-19, has had no exposure that she is aware of, denies any fever cough or shortness of breath at home.  In the emergency room patient's lactic acid was 2.4, urinalysis showed small amount of blood, positive nitrites, 3+ leukocytes, 0-2 red blood cells, too numerous to count white blood cells, 4+ bacteria, 0-2 squamous epithelial cells, patient was started on Rocephin in the emergency room, blood cultures and urine culture pending.  Respiratory viral panel was negative including COVID-19.  Urine drug screen was positive for THC.  Chest x-ray shows lungs well expanded and clear heart and hilar  structures are normal, no acute disease.  Sodium is 132, potassium 3.5, anion gap 18.4, CO2 20.6, creatinine 2.61, BUN 52, magnesium 2.3, white blood cell count 4.9, hemoglobin 14.9, hematocrit 43.2, platelets 373.    Review of Systems   Constitutional: Positive for fatigue. Negative for appetite change and fever.   HENT: Negative for nosebleeds and trouble swallowing.    Eyes: Negative for photophobia, redness and visual disturbance.   Respiratory: Negative for cough, chest tightness, shortness of breath and wheezing.    Cardiovascular: Negative for chest pain, palpitations and leg swelling.   Gastrointestinal: Positive for diarrhea (about 2 weeks ago for three days, then stopped but has been fatigued and no appetite since then) and nausea. Negative for abdominal distention, abdominal pain and vomiting.   Endocrine: Negative.    Genitourinary: Negative.  Negative for difficulty urinating, dysuria, flank pain, frequency, hematuria and urgency.   Musculoskeletal: Negative for gait problem and joint swelling.   Skin: Negative.    Neurological: Negative for dizziness, seizures, speech difficulty, light-headedness and headaches.   Hematological: Negative.    Psychiatric/Behavioral: Negative for behavioral problems and confusion.        Personal History     Past Medical History:   Diagnosis Date   • Depression    • GERD (gastroesophageal reflux disease)    • History of bronchitis    • History of migraine    • Hyperlipidemia     NO MEDS CURRENTLY. WORKING ON   • Hypertension    • Leukopenia     PER HISTORY. HAS SEEN CBC IN THE PAST   • Neck pain    • Obesity (BMI 30.0-34.9) 6/6/2019   • Osteoarthritis    • PVC (premature ventricular contraction)    • Seasonal allergies    • Torn meniscus     LEFT     Past Surgical History:   Procedure Laterality Date   • BREAST BIOPSY      BENIGN. HORACE BREAST   • BUNIONECTOMY Left    • BUNIONECTOMY Left     REVISION   • COLONOSCOPY N/A 2/1/2020    Procedure: COLONOSCOPY TO CECUM AND INTO  TERMINAL ILEUM WITH COLD BIOPSY POLYPECTOMY;  Surgeon: Munira Montes MD;  Location: [facility] ENDOSCOPY;  Service: Gastroenterology   • ENDOSCOPY N/A 2/1/2020    Procedure: ESOPHAGOGASTRODUODENOSCOPY WITH COLD BIOPSIES;  Surgeon: Munira Montes MD;  Location: [facility] ENDOSCOPY;  Service: Gastroenterology   • KNEE ARTHROSCOPY Left 8/22/2018    Procedure: KNEE ARTHROSCOPY LATERAL MENISECTOMY DEBRIDEMENT OF MEDIAL FEMORAL CONDYLE DEFECT DEBRIDEMENT OF ARTHRITIS;  Surgeon: Graciela Crockett MD;  Location:  [facility];  Service: Orthopedics   • SALPINGO OOPHORECTOMY Left    • TONSILLECTOMY     • TUBAL ABDOMINAL LIGATION       Family History   Problem Relation Age of Onset   • Dementia Mother    • Arthritis Mother    • Stroke Father    • Heart disease Father    • Hypertension Father    • Diabetes Father    • Cancer Father    • Colon cancer Father    • Breast cancer Sister    • Atrial fibrillation Sister    • Ovarian cancer Maternal Aunt    • Malig Hyperthermia Neg Hx      Social History     Tobacco Use   • Smoking status: Former Smoker   • Smokeless tobacco: Never Used   • Tobacco comment: caffeine - tea   Substance Use Topics   • Alcohol use: Yes     Alcohol/week: 2.0 standard drinks     Types: 2 Cans of beer per week   • Drug use: No     No current facility-administered medications on file prior to encounter.     Current Outpatient Medications on File Prior to Encounter   Medication Sig Dispense Refill   • amLODIPine (NORVASC) 5 MG tablet Take 1 tablet by mouth 1 (One) Time As Needed (for elevated blood pressure). (Patient taking differently: Take 10 mg by mouth 1 (One) Time As Needed (for elevated blood pressure).) 90 tablet 3   • buPROPion XL (WELLBUTRIN XL) 150 MG 24 hr tablet Take 300 mg by mouth Every Morning.     • Bystolic 20 MG tablet TAKE ONE TABLET BY MOUTH DAILY 90 tablet 3   • calcium carbonate (TUMS) 500 MG chewable tablet Chew 1 tablet As Needed.     • cetirizine (zyrTEC) 10 MG tablet Take 10 mg by  mouth As Needed.     • escitalopram (LEXAPRO) 20 MG tablet Take 1 tablet by mouth Daily. 90 tablet 3   • esomeprazole (NexIUM) 20 MG capsule Take 20 mg by mouth Daily.     • losartan-hydrochlorothiazide (HYZAAR) 100-25 MG per tablet Take 1 tablet by mouth Daily. 90 tablet 3   • traZODone (DESYREL) 50 MG tablet Take 50 mg by mouth Every Night.     • Cholecalciferol (VITAMIN D3) 5000 UNITS capsule capsule Take 5,000 Units by mouth daily.     • diphenhydrAMINE (BENADRYL) 25 mg capsule Take 25 mg by mouth At Night As Needed for Sleep.     • Multiple Vitamins-Minerals (MULTIVITAMINS) chewable tablet Chew 1 tablet Daily.     • ondansetron (ZOFRAN) 8 MG tablet Take 1 tablet by mouth Every 8 (Eight) Hours As Needed for Nausea or Vomiting. 30 tablet 3   • [DISCONTINUED] ARIPiprazole (ABILIFY) 10 MG tablet Take 10 mg by mouth Daily.     • [DISCONTINUED] azithromycin (Zithromax Z-David) 250 MG tablet Take 2 tablets the first day, then 1 tablet daily for 4 days. 6 tablet 0     Allergies   Allergen Reactions   • Celecoxib Hives and Swelling     hives   • Ace Inhibitors Cough   • Lisinopril Cough       Objective    Objective     Vital Signs  Temp:  [96.7 °F (35.9 °C)-98.5 °F (36.9 °C)] 97.6 °F (36.4 °C)  Heart Rate:  [63-88] 67  Resp:  [16-20] 18  BP: ()/(63-94) 126/72  SpO2:  [96 %-100 %] 98 %  on   ;   Device (Oxygen Therapy): room air  Body mass index is 31.67 kg/m².    Physical Exam  Vitals and nursing note reviewed.   Constitutional:       General: She is not in acute distress.     Appearance: She is well-developed.   HENT:      Head: Normocephalic.   Neck:      Vascular: No JVD.   Cardiovascular:      Rate and Rhythm: Normal rate and regular rhythm.      Comments: Normal sinus rhythm, heart rate 70 during my exam, no chest pain or shortness of breath.  No peripheral edema  Pulmonary:      Effort: Pulmonary effort is normal.      Breath sounds: Normal breath sounds.      Comments: Lung sounds clear, sats 97% on room  air  Abdominal:      General: Bowel sounds are normal. There is no distension.      Palpations: Abdomen is soft.      Tenderness: There is no abdominal tenderness. There is no right CVA tenderness or left CVA tenderness.      Comments: No abdominal tenderness or guarding   Musculoskeletal:         General: Normal range of motion.      Cervical back: Normal range of motion.      Right lower leg: No edema.      Left lower leg: No edema.   Skin:     General: Skin is warm and dry.      Capillary Refill: Capillary refill takes less than 2 seconds.   Neurological:      General: No focal deficit present.      Mental Status: She is alert and oriented to person, place, and time.   Psychiatric:         Attention and Perception: Attention normal.         Mood and Affect: Mood is anxious.         Speech: Speech normal.         Behavior: Behavior normal.         Cognition and Memory: Cognition normal.         Judgment: Judgment normal.         Results Review:  I reviewed the patient's new clinical results.  I reviewed the patient's new imaging results and agree with the interpretation.  I reviewed the patient's other test results and agree with the interpretation  I personally viewed and interpreted the patient's EKG/Telemetry data  Discussed with ED provider.    Lab Results (last 24 hours)     Procedure Component Value Units Date/Time    CBC & Differential [[id number]]  (Normal) Collected: 08/06/21 1352    Specimen: Blood Updated: 08/06/21 1416    Narrative:      The following orders were created for panel order CBC & Differential.  Procedure                               Abnormality         Status                     ---------                               -----------         ------                     CBC Auto Differential[[id number]]        Normal              Final result                 Please view results for these tests on the individual orders.    Comprehensive Metabolic Panel [[id number]]  (Abnormal) Collected: 08/06/21  1352    Specimen: Blood Updated: 08/06/21 1455     Glucose 99 mg/dL      BUN 52 mg/dL      Creatinine 2.61 mg/dL      Sodium 132 mmol/L      Potassium 3.5 mmol/L      Chloride 93 mmol/L      CO2 20.6 mmol/L      Calcium 10.1 mg/dL      Total Protein 7.3 g/dL      Albumin 4.00 g/dL      ALT (SGPT) 27 U/L      AST (SGOT) 19 U/L      Alkaline Phosphatase 74 U/L      Total Bilirubin 0.8 mg/dL      eGFR Non African Amer 19 mL/min/1.73      Globulin 3.3 gm/dL      A/G Ratio 1.2 g/dL      BUN/Creatinine Ratio 19.9     Anion Gap 18.4 mmol/L     Narrative:      GFR Normal >60  Chronic Kidney Disease <60  Kidney Failure <15      Troponin [[id number]]  (Normal) Collected: 08/06/21 1352    Specimen: Blood Updated: 08/06/21 1503     Troponin T 0.015 ng/mL     Narrative:      Troponin T Reference Range:  <= 0.03 ng/mL-   Negative for AMI  >0.03 ng/mL-     Abnormal for myocardial necrosis.  Clinicians would have to utilize clinical acumen, EKG, Troponin and serial changes to determine if it is an Acute Myocardial Infarction or myocardial injury due to an underlying chronic condition.       Results may be falsely decreased if patient taking Biotin.      Magnesium [[id number]]  (Normal) Collected: 08/06/21 1352    Specimen: Blood Updated: 08/06/21 1455     Magnesium 2.3 mg/dL     CBC Auto Differential [[id number]]  (Normal) Collected: 08/06/21 1352    Specimen: Blood Updated: 08/06/21 1416     WBC 4.96 10*3/mm3      RBC 4.69 10*6/mm3      Hemoglobin 14.9 g/dL      Hematocrit 43.5 %      MCV 92.8 fL      MCH 31.8 pg      MCHC 34.3 g/dL      RDW 12.3 %      RDW-SD 41.7 fl      MPV 10.7 fL      Platelets 373 10*3/mm3      Neutrophil % 66.5 %      Lymphocyte % 22.6 %      Monocyte % 9.7 %      Eosinophil % 0.6 %      Basophil % 0.4 %      Immature Grans % 0.2 %      Neutrophils, Absolute 3.30 10*3/mm3      Lymphocytes, Absolute 1.12 10*3/mm3      Monocytes, Absolute 0.48 10*3/mm3      Eosinophils, Absolute 0.03 10*3/mm3      Basophils,  Absolute 0.02 10*3/mm3      Immature Grans, Absolute 0.01 10*3/mm3      nRBC 0.0 /100 WBC     Urinalysis With Microscopic If Indicated (No Culture) - Urine, Clean Catch [[id number]]  (Abnormal) Collected: 08/06/21 1752    Specimen: Urine, Clean Catch Updated: 08/06/21 1822     Color, UA Yellow     Appearance, UA Turbid     pH, UA 6.0     Specific Gravity, UA 1.013     Glucose, UA Negative     Ketones, UA 15 mg/dL (1+)     Bilirubin, UA Negative     Blood, UA Small (1+)     Protein, UA Trace     Leuk Esterase, UA Large (3+)     Nitrite, UA Positive     Urobilinogen, UA 0.2 E.U./dL    Urine Drug Screen - Urine, Clean Catch [[id number]]  (Abnormal) Collected: 08/06/21 1752    Specimen: Urine, Clean Catch Updated: 08/06/21 1829     Amphet/Methamphet, Screen Negative     Barbiturates Screen, Urine Negative     Benzodiazepine Screen, Urine Negative     Cocaine Screen, Urine Negative     Opiate Screen Negative     THC, Screen, Urine Positive     Methadone Screen, Urine Negative     Oxycodone Screen, Urine Negative    Narrative:      Negative Thresholds Per Drugs Screened:    Amphetamines                 500 ng/ml  Barbiturates                 200 ng/ml  Benzodiazepines              100 ng/ml  Cocaine                      300 ng/ml  Methadone                    300 ng/ml  Opiates                      300 ng/ml  Oxycodone                    100 ng/ml  THC                           50 ng/ml    The Normal Value for all drugs tested is negative. This report includes final unconfirmed screening results to be used for medical treatment purposes only. Unconfirmed results must not be used for non-medical purposes such as employment or legal testing. Clinical consideration should be applied to any drug of abuse test, particularly when unconfirmed results are used.            Urinalysis, Microscopic Only - Urine, Clean Catch [[id number]]  (Abnormal) Collected: 08/06/21 1752    Specimen: Urine, Clean Catch Updated: 08/06/21 1822     RBC,  UA 0-2 /HPF      WBC, UA Too Numerous to Count /HPF      Bacteria, UA 4+ /HPF      Squamous Epithelial Cells, UA 0-2 /HPF      Hyaline Casts, UA 3-6 /LPF      Methodology Automated Microscopy    Urine Culture - Urine, Urine, Clean Catch [[id number]] Collected: 08/06/21 1752    Specimen: Urine, Clean Catch Updated: 08/06/21 1830    Ethanol [[id number]] Collected: 08/06/21 1820    Specimen: Blood Updated: 08/06/21 1925     Ethanol <10 mg/dL      Ethanol % <0.010 %     Magnesium [[id number]]  (Normal) Collected: 08/06/21 1820    Specimen: Blood Updated: 08/06/21 1916     Magnesium 2.4 mg/dL     Lactic Acid, Plasma [[id number]]  (Abnormal) Collected: 08/06/21 1836    Specimen: Blood Updated: 08/06/21 1914     Lactate 2.4 mmol/L     Blood Culture - Blood, Arm, Right [[id number]] Collected: 08/06/21 1836    Specimen: Blood from Arm, Right Updated: 08/06/21 1852    Blood Culture - Blood, Arm, Left [[id number]] Collected: 08/06/21 1843    Specimen: Blood from Arm, Left Updated: 08/06/21 1853    Respiratory Panel PCR w/COVID-19(SARS-CoV-2) [facility] In-House, NP Swab in UTM/VTM, 3-4 HR TAT - Swab, Nasopharynx [[id number]]  (Normal) Collected: 08/06/21 1844    Specimen: Swab from Nasopharynx Updated: 08/06/21 2030     ADENOVIRUS, PCR Not Detected     Coronavirus 229E Not Detected     Coronavirus HKU1 Not Detected     Coronavirus NL63 Not Detected     Coronavirus OC43 Not Detected     COVID19 Not Detected     Human Metapneumovirus Not Detected     Human Rhinovirus/Enterovirus Not Detected     Influenza A PCR Not Detected     Influenza B PCR Not Detected     Parainfluenza Virus 1 Not Detected     Parainfluenza Virus 2 Not Detected     Parainfluenza Virus 3 Not Detected     Parainfluenza Virus 4 Not Detected     RSV, PCR Not Detected     Bordetella pertussis pcr Not Detected     Bordetella parapertussis PCR Not Detected     Chlamydophila pneumoniae PCR Not Detected     Mycoplasma pneumo by PCR Not Detected     Narrative:      In the setting of a positive respiratory panel with a viral infection PLUS a negative procalcitonin without other underlying concern for bacterial infection, consider observing off antibiotics or discontinuation of antibiotics and continue supportive care. If the respiratory panel is positive for atypical bacterial infection (Bordetella pertussis, Chlamydophila pneumoniae, or Mycoplasma pneumoniae), consider antibiotic de-escalation to target atypical bacterial infection.          Imaging Results (Last 24 Hours)     Procedure Component Value Units Date/Time    XR Chest 1 View [[id number]] Collected: 08/06/21 1501     Updated: 08/06/21 1546    Narrative:      ONE VIEW PORTABLE CHEST     HISTORY: Weakness and dizziness. Hypertension.     FINDINGS: The lungs are well-expanded and clear and the heart and hilar  structures are normal. There is no acute disease or change from  09/22/2018.     This report was finalized on 8/6/2021 3:43 PM by Dr. Tyrel Burr M.D.             Results for orders placed during the hospital encounter of 09/22/18    Adult Transthoracic Echo Complete W/ Cont if Necessary Per Protocol    Interpretation Summary  · Left ventricular systolic function is normal.  · Calculated EF = 62%.  · Normal echo      ECG 12 Lead   Final Result   HEART RATE= 63  bpm   RR Interval= 948  ms   AZ Interval= 169  ms   P Horizontal Axis= 20  deg   P Front Axis= 19  deg   QRSD Interval= 94  ms   QT Interval= 461  ms   QRS Axis= 13  deg   T Wave Axis= 18  deg   - NORMAL ECG -   Sinus rhythm   NON-SPECIFIC ST-T WAVE CHANGES new vs previous   POOR R WAVE PROGRESSION   Electronically Signed By: Kemal Esparza ([facility]) 06-Aug-2021 19:17:48   Date and Time of Study: 2021-08-06 16:57:43           Assessment/Plan     Active Hospital Problems    Diagnosis  POA   • **FANNY (acute kidney injury) (CMS/[facility]) [N17.9]  Yes   • Acute UTI (urinary tract infection) [N39.0]  Unknown   • Obesity (BMI 30.0-34.9) [E66.9]  Yes    • Anxiety disorder due to general medical condition with panic attack [F06.4, F41.0]  Yes   • Vitamin D deficiency disease [E55.9]  Yes   • Essential hypertension [I10]  Yes     Mrs. Sherwood is a 60-year-old female with history of hypertension, anxiety, vitamin D deficiency, obesity who presents to the emergency room with generalized weakness and fatigue.    Acute kidney injury/UTI  -IV fluids, normal saline at 100 cc an hour overnight  -Recheck lactic acid, fluid bolus given the emergency room  -Urine culture and blood cultures are pending  -Rocephin started in the emergency room, will continue that for now until culture results  -Hold losartan/HCTZ for now  -Recheck BMP, CBC in a.m.  -Check CT abdomen and pelvis  -Avoid any nephrotoxic medications    Hypertension/anxiety disorder/vitamin D deficiency  -Continue home medications  -Blood pressure stable at this time, continue to monitor, holding losartan HCTZ due to acute kidney injury, continue Bystolic and amlodipine.      · I discussed the patient's findings and my recommendations with patient and ED provider.    VTE Prophylaxis - SCDs.  Code Status - Full code.       OLVIN Delgado  [facility]  08/06/21  23:20 EDT

## 2021-08-08 ENCOUNTER — APPOINTMENT (OUTPATIENT)
Dept: CT IMAGING | Facility: HOSPITAL | Age: 61
End: 2021-08-08

## 2021-08-08 LAB
ALBUMIN SERPL-MCNC: 3.3 G/DL (ref 3.5–5.2)
ANION GAP SERPL CALCULATED.3IONS-SCNC: 12.5 MMOL/L (ref 5–15)
BACTERIA SPEC AEROBE CULT: ABNORMAL
BUN SERPL-MCNC: 26 MG/DL (ref 6–20)
BUN/CREAT SERPL: 17 (ref 7–25)
CALCIUM SPEC-SCNC: 8.8 MG/DL (ref 8.6–10.5)
CHLORIDE SERPL-SCNC: 108 MMOL/L (ref 98–107)
CO2 SERPL-SCNC: 20.5 MMOL/L (ref 22–29)
CREAT SERPL-MCNC: 1.53 MG/DL (ref 0.57–1)
GFR SERPL CREATININE-BSD FRML MDRD: 35 ML/MIN/1.73
GLUCOSE SERPL-MCNC: 89 MG/DL (ref 65–99)
MAGNESIUM SERPL-MCNC: 1.8 MG/DL (ref 1.6–2.6)
PHOSPHATE SERPL-MCNC: 3 MG/DL (ref 2.5–4.5)
POTASSIUM SERPL-SCNC: 3.2 MMOL/L (ref 3.5–5.2)
SODIUM SERPL-SCNC: 141 MMOL/L (ref 136–145)

## 2021-08-08 PROCEDURE — 80069 RENAL FUNCTION PANEL: CPT | Performed by: HOSPITALIST

## 2021-08-08 PROCEDURE — 83735 ASSAY OF MAGNESIUM: CPT | Performed by: HOSPITALIST

## 2021-08-08 PROCEDURE — 74176 CT ABD & PELVIS W/O CONTRAST: CPT

## 2021-08-08 PROCEDURE — 25010000002 CEFTRIAXONE PER 250 MG: Performed by: NURSE PRACTITIONER

## 2021-08-08 PROCEDURE — 25010000002 ONDANSETRON PER 1 MG: Performed by: NURSE PRACTITIONER

## 2021-08-08 RX ORDER — POTASSIUM CHLORIDE 750 MG/1
40 TABLET, FILM COATED, EXTENDED RELEASE ORAL ONCE
Status: COMPLETED | OUTPATIENT
Start: 2021-08-08 | End: 2021-08-08

## 2021-08-08 RX ADMIN — SODIUM CHLORIDE 100 ML/HR: 9 INJECTION, SOLUTION INTRAVENOUS at 04:29

## 2021-08-08 RX ADMIN — ONDANSETRON 4 MG: 2 INJECTION INTRAMUSCULAR; INTRAVENOUS at 12:00

## 2021-08-08 RX ADMIN — POTASSIUM CHLORIDE 40 MEQ: 750 TABLET, EXTENDED RELEASE ORAL at 12:00

## 2021-08-08 RX ADMIN — PANTOPRAZOLE SODIUM 40 MG: 40 TABLET, DELAYED RELEASE ORAL at 06:17

## 2021-08-08 RX ADMIN — TRAZODONE HYDROCHLORIDE 50 MG: 50 TABLET ORAL at 20:00

## 2021-08-08 RX ADMIN — SODIUM CHLORIDE 100 ML/HR: 9 INJECTION, SOLUTION INTRAVENOUS at 14:46

## 2021-08-08 RX ADMIN — ESCITALOPRAM 20 MG: 20 TABLET, FILM COATED ORAL at 08:06

## 2021-08-08 RX ADMIN — NEBIVOLOL HYDROCHLORIDE 20 MG: 10 TABLET ORAL at 08:06

## 2021-08-08 RX ADMIN — BUPROPION HYDROCHLORIDE 300 MG: 300 TABLET, EXTENDED RELEASE ORAL at 06:17

## 2021-08-08 RX ADMIN — CEFTRIAXONE SODIUM 1 G: 1 INJECTION, SOLUTION INTRAVENOUS at 18:21

## 2021-08-08 NOTE — PLAN OF CARE
Goal Outcome Evaluation:  Plan of Care Reviewed With: patient        Progress: improving  Outcome Summary: VS stable, afebrile, slept between care, denies nausea or pain, malorie regular diet, NS @100, scds on while in bed, waiting to have CT, will continue to monitor

## 2021-08-08 NOTE — PROGRESS NOTES
"    DAILY PROGRESS NOTE  [facility]    Patient Identification:  Name: Amanda Sherwood  Age: 60 y.o.  Sex: female  :  1960  MRN: [id number]         Primary Care Physician: Provider, No Known    Subjective:  Interval History: Overall feeling better.  Still some nausea but no further emesis or diarrhea since admission.  No complaints of abdominal pain.  Urine output is improved and is not tea or dark in color.  No confusion chest pain or troubles breathing    Objective: Pleasant conversational and nontoxic.  No family at bedside.  Case discussed with RN    Scheduled Meds:buPROPion XL, 300 mg, Oral, QAM  cefTRIAXone, 1 g, Intravenous, Q24H  escitalopram, 20 mg, Oral, Daily  nebivolol, 20 mg, Oral, Daily  pantoprazole, 40 mg, Oral, QAM  sodium chloride, 10 mL, Intravenous, Q12H  traZODone, 50 mg, Oral, Nightly      Continuous Infusions:sodium chloride, 100 mL/hr, Last Rate: 100 mL/hr (21 0429)        Vital signs in last 24 hours:  Temp:  [94.8 °F (34.9 °C)-97.9 °F (36.6 °C)] 97.3 °F (36.3 °C)  Heart Rate:  [65-74] 67  Resp:  [18] 18  BP: (102-143)/(66-90) 143/84    Intake/Output:    Intake/Output Summary (Last 24 hours) at 2021 0815  Last data filed at 2021 0813  Gross per 24 hour   Intake 3383 ml   Output 3750 ml   Net -367 ml       Exam:  /84 (BP Location: Right arm, Patient Position: Lying)   Pulse 67   Temp 97.3 °F (36.3 °C) (Oral)   Resp 18   Ht 165.1 cm (65\")   Wt 86.3 kg (190 lb 4.8 oz)   LMP 2016 (Approximate) Comment: 52  SpO2 97%   BMI 31.67 kg/m²     General Appearance:    Alert, cooperative, no distress, AAOx3                          Head:    Normocephalic, without obvious abnormality, atraumatic                           Eyes:    Conjunctivae/corneas clear, EOM's intact, both eyes                         Throat:   Oral mucosa pink and moist                           Neck:   No JVD                         Lungs:    Clear to auscultation bilaterally, " respirations unlabored                 Chest Wall:    No tenderness or deformity                          Heart:    Regular rate and rhythm, S1 and S2 normal, no murmur, no rub or gallop                  Abdomen:     Soft, nontender, bowel sounds active                 Extremities:   Extremities normal, atraumatic, no cyanosis or edema                        Pulses:   Pulses palpable in lower extremities                            Skin:   Skin is warm and dry                  Neurologic:   CNII-XII intact, no focal deficits noted     Data Review:  Labs in chart were reviewed.    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       Plan:    ARF resolved with IVF and further holding of ARB/HCTZ   -Creatinine trending down to 1.53   -Urine output adequate with no reports of molasses or tea colored urine   -Continue to replace K    UTI on Rocephin   -Culture pending though blood cultures NGTD   -Third dose of Rocephin today and then treated    HTN stable off meds        CT ordered on admission and still pending -anticipate negative result given clinical improvement    Okay to give third dose of Rocephin early today    Case discussed with RN with plans to discharge likely this afternoon we will continue IV fluids until that point      Addendum -notified by RN that CT was complete.  I reviewed it and ultimately it is unremarkable for any acute disease as anticipated.  I called directly to the patient's room to discuss how she was feeling.  She stills feels a bit weak overall.  Patient remained on IV fluids overnight with  repeat labs in a.m. and will hold discharge this afternoon.  Urine culture demonstrated a E. coli strain that is resistant to penicillin and tetracyclines.  Patient will complete treatment today after third dose of Rocephin.  HTN stable on current regimen with removal of ARB/HCTZ with a current level of 143/84    Donavon Nelson MD  8/8/2021  08:15 EDT

## 2021-08-08 NOTE — NURSING NOTE
Patient has a CT scan ordered since yesterday early morning, but has not been done yet.  I called and spoke with someone in CT, he stated that she is on their list to do and he would try to get her done this morning.

## 2021-08-08 NOTE — PROGRESS NOTES
Chart reviewed on 8/7/2021 and orders placed and reviewed to ensure correctness    Case was discussed with RN on this day    I personally failed to see the patient at bedside yesterday    Discussed the above with patient and RN was apologetic to both

## 2021-08-08 NOTE — PLAN OF CARE
Goal Outcome Evaluation:  Plan of Care Reviewed With: patient        Progress: improving  Outcome Summary: vss and afebrile, voiding well, denies pain, ambulated in [place] and tolerated well, did c/o weakness after walking, potassium supplement given, ivf infusing

## 2021-08-08 NOTE — CONSULTS
Adult Nutrition  Assessment/PES    Patient Name:  Amanda Sherwood  YOB: 1960  MRN: [id number]  Admit Date:  8/6/2021    Assessment Date:  8/8/2021    Comments:  Consult: RN screen. Admission for N/V, weakness, and & fatigue d/t FANNY and acute UTI. 50-75% intake on regular diet. Wt index note CBW of 190# from 205# on 5/17. Expected d/c today or tomorrow. Boost Breeze ordered BID to assist in meeting needs. If pt to d/c today, pt likely to not receive supplement. Expect intake to return to normal after recovered.     Reason for Assessment     Row Name 08/08/21 1407          Reason for Assessment    Reason For Assessment  nurse/nurse practitioner consult     Diagnosis  infection/sepsis FANNY, acute UTI, HTN, anxiety, and Vit D deficiency.     Identified At Risk by Screening Criteria  reduced oral intake over the last month         Nutrition/Diet History     Row Name 08/08/21 1409          Nutrition/Diet History    Typical Food/Fluid Intake  N/V, weakness, and & fatigue d/t FANNY and acute UTI. 50-75% intake on regular diet. Wt index note CBW of 190# from 205# on 5/17. Expected d/c today or tomorrow.     Factors Affecting Nutritional Intake  vomiting;nausea           Labs/Tests/Procedures/Meds     Row Name 08/08/21 1410          Labs/Procedures/Meds    Lab Results Reviewed  reviewed, pertinent     Lab Results Comments  K 3.2, CO2, Cl, BUN 26, Cr 1.53, GFR 35, Alb        Diagnostic Tests/Procedures    Diagnostic Test/Procedure Reviewed  reviewed, pertinent     Diagnostic Test/Procedures Comments  CT 8/8        Medications    Pertinent Medications Reviewed  reviewed, pertinent     Pertinent Medications Comments  IV rocephin, protonix, 100ml/hr IVF         Physical Findings     Row Name 08/08/21 1411          Physical Findings    Overall Physical Appearance  overweight     Gastrointestinal  nausea     Skin  other (see comments) B=20           Nutrition Prescription Ordered     Row Name 08/08/21 1411           Nutrition Prescription PO    Current PO Diet  Regular     Fluid Consistency  Thin                 Problem/Interventions:  Problem 1     Row Name 08/08/21 1412          Nutrition Diagnoses Problem 1    Problem 1  Inadequate Intake/Infusion     Inadequate Intake Type  Oral     Etiology (related to)  Factors Affecting Nutrition;Medical Diagnosis     Infectious Disease  UTI     Renal  FANNY     Appetite  Poor Due to GI Factor     Reported GI Symptoms  N & V     Signs/Symptoms (evidenced by)  Report of Mnimal PO Intake;Unintended Weight Change     Unintended Weight Change  Loss     Number of Pounds Lost  15#     Weight loss time period  1-2 months               Intervention Goal     Row Name 08/08/21 1412          Intervention Goal    General  Maintain nutrition;Improved nutrition related lab(s);Reduce/improve symptoms;Meet nutritional needs for age/condition     PO  Increase intake;Meet estimated needs     Weight  Maintain weight         Nutrition Intervention     Row Name 08/08/21 1412          Nutrition Intervention    RD/Tech Action  Care plan reviewd;Follow Tx progress;Encourage intake;Recommend/ordered     Recommended/Ordered  Supplement         Nutrition Prescription     Row Name 08/08/21 1412          Nutrition Prescription PO    PO Prescription  Begin/change supplement     Supplement  Boost Breeze     Supplement Frequency  2 times a day     New PO Prescription Ordered?  Yes         Education/Evaluation     Row Name 08/08/21 1412          Education    Education  Will Instruct as appropriate        Monitor/Evaluation    Monitor  Per protocol           Electronically signed by:  Deana Bonner MS,RD,LD  08/08/21 14:13 EDT

## 2021-08-08 NOTE — PROGRESS NOTES
"    DAILY PROGRESS NOTE  [facility]    Patient Identification:  Name: Amanda Sherwood  Age: 60 y.o.  Sex: female  :  1960  MRN: [id number]         Primary Care Physician: Provider, No Known    Subjective:  Interval History: No new issues.  Resolution of emesis with improvement in nausea.  No chest pain or troubles breathing no altered mentation    Objective:    Scheduled Meds:buPROPion XL, 300 mg, Oral, QAM  cefTRIAXone, 1 g, Intravenous, Q24H  escitalopram, 20 mg, Oral, Daily  nebivolol, 20 mg, Oral, Daily  pantoprazole, 40 mg, Oral, QAM  sodium chloride, 10 mL, Intravenous, Q12H  traZODone, 50 mg, Oral, Nightly      Continuous Infusions:sodium chloride, 100 mL/hr, Last Rate: 100 mL/hr (21 0429)        Vital signs in last 24 hours:  Temp:  [94.8 °F (34.9 °C)-97.9 °F (36.6 °C)] 97.3 °F (36.3 °C)  Heart Rate:  [65-74] 67  Resp:  [18] 18  BP: (102-143)/(66-90) 143/84    Intake/Output:    Intake/Output Summary (Last 24 hours) at 2021 0726  Last data filed at 2021 0635  Gross per 24 hour   Intake 3383 ml   Output 2800 ml   Net 583 ml       Exam:  /84 (BP Location: Right arm, Patient Position: Lying)   Pulse 67   Temp 97.3 °F (36.3 °C) (Oral)   Resp 18   Ht 165.1 cm (65\")   Wt 86.3 kg (190 lb 4.8 oz)   LMP 2016 (Approximate) Comment: 52  SpO2 97%   BMI 31.67 kg/m²     General Appearance:    Alert, cooperative, AAOx3                         Throat:   Oral mucosa pink and moist                           Neck:   Supple, symmetrical, trachea midline, no JVD                         Lungs:    Clear to auscultation bilaterally, respirations unlabored                 Chest Wall:    No tenderness or deformity                          Heart:    Regular rate and rhythm, S1 and S2 normal                  Abdomen:     Soft, nontender, bowel sounds active                 Extremities: Moving all, no cyanosis or edema                        Pulses:   Pulses palpable in all " extremities                            Skin:   Skin is warm and dry                  Neurologic:   CNII-XII intact, no focal deficits noted     Data Review:  Labs in chart were reviewed.    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       Plan:    ARF resolving with IVF and holding of ARB/HCTZ   -Creatinine trended down to 1.86   -K replacement underway    UTI on Rocephin   -Culture pending though gram-negative crystal preliminarily   -Blood cultures NGTD   -No sepsis as admission as acid-base balance skewed by ARF    HTN stable off meds    +THC    This is the 8/7/2021 note    Donavon Nelson MD  8/8/2021  07:26 EDT

## 2021-08-09 ENCOUNTER — READMISSION MANAGEMENT (OUTPATIENT)
Dept: CALL CENTER | Facility: HOSPITAL | Age: 61
End: 2021-08-09

## 2021-08-09 VITALS
SYSTOLIC BLOOD PRESSURE: 141 MMHG | BODY MASS INDEX: 31.71 KG/M2 | HEIGHT: 65 IN | HEART RATE: 73 BPM | RESPIRATION RATE: 16 BRPM | DIASTOLIC BLOOD PRESSURE: 78 MMHG | OXYGEN SATURATION: 98 % | WEIGHT: 190.3 LBS | TEMPERATURE: 98 F

## 2021-08-09 LAB
ANION GAP SERPL CALCULATED.3IONS-SCNC: 9.7 MMOL/L (ref 5–15)
BUN SERPL-MCNC: 15 MG/DL (ref 6–20)
BUN/CREAT SERPL: 11.6 (ref 7–25)
CALCIUM SPEC-SCNC: 8.5 MG/DL (ref 8.6–10.5)
CHLORIDE SERPL-SCNC: 112 MMOL/L (ref 98–107)
CO2 SERPL-SCNC: 21.3 MMOL/L (ref 22–29)
CREAT SERPL-MCNC: 1.29 MG/DL (ref 0.57–1)
GFR SERPL CREATININE-BSD FRML MDRD: 42 ML/MIN/1.73
GLUCOSE SERPL-MCNC: 83 MG/DL (ref 65–99)
POTASSIUM SERPL-SCNC: 3.7 MMOL/L (ref 3.5–5.2)
SODIUM SERPL-SCNC: 143 MMOL/L (ref 136–145)

## 2021-08-09 PROCEDURE — 80048 BASIC METABOLIC PNL TOTAL CA: CPT | Performed by: HOSPITALIST

## 2021-08-09 RX ADMIN — BUPROPION HYDROCHLORIDE 300 MG: 300 TABLET, EXTENDED RELEASE ORAL at 06:00

## 2021-08-09 RX ADMIN — PANTOPRAZOLE SODIUM 40 MG: 40 TABLET, DELAYED RELEASE ORAL at 06:00

## 2021-08-09 RX ADMIN — ESCITALOPRAM 20 MG: 20 TABLET, FILM COATED ORAL at 09:44

## 2021-08-09 RX ADMIN — NEBIVOLOL HYDROCHLORIDE 20 MG: 10 TABLET ORAL at 09:44

## 2021-08-09 RX ADMIN — SODIUM CHLORIDE 100 ML/HR: 9 INJECTION, SOLUTION INTRAVENOUS at 01:25

## 2021-08-09 NOTE — PLAN OF CARE
Goal Outcome Evaluation:  Plan of Care Reviewed With: patient        Progress: improving  Outcome Summary: VS stable, afebrile, alert and oriented, room air, voiding freely, slept between care, recheck potassium in am, malorie CLD, ivf infusing as ordered, waiting on CT to be done, denies pain  and nausea

## 2021-08-09 NOTE — DISCHARGE SUMMARY
[facility]    Date of Discharge:  8/9/2021    PCP: Provider, No Known    Discharge Diagnosis:   Active Hospital Problems    Diagnosis  POA   • **FANNY (acute kidney injury) (CMS/HCC) [N17.9]  Yes   • Acute UTI (urinary tract infection) [N39.0]  Unknown   • Obesity (BMI 30.0-34.9) [E66.9]  Yes   • Anxiety disorder due to general medical condition with panic attack [F06.4, F41.0]  Yes   • Vitamin D deficiency disease [E55.9]  Yes   • Essential hypertension [I10]  Yes      Resolved Hospital Problems   No resolved problems to display.          Consults     Date and Time Order Name Status Description    8/6/2021  9:36 PM [facility] (on-call MD unless specified) Details Completed     8/6/2021  9:00 PM [facility] (on-call MD unless specified) Details Completed         Hospital Course  Please see history and physical for details. 60 y.o. female who was initially minute by myself due to issues of acute renal failure as well as urinary tract infection.  Her UTI was treated with IV Rocephin of which she received 3 doses for a strain of E. coli that was resistant to penicillin and tetracycline.  She was found to be in acute renal failure made worse by the UTI as well as use of ARB/HCTZ.  The insulting agents have been removed and patient's blood pressures are stable on Bystolic and amlodipine.  At the time of discharge she is 141/78 with a pulse of 71.  Her electrolyte imbalances have been replaced and her potassium is 3.7 by discharge.  Her creatinine is trended down to 1.29 from 2.61 at admission.  Her acidosis was skewed by acute renal failure but not secondary to sepsis and blood cultures have remained negative.  A CT of the abdomen pelvis did not demonstrate any acute disease and showed no aspects of kidney or urological obstruction.  By discharge she feels tremendously better.  She is alert and oriented x3 and I feel she is at baseline.  She is more than medically stable to be  discharged home.  I have counseled her to follow-up closely with her primary care physician in the next week or so and to monitor her blood pressure readings at home and provide that to PCP.  She should not restart on her ARB/HCTZ until there is complete resolution of kidney function and creatinine levels have returned to complete baseline.  I will defer further management of this to PCP post discharge.  All questions answered the patient to the best my ability and she is thankful for the care she received while here as well as amenable to the above plan for discharge to home.  No additional discharge needs noted       Condition on Discharge: Improved.     Temp:  [97.4 °F (36.3 °C)-97.8 °F (36.6 °C)] 97.7 °F (36.5 °C)  Heart Rate:  [64-92] 71  Resp:  [16-18] 16  BP: (114-141)/(74-85) 141/78  Body mass index is 31.67 kg/m².    Physical Exam  HENT:      Head: Normocephalic.      Nose: Nose normal.      Mouth/Throat:      Mouth: Mucous membranes are moist.      Pharynx: Oropharynx is clear.   Eyes:      General: No scleral icterus.  Cardiovascular:      Rate and Rhythm: Normal rate and regular rhythm.   Pulmonary:      Effort: Pulmonary effort is normal. No respiratory distress.      Breath sounds: Normal breath sounds.   Abdominal:      General: Bowel sounds are normal. There is no distension.      Palpations: Abdomen is soft.      Tenderness: There is no abdominal tenderness.   Musculoskeletal:         General: No swelling.   Skin:     General: Skin is warm and dry.      Coloration: Skin is not jaundiced.   Neurological:      General: No focal deficit present.      Mental Status: She is alert and oriented to person, place, and time. Mental status is at baseline.     [unfilled]    Disposition: Home or Self Care       Discharge Medications      Changes to Medications      Instructions Start Date   amLODIPine 5 MG tablet  Commonly known as: NORVASC  What changed: how much to take   5 mg, Oral, Once As Needed          Continue These Medications      Instructions Start Date   buPROPion  MG 24 hr tablet  Commonly known as: WELLBUTRIN XL   300 mg, Oral, Every Morning      Bystolic 20 MG tablet  Generic drug: nebivolol   TAKE ONE TABLET BY MOUTH DAILY      calcium carbonate 500 MG chewable tablet  Commonly known as: TUMS   1 tablet, Oral, As Needed      cetirizine 10 MG tablet  Commonly known as: zyrTEC   10 mg, Oral, As Needed      escitalopram 20 MG tablet  Commonly known as: LEXAPRO   20 mg, Oral, Daily      esomeprazole 20 MG capsule  Commonly known as: nexIUM   20 mg, Oral, Daily      ondansetron 8 MG tablet  Commonly known as: Zofran   8 mg, Oral, Every 8 Hours PRN      traZODone 50 MG tablet  Commonly known as: DESYREL   50 mg, Oral, Nightly      vitamin D3 125 MCG (5000 UT) capsule capsule   5,000 Units, Oral, Daily         Stop These Medications    diphenhydrAMINE 25 mg capsule  Commonly known as: BENADRYL     losartan-hydrochlorothiazide 100-25 MG per tablet  Commonly known as: HYZAAR     Multivitamins chewable tablet             Additional Instructions for the Follow-ups that You Need to Schedule     Discharge Follow-up with PCP   As directed       Currently Documented PCP:    Provider, No Known    PCP Phone Number:    [phone]     Follow Up Details: PCP 1 to 2 weeks           Follow-up Information     Provider, No Known .    Why: PCP 1 to 2 weeks  Contact information:  [facility] [address] [phone]                  Pending Labs     Order Current Status    Blood Culture - Blood, Arm, Left Preliminary result    Blood Culture - Blood, Arm, Right Preliminary result         Donavon Nelson MD  08/09/21  08:13 EDT    Discharge time spent greater than 30 minutes.

## 2021-08-09 NOTE — PAYOR COMM NOTE
"Tasha Romano (60 y.o. Female)     CLINICAL FOR [id number]    CONTACT CELSA GOODMANJOHN  P# [phone]  F# [phone]        Date of Birth Social Security Number Address Home Phone MRN    1960  [id number] [facility] [address] [phone] [id number]    Hoahaoism Marital Status          Hindu        Admission Date Admission Type Admitting Provider Attending Provider Department, Room/Bed    8/6/21 Emergency Ottoniel Jane MD Masden, Troy Andrew, MD [address], P696/1    Discharge Date Discharge Disposition Discharge Destination         Home or Self Care              Attending Provider: Donavon Nelson MD    Allergies: Celecoxib, Ace Inhibitors, Lisinopril    Isolation: None   Infection: None   Code Status: CPR    Ht: 165.1 cm (65\")   Wt: 86.3 kg (190 lb 4.8 oz)    Admission Cmt: None   Principal Problem: FANNY (acute kidney injury) (CMS/Formerly [facility]) [N17.9]                 Active Insurance as of 8/6/2021     Primary Coverage     Payor Plan Insurance Group Employer/Plan Group    [employer] [id number]     Payor Plan Address Payor Plan Phone Number Payor Plan Fax Number Effective Dates    [address] [phone]  1/1/2019 - None Entered    [facility] [id number]       Subscriber Name Subscriber Birth Date Member ID       TASHA ROMANO 1960 [id number]                 Emergency Contacts      (Rel.) Home Phone Work Phone Mobile Phone    PresleyWilliam (Spouse) [phone] -- [phone]    PresleyAdina (Daughter) [phone] -- [phone]    Sea Romano (Son) [phone] -- [phone]               History & Physical      Oly Saez, APRN at 08/06/21 2308     Attestation signed by Donavon Nelson MD at 08/07/21 0921    I agree with documentation per NP                      Patient Name:  Tasha Romano  YOB: 1960  MRN:  [id number]  Admit Date:  8/6/2021  Patient " Care Team:  Provider, No Known as PCP - Yue Beauchamp MD as Consulting Physician (Cardiology)      Subjective   History Present Illness     Chief Complaint   Patient presents with   • Weakness - Generalized   • Vomiting     History of Present Illness   Mrs. Sherwood is a 60-year-old female with history of hypertension, anxiety, vitamin D deficiency, obesity who presents to the emergency room with generalized weakness and fatigue.  Patient states she had nausea vomiting diarrhea about 2 weeks ago that lasted for about 3 days, since then she has had no appetite, she states for about 5 days she did not eat anything, but in the last few days have been trying to force herself to eat.  She states she is continue to get weaker and increased fatigue so decided to come to the emergency room for evaluation.  Patient denies any type of pain, no abdominal pain.  She denies any blood in her stool, the last time she had diarrhea was more than a week ago, since then she has been having normal bowel movements.  She denies any urinary symptoms, no burning no frequency no urgency, denies any pelvic or back pain.  No history of urinary tract infections or kidney stones.  Patient has been vaccinated against COVID-19, has had no exposure that she is aware of, denies any fever cough or shortness of breath at home.  In the emergency room patient's lactic acid was 2.4, urinalysis showed small amount of blood, positive nitrites, 3+ leukocytes, 0-2 red blood cells, too numerous to count white blood cells, 4+ bacteria, 0-2 squamous epithelial cells, patient was started on Rocephin in the emergency room, blood cultures and urine culture pending.  Respiratory viral panel was negative including COVID-19.  Urine drug screen was positive for THC.  Chest x-ray shows lungs well expanded and clear heart and hilar structures are normal, no acute disease.  Sodium is 132, potassium 3.5, anion gap 18.4, CO2 20.6, creatinine 2.61, BUN 52,  magnesium 2.3, white blood cell count 4.9, hemoglobin 14.9, hematocrit 43.2, platelets 373.    Review of Systems   Constitutional: Positive for fatigue. Negative for appetite change and fever.   HENT: Negative for nosebleeds and trouble swallowing.    Eyes: Negative for photophobia, redness and visual disturbance.   Respiratory: Negative for cough, chest tightness, shortness of breath and wheezing.    Cardiovascular: Negative for chest pain, palpitations and leg swelling.   Gastrointestinal: Positive for diarrhea (about 2 weeks ago for three days, then stopped but has been fatigued and no appetite since then) and nausea. Negative for abdominal distention, abdominal pain and vomiting.   Endocrine: Negative.    Genitourinary: Negative.  Negative for difficulty urinating, dysuria, flank pain, frequency, hematuria and urgency.   Musculoskeletal: Negative for gait problem and joint swelling.   Skin: Negative.    Neurological: Negative for dizziness, seizures, speech difficulty, light-headedness and headaches.   Hematological: Negative.    Psychiatric/Behavioral: Negative for behavioral problems and confusion.        Personal History     Past Medical History:   Diagnosis Date   • Depression    • GERD (gastroesophageal reflux disease)    • History of bronchitis    • History of migraine    • Hyperlipidemia     NO MEDS CURRENTLY. WORKING ON   • Hypertension    • Leukopenia     PER HISTORY. HAS SEEN CBC IN THE PAST   • Neck pain    • Obesity (BMI 30.0-34.9) 6/6/2019   • Osteoarthritis    • PVC (premature ventricular contraction)    • Seasonal allergies    • Torn meniscus     LEFT     Past Surgical History:   Procedure Laterality Date   • BREAST BIOPSY      BENIGN. HORACE BREAST   • BUNIONECTOMY Left    • BUNIONECTOMY Left     REVISION   • COLONOSCOPY N/A 2/1/2020    Procedure: COLONOSCOPY TO CECUM AND INTO TERMINAL ILEUM WITH COLD BIOPSY POLYPECTOMY;  Surgeon: Munira Montes MD;  Location: [facility] ENDOSCOPY;  Service:  Gastroenterology   • ENDOSCOPY N/A 2/1/2020    Procedure: ESOPHAGOGASTRODUODENOSCOPY WITH COLD BIOPSIES;  Surgeon: Munira Montes MD;  Location: [facility] ENDOSCOPY;  Service: Gastroenterology   • KNEE ARTHROSCOPY Left 8/22/2018    Procedure: KNEE ARTHROSCOPY LATERAL MENISECTOMY DEBRIDEMENT OF MEDIAL FEMORAL CONDYLE DEFECT DEBRIDEMENT OF ARTHRITIS;  Surgeon: Graciela Crockett MD;  Location:  [facility];  Service: Orthopedics   • SALPINGO OOPHORECTOMY Left    • TONSILLECTOMY     • TUBAL ABDOMINAL LIGATION       Family History   Problem Relation Age of Onset   • Dementia Mother    • Arthritis Mother    • Stroke Father    • Heart disease Father    • Hypertension Father    • Diabetes Father    • Cancer Father    • Colon cancer Father    • Breast cancer Sister    • Atrial fibrillation Sister    • Ovarian cancer Maternal Aunt    • Malig Hyperthermia Neg Hx      Social History     Tobacco Use   • Smoking status: Former Smoker   • Smokeless tobacco: Never Used   • Tobacco comment: caffeine - tea   Substance Use Topics   • Alcohol use: Yes     Alcohol/week: 2.0 standard drinks     Types: 2 Cans of beer per week   • Drug use: No     No current facility-administered medications on file prior to encounter.     Current Outpatient Medications on File Prior to Encounter   Medication Sig Dispense Refill   • amLODIPine (NORVASC) 5 MG tablet Take 1 tablet by mouth 1 (One) Time As Needed (for elevated blood pressure). (Patient taking differently: Take 10 mg by mouth 1 (One) Time As Needed (for elevated blood pressure).) 90 tablet 3   • buPROPion XL (WELLBUTRIN XL) 150 MG 24 hr tablet Take 300 mg by mouth Every Morning.     • Bystolic 20 MG tablet TAKE ONE TABLET BY MOUTH DAILY 90 tablet 3   • calcium carbonate (TUMS) 500 MG chewable tablet Chew 1 tablet As Needed.     • cetirizine (zyrTEC) 10 MG tablet Take 10 mg by mouth As Needed.     • escitalopram (LEXAPRO) 20 MG tablet Take 1 tablet by mouth Daily. 90 tablet 3   • esomeprazole  (NexIUM) 20 MG capsule Take 20 mg by mouth Daily.     • losartan-hydrochlorothiazide (HYZAAR) 100-25 MG per tablet Take 1 tablet by mouth Daily. 90 tablet 3   • traZODone (DESYREL) 50 MG tablet Take 50 mg by mouth Every Night.     • Cholecalciferol (VITAMIN D3) 5000 UNITS capsule capsule Take 5,000 Units by mouth daily.     • diphenhydrAMINE (BENADRYL) 25 mg capsule Take 25 mg by mouth At Night As Needed for Sleep.     • Multiple Vitamins-Minerals (MULTIVITAMINS) chewable tablet Chew 1 tablet Daily.     • ondansetron (ZOFRAN) 8 MG tablet Take 1 tablet by mouth Every 8 (Eight) Hours As Needed for Nausea or Vomiting. 30 tablet 3   • [DISCONTINUED] ARIPiprazole (ABILIFY) 10 MG tablet Take 10 mg by mouth Daily.     • [DISCONTINUED] azithromycin (Zithromax Z-David) 250 MG tablet Take 2 tablets the first day, then 1 tablet daily for 4 days. 6 tablet 0     Allergies   Allergen Reactions   • Celecoxib Hives and Swelling     hives   • Ace Inhibitors Cough   • Lisinopril Cough       Objective    Objective     Vital Signs  Temp:  [96.7 °F (35.9 °C)-98.5 °F (36.9 °C)] 97.6 °F (36.4 °C)  Heart Rate:  [63-88] 67  Resp:  [16-20] 18  BP: ()/(63-94) 126/72  SpO2:  [96 %-100 %] 98 %  on   ;   Device (Oxygen Therapy): room air  Body mass index is 31.67 kg/m².    Physical Exam  Vitals and nursing note reviewed.   Constitutional:       General: She is not in acute distress.     Appearance: She is well-developed.   HENT:      Head: Normocephalic.   Neck:      Vascular: No JVD.   Cardiovascular:      Rate and Rhythm: Normal rate and regular rhythm.      Comments: Normal sinus rhythm, heart rate 70 during my exam, no chest pain or shortness of breath.  No peripheral edema  Pulmonary:      Effort: Pulmonary effort is normal.      Breath sounds: Normal breath sounds.      Comments: Lung sounds clear, sats 97% on room air  Abdominal:      General: Bowel sounds are normal. There is no distension.      Palpations: Abdomen is soft.       Tenderness: There is no abdominal tenderness. There is no right CVA tenderness or left CVA tenderness.      Comments: No abdominal tenderness or guarding   Musculoskeletal:         General: Normal range of motion.      Cervical back: Normal range of motion.      Right lower leg: No edema.      Left lower leg: No edema.   Skin:     General: Skin is warm and dry.      Capillary Refill: Capillary refill takes less than 2 seconds.   Neurological:      General: No focal deficit present.      Mental Status: She is alert and oriented to person, place, and time.   Psychiatric:         Attention and Perception: Attention normal.         Mood and Affect: Mood is anxious.         Speech: Speech normal.         Behavior: Behavior normal.         Cognition and Memory: Cognition normal.         Judgment: Judgment normal.         Results Review:  I reviewed the patient's new clinical results.  I reviewed the patient's new imaging results and agree with the interpretation.  I reviewed the patient's other test results and agree with the interpretation  I personally viewed and interpreted the patient's EKG/Telemetry data  Discussed with ED provider.    Lab Results (last 24 hours)     Procedure Component Value Units Date/Time    CBC & Differential [[id number]]  (Normal) Collected: 08/06/21 1352    Specimen: Blood Updated: 08/06/21 2306    Narrative:      The following orders were created for panel order CBC & Differential.  Procedure                               Abnormality         Status                     ---------                               -----------         ------                     CBC Auto Differential[[id number]]        Normal              Final result                 Please view results for these tests on the individual orders.    Comprehensive Metabolic Panel [[id number]]  (Abnormal) Collected: 08/06/21 1352    Specimen: Blood Updated: 08/06/21 1455     Glucose 99 mg/dL      BUN 52 mg/dL      Creatinine 2.61 mg/dL       Sodium 132 mmol/L      Potassium 3.5 mmol/L      Chloride 93 mmol/L      CO2 20.6 mmol/L      Calcium 10.1 mg/dL      Total Protein 7.3 g/dL      Albumin 4.00 g/dL      ALT (SGPT) 27 U/L      AST (SGOT) 19 U/L      Alkaline Phosphatase 74 U/L      Total Bilirubin 0.8 mg/dL      eGFR Non African Amer 19 mL/min/1.73      Globulin 3.3 gm/dL      A/G Ratio 1.2 g/dL      BUN/Creatinine Ratio 19.9     Anion Gap 18.4 mmol/L     Narrative:      GFR Normal >60  Chronic Kidney Disease <60  Kidney Failure <15      Troponin [[id number]]  (Normal) Collected: 08/06/21 1352    Specimen: Blood Updated: 08/06/21 1503     Troponin T 0.015 ng/mL     Narrative:      Troponin T Reference Range:  <= 0.03 ng/mL-   Negative for AMI  >0.03 ng/mL-     Abnormal for myocardial necrosis.  Clinicians would have to utilize clinical acumen, EKG, Troponin and serial changes to determine if it is an Acute Myocardial Infarction or myocardial injury due to an underlying chronic condition.       Results may be falsely decreased if patient taking Biotin.      Magnesium [[id number]]  (Normal) Collected: 08/06/21 1352    Specimen: Blood Updated: 08/06/21 1455     Magnesium 2.3 mg/dL     CBC Auto Differential [[id number]]  (Normal) Collected: 08/06/21 1352    Specimen: Blood Updated: 08/06/21 1416     WBC 4.96 10*3/mm3      RBC 4.69 10*6/mm3      Hemoglobin 14.9 g/dL      Hematocrit 43.5 %      MCV 92.8 fL      MCH 31.8 pg      MCHC 34.3 g/dL      RDW 12.3 %      RDW-SD 41.7 fl      MPV 10.7 fL      Platelets 373 10*3/mm3      Neutrophil % 66.5 %      Lymphocyte % 22.6 %      Monocyte % 9.7 %      Eosinophil % 0.6 %      Basophil % 0.4 %      Immature Grans % 0.2 %      Neutrophils, Absolute 3.30 10*3/mm3      Lymphocytes, Absolute 1.12 10*3/mm3      Monocytes, Absolute 0.48 10*3/mm3      Eosinophils, Absolute 0.03 10*3/mm3      Basophils, Absolute 0.02 10*3/mm3      Immature Grans, Absolute 0.01 10*3/mm3      nRBC 0.0 /100 WBC     Urinalysis With Microscopic  If Indicated (No Culture) - Urine, Clean Catch [[id number]]  (Abnormal) Collected: 08/06/21 1752    Specimen: Urine, Clean Catch Updated: 08/06/21 1822     Color, UA Yellow     Appearance, UA Turbid     pH, UA 6.0     Specific Gravity, UA 1.013     Glucose, UA Negative     Ketones, UA 15 mg/dL (1+)     Bilirubin, UA Negative     Blood, UA Small (1+)     Protein, UA Trace     Leuk Esterase, UA Large (3+)     Nitrite, UA Positive     Urobilinogen, UA 0.2 E.U./dL    Urine Drug Screen - Urine, Clean Catch [[id number]]  (Abnormal) Collected: 08/06/21 1752    Specimen: Urine, Clean Catch Updated: 08/06/21 1829     Amphet/Methamphet, Screen Negative     Barbiturates Screen, Urine Negative     Benzodiazepine Screen, Urine Negative     Cocaine Screen, Urine Negative     Opiate Screen Negative     THC, Screen, Urine Positive     Methadone Screen, Urine Negative     Oxycodone Screen, Urine Negative    Narrative:      Negative Thresholds Per Drugs Screened:    Amphetamines                 500 ng/ml  Barbiturates                 200 ng/ml  Benzodiazepines              100 ng/ml  Cocaine                      300 ng/ml  Methadone                    300 ng/ml  Opiates                      300 ng/ml  Oxycodone                    100 ng/ml  THC                           50 ng/ml    The Normal Value for all drugs tested is negative. This report includes final unconfirmed screening results to be used for medical treatment purposes only. Unconfirmed results must not be used for non-medical purposes such as employment or legal testing. Clinical consideration should be applied to any drug of abuse test, particularly when unconfirmed results are used.            Urinalysis, Microscopic Only - Urine, Clean Catch [[id number]]  (Abnormal) Collected: 08/06/21 1752    Specimen: Urine, Clean Catch Updated: 08/06/21 1822     RBC, UA 0-2 /HPF      WBC, UA Too Numerous to Count /HPF      Bacteria, UA 4+ /HPF      Squamous Epithelial Cells, UA 0-2 /HPF       Hyaline Casts, UA 3-6 /LPF      Methodology Automated Microscopy    Urine Culture - Urine, Urine, Clean Catch [[id number]] Collected: 08/06/21 1752    Specimen: Urine, Clean Catch Updated: 08/06/21 1830    Ethanol [[id number]] Collected: 08/06/21 1820    Specimen: Blood Updated: 08/06/21 1925     Ethanol <10 mg/dL      Ethanol % <0.010 %     Magnesium [[id number]]  (Normal) Collected: 08/06/21 1820    Specimen: Blood Updated: 08/06/21 1916     Magnesium 2.4 mg/dL     Lactic Acid, Plasma [[id number]]  (Abnormal) Collected: 08/06/21 1836    Specimen: Blood Updated: 08/06/21 1914     Lactate 2.4 mmol/L     Blood Culture - Blood, Arm, Right [[id number]] Collected: 08/06/21 1836    Specimen: Blood from Arm, Right Updated: 08/06/21 1852    Blood Culture - Blood, Arm, Left [[id number]] Collected: 08/06/21 1843    Specimen: Blood from Arm, Left Updated: 08/06/21 1853    Respiratory Panel PCR w/COVID-19(SARS-CoV-2) [facility] In-House, NP Swab in UTM/VTM, 3-4 HR TAT - Swab, Nasopharynx [[id number]]  (Normal) Collected: 08/06/21 1844    Specimen: Swab from Nasopharynx Updated: 08/06/21 2030     ADENOVIRUS, PCR Not Detected     Coronavirus 229E Not Detected     Coronavirus HKU1 Not Detected     Coronavirus NL63 Not Detected     Coronavirus OC43 Not Detected     COVID19 Not Detected     Human Metapneumovirus Not Detected     Human Rhinovirus/Enterovirus Not Detected     Influenza A PCR Not Detected     Influenza B PCR Not Detected     Parainfluenza Virus 1 Not Detected     Parainfluenza Virus 2 Not Detected     Parainfluenza Virus 3 Not Detected     Parainfluenza Virus 4 Not Detected     RSV, PCR Not Detected     Bordetella pertussis pcr Not Detected     Bordetella parapertussis PCR Not Detected     Chlamydophila pneumoniae PCR Not Detected     Mycoplasma pneumo by PCR Not Detected    Narrative:      In the setting of a positive respiratory panel with a viral infection PLUS a negative procalcitonin without other  underlying concern for bacterial infection, consider observing off antibiotics or discontinuation of antibiotics and continue supportive care. If the respiratory panel is positive for atypical bacterial infection (Bordetella pertussis, Chlamydophila pneumoniae, or Mycoplasma pneumoniae), consider antibiotic de-escalation to target atypical bacterial infection.          Imaging Results (Last 24 Hours)     Procedure Component Value Units Date/Time    XR Chest 1 View [[id number]] Collected: 08/06/21 1501     Updated: 08/06/21 1546    Narrative:      ONE VIEW PORTABLE CHEST     HISTORY: Weakness and dizziness. Hypertension.     FINDINGS: The lungs are well-expanded and clear and the heart and hilar  structures are normal. There is no acute disease or change from  09/22/2018.     This report was finalized on 8/6/2021 3:43 PM by Dr. Tyrel Burr M.D.             Results for orders placed during the hospital encounter of 09/22/18    Adult Transthoracic Echo Complete W/ Cont if Necessary Per Protocol    Interpretation Summary  · Left ventricular systolic function is normal.  · Calculated EF = 62%.  · Normal echo      ECG 12 Lead   Final Result   HEART RATE= 63  bpm   RR Interval= 948  ms   OH Interval= 169  ms   P Horizontal Axis= 20  deg   P Front Axis= 19  deg   QRSD Interval= 94  ms   QT Interval= 461  ms   QRS Axis= 13  deg   T Wave Axis= 18  deg   - NORMAL ECG -   Sinus rhythm   NON-SPECIFIC ST-T WAVE CHANGES new vs previous   POOR R WAVE PROGRESSION   Electronically Signed By: Kemal Esparza ([facility]) 06-Aug-2021 19:17:48   Date and Time of Study: 2021-08-06 16:57:43           Assessment/Plan     Active Hospital Problems    Diagnosis  POA   • **FANNY (acute kidney injury) (CMS/HCC) [N17.9]  Yes   • Acute UTI (urinary tract infection) [N39.0]  Unknown   • Obesity (BMI 30.0-34.9) [E66.9]  Yes   • Anxiety disorder due to general medical condition with panic attack [F06.4, F41.0]  Yes   • Vitamin D deficiency disease  [E55.9]  Yes   • Essential hypertension [I10]  Yes     Mrs. Sherwood is a 60-year-old female with history of hypertension, anxiety, vitamin D deficiency, obesity who presents to the emergency room with generalized weakness and fatigue.    Acute kidney injury/UTI  -IV fluids, normal saline at 100 cc an hour overnight  -Recheck lactic acid, fluid bolus given the emergency room  -Urine culture and blood cultures are pending  -Rocephin started in the emergency room, will continue that for now until culture results  -Hold losartan/HCTZ for now  -Recheck BMP, CBC in a.m.  -Check CT abdomen and pelvis  -Avoid any nephrotoxic medications    Hypertension/anxiety disorder/vitamin D deficiency  -Continue home medications  -Blood pressure stable at this time, continue to monitor, holding losartan HCTZ due to acute kidney injury, continue Bystolic and amlodipine.      · I discussed the patient's findings and my recommendations with patient and ED provider.    VTE Prophylaxis - SCDs.  Code Status - Full code.       OLVIN Delgado  [facility]  08/06/21  23:20 EDT      Electronically signed by Donavon Nelson MD at 08/07/21 0921          Emergency Department Notes      Luna Marin, RN at 08/06/21 1255        Pt arrived via PV with c/o for N&V and generalized weakness that started eight days ago. Pt denies SOB, abdominal pain, fever.    Pt placed in mask, this RN in mask.      Luna Marin, RN  08/06/21 1258      Electronically signed by Luna Marin RN at 08/06/21 1258     Mirna Mayfield, RN at 08/06/21 1632        Pt came to ED from home with c/o generalized weakness, loss of appetite (food and drink). Pt said weakness started around 2 weeks ago, when pt started feeling nauseous at work, then she came home and vomited for the next couple of days. Pt said she gets very dizzy when she stands up, and a couple days after symptoms began she fainted and hit her head on the bedside table. Pt  "is AAO x4 but shows visible signs of distress including crying and mild shaking. 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      Electronically signed by Mirna Mayfield RN at 08/06/21 1640     Martínez Vázquez MD at 08/06/21 3826           EMERGENCY DEPARTMENT ENCOUNTER    Room Number:  P696/1  Date of encounter:  8/6/2021  PCP: Provider, No Known  Historian: Patient      HPI:  Chief Complaint: Nausea vomiting  A complete HPI/ROS/PMH/PSH/SH/FH are unobtainable due to: Nothing    Context: Amanda Sherwood is a 60 y.o. female who presents to the ED c/o nausea vomiting now for several days duration, described as intractable and severe.  Patient is also had diarrhea which she said is watery and multiple times a day.  Patient says she feels very weak, very shaky, and has no energy.  Patient also says that everything she tries to eat \"tastes like cardboard\".    Patient says she has had no fever, has a chronic cough that is no different than usual, no shortness of breath, and is fully vaccinated for COVID-19.  States that there is no blood or mucus in the stools, and that \"I do not hurt anywhere\".    Patient has not taken anything for it, and states very clearly \"I should have come in here a couple weeks ago\".      PAST MEDICAL HISTORY  Active Ambulatory Problems     Diagnosis Date Noted   • Neck pain 03/18/2016   • Temporomandibular joint (TMJ) pain 03/18/2016   • Chronic cough 09/08/2016   • Anxiety disorder due to general medical condition with panic attack 09/08/2016   • Bone pain 09/08/2016   • Vitamin D deficiency disease 09/08/2016   • Health care maintenance 09/08/2016   • Essential hypertension 09/08/2016   • Thinning hair 09/08/2016   • Osteoarthrosis, localized, primary, knee 08/21/2017   • Hyperlipidemia    • Acute medial meniscus tear of left knee 08/09/2018   • Lower extremity edema 06/06/2019   • Obesity (BMI 30.0-34.9) 06/06/2019   • Alcohol withdrawal syndrome with complication (CMS/HCC) " 08/13/2019   • FH: colon cancer 01/23/2020   • Heartburn 01/23/2020     Resolved Ambulatory Problems     Diagnosis Date Noted   • Headache 03/18/2016   • Pain of left thigh 09/08/2016   • Screening for osteoporosis 09/08/2016   • PVC (premature ventricular contraction)    • S/P left knee arthroscopy 09/04/2018   • Chest pain 09/22/2018     Past Medical History:   Diagnosis Date   • Depression    • GERD (gastroesophageal reflux disease)    • History of bronchitis    • History of migraine    • Hypertension    • Leukopenia    • Osteoarthritis    • Seasonal allergies    • Torn meniscus          PAST SURGICAL HISTORY  Past Surgical History:   Procedure Laterality Date   • BREAST BIOPSY      BENIGN. HORACE BREAST   • BUNIONECTOMY Left    • BUNIONECTOMY Left     REVISION   • COLONOSCOPY N/A 2/1/2020    Procedure: COLONOSCOPY TO CECUM AND INTO TERMINAL ILEUM WITH COLD BIOPSY POLYPECTOMY;  Surgeon: Munira Montes MD;  Location: [facility] ENDOSCOPY;  Service: Gastroenterology   • ENDOSCOPY N/A 2/1/2020    Procedure: ESOPHAGOGASTRODUODENOSCOPY WITH COLD BIOPSIES;  Surgeon: Munira Montes MD;  Location: [facility] ENDOSCOPY;  Service: Gastroenterology   • KNEE ARTHROSCOPY Left 8/22/2018    Procedure: KNEE ARTHROSCOPY LATERAL MENISECTOMY DEBRIDEMENT OF MEDIAL FEMORAL CONDYLE DEFECT DEBRIDEMENT OF ARTHRITIS;  Surgeon: Graciela Crockett MD;  [place]  [facility] [place]  Service: Orthopedics   • SALPINGO OOPHORECTOMY Left    • TONSILLECTOMY     • TUBAL ABDOMINAL LIGATION           FAMILY HISTORY  Family History   Problem Relation Age of Onset   • Dementia Mother    • Arthritis Mother    • Stroke Father    • Heart disease Father    • Hypertension Father    • Diabetes Father    • Cancer Father    • Colon cancer Father    • Breast cancer Sister    • Atrial fibrillation Sister    • Ovarian cancer Maternal Aunt    • Malig Hyperthermia Neg Hx          SOCIAL HISTORY  Social History     Socioeconomic History   • Marital status:       Spouse name: Not on file   • Number of children: Not on file   • Years of education: Not on file   • Highest education level: Not on file   Tobacco Use   • Smoking status: Former Smoker   • Smokeless tobacco: Never Used   • Tobacco comment: caffeine - tea   Substance and Sexual Activity   • Alcohol use: Yes     Alcohol/week: 2.0 standard drinks     Types: 2 Cans of beer per week   • Drug use: No   • Sexual activity: Defer         ALLERGIES  Celecoxib, Ace inhibitors, and Lisinopril        REVIEW OF SYSTEMS  Review of Systems     All systems reviewed and negative except for those discussed in HPI.       PHYSICAL EXAM    I have reviewed the triage vital signs and nursing notes.    ED Triage Vitals   Temp Heart Rate Resp BP SpO2   08/06/21 1258 08/06/21 1258 08/06/21 1258 08/06/21 1258 08/06/21 1258   97.1 °F (36.2 °C) 88 20 103/66 96 %      Temp src Heart Rate Source Patient Position BP Location FiO2 (%)   08/06/21 1258 08/06/21 1630 08/06/21 1630 08/06/21 1630 --   Tympanic Monitor Sitting Left arm        Physical Exam  GENERAL: Awake and alert, very anxious, hyperventilating  HENT: nares patent, dry mucous membrane  EYES: no scleral icterus  CV: regular rhythm, regular rate  RESPIRATORY: normal effort  ABDOMEN: soft, nontender palpation, bowel sounds present  MUSCULOSKELETAL: no deformity  NEURO: alert, moves all extremities, follows commands  SKIN: warm, dry        LAB RESULTS  Recent Results (from the past 24 hour(s))   Comprehensive Metabolic Panel    Collection Time: 08/06/21  1:52 PM    Specimen: Blood   Result Value Ref Range    Glucose 99 65 - 99 mg/dL    BUN 52 (H) 6 - 20 mg/dL    Creatinine 2.61 (H) 0.57 - 1.00 mg/dL    Sodium 132 (L) 136 - 145 mmol/L    Potassium 3.5 3.5 - 5.2 mmol/L    Chloride 93 (L) 98 - 107 mmol/L    CO2 20.6 (L) 22.0 - 29.0 mmol/L    Calcium 10.1 8.6 - 10.5 mg/dL    Total Protein 7.3 6.0 - 8.5 g/dL    Albumin 4.00 3.50 - 5.20 g/dL    ALT (SGPT) 27 1 - 33 U/L    AST (SGOT) 19 1 - 32  U/L    Alkaline Phosphatase 74 39 - 117 U/L    Total Bilirubin 0.8 0.0 - 1.2 mg/dL    eGFR Non African Amer 19 (L) >60 mL/min/1.73    Globulin 3.3 gm/dL    A/G Ratio 1.2 g/dL    BUN/Creatinine Ratio 19.9 7.0 - 25.0    Anion Gap 18.4 (H) 5.0 - 15.0 mmol/L   Troponin    Collection Time: 08/06/21  1:52 PM    Specimen: Blood   Result Value Ref Range    Troponin T 0.015 0.000 - 0.030 ng/mL   Magnesium    Collection Time: 08/06/21  1:52 PM    Specimen: Blood   Result Value Ref Range    Magnesium 2.3 1.6 - 2.6 mg/dL   Green Top (Gel)    Collection Time: 08/06/21  1:52 PM   Result Value Ref Range    Extra Tube Hold for add-ons.    Lavender Top    Collection Time: 08/06/21  1:52 PM   Result Value Ref Range    Extra Tube hold for add-on    Gold Top - SST    Collection Time: 08/06/21  1:52 PM   Result Value Ref Range    Extra Tube Hold for add-ons.    CBC Auto Differential    Collection Time: 08/06/21  1:52 PM    Specimen: Blood   Result Value Ref Range    WBC 4.96 3.40 - 10.80 10*3/mm3    RBC 4.69 3.77 - 5.28 10*6/mm3    Hemoglobin 14.9 12.0 - 15.9 g/dL    Hematocrit 43.5 34.0 - 46.6 %    MCV 92.8 79.0 - 97.0 fL    MCH 31.8 26.6 - 33.0 pg    MCHC 34.3 31.5 - 35.7 g/dL    RDW 12.3 12.3 - 15.4 %    RDW-SD 41.7 37.0 - 54.0 fl    MPV 10.7 6.0 - 12.0 fL    Platelets 373 140 - 450 10*3/mm3    Neutrophil % 66.5 42.7 - 76.0 %    Lymphocyte % 22.6 19.6 - 45.3 %    Monocyte % 9.7 5.0 - 12.0 %    Eosinophil % 0.6 0.3 - 6.2 %    Basophil % 0.4 0.0 - 1.5 %    Immature Grans % 0.2 0.0 - 0.5 %    Neutrophils, Absolute 3.30 1.70 - 7.00 10*3/mm3    Lymphocytes, Absolute 1.12 0.70 - 3.10 10*3/mm3    Monocytes, Absolute 0.48 0.10 - 0.90 10*3/mm3    Eosinophils, Absolute 0.03 0.00 - 0.40 10*3/mm3    Basophils, Absolute 0.02 0.00 - 0.20 10*3/mm3    Immature Grans, Absolute 0.01 0.00 - 0.05 10*3/mm3    nRBC 0.0 0.0 - 0.2 /100 WBC   ECG 12 Lead    Collection Time: 08/06/21  4:57 PM   Result Value Ref Range    QT Interval 461 ms   Urinalysis With  Microscopic If Indicated (No Culture) - Urine, Clean Catch    Collection Time: 08/06/21  5:52 PM    Specimen: Urine, Clean Catch   Result Value Ref Range    Color, UA Yellow Yellow, Straw    Appearance, UA Turbid (A) Clear    pH, UA 6.0 5.0 - 8.0    Specific Gravity, UA 1.013 1.005 - 1.030    Glucose, UA Negative Negative    Ketones, UA 15 mg/dL (1+) (A) Negative    Bilirubin, UA Negative Negative    Blood, UA Small (1+) (A) Negative    Protein, UA Trace (A) Negative    Leuk Esterase, UA Large (3+) (A) Negative    Nitrite, UA Positive (A) Negative    Urobilinogen, UA 0.2 E.U./dL 0.2 - 1.0 E.U./dL   Urine Drug Screen - Urine, Clean Catch    Collection Time: 08/06/21  5:52 PM    Specimen: Urine, Clean Catch   Result Value Ref Range    Amphet/Methamphet, Screen Negative Negative    Barbiturates Screen, Urine Negative Negative    Benzodiazepine Screen, Urine Negative Negative    Cocaine Screen, Urine Negative Negative    Opiate Screen Negative Negative    THC, Screen, Urine Positive (A) Negative    Methadone Screen, Urine Negative Negative    Oxycodone Screen, Urine Negative Negative   Urinalysis, Microscopic Only - Urine, Clean Catch    Collection Time: 08/06/21  5:52 PM    Specimen: Urine, Clean Catch   Result Value Ref Range    RBC, UA 0-2 None Seen, 0-2 /HPF    WBC, UA Too Numerous to Count (A) None Seen, 0-2 /HPF    Bacteria, UA 4+ (A) None Seen /HPF    Squamous Epithelial Cells, UA 0-2 None Seen, 0-2 /HPF    Hyaline Casts, UA 3-6 None Seen /LPF    Methodology Automated Microscopy    Ethanol    Collection Time: 08/06/21  6:20 PM    Specimen: Blood   Result Value Ref Range    Ethanol <10 0 - 10 mg/dL    Ethanol % <0.010 %   Magnesium    Collection Time: 08/06/21  6:20 PM    Specimen: Blood   Result Value Ref Range    Magnesium 2.4 1.6 - 2.6 mg/dL   Lactic Acid, Plasma    Collection Time: 08/06/21  6:36 PM    Specimen: Blood   Result Value Ref Range    Lactate 2.4 (C) 0.5 - 2.0 mmol/L   Respiratory Panel PCR  w/COVID-19(SARS-CoV-2) [facility] In-House, NP Swab in UTM/VTM, 3-4 HR TAT - Swab, Nasopharynx    Collection Time: 08/06/21  6:44 PM    Specimen: Nasopharynx; Swab   Result Value Ref Range    ADENOVIRUS, PCR Not Detected Not Detected    Coronavirus 229E Not Detected Not Detected    Coronavirus HKU1 Not Detected Not Detected    Coronavirus NL63 Not Detected Not Detected    Coronavirus OC43 Not Detected Not Detected    COVID19 Not Detected Not Detected - Ref. Range    Human Metapneumovirus Not Detected Not Detected    Human Rhinovirus/Enterovirus Not Detected Not Detected    Influenza A PCR Not Detected Not Detected    Influenza B PCR Not Detected Not Detected    Parainfluenza Virus 1 Not Detected Not Detected    Parainfluenza Virus 2 Not Detected Not Detected    Parainfluenza Virus 3 Not Detected Not Detected    Parainfluenza Virus 4 Not Detected Not Detected    RSV, PCR Not Detected Not Detected    Bordetella pertussis pcr Not Detected Not Detected    Bordetella parapertussis PCR Not Detected Not Detected    Chlamydophila pneumoniae PCR Not Detected Not Detected    Mycoplasma pneumo by PCR Not Detected Not Detected       Ordered the above labs and independently reviewed the results.        RADIOLOGY  XR Chest 1 View    Result Date: 8/6/2021  ONE VIEW PORTABLE CHEST  HISTORY: Weakness and dizziness. Hypertension.  FINDINGS: The lungs are well-expanded and clear and the heart and hilar structures are normal. There is no acute disease or change from 09/22/2018.  This report was finalized on 8/6/2021 3:43 PM by Dr. Tyrel Burr M.D.        I ordered the above noted radiological studies. Reviewed by me and discussed with radiologist.  See dictation for official radiology interpretation.      PROCEDURES    Procedures      MEDICATIONS GIVEN IN ER    Medications   sodium chloride 0.9 % flush 10 mL (has no administration in time range)   sodium chloride 0.9 % flush 10 mL (10 mL Intravenous Given 8/6/21  2257)   sodium chloride 0.9 % flush 10 mL (has no administration in time range)   acetaminophen (TYLENOL) tablet 650 mg (has no administration in time range)     Or   acetaminophen (TYLENOL) 160 MG/5ML solution 650 mg (has no administration in time range)     Or   acetaminophen (TYLENOL) suppository 650 mg (has no administration in time range)   sodium chloride 0.9 % infusion (100 mL/hr Intravenous New Bag 8/6/21 2258)   ondansetron (ZOFRAN) injection 4 mg (has no administration in time range)   cefTRIAXone (ROCEPHIN) IVPB 1 g (has no administration in time range)   amLODIPine (NORVASC) tablet 10 mg (has no administration in time range)   buPROPion XL (WELLBUTRIN XL) 24 hr tablet 300 mg (has no administration in time range)   nebivolol (BYSTOLIC) tablet 20 mg (has no administration in time range)   calcium carbonate (TUMS) chewable tablet 500 mg (200 mg elemental) (has no administration in time range)   escitalopram (LEXAPRO) tablet 20 mg (has no administration in time range)   pantoprazole (PROTONIX) EC tablet 40 mg (has no administration in time range)   traZODone (DESYREL) tablet 50 mg (has no administration in time range)   sodium chloride 0.9 % bolus 1,000 mL (0 mL Intravenous Stopped 8/6/21 1907)   cefTRIAXone (ROCEPHIN) IVPB 1 g (0 g Intravenous Stopped 8/6/21 1936)         PROGRESS, DATA ANALYSIS, CONSULTS, AND MEDICAL DECISION MAKING    All labs have been independently reviewed by me.  All radiology studies have been reviewed by me and discussed with radiologist dictating the report.   EKG's independently viewed and interpreted by me.  Discussion below represents my analysis of pertinent findings related to patient's condition, differential diagnosis, treatment plan and final disposition.        ED Course as of Aug 06 2321   Fri Aug 06, 2021   2319 CBC is unremarkable, chemistry shows a significant FANNY with creatinine of 2.6 and a baseline of 1    [DP]   2320 Urinalysis shows 4+ bacteria and too numerous to  count whites    [DP]   2320 Urine drug screen interestingly positive for THC    [DP]   2320 Patient received IV Zofran, Tylenol, IV fluids and IV Rocephin    [DP]   2320 She was still quite anxious and hyperventilating, and I asked her if she needed to talk about anything and she declined    [DP]   2320 I spoke with nurse practitioner from [facility] who agrees to meet the patient on behalf of Dr. Calderon.  I think she safe for a MedSur bed and should improve with IV fluids and antibiotics    [DP]      ED Course User Index  [DP] Martínez Vázquez MD           PPE: Both the patient and I wore a surgical mask throughout the entire patient encounter. I wore protective goggles.     AS OF :21 EDT VITALS:    BP - 126/72  HR - 67  TEMP - 97.6 °F (36.4 °C) (Oral)  O2 SATS - 98%        DIAGNOSIS  Final diagnoses:   FANNY (acute kidney injury) (CMS/[facility])   Acute UTI   Cyclical vomiting         DISPOSITION  Admit           Martínez Vázquez MD  21 2321      Electronically signed by Martínez Vázquez MD at 21 2321          Physician Progress Notes (last 72 hours) (Notes from 21 1255 through 21 1255)      Donavon Nelson MD at 21 0815              DAILY PROGRESS NOTE  [facility]    Patient Identification:  Name: Amanda Sherwood  Age: 60 y.o.  Sex: female  :  1960  MRN: [id number]         Primary Care Physician: Provider, No Known    Subjective:  Interval History: Overall feeling better.  Still some nausea but no further emesis or diarrhea since admission.  No complaints of abdominal pain.  Urine output is improved and is not tea or dark in color.  No confusion chest pain or troubles breathing    Objective: Pleasant conversational and nontoxic.  No family at bedside.  Case discussed with RN    Scheduled Meds:buPROPion XL, 300 mg, Oral, QAM  cefTRIAXone, 1 g, Intravenous, Q24H  escitalopram, 20 mg, Oral, Daily  nebivolol, 20 mg, Oral, Daily  pantoprazole, 40 mg, Oral, QAM  sodium  "chloride, 10 mL, Intravenous, Q12H  traZODone, 50 mg, Oral, Nightly      Continuous Infusions:sodium chloride, 100 mL/hr, Last Rate: 100 mL/hr (08/08/21 0429)        Vital signs in last 24 hours:  Temp:  [94.8 °F (34.9 °C)-97.9 °F (36.6 °C)] 97.3 °F (36.3 °C)  Heart Rate:  [65-74] 67  Resp:  [18] 18  BP: (102-143)/(66-90) 143/84    Intake/Output:    Intake/Output Summary (Last 24 hours) at 8/8/2021 0815  Last data filed at 8/8/2021 0813  Gross per 24 hour   Intake 3383 ml   Output 3750 ml   Net -367 ml       Exam:  /84 (BP Location: Right arm, Patient Position: Lying)   Pulse 67   Temp 97.3 °F (36.3 °C) (Oral)   Resp 18   Ht 165.1 cm (65\")   Wt 86.3 kg (190 lb 4.8 oz)   LMP 06/20/2016 (Approximate) Comment: 52  SpO2 97%   BMI 31.67 kg/m²     General Appearance:    Alert, cooperative, no distress, AAOx3                          Head:    Normocephalic, without obvious abnormality, atraumatic                           Eyes:    Conjunctivae/corneas clear, EOM's intact, both eyes                         Throat:   Oral mucosa pink and moist                           Neck:   No JVD                         Lungs:    Clear to auscultation bilaterally, respirations unlabored                 Chest Wall:    No tenderness or deformity                          Heart:    Regular rate and rhythm, S1 and S2 normal, no murmur, no rub or gallop                  Abdomen:     Soft, nontender, bowel sounds active                 Extremities:   Extremities normal, atraumatic, no cyanosis or edema                        Pulses:   Pulses palpable in lower extremities                            Skin:   Skin is warm and dry                  Neurologic:   CNII-XII intact, no focal deficits noted     Data Review:  Labs in chart were reviewed.    Assessment:  Active Hospital Problems    Diagnosis  POA   • **FANNY (acute kidney injury) (CMS/HCC) [N17.9]  Yes   • Acute UTI (urinary tract infection) [N39.0]  Unknown   • Obesity (BMI " 30.0-34.9) [E66.9]  Yes   • Anxiety disorder due to general medical condition with panic attack [F06.4, F41.0]  Yes   • Vitamin D deficiency disease [E55.9]  Yes   • Essential hypertension [I10]  Yes      Resolved Hospital Problems   No resolved problems to display.       Plan:    ARF resolved with IVF and further holding of ARB/HCTZ   -Creatinine trending down to 1.53   -Urine output adequate with no reports of molasses or tea colored urine   -Continue to replace K    UTI on Rocephin   -Culture pending though blood cultures NGTD   -Third dose of Rocephin today and then treated    HTN stable off meds        CT ordered on admission and still pending -anticipate negative result given clinical improvement    Okay to give third dose of Rocephin early today    Case discussed with RN with plans to discharge likely this afternoon we will continue IV fluids until that point      Addendum -notified by RN that CT was complete.  I reviewed it and ultimately it is unremarkable for any acute disease as anticipated.  I called directly to the patient's room to discuss how she was feeling.  She stills feels a bit weak overall.  Patient remained on IV fluids overnight with repeat labs in a.m. and will hold discharge this afternoon.  Urine culture demonstrated a E. coli strain that is resistant to penicillin and tetracyclines.  Patient will complete treatment today after third dose of Rocephin.  HTN stable on current regimen with removal of ARB/HCTZ with a current level of 143/84    Donavon Nelson MD  8/8/2021  08:15 EDT      Electronically signed by Donavon Nelson MD at 08/08/21 1147     Donavon Nelson MD at 08/07/21 0819        Chart reviewed on 8/7/2021 and orders placed and reviewed to ensure correctness    Case was discussed with RN on this day    I personally failed to see the patient at bedside yesterday    Discussed the above with patient and RN was apologetic to both      Electronically signed by Leslie  Donavon Stafford MD at 08/08/21 0820       Consult Notes (last 72 hours) (Notes from 08/06/21 1255 through 08/09/21 1255)    No notes of this type exist for this encounter.            Discharge Summary      Donavon Nelson MD at 08/09/21 0813                              [facility]    Date of Discharge:  8/9/2021    PCP: Provider, No Known    Discharge Diagnosis:   Active Hospital Problems    Diagnosis  POA   • **FANNY (acute kidney injury) (CMS/HCC) [N17.9]  Yes   • Acute UTI (urinary tract infection) [N39.0]  Unknown   • Obesity (BMI 30.0-34.9) [E66.9]  Yes   • Anxiety disorder due to general medical condition with panic attack [F06.4, F41.0]  Yes   • Vitamin D deficiency disease [E55.9]  Yes   • Essential hypertension [I10]  Yes      Resolved Hospital Problems   No resolved problems to display.          Consults     Date and Time Order Name Status Description    8/6/2021  9:36 PM [facility] (on-call MD unless specified) Details Completed     8/6/2021  9:00 PM [facility] (on-call MD unless specified) Details Completed         Hospital Course  Please see history and physical for details. 60 y.o. female who was initially minute by myself due to issues of acute renal failure as well as urinary tract infection.  Her UTI was treated with IV Rocephin of which she received 3 doses for a strain of E. coli that was resistant to penicillin and tetracycline.  She was found to be in acute renal failure made worse by the UTI as well as use of ARB/HCTZ.  The insulting agents have been removed and patient's blood pressures are stable on Bystolic and amlodipine.  At the time of discharge she is 141/78 with a pulse of 71.  Her electrolyte imbalances have been replaced and her potassium is 3.7 by discharge.  Her creatinine is trended down to 1.29 from 2.61 at admission.  Her acidosis was skewed by acute renal failure but not secondary to sepsis and blood cultures have remained negative.  A CT of the abdomen  pelvis did not demonstrate any acute disease and showed no aspects of kidney or urological obstruction.  By discharge she feels tremendously better.  She is alert and oriented x3 and I feel she is at baseline.  She is more than medically stable to be discharged home.  I have counseled her to follow-up closely with her primary care physician in the next week or so and to monitor her blood pressure readings at home and provide that to PCP.  She should not restart on her ARB/HCTZ until there is complete resolution of kidney function and creatinine levels have returned to complete baseline.  I will defer further management of this to PCP post discharge.  All questions answered the patient to the best my ability and she is thankful for the care she received while here as well as amenable to the above plan for discharge to home.  No additional discharge needs noted       Condition on Discharge: Improved.     Temp:  [97.4 °F (36.3 °C)-97.8 °F (36.6 °C)] 97.7 °F (36.5 °C)  Heart Rate:  [64-92] 71  Resp:  [16-18] 16  BP: (114-141)/(74-85) 141/78  Body mass index is 31.67 kg/m².    Physical Exam  HENT:      Head: Normocephalic.      Nose: Nose normal.      Mouth/Throat:      Mouth: Mucous membranes are moist.      Pharynx: Oropharynx is clear.   Eyes:      General: No scleral icterus.  Cardiovascular:      Rate and Rhythm: Normal rate and regular rhythm.   Pulmonary:      Effort: Pulmonary effort is normal. No respiratory distress.      Breath sounds: Normal breath sounds.   Abdominal:      General: Bowel sounds are normal. There is no distension.      Palpations: Abdomen is soft.      Tenderness: There is no abdominal tenderness.   Musculoskeletal:         General: No swelling.   Skin:     General: Skin is warm and dry.      Coloration: Skin is not jaundiced.   Neurological:      General: No focal deficit present.      Mental Status: She is alert and oriented to person, place, and time. Mental status is at baseline.      [unfilled]    Disposition: Home or Self Care       Discharge Medications      Changes to Medications      Instructions Start Date   amLODIPine 5 MG tablet  Commonly known as: NORVASC  What changed: how much to take   5 mg, Oral, Once As Needed         Continue These Medications      Instructions Start Date   buPROPion  MG 24 hr tablet  Commonly known as: WELLBUTRIN XL   300 mg, Oral, Every Morning      Bystolic 20 MG tablet  Generic drug: nebivolol   TAKE ONE TABLET BY MOUTH DAILY      calcium carbonate 500 MG chewable tablet  Commonly known as: TUMS   1 tablet, Oral, As Needed      cetirizine 10 MG tablet  Commonly known as: zyrTEC   10 mg, Oral, As Needed      escitalopram 20 MG tablet  Commonly known as: LEXAPRO   20 mg, Oral, Daily      esomeprazole 20 MG capsule  Commonly known as: nexIUM   20 mg, Oral, Daily      ondansetron 8 MG tablet  Commonly known as: Zofran   8 mg, Oral, Every 8 Hours PRN      traZODone 50 MG tablet  Commonly known as: DESYREL   50 mg, Oral, Nightly      vitamin D3 125 MCG (5000 UT) capsule capsule   5,000 Units, Oral, Daily         Stop These Medications    diphenhydrAMINE 25 mg capsule  Commonly known as: BENADRYL     losartan-hydrochlorothiazide 100-25 MG per tablet  Commonly known as: HYZAAR     Multivitamins chewable tablet             Additional Instructions for the Follow-ups that You Need to Schedule     Discharge Follow-up with PCP   As directed       Currently Documented PCP:    Provider, No Known    PCP Phone Number:    [phone]     Follow Up Details: PCP 1 to 2 weeks           Follow-up Information     Provider, No Known .    Why: PCP 1 to 2 weeks  Contact information:  [facility] [address]  [phone]                  Pending Labs     Order Current Status    Blood Culture - Blood, Arm, Left Preliminary result    Blood Culture - Blood, Arm, Right Preliminary result         Donavon Nelson MD  08/09/21  08:13 EDT    Discharge time spent  greater than 30 minutes.    Electronically signed by Donavon Nelson MD at 08/09/21 0817     All medication doses during the admission are shown, including meds that are no longer on order.  Scheduled Meds Sorted by Name  for Amanda Sherwood as of 8/7/21 through 8/9/21    1 Day 3 Days 7 Days 10 Days < Today >    Legend:                          Inactive     Active     Other Encounter     Linked                 Medications 08/07/21 08/08/21 08/09/21   buPROPion XL (WELLBUTRIN XL) 24 hr tablet 300 mg   Dose: 300 mg  Freq: Every Morning Route: PO  Start: 08/07/21 0700    Admin Instructions:   Caution: Look alike/sound alike drug alert. Swallow whole. Do not crush, chew, or split tablet.    0611 0617 0600            cefTRIAXone (ROCEPHIN) IVPB 1 g   Dose: 1 g  Freq: Every 24 Hours Route: IV  Indications of Use: URINARY TRACT INFECTION  Last Dose: 1 g (08/08/21 1821)  Start: 08/07/21 1830 End: 08/09/21 0752    Admin Instructions:   Caution: Look alike/sound alike drug alert. Refrigerate    1806 1821          0752-D/C'd          cefTRIAXone (ROCEPHIN) IVPB 1 g   Dose: 1 g  Freq: Once Route: IV  Indications of Use: URINARY TRACT INFECTION  Last Dose: Stopped (08/06/21 1936)  Start: 08/06/21 1827 End: 08/06/21 1936    Admin Instructions:   Caution: Look alike/sound alike drug alert. Refrigerate         escitalopram (LEXAPRO) tablet 20 mg   Dose: 20 mg  Freq: Daily Route: PO  Start: 08/07/21 0900    Admin Instructions:   Caution: Look alike/sound alike drug alert.    0820 0806 0944            nebivolol (BYSTOLIC) tablet 20 mg   Dose: 20 mg  Freq: Daily Route: PO  Start: 08/07/21 0900 0819 0806 0944            pantoprazole (PROTONIX) EC tablet 40 mg   Dose: 40 mg  Freq: Every Morning Route: PO  Start: 08/07/21 0700    Admin Instructions:   Swallow whole; do not crush, split, or chew.    0612 0617 0600            potassium chloride  (K-DUR,KLOR-CON) ER tablet 40 mEq   Dose: 40 mEq  Freq: Once Route: PO  Start: 08/08/21 1230 End: 08/08/21 1200    Admin Instructions:   Swallow whole; do not crush, split, or chew.     1200             potassium chloride (K-DUR,KLOR-CON) ER tablet 40 mEq   Dose: 40 mEq  Freq: Once Route: PO  Start: 08/07/21 1215 End: 08/07/21 1219    Admin Instructions:   Swallow whole; do not crush, split, or chew.    1219              sodium chloride 0.9 % bolus 1,000 mL   Dose: 1,000 mL  Freq: Once Route: IV  Last Dose: Stopped (08/06/21 1907)  Start: 08/06/21 1802 End: 08/06/21 1907         sodium chloride 0.9 % flush 10 mL   Dose: 10 mL  Freq: Every 12 Hours Scheduled Route: IV  Start: 08/06/21 2236     (2007) [C]         (2000) [C]        (2188)   2100          traZODone (DESYREL) tablet 50 mg   Dose: 50 mg  Freq: Nightly Route: PO  Start: 08/07/21 0000    Admin Instructions:   Take with food.  Caution: Look alike/sound alike drug alert    2007 2000          2100            Medications 08/07/21 08/08/21 08/09/21       Continuous Meds Sorted by Name  for Amanda Shewrood as of 8/7/21 through 8/9/21   Legend:                          Inactive     Active     Other Encounter     Linked                 Medications 08/07/21 08/08/21 08/09/21   sodium chloride 0.9 % infusion   Rate: 100 mL/hr Dose: 100 mL/hr  Freq: Continuous Route: IV  Last Dose: 100 mL/hr (08/09/21 0125)  Start: 08/06/21 2236    0821   0822   1805     1806          0427   0429   1444     1446          0124   0125              PRN Meds Sorted by Name  for Amanda Sherwood as of 8/7/21 through 8/9/21   Legend:                          Inactive     Active     Other Encounter     Linked                 Medications 08/07/21 08/08/21 08/09/21   acetaminophen (TYLENOL) tablet 650 mg   Dose: 650 mg  Freq: Every 4 Hours PRN Route: PO  PRN Reason: Mild Pain   Start: 08/06/21 2233    Admin Instructions:   Do not exceed 4 grams of acetaminophen in a 24 hr  period.    If given for pain, use the following pain scale:   Mild Pain = Pain Score of 1-3, CPOT 1-2  Moderate Pain = Pain Score of 4-6, CPOT 3-4  Severe Pain = Pain Score of 7-10, CPOT 5-8  Do not exceed 4 grams of acetaminophen in a 24 hr period. Max dose of 2gm for AST/ALT greater than 120 units/L      If given for pain, use the following pain scale:   Mild Pain = Pain Score of 1-3, CPOT 1-2  Moderate Pain = Pain Score of 4-6, CPOT 3-4  Severe Pain = Pain Score of 7-10, CPOT 5-8         Or  acetaminophen (TYLENOL) 160 MG/5ML solution 650 mg   Dose: 650 mg  Freq: Every 4 Hours PRN Route: PO  PRN Reason: Mild Pain   Start: 08/06/21 2233    Admin Instructions:   Do not exceed 4 grams of acetaminophen in a 24 hr period.    If given for pain, use the following pain scale:   Mild Pain = Pain Score of 1-3, CPOT 1-2  Moderate Pain = Pain Score of 4-6, CPOT 3-4  Severe Pain = Pain Score of 7-10, CPOT 5-8  Do not exceed 4 grams of acetaminophen in a 24 hr period. Max dose of 2gm for AST/ALT greater than 120 units/L      If given for pain, use the following pain scale:   Mild Pain = Pain Score of 1-3, CPOT 1-2  Moderate Pain = Pain Score of 4-6, CPOT 3-4  Severe Pain = Pain Score of 7-10, CPOT 5-8         Or  acetaminophen (TYLENOL) suppository 650 mg   Dose: 650 mg  Freq: Every 4 Hours PRN Route: RE  PRN Reason: Mild Pain   Start: 08/06/21 2233    Admin Instructions:   Do not exceed 4 grams of acetaminophen in a 24 hr period. Max dose of 2gm for AST/ALT greater than 120 units/L      If given for pain, use the following pain scale:   Mild Pain = Pain Score of 1-3, CPOT 1-2  Moderate Pain = Pain Score of 4-6, CPOT 3-4  Severe Pain = Pain Score of 7-10, CPOT 5-8         amLODIPine (NORVASC) tablet 10 mg   Dose: 10 mg  Freq: Once As Needed Route: PO  PRN Comment: for elevated blood pressure  Start: 08/06/21 2667    Admin Instructions:   Caution: Look alike/sound alike drug alert. Avoid grapefruit juice.         calcium  carbonate (TUMS) chewable tablet 500 mg (200 mg elemental)   Dose: 1 tablet  Freq: 2 Times Daily PRN Route: PO  PRN Reason: Indigestion  Start: 08/06/21 2307    Admin Instructions:   One tablet contains 200 mg elemental calcium.  Take with food.         nitroglycerin (NITROSTAT) SL tablet 0.4 mg   Dose: 0.4 mg  Freq: Every 5 Minutes PRN Route: SL  PRN Reason: Chest Pain  PRN Comment: Only if SBP Greater Than 100  Start: 08/06/21 2233 End: 08/06/21 2235    Admin Instructions:   If Pain Unrelieved After 3 Doses Notify MD         ondansetron (ZOFRAN) injection 4 mg   Dose: 4 mg  Freq: Every 6 Hours PRN Route: IV  PRN Reasons: Nausea,Vomiting  Start: 08/06/21 2234    Admin Instructions:   If BOTH ondansetron (ZOFRAN) and promethazine (PHENERGAN) are ordered use ondansetron first and THEN promethazine IF ondansetron is ineffective.     1200             sodium chloride 0.9 % flush 10 mL   Dose: 10 mL  Freq: As Needed Route: IV  PRN Reason: Line Care  Start: 08/06/21 2233         sodium chloride 0.9 % flush 10 mL   Dose: 10 mL  Freq: As Needed Route: IV  PRN Reason: Line Care  Start: 08/06/21 1301         Medications 08/07/21 08/08/21 08/09/21

## 2021-08-09 NOTE — OUTREACH NOTE
Prep Survey      Responses   [facility] [place] patient discharged from?  [facility]   Is LACE score < 7 ?  Yes   Emergency Room discharge w/ pulse ox?  No   Eligibility  [facility]   Date of Admission  08/06/21   Date of Discharge  08/09/21   Discharge Disposition  Home or Self Care   Discharge diagnosis  FANNY, acute UTI, anxiety disorder,  HTN, vit D deficiency   Does the patient have one of the following disease processes/diagnoses(primary or secondary)?  Other   Does the patient have Home health ordered?  No   Is there a DME ordered?  No   Prep survey completed?  Yes          Vesna Davis RN

## 2021-08-09 NOTE — PROGRESS NOTES
Discharge Planning Assessment  [facility]     Patient Name: Amanda Sherwood  MRN: [id number]  Today's Date: 8/9/2021    Admit Date: 8/6/2021    Discharge Needs Assessment    No documentation.       Discharge Plan     Row Name 08/09/21 1839       Plan    Final Discharge Disposition Code  01 - home or self-care    Final Note  Discharged home, no discharge needs identified.  Bonnie Welch RN        Continued Care and Services - Discharged on 8/9/2021 Admission date: 8/6/2021 - Discharge disposition: Home or Self Care   Coordination has not been started for this encounter.     Selected Continued Care - Episodes Includes selections from active Coordinated Care Management episodes    Rising Risk Care Management Episode start date: 8/9/2021   There are no active outsourced providers for this episode.               Expected Discharge Date and Time     Expected Discharge Date Expected Discharge Time    Aug 9, 2021         Demographic Summary    No documentation.       Functional Status    No documentation.       Psychosocial    No documentation.       Abuse/Neglect    No documentation.       Legal    No documentation.       Substance Abuse    No documentation.       Patient Forms    No documentation.           Bonnie Welch, RN

## 2021-08-10 ENCOUNTER — TRANSITIONAL CARE MANAGEMENT TELEPHONE ENCOUNTER (OUTPATIENT)
Dept: CALL CENTER | Facility: HOSPITAL | Age: 61
End: 2021-08-10

## 2021-08-11 LAB
BACTERIA SPEC AEROBE CULT: NORMAL
BACTERIA SPEC AEROBE CULT: NORMAL

## 2021-08-16 ENCOUNTER — OFFICE VISIT (OUTPATIENT)
Dept: FAMILY MEDICINE CLINIC | Facility: CLINIC | Age: 61
End: 2021-08-16

## 2021-08-16 ENCOUNTER — TELEPHONE (OUTPATIENT)
Dept: FAMILY MEDICINE CLINIC | Facility: CLINIC | Age: 61
End: 2021-08-16

## 2021-08-16 VITALS
BODY MASS INDEX: 32.96 KG/M2 | SYSTOLIC BLOOD PRESSURE: 140 MMHG | RESPIRATION RATE: 16 BRPM | DIASTOLIC BLOOD PRESSURE: 82 MMHG | OXYGEN SATURATION: 98 % | HEIGHT: 65 IN | TEMPERATURE: 96.9 F | HEART RATE: 69 BPM | WEIGHT: 197.8 LBS

## 2021-08-16 DIAGNOSIS — M79.662 PAIN IN LEFT LOWER LEG: ICD-10-CM

## 2021-08-16 DIAGNOSIS — I10 ESSENTIAL HYPERTENSION: ICD-10-CM

## 2021-08-16 DIAGNOSIS — Z13.6 SCREENING, ISCHEMIC HEART DISEASE: ICD-10-CM

## 2021-08-16 DIAGNOSIS — R53.83 FATIGUE, UNSPECIFIED TYPE: ICD-10-CM

## 2021-08-16 DIAGNOSIS — R20.0 RIGHT LEG NUMBNESS: ICD-10-CM

## 2021-08-16 DIAGNOSIS — R19.8 GI SYMPTOM: Primary | ICD-10-CM

## 2021-08-16 DIAGNOSIS — F41.0 ANXIETY DISORDER DUE TO GENERAL MEDICAL CONDITION WITH PANIC ATTACK: ICD-10-CM

## 2021-08-16 DIAGNOSIS — F06.4 ANXIETY DISORDER DUE TO GENERAL MEDICAL CONDITION WITH PANIC ATTACK: ICD-10-CM

## 2021-08-16 PROCEDURE — 1111F DSCHRG MED/CURRENT MED MERGE: CPT

## 2021-08-16 PROCEDURE — 99496 TRANSJ CARE MGMT HIGH F2F 7D: CPT

## 2021-08-16 RX ORDER — LORAZEPAM 0.5 MG/1
0.5 TABLET ORAL DAILY PRN
COMMUNITY

## 2021-08-16 RX ORDER — ONDANSETRON 4 MG/1
4 TABLET, FILM COATED ORAL EVERY 8 HOURS PRN
Qty: 20 TABLET | Refills: 1 | Status: SHIPPED | OUTPATIENT
Start: 2021-08-16 | End: 2021-10-26 | Stop reason: SDUPTHER

## 2021-08-16 RX ORDER — ONDANSETRON 4 MG/1
4 TABLET, FILM COATED ORAL EVERY 8 HOURS PRN
COMMUNITY
End: 2021-08-16 | Stop reason: SDUPTHER

## 2021-08-16 NOTE — PROGRESS NOTES
Transitional Care Follow Up Visit  Subjective     Amandamarianna Sherwood is a 60 y.o. female who presents for a transitional care management visit.    Within 48 business hours after discharge our office contacted them via telephone to coordinate their care and needs.      I reviewed and discussed the details of that call along with the discharge summary, hospital problems, inpatient lab results, inpatient diagnostic studies, and consultation reports with Amanda.     Current outpatient and discharge medications have been reconciled for the patient.  Reviewed by: OLVIN Mcgee      Date of TCM Phone Call 8/9/2021   [facility]   Date of Admission 8/6/2021   Date of Discharge 8/9/2021   Discharge Disposition Home or Self Care     Risk for Readmission (LACE) Score: 6 (8/9/2021  6:00 AM)      History of Present Illness   Course During Hospital Stay:  About 3 weeks ago patient started vomiting and having diarrhea and wasn't able to eat or drink for 5 days and stayed in bed. Went to the [facility] ER last Friday 8/6 and Cr was 2.6. Admitted with FANNY and UTI. CT of the abdomen at this visit was negative for any acute findings. Stayed until Monday the 9th. Was given fluids and antibiotics. All symptoms resolved but still has some fatigue and slowly regaining appetite. Patient drinking plenty of fluids.    Was told in hospital to stop losartan and HCTZ. Dr. Nelson ordered to stop due to affecting kidneys. Currently on Bystolic 20 mg and takes amlodipine PRN in afternoon if BP elevated. Has cardiology appointment in November. BP at home 123/84 at home. No chest pain, palpitations, or LE swelling. Occasionally has SOB with e denies xertion since hospitalization  patient feels is associated with fatigue.       Feeling more anxious since hospitalization. Supposed to go back to work this Friday but still has some fatigue. Feeling stressed about getting FMLA and job security. Sees [facility] psychiatry  to manage anxiety medications. Last visit end of July. Has follow up in October. Feels like anxiety is situational and says they will follow up with Oly if they feel the anxiety is getting worse.     Complains of right leg numbness above knee started 1 week ago. When walking feels like a burning sensation. Will keep a close eye since it just started and report if it gets worse and follow-up in a month.    Patient complains of left lower leg tenderness in the calf area.  Feels like it is almost like a muscle strain.  Denies any lower extremity swelling warmth or redness.  Denies any shortness of breath.    The following portions of the patient's history were reviewed and updated as appropriate: problem list.allergies, current meds, past medical and surgical history, family history, social history.    Review of Systems   Constitutional: Positive for appetite change and fatigue.   HENT: Negative.    Eyes: Negative.    Respiratory: Negative.    Cardiovascular: Negative.    Gastrointestinal: Negative.    Endocrine: Negative.    Genitourinary: Negative.    Musculoskeletal: Negative.    Skin: Negative.    Neurological: Negative.    Hematological: Negative.    Psychiatric/Behavioral: Negative.        Objective   Physical Exam  Vitals reviewed.   HENT:      Head: Normocephalic.      Mouth/Throat:      Mouth: Mucous membranes are moist.   Eyes:      Pupils: Pupils are equal, round, and reactive to light.   Cardiovascular:      Rate and Rhythm: Normal rate and regular rhythm.      Pulses: Normal pulses.      Heart sounds: Normal heart sounds.   Pulmonary:      Effort: Pulmonary effort is normal.      Breath sounds: Normal breath sounds.   Abdominal:      General: Bowel sounds are normal.      Palpations: Abdomen is soft.   Musculoskeletal:         General: Normal range of motion.      Cervical back: Normal range of motion.   Skin:     General: Skin is warm and dry.   Neurological:      Mental Status: She is alert.    Psychiatric:         Mood and Affect: Mood normal.         Assessment/Plan   Problems Addressed this Visit        Cardiac and Vasculature    Essential hypertension     Hypertension is unchanged.  Continue current treatment regimen.  Blood pressure will be reassessed in 4 weeks.            Mental Health    Anxiety disorder due to general medical condition with panic attack     Patient states they feel like they are more anxious and stressed recently in regard to work. Does see Oly Rivera with psychiatry regularly with ED visits and has a follow-up appointment in October. Patient stated they will follow up if anxiety feels like it is getting worse but thinks once they're able to restart work it will get better. Will follow-up in a month to reassess.         Relevant Medications    LORazepam (Ativan) 0.5 MG tablet       Symptoms and Signs    Right leg numbness     Patient stated this started 1 week ago.  No back pain or knee pain associated.  Patient does have a history of arthritis.  Plan to monitor for now and reevaluate in 1 month.  Patient counseled to call for an earlier appointment if the burning sensation gets worse.           Other Visit Diagnoses     GI symptom    -  Primary    Patient states she has some nausea associated with anxiety. Has been taking Zofran 4 mg about once a week and asked to be reordered.    Relevant Medications    ondansetron (ZOFRAN) 4 MG tablet    Fatigue, unspecified type        Likely due to recent UTI, FANNY, and hospitalization. Will check labs and follow-up in 1 month.    Relevant Orders    Comprehensive metabolic panel    CBC w AUTO Differential    Vitamin D 25 hydroxy    Screening, ischemic heart disease        Relevant Orders    Lipid panel    Pain in left lower leg        Relevant Orders    Duplex Venous Lower Extremity - Left CAR      Diagnoses       Codes Comments    GI symptom    -  Primary ICD-10-CM: R19.8  ICD-9-CM: 787.99 Patient states she has some nausea associated  with anxiety. Has been taking Zofran 4 mg about once a week and asked to be reordered.    Fatigue, unspecified type     ICD-10-CM: R53.83  ICD-9-CM: 780.79 Likely due to recent UTI, FANNY, and hospitalization. Will check labs and follow-up in 1 month.    Screening, ischemic heart disease     ICD-10-CM: Z13.6  ICD-9-CM: V81.0     Essential hypertension     ICD-10-CM: I10  ICD-9-CM: 401.9     Anxiety disorder due to general medical condition with panic attack     ICD-10-CM: F06.4, F41.0  ICD-9-CM: 293.84, 300.01     Right leg numbness     ICD-10-CM: R20.0  ICD-9-CM: 782.0     Pain in left lower leg     ICD-10-CM: M79.662  ICD-9-CM: 729.5              Will follow up in 1 month.  At that time we will review labs with patient and see how fatigue and the right leg burning sensation has progressed.  Also will recheck blood pressure and make sure is stable on current medication regimen and if anxiety is controlled.  Okay with patient returning to work since some symptoms have resolved except for some fatigue.  Told patient to listen to her body and to increase activity as able to help regain strength.    Medical assistant and I wore mask and eyewear protection during entire encounter.  Patient wore mask.      Electronically signed by OLVIN Mcgee, 08/16/21, 8:52 AM EDT.    Duplex of left lower extremity was ordered due to left calf tenderness and recent hospitalization.  Patient updated and called about order being placed just to rule out any kind of DVT.  Originally put in for stat so patient could have it completed today but patient refused to go in today due to being unable to.  Reordered so patient could be seen urgently tomorrow.  We will follow-up with patient as soon as results are completed.    Electronically signed by OLVIN Mcgee, 08/16/21, 3:36 PM EDT.

## 2021-08-16 NOTE — ASSESSMENT & PLAN NOTE
Patient stated this started 1 week ago.  No back pain or knee pain associated.  Patient does have a history of arthritis.  Plan to monitor for now and reevaluate in 1 month.  Patient counseled to call for an earlier appointment if the burning sensation gets worse.

## 2021-08-16 NOTE — TELEPHONE ENCOUNTER
Patient called in regard to setting up for venous ultrasound of left lower extremity in order to rule out DVT.  Patient has no complaints of lower extremity swelling or pain but with recent hospitalization I went to rule out DVT before restarting work.  Patient understood and okay with moving forward.  Attempted to order for today but patient was unable to go to appointment today and was rescheduled for tomorrow morning.  We will follow up with patient in regard to results.

## 2021-08-16 NOTE — ASSESSMENT & PLAN NOTE
Patient states they feel like they are more anxious and stressed recently in regard to work. Does see Oly Rivera with psychiatry regularly with ED visits and has a follow-up appointment in October. Patient stated they will follow up if anxiety feels like it is getting worse but thinks once they're able to restart work it will get better. Will follow-up in a month to reassess.

## 2021-08-17 ENCOUNTER — TELEPHONE (OUTPATIENT)
Dept: FAMILY MEDICINE CLINIC | Facility: CLINIC | Age: 61
End: 2021-08-17

## 2021-08-17 ENCOUNTER — HOSPITAL ENCOUNTER (OUTPATIENT)
Dept: CARDIOLOGY | Facility: HOSPITAL | Age: 61
Discharge: HOME | End: 2021-08-17

## 2021-08-17 DIAGNOSIS — E78.2 MIXED HYPERLIPIDEMIA: Primary | ICD-10-CM

## 2021-08-17 LAB
25(OH)D3+25(OH)D2 SERPL-MCNC: 64.3 NG/ML (ref 30–100)
ALBUMIN SERPL-MCNC: 4.1 G/DL (ref 3.5–5.2)
ALBUMIN/GLOB SERPL: 1.9 G/DL
ALP SERPL-CCNC: 78 U/L (ref 39–117)
ALT SERPL-CCNC: 21 U/L (ref 1–33)
AST SERPL-CCNC: 19 U/L (ref 1–32)
BASOPHILS # BLD AUTO: 0.01 10*3/MM3 (ref 0–0.2)
BASOPHILS NFR BLD AUTO: 0.3 % (ref 0–1.5)
BH CV LOWER VASCULAR LEFT COMMON FEMORAL AUGMENT: NORMAL
BH CV LOWER VASCULAR LEFT COMMON FEMORAL COMPETENT: NORMAL
BH CV LOWER VASCULAR LEFT COMMON FEMORAL COMPRESS: NORMAL
BH CV LOWER VASCULAR LEFT COMMON FEMORAL PHASIC: NORMAL
BH CV LOWER VASCULAR LEFT COMMON FEMORAL SPONT: NORMAL
BH CV LOWER VASCULAR LEFT DISTAL FEMORAL COMPRESS: NORMAL
BH CV LOWER VASCULAR LEFT GASTRONEMIUS COMPRESS: NORMAL
BH CV LOWER VASCULAR LEFT GREATER SAPH AK COMPRESS: NORMAL
BH CV LOWER VASCULAR LEFT GREATER SAPH BK COMPRESS: NORMAL
BH CV LOWER VASCULAR LEFT LESSER SAPH COMPRESS: NORMAL
BH CV LOWER VASCULAR LEFT MID FEMORAL AUGMENT: NORMAL
BH CV LOWER VASCULAR LEFT MID FEMORAL COMPETENT: NORMAL
BH CV LOWER VASCULAR LEFT MID FEMORAL COMPRESS: NORMAL
BH CV LOWER VASCULAR LEFT MID FEMORAL PHASIC: NORMAL
BH CV LOWER VASCULAR LEFT MID FEMORAL SPONT: NORMAL
BH CV LOWER VASCULAR LEFT PERONEAL COMPRESS: NORMAL
BH CV LOWER VASCULAR LEFT POPLITEAL AUGMENT: NORMAL
BH CV LOWER VASCULAR LEFT POPLITEAL COMPETENT: NORMAL
BH CV LOWER VASCULAR LEFT POPLITEAL COMPRESS: NORMAL
BH CV LOWER VASCULAR LEFT POPLITEAL PHASIC: NORMAL
BH CV LOWER VASCULAR LEFT POPLITEAL SPONT: NORMAL
BH CV LOWER VASCULAR LEFT POSTERIOR TIBIAL COMPRESS: NORMAL
BH CV LOWER VASCULAR LEFT PROFUNDA FEMORAL COMPRESS: NORMAL
BH CV LOWER VASCULAR LEFT PROXIMAL FEMORAL COMPRESS: NORMAL
BH CV LOWER VASCULAR LEFT SAPHENOFEMORAL JUNCTION COMPRESS: NORMAL
BH CV LOWER VASCULAR RIGHT COMMON FEMORAL AUGMENT: NORMAL
BH CV LOWER VASCULAR RIGHT COMMON FEMORAL COMPETENT: NORMAL
BH CV LOWER VASCULAR RIGHT COMMON FEMORAL COMPRESS: NORMAL
BH CV LOWER VASCULAR RIGHT COMMON FEMORAL PHASIC: NORMAL
BH CV LOWER VASCULAR RIGHT COMMON FEMORAL SPONT: NORMAL
BILIRUB SERPL-MCNC: 0.5 MG/DL (ref 0–1.2)
BUN SERPL-MCNC: 7 MG/DL (ref 6–20)
BUN/CREAT SERPL: 5.6 (ref 7–25)
CALCIUM SERPL-MCNC: 9.9 MG/DL (ref 8.6–10.5)
CHLORIDE SERPL-SCNC: 102 MMOL/L (ref 98–107)
CHOLEST SERPL-MCNC: 275 MG/DL (ref 0–200)
CO2 SERPL-SCNC: 27.2 MMOL/L (ref 22–29)
CREAT SERPL-MCNC: 1.26 MG/DL (ref 0.57–1)
EOSINOPHIL # BLD AUTO: 0.07 10*3/MM3 (ref 0–0.4)
EOSINOPHIL NFR BLD AUTO: 2.2 % (ref 0.3–6.2)
ERYTHROCYTE [DISTWIDTH] IN BLOOD BY AUTOMATED COUNT: 12.8 % (ref 12.3–15.4)
GLOBULIN SER CALC-MCNC: 2.2 GM/DL
GLUCOSE SERPL-MCNC: 107 MG/DL (ref 65–99)
HCT VFR BLD AUTO: 40.2 % (ref 34–46.6)
HDLC SERPL-MCNC: 68 MG/DL (ref 40–60)
HGB BLD-MCNC: 13.4 G/DL (ref 12–15.9)
IMM GRANULOCYTES # BLD AUTO: 0.01 10*3/MM3 (ref 0–0.05)
IMM GRANULOCYTES NFR BLD AUTO: 0.3 % (ref 0–0.5)
LDLC SERPL CALC-MCNC: 190 MG/DL (ref 0–100)
LYMPHOCYTES # BLD AUTO: 0.77 10*3/MM3 (ref 0.7–3.1)
LYMPHOCYTES NFR BLD AUTO: 23.8 % (ref 19.6–45.3)
MAXIMAL PREDICTED HEART RATE: 162 BPM
MCH RBC QN AUTO: 31.8 PG (ref 26.6–33)
MCHC RBC AUTO-ENTMCNC: 33.3 G/DL (ref 31.5–35.7)
MCV RBC AUTO: 95.5 FL (ref 79–97)
MONOCYTES # BLD AUTO: 0.26 10*3/MM3 (ref 0.1–0.9)
MONOCYTES NFR BLD AUTO: 8 % (ref 5–12)
NEUTROPHILS # BLD AUTO: 2.11 10*3/MM3 (ref 1.7–7)
NEUTROPHILS NFR BLD AUTO: 65.4 % (ref 42.7–76)
NRBC BLD AUTO-RTO: 0.3 /100 WBC (ref 0–0.2)
PLATELET # BLD AUTO: 181 10*3/MM3 (ref 140–450)
POTASSIUM SERPL-SCNC: 4.2 MMOL/L (ref 3.5–5.2)
PROT SERPL-MCNC: 6.3 G/DL (ref 6–8.5)
RBC # BLD AUTO: 4.21 10*6/MM3 (ref 3.77–5.28)
SODIUM SERPL-SCNC: 144 MMOL/L (ref 136–145)
STRESS TARGET HR: 138 BPM
TRIGL SERPL-MCNC: 100 MG/DL (ref 0–150)
VLDLC SERPL CALC-MCNC: 17 MG/DL (ref 5–40)
WBC # BLD AUTO: 3.23 10*3/MM3 (ref 3.4–10.8)

## 2021-08-17 PROCEDURE — 93971 EXTREMITY STUDY: CPT

## 2021-08-17 RX ORDER — ATORVASTATIN CALCIUM 40 MG/1
40 TABLET, FILM COATED ORAL NIGHTLY
Qty: 30 TABLET | Refills: 3 | Status: SHIPPED | OUTPATIENT
Start: 2021-08-17 | End: 2022-01-25 | Stop reason: SDUPTHER

## 2021-08-17 NOTE — TELEPHONE ENCOUNTER
Caller: Amanda Sherwood    Relationship: Self    Best call back number: [phone]    What form or medical record are you requesting: FMLA    Who is requesting this form or medical record from you: WORK    How would you like to receive the form or medical records (pick-up, mail, fax): PLEASE FAX BACK TO [employer]-NUMBER WILL BE ON THE PAPERWORK THAT THEY SEND OVER.     Timeframe paperwork needed: ASAP    Additional notes: PLEASE CALL PATIENT IF ANY QUESTIONS.

## 2021-08-17 NOTE — TELEPHONE ENCOUNTER
Informed patient to have [employer] fax over FMLA forms, pt informed no payment for FMLA at this time.

## 2021-08-17 NOTE — TELEPHONE ENCOUNTER
Patient was out from work for a couple of weeks prior to evaluation. Patient's employer is requesting patient to have her physician fill out FMLA forms for the days she was absent, which pertain to the ER visit, vomiting and diarrhea and loss of appetite. Is Tasneem willing to fill out and authorize FMLA? Please advise.

## 2021-08-17 NOTE — PROGRESS NOTES
Following labs total cholesterol 275 and .  Per recommendations we will start patient on Lipitor 40 mg daily.  We will recheck labs in 6-8  weeks.

## 2021-08-27 ENCOUNTER — HOSPITAL ENCOUNTER (OUTPATIENT)
Facility: HOSPITAL | Age: 61
Setting detail: OBSERVATION
Discharge: HOME | End: 2021-08-29
Attending: EMERGENCY MEDICINE | Admitting: HOSPITALIST

## 2021-08-27 ENCOUNTER — HOSPITAL ENCOUNTER (OUTPATIENT)
Dept: CT IMAGING | Facility: HOSPITAL | Age: 61
Discharge: HOME | End: 2021-08-27

## 2021-08-27 ENCOUNTER — TELEPHONE (OUTPATIENT)
Dept: CARDIOLOGY | Facility: CLINIC | Age: 61
End: 2021-08-27

## 2021-08-27 ENCOUNTER — LAB (OUTPATIENT)
Dept: LAB | Facility: HOSPITAL | Age: 61
End: 2021-08-27

## 2021-08-27 ENCOUNTER — OFFICE VISIT (OUTPATIENT)
Dept: CARDIOLOGY | Facility: CLINIC | Age: 61
End: 2021-08-27

## 2021-08-27 VITALS
SYSTOLIC BLOOD PRESSURE: 110 MMHG | BODY MASS INDEX: 32.52 KG/M2 | WEIGHT: 195.2 LBS | OXYGEN SATURATION: 98 % | HEART RATE: 75 BPM | HEIGHT: 65 IN | DIASTOLIC BLOOD PRESSURE: 70 MMHG

## 2021-08-27 DIAGNOSIS — R06.02 SHORTNESS OF BREATH: Primary | ICD-10-CM

## 2021-08-27 DIAGNOSIS — E66.9 OBESITY (BMI 30.0-34.9): ICD-10-CM

## 2021-08-27 DIAGNOSIS — R06.02 SHORTNESS OF BREATH: ICD-10-CM

## 2021-08-27 DIAGNOSIS — R00.2 PALPITATIONS: ICD-10-CM

## 2021-08-27 DIAGNOSIS — I26.94 MULTIPLE SUBSEGMENTAL PULMONARY EMBOLI WITHOUT ACUTE COR PULMONALE (HCC): Primary | ICD-10-CM

## 2021-08-27 DIAGNOSIS — N17.9 AKI (ACUTE KIDNEY INJURY) (HCC): ICD-10-CM

## 2021-08-27 DIAGNOSIS — I49.3 PVCS (PREMATURE VENTRICULAR CONTRACTIONS): ICD-10-CM

## 2021-08-27 DIAGNOSIS — R79.89 ELEVATED D-DIMER: ICD-10-CM

## 2021-08-27 DIAGNOSIS — I47.1 PSVT (PAROXYSMAL SUPRAVENTRICULAR TACHYCARDIA) (HCC): ICD-10-CM

## 2021-08-27 DIAGNOSIS — I10 ESSENTIAL HYPERTENSION: ICD-10-CM

## 2021-08-27 DIAGNOSIS — R60.0 LOWER EXTREMITY EDEMA: ICD-10-CM

## 2021-08-27 DIAGNOSIS — R10.32 LEFT LOWER QUADRANT ABDOMINAL PAIN: ICD-10-CM

## 2021-08-27 LAB
ANION GAP SERPL CALCULATED.3IONS-SCNC: 19.8 MMOL/L (ref 5–15)
BUN SERPL-MCNC: 9 MG/DL (ref 6–20)
BUN/CREAT SERPL: 8.4 (ref 7–25)
CALCIUM SPEC-SCNC: 10.2 MG/DL (ref 8.6–10.5)
CHLORIDE SERPL-SCNC: 95 MMOL/L (ref 98–107)
CO2 SERPL-SCNC: 21.2 MMOL/L (ref 22–29)
CREAT SERPL-MCNC: 1.07 MG/DL (ref 0.57–1)
D DIMER PPP FEU-MCNC: 2.09 MCGFEU/ML (ref 0–0.49)
DEPRECATED RDW RBC AUTO: 50.4 FL (ref 37–54)
ERYTHROCYTE [DISTWIDTH] IN BLOOD BY AUTOMATED COUNT: 14.5 % (ref 12.3–15.4)
GFR SERPL CREATININE-BSD FRML MDRD: 53 ML/MIN/1.73
GLUCOSE SERPL-MCNC: 84 MG/DL (ref 65–99)
HCT VFR BLD AUTO: 45.8 % (ref 34–46.6)
HGB BLD-MCNC: 15.4 G/DL (ref 12–15.9)
INR PPP: 0.97 (ref 0.9–1.1)
MCH RBC QN AUTO: 32.2 PG (ref 26.6–33)
MCHC RBC AUTO-ENTMCNC: 33.6 G/DL (ref 31.5–35.7)
MCV RBC AUTO: 95.8 FL (ref 79–97)
NT-PROBNP SERPL-MCNC: 253 PG/ML (ref 0–900)
PLATELET # BLD AUTO: 233 10*3/MM3 (ref 140–450)
PMV BLD AUTO: 10.7 FL (ref 6–12)
POTASSIUM SERPL-SCNC: 3.8 MMOL/L (ref 3.5–5.2)
PROTHROMBIN TIME: 12.7 SECONDS (ref 11.7–14.2)
RBC # BLD AUTO: 4.78 10*6/MM3 (ref 3.77–5.28)
SARS-COV-2 RNA PNL SPEC NAA+PROBE: NOT DETECTED
SODIUM SERPL-SCNC: 136 MMOL/L (ref 136–145)
TROPONIN T SERPL-MCNC: <0.01 NG/ML (ref 0–0.03)
TSH SERPL DL<=0.05 MIU/L-ACNC: 1.17 UIU/ML (ref 0.27–4.2)
WBC # BLD AUTO: 4.73 10*3/MM3 (ref 3.4–10.8)

## 2021-08-27 PROCEDURE — 87635 SARS-COV-2 COVID-19 AMP PRB: CPT | Performed by: EMERGENCY MEDICINE

## 2021-08-27 PROCEDURE — 85610 PROTHROMBIN TIME: CPT | Performed by: EMERGENCY MEDICINE

## 2021-08-27 PROCEDURE — 80048 BASIC METABOLIC PNL TOTAL CA: CPT

## 2021-08-27 PROCEDURE — 99214 OFFICE O/P EST MOD 30 MIN: CPT | Performed by: NURSE PRACTITIONER

## 2021-08-27 PROCEDURE — G0378 HOSPITAL OBSERVATION PER HR: HCPCS

## 2021-08-27 PROCEDURE — 84443 ASSAY THYROID STIM HORMONE: CPT

## 2021-08-27 PROCEDURE — 85027 COMPLETE CBC AUTOMATED: CPT

## 2021-08-27 PROCEDURE — 25010000002 ENOXAPARIN PER 10 MG: Performed by: EMERGENCY MEDICINE

## 2021-08-27 PROCEDURE — 96372 THER/PROPH/DIAG INJ SC/IM: CPT

## 2021-08-27 PROCEDURE — 0 IOPAMIDOL PER 1 ML: Performed by: NURSE PRACTITIONER

## 2021-08-27 PROCEDURE — 93000 ELECTROCARDIOGRAM COMPLETE: CPT | Performed by: NURSE PRACTITIONER

## 2021-08-27 PROCEDURE — 83880 ASSAY OF NATRIURETIC PEPTIDE: CPT

## 2021-08-27 PROCEDURE — 99284 EMERGENCY DEPT VISIT MOD MDM: CPT

## 2021-08-27 PROCEDURE — 84484 ASSAY OF TROPONIN QUANT: CPT | Performed by: EMERGENCY MEDICINE

## 2021-08-27 PROCEDURE — C9803 HOPD COVID-19 SPEC COLLECT: HCPCS

## 2021-08-27 PROCEDURE — 36415 COLL VENOUS BLD VENIPUNCTURE: CPT

## 2021-08-27 PROCEDURE — 85379 FIBRIN DEGRADATION QUANT: CPT

## 2021-08-27 PROCEDURE — 71275 CT ANGIOGRAPHY CHEST: CPT

## 2021-08-27 RX ORDER — LOSARTAN POTASSIUM AND HYDROCHLOROTHIAZIDE 25; 100 MG/1; MG/1
1 TABLET ORAL DAILY
COMMUNITY
End: 2021-09-14 | Stop reason: ALTCHOICE

## 2021-08-27 RX ORDER — AMLODIPINE BESYLATE 5 MG/1
5 TABLET ORAL
Status: DISCONTINUED | OUTPATIENT
Start: 2021-08-27 | End: 2021-08-29 | Stop reason: HOSPADM

## 2021-08-27 RX ORDER — ACETAMINOPHEN 325 MG/1
650 TABLET ORAL EVERY 4 HOURS PRN
Status: DISCONTINUED | OUTPATIENT
Start: 2021-08-27 | End: 2021-08-29 | Stop reason: HOSPADM

## 2021-08-27 RX ORDER — ACETAMINOPHEN 160 MG/5ML
650 SOLUTION ORAL EVERY 4 HOURS PRN
Status: DISCONTINUED | OUTPATIENT
Start: 2021-08-27 | End: 2021-08-29 | Stop reason: HOSPADM

## 2021-08-27 RX ORDER — AMLODIPINE BESYLATE 5 MG/1
5 TABLET ORAL
Status: DISCONTINUED | OUTPATIENT
Start: 2021-08-28 | End: 2021-08-27

## 2021-08-27 RX ORDER — BUPROPION HYDROCHLORIDE 300 MG/1
300 TABLET ORAL EVERY MORNING
Status: DISCONTINUED | OUTPATIENT
Start: 2021-08-28 | End: 2021-08-29 | Stop reason: HOSPADM

## 2021-08-27 RX ORDER — HEPARIN SODIUM 5000 [USP'U]/ML
80 INJECTION, SOLUTION INTRAVENOUS; SUBCUTANEOUS ONCE
Status: DISCONTINUED | OUTPATIENT
Start: 2021-08-27 | End: 2021-08-27

## 2021-08-27 RX ORDER — LORAZEPAM 0.5 MG/1
0.5 TABLET ORAL DAILY PRN
Status: DISCONTINUED | OUTPATIENT
Start: 2021-08-27 | End: 2021-08-28

## 2021-08-27 RX ORDER — CALCIUM CARBONATE 200(500)MG
1 TABLET,CHEWABLE ORAL AS NEEDED
Status: DISCONTINUED | OUTPATIENT
Start: 2021-08-27 | End: 2021-08-27

## 2021-08-27 RX ORDER — NEBIVOLOL 10 MG/1
20 TABLET ORAL DAILY
Status: DISCONTINUED | OUTPATIENT
Start: 2021-08-28 | End: 2021-08-29 | Stop reason: HOSPADM

## 2021-08-27 RX ORDER — HEPARIN SODIUM 5000 [USP'U]/ML
40-80 INJECTION, SOLUTION INTRAVENOUS; SUBCUTANEOUS EVERY 6 HOURS PRN
Status: DISCONTINUED | OUTPATIENT
Start: 2021-08-27 | End: 2021-08-27

## 2021-08-27 RX ORDER — ESCITALOPRAM OXALATE 20 MG/1
20 TABLET ORAL NIGHTLY
Status: DISCONTINUED | OUTPATIENT
Start: 2021-08-27 | End: 2021-08-29 | Stop reason: HOSPADM

## 2021-08-27 RX ORDER — NITROGLYCERIN 0.4 MG/1
0.4 TABLET SUBLINGUAL
Status: DISCONTINUED | OUTPATIENT
Start: 2021-08-27 | End: 2021-08-29 | Stop reason: HOSPADM

## 2021-08-27 RX ORDER — TRAZODONE HYDROCHLORIDE 50 MG/1
50 TABLET ORAL NIGHTLY
Status: DISCONTINUED | OUTPATIENT
Start: 2021-08-27 | End: 2021-08-29 | Stop reason: HOSPADM

## 2021-08-27 RX ORDER — CALCIUM CARBONATE 200(500)MG
1 TABLET,CHEWABLE ORAL 3 TIMES DAILY PRN
Status: DISCONTINUED | OUTPATIENT
Start: 2021-08-27 | End: 2021-08-29 | Stop reason: HOSPADM

## 2021-08-27 RX ORDER — ACETAMINOPHEN 650 MG/1
650 SUPPOSITORY RECTAL EVERY 4 HOURS PRN
Status: DISCONTINUED | OUTPATIENT
Start: 2021-08-27 | End: 2021-08-29 | Stop reason: HOSPADM

## 2021-08-27 RX ORDER — SODIUM CHLORIDE 0.9 % (FLUSH) 0.9 %
10 SYRINGE (ML) INJECTION AS NEEDED
Status: DISCONTINUED | OUTPATIENT
Start: 2021-08-27 | End: 2021-08-29 | Stop reason: HOSPADM

## 2021-08-27 RX ORDER — ONDANSETRON 4 MG/1
4 TABLET, FILM COATED ORAL EVERY 8 HOURS PRN
Status: DISCONTINUED | OUTPATIENT
Start: 2021-08-27 | End: 2021-08-29 | Stop reason: HOSPADM

## 2021-08-27 RX ORDER — PANTOPRAZOLE SODIUM 40 MG/1
40 TABLET, DELAYED RELEASE ORAL EVERY MORNING
Status: DISCONTINUED | OUTPATIENT
Start: 2021-08-28 | End: 2021-08-29 | Stop reason: HOSPADM

## 2021-08-27 RX ORDER — HEPARIN SODIUM 10000 [USP'U]/100ML
16.9 INJECTION, SOLUTION INTRAVENOUS
Status: DISCONTINUED | OUTPATIENT
Start: 2021-08-27 | End: 2021-08-27

## 2021-08-27 RX ORDER — SODIUM CHLORIDE 0.9 % (FLUSH) 0.9 %
10 SYRINGE (ML) INJECTION EVERY 12 HOURS SCHEDULED
Status: DISCONTINUED | OUTPATIENT
Start: 2021-08-27 | End: 2021-08-29 | Stop reason: HOSPADM

## 2021-08-27 RX ORDER — MELATONIN
1000 DAILY
Status: DISCONTINUED | OUTPATIENT
Start: 2021-08-28 | End: 2021-08-29 | Stop reason: HOSPADM

## 2021-08-27 RX ADMIN — ENOXAPARIN SODIUM 90 MG: 100 INJECTION, SOLUTION INTRAVENOUS; SUBCUTANEOUS at 20:03

## 2021-08-27 RX ADMIN — LORAZEPAM 0.5 MG: 0.5 TABLET ORAL at 22:17

## 2021-08-27 RX ADMIN — IOPAMIDOL 85 ML: 755 INJECTION, SOLUTION INTRAVENOUS at 17:41

## 2021-08-27 RX ADMIN — ESCITALOPRAM 20 MG: 20 TABLET, FILM COATED ORAL at 23:13

## 2021-08-27 RX ADMIN — SODIUM CHLORIDE, PRESERVATIVE FREE 10 ML: 5 INJECTION INTRAVENOUS at 22:17

## 2021-08-27 RX ADMIN — AMLODIPINE BESYLATE 5 MG: 5 TABLET ORAL at 23:13

## 2021-08-27 RX ADMIN — TRAZODONE HYDROCHLORIDE 50 MG: 50 TABLET ORAL at 23:13

## 2021-08-27 NOTE — NURSING NOTE
Stat hold & call CTA chest R/O PE. Positive for PE per Dr Uribe. After speaking with Dr Juju ULLOA patient taken to ED in wheelchair. Report called to ER triage nurse. Patient and nurse with appropriate PPE in place.

## 2021-08-27 NOTE — TELEPHONE ENCOUNTER
Patient was notified of blood work. D-dimer elevated.  I recommended a stat CTA of the chest-PE protocol.  proBNP, TSH, and CBC normal.      Patient recommended to go to hospital admissions for a stat CTA of the chest-PE protocol now.  She verbalized understanding.    Isaura - please arrange. Meri is also coming to talk to you.

## 2021-08-27 NOTE — PROGRESS NOTES
"  Date of Office Visit: 2021  Encounter Provider: OLVIN Perea  Place of Service: [facility] CARDIOLOGY  Patient Name: Amanda Sherwood  :1960  Primary Cardiologist: Dr. Yue Maradiaga     Chief Complaint   Patient presents with   • Palpitations   • Hypertension   • Shortness of Breath   :     HPI: Amanda Sherwood is a pleasant 60 y.o. female who presents today for follow-up on palpitations and hypertension. I have reviewed her medical records.     She has a known history of GERD, hyperlipidemia, and hypertension.  We have seen her for PSVT and premature ventricular contractions (PVC). In 2018, she had a normal nuclear stress test completed.  She was previously on amlodipine 5 mg daily and developed worsening lower extremity edema so she now takes the medication as needed for hypertension.  She has known anxiety and depression.    She explains that she recently had a \"stomach bug\" and had nausea and vomiting.  She laid in bed for 5 days.  She presented to the [facility] emergency department with generalized weakness and fatigue.  She was diagnosed with acute renal failure and urinary tract infection that was treated with antibiotic therapy.  Her losartan/HCTZ was discontinued.  Her creatinine upon admission was 2.61 and upon discharge was 1.29.  CT of the abdomen/pelvis showed no acute abnormalities.  She was recommended \"not to restart on her ARB/HCTZ until there is complete resolution of kidney function and creatinine levels have returned to complete baseline\".    I reviewed her blood work from 2021 and BUN was 7 and creatinine was 1.26.  Her baseline creatinine is typically 1.0-1.1.  Her total cholesterol was 275, triglycerides 100, HDL 68, and .  CBC showed normal hemoglobin and hematocrit and white blood cell count was low at 3.23.    Today was a follow-up visit.  She recently had a venous duplex completed because of calf pain and the " test was negative.  She has been experiencing left lower quadrant abdominal pain for 1 week.  She has been short of breath since being in the hospital and she said she did not report this to the clinical staff.  She is continue to have shortness of breath intermittently both with rest and exertion since leaving the hospital.  She is short of breath just talking on the examination table today.  She has had a chronic cough for 3 years.  Her chest x-ray in the hospital showed no acute abnormalities.  She said she was given a lot of fluid in the hospital and her fingers and hands were edematous which has improved.  She has occasional palpitations.  She denies chest pain, dizziness, syncope, or bleeding.    A few days ago, she noted that her blood pressure was elevated and on her own accord she restarted the losartan/HCTZ at the previous dosage.  Her blood pressure is normal today.    Past Medical History:   Diagnosis Date   • Allergic    • Anxiety 3 years ago   • Depression    • GERD (gastroesophageal reflux disease)    • History of bronchitis    • History of migraine    • Hyperlipidemia     NO MEDS CURRENTLY. WORKING ON   • Hypertension    • Leukopenia     PER HISTORY. HAS SEEN CBC IN THE PAST   • Neck pain    • Obesity (BMI 30.0-34.9) 6/6/2019   • Osteoarthritis    • PVC (premature ventricular contraction)    • Seasonal allergies    • Torn meniscus     LEFT   • Tremor 3 years ago       Past Surgical History:   Procedure Laterality Date   • BREAST BIOPSY      BENIGN. HORACE BREAST   • BUNIONECTOMY Left    • BUNIONECTOMY Left     REVISION   • COLONOSCOPY N/A 2/1/2020    Procedure: COLONOSCOPY TO CECUM AND INTO TERMINAL ILEUM WITH COLD BIOPSY POLYPECTOMY;  Surgeon: Munira Montes MD;  Location: [facility] ENDOSCOPY;  Service: Gastroenterology   • ENDOSCOPY N/A 2/1/2020    Procedure: ESOPHAGOGASTRODUODENOSCOPY WITH COLD BIOPSIES;  Surgeon: Munira Montes MD;  Location: [facility] ENDOSCOPY;  Service: Gastroenterology   •  KNEE ARTHROSCOPY Left 2018    Procedure: KNEE ARTHROSCOPY LATERAL MENISECTOMY DEBRIDEMENT OF MEDIAL FEMORAL CONDYLE DEFECT DEBRIDEMENT OF ARTHRITIS;  Surgeon: Graciela Crockett MD;  Location: [facility];  Service: Orthopedics   • SALPINGO OOPHORECTOMY Left    • TONSILLECTOMY     • TONSILLECTOMY  as a child   • TUBAL ABDOMINAL LIGATION         Social History     Socioeconomic History   • Marital status:      Spouse name: Not on file   • Number of children: Not on file   • Years of education: Not on file   • Highest education level: Not on file   Tobacco Use   • Smoking status: Former Smoker     Packs/day: 0.50     Years: 4.00     Pack years: 2.00     Types: Cigarettes     Quit date: 1987     Years since quittin.8   • Smokeless tobacco: Never Used   • Tobacco comment: caffeine - tea   Vaping Use   • Vaping Use: Never used   Substance and Sexual Activity   • Alcohol use: Yes     Alcohol/week: 2.0 standard drinks     Types: 2 Cans of beer per week   • Drug use: Yes     Types: Marijuana   • Sexual activity: Yes     Partners: Male     Birth control/protection: Post-menopausal       Family History   Problem Relation Age of Onset   • Dementia Mother    • Arthritis Mother    • Other Mother         Alzheimer's   • Stroke Father    • Heart disease Father    • Hypertension Father    • Diabetes Father    • Cancer Father    • Colon cancer Father    • Hyperlipidemia Father    • Breast cancer Sister    • Atrial fibrillation Sister    • Alcohol abuse Sister    • Asthma Sister    • Ovarian cancer Maternal Aunt    • Malig Hyperthermia Neg Hx        The following portion of the patient's history were reviewed and updated as appropriate: past medical history, past surgical history, past social history, past family history, allergies, current medications, and problem list.    Review of Systems   Constitutional: Negative.   Cardiovascular: Positive for dyspnea on exertion and leg swelling.   Respiratory: Positive  for cough and shortness of breath.    Hematologic/Lymphatic: Negative.    Musculoskeletal: Positive for myalgias.   Neurological: Negative.    Psychiatric/Behavioral: Positive for depression. The patient is nervous/anxious.        Allergies   Allergen Reactions   • Celecoxib Hives and Swelling     hives   • Ace Inhibitors Cough   • Lisinopril Cough         Current Outpatient Medications:   •  amLODIPine (NORVASC) 5 MG tablet, Take 1 tablet by mouth 1 (One) Time As Needed (for elevated blood pressure). (Patient taking differently: Take 5 mg by mouth As Needed (for elevated blood pressure).), Disp: 90 tablet, Rfl: 3  •  buPROPion XL (WELLBUTRIN XL) 150 MG 24 hr tablet, Take 300 mg by mouth Every Morning., Disp: , Rfl:   •  Bystolic 20 MG tablet, TAKE ONE TABLET BY MOUTH DAILY, Disp: 90 tablet, Rfl: 3  •  calcium carbonate (TUMS) 500 MG chewable tablet, Chew 1 tablet As Needed., Disp: , Rfl:   •  cetirizine (zyrTEC) 10 MG tablet, Take 10 mg by mouth As Needed., Disp: , Rfl:   •  Cholecalciferol (VITAMIN D3) 5000 UNITS capsule capsule, Take 5,000 Units by mouth daily., Disp: , Rfl:   •  escitalopram (LEXAPRO) 20 MG tablet, Take 1 tablet by mouth Daily., Disp: 90 tablet, Rfl: 3  •  esomeprazole (NexIUM) 20 MG capsule, Take 20 mg by mouth Daily., Disp: , Rfl:   •  LORazepam (Ativan) 0.5 MG tablet, Take 0.5 mg by mouth As Needed., Disp: , Rfl:   •  losartan-hydrochlorothiazide (HYZAAR) 100-25 MG per tablet, Take 1 tablet by mouth Daily., Disp: , Rfl:   •  ondansetron (ZOFRAN) 4 MG tablet, Take 1 tablet by mouth Every 8 (Eight) Hours As Needed for Nausea., Disp: 20 tablet, Rfl: 1  •  traZODone (DESYREL) 50 MG tablet, Take 50 mg by mouth Every Night., Disp: , Rfl:   •  atorvastatin (Lipitor) 40 MG tablet, Take 1 tablet by mouth Every Night. Take half tab for 1 week then take full tab, Disp: 30 tablet, Rfl: 3        Objective:     Vitals:    08/27/21 1410 08/27/21 1421 08/27/21 1509   BP: 122/82 110/70    BP Location: Left  "arm Right arm    Pulse: 75     SpO2:   98%   Weight: 88.5 kg (195 lb 3.2 oz)     Height: 165.1 cm (65\")       Body mass index is 32.48 kg/m².    PHYSICAL EXAM:    Vitals Reviewed.   General Appearance: No acute distress, well developed and well nourished. Obese.   Eyes: Conjunctiva and lids: No erythema, swelling, or discharge. Sclera non-icteric.   HENT: Atraumatic, normocephalic. External eyes, ears, and nose normal. No hearing loss noted. Mucous membranes normal. Lips not cyanotic. Neck supple with no tenderness.  Respiratory: Short of breath at rest and with talking. Shallow breaths.    Clear to auscultation. No rales, crackles, rhonchi, or wheezing auscultated.   Cardiovascular:  Jugular Venous Pressure: Normal  Heart Rate and Rhythm: Normal, Heart Sounds: Normal S1 and S2. No S3 or S4 noted.  Murmurs: No murmurs noted. No rubs, thrills, or gallops.   Lower Extremities: No edema noted.  Gastrointestinal:  Abdomen soft, non-distended, non-tender. Normal bowel sounds.    Musculoskeletal: Normal movement of extremities  Skin and Nails: General appearance normal. No pallor, cyanosis, diaphoresis. Skin temperature normal. No clubbing of fingernails.   Psychiatric: Patient alert and oriented to person, place, and time. Speech and behavior appropriate. Normal mood and affect.       ECG 12 Lead    Date/Time: 8/27/2021 2:15 PM  Performed by: Soo Pryor APRN  Authorized by: Soo Pryor APRN   Comparison: compared with previous ECG from 8/6/2021  Similar to previous ECG  Rhythm: sinus rhythm  Rate: normal  BPM: 75  Conduction: conduction normal  ST Segments: ST segments normal  T Waves: T waves normal  QRS axis: normal  Other: no other findings    Clinical impression: normal ECG              Assessment:       Diagnosis Plan   1. Shortness of breath  CBC (No Diff)    Basic Metabolic Panel    proBNP    TSH    Adult Transthoracic Echo Complete W/ Cont if Necessary Per Protocol    D-dimer, Quantitative   2. FANNY " (acute kidney injury) (CMS/HCC)     3. Lower extremity edema     4. Essential hypertension     5. Palpitations     6. PVCs (premature ventricular contractions)     7. PSVT (paroxysmal supraventricular tachycardia) (CMS/HCC)     8. Left lower quadrant abdominal pain     9. Obesity (BMI 30.0-34.9)            Plan:       1.  Shortness of Breath: She says she has been short of breath since she was in the hospital and it continues post hospitalization.  She says it is intermittent and can occur with both exertion and not exertion.  She is dyspneic just talking to me.  Her oxygen saturation was normal.  I recommended blood work to be completed today including a CBC, BMP, proBNP, TSH, and stat D-dimer.  She also said she received a lot of fluid during her hospitalization.  I have ordered a 2D echocardiogram to be completed.    2.  Acute Kidney Injury: Recently hospitalized for FANNY in the setting of nausea and vomiting.  Her initial creatinine was 2.6 and repeat creatinine the other day was 1.26.  On her own accord, she restarted the losartan/HCTZ and I recommended that we repeat her BMP.    3.  Lower Extremity Edema: Resolved per patient.  Venous duplex was normal.    4.  Hypertension: Blood pressure well controlled, but I am concerned about her BUN/creatinine with her restarting the losartan/HCTZ.    5-7.  Palpitations: Known PVCs and PSVT.  She feels that her palpitations are stable on the beta-blocker.    8.  Abdominal pain: She is experiencing left lower quadrant abdominal pain.  I am concerned that may be kidney function and she thinks it is from her hernia.    9.  Obesity: BMI is 32.5.    At the end of the visit, she was becoming more dyspneic.  She does have an element of anxiety and she is nervous about going back to work tomorrow because she is not feeling well.  With her abdominal pain and shortness of breath, I recommended that we take her to the emergency department via wheelchair.  She refused and does not  want to go to the ED.  She says she has to go to work tomorrow.  We explained that we think is very important for her health that she has acute evaluation and she declined.  I am concerned that maybe she is having an anxiety attack.  She was transferred to the lab via wheelchair to have her blood work completed.  If her D-dimer is elevated I will recommend a stat CTA of the chest.    As always, it has been a pleasure to participate in your patient's care. Thank you.       Sincerely,       OLVIN Short  [facility] Cardiology      · COVID-19 Precautions - Patient was compliant in wearing a mask. When I saw the patient, I used appropriate personal protective equipment (PPE) including mask and eye shield (standard procedure).  Additionally, I used gown and gloves if indicated.  Hand hygiene was completed before and after seeing the patient.  · Dictated utilizing Dragon Dictation  · I spent 42 minutes reviewing her medical records/testing/previous office notes/labs, face-to-face interaction with patient, physical examination, formulating the plan of care, and discussion of plan of care with patient.

## 2021-08-28 ENCOUNTER — APPOINTMENT (OUTPATIENT)
Dept: CARDIOLOGY | Facility: HOSPITAL | Age: 61
End: 2021-08-28

## 2021-08-28 LAB
ANION GAP SERPL CALCULATED.3IONS-SCNC: 14.3 MMOL/L (ref 5–15)
BH CV LOWER VASCULAR LEFT COMMON FEMORAL AUGMENT: NORMAL
BH CV LOWER VASCULAR LEFT COMMON FEMORAL COMPETENT: NORMAL
BH CV LOWER VASCULAR LEFT COMMON FEMORAL COMPRESS: NORMAL
BH CV LOWER VASCULAR LEFT COMMON FEMORAL PHASIC: NORMAL
BH CV LOWER VASCULAR LEFT COMMON FEMORAL SPONT: NORMAL
BH CV LOWER VASCULAR LEFT DISTAL FEMORAL COMPRESS: NORMAL
BH CV LOWER VASCULAR LEFT GASTRONEMIUS COMPRESS: NORMAL
BH CV LOWER VASCULAR LEFT GREATER SAPH AK COMPRESS: NORMAL
BH CV LOWER VASCULAR LEFT GREATER SAPH BK COMPRESS: NORMAL
BH CV LOWER VASCULAR LEFT LESSER SAPH COMPRESS: NORMAL
BH CV LOWER VASCULAR LEFT MID FEMORAL AUGMENT: NORMAL
BH CV LOWER VASCULAR LEFT MID FEMORAL COMPETENT: NORMAL
BH CV LOWER VASCULAR LEFT MID FEMORAL COMPRESS: NORMAL
BH CV LOWER VASCULAR LEFT MID FEMORAL PHASIC: NORMAL
BH CV LOWER VASCULAR LEFT MID FEMORAL SPONT: NORMAL
BH CV LOWER VASCULAR LEFT PERONEAL COMPRESS: NORMAL
BH CV LOWER VASCULAR LEFT POPLITEAL AUGMENT: NORMAL
BH CV LOWER VASCULAR LEFT POPLITEAL COMPETENT: NORMAL
BH CV LOWER VASCULAR LEFT POPLITEAL COMPRESS: NORMAL
BH CV LOWER VASCULAR LEFT POPLITEAL PHASIC: NORMAL
BH CV LOWER VASCULAR LEFT POPLITEAL SPONT: NORMAL
BH CV LOWER VASCULAR LEFT POSTERIOR TIBIAL COMPRESS: NORMAL
BH CV LOWER VASCULAR LEFT PROFUNDA FEMORAL COMPRESS: NORMAL
BH CV LOWER VASCULAR LEFT PROXIMAL FEMORAL COMPRESS: NORMAL
BH CV LOWER VASCULAR LEFT SAPHENOFEMORAL JUNCTION COMPRESS: NORMAL
BH CV LOWER VASCULAR RIGHT COMMON FEMORAL AUGMENT: NORMAL
BH CV LOWER VASCULAR RIGHT COMMON FEMORAL COMPETENT: NORMAL
BH CV LOWER VASCULAR RIGHT COMMON FEMORAL COMPRESS: NORMAL
BH CV LOWER VASCULAR RIGHT COMMON FEMORAL PHASIC: NORMAL
BH CV LOWER VASCULAR RIGHT COMMON FEMORAL SPONT: NORMAL
BH CV LOWER VASCULAR RIGHT DISTAL FEMORAL COMPRESS: NORMAL
BH CV LOWER VASCULAR RIGHT GASTRONEMIUS COMPRESS: NORMAL
BH CV LOWER VASCULAR RIGHT GREATER SAPH AK COMPRESS: NORMAL
BH CV LOWER VASCULAR RIGHT GREATER SAPH BK COMPRESS: NORMAL
BH CV LOWER VASCULAR RIGHT LESSER SAPH COMPRESS: NORMAL
BH CV LOWER VASCULAR RIGHT MID FEMORAL AUGMENT: NORMAL
BH CV LOWER VASCULAR RIGHT MID FEMORAL COMPETENT: NORMAL
BH CV LOWER VASCULAR RIGHT MID FEMORAL COMPRESS: NORMAL
BH CV LOWER VASCULAR RIGHT MID FEMORAL PHASIC: NORMAL
BH CV LOWER VASCULAR RIGHT MID FEMORAL SPONT: NORMAL
BH CV LOWER VASCULAR RIGHT PERONEAL COMPRESS: NORMAL
BH CV LOWER VASCULAR RIGHT POPLITEAL AUGMENT: NORMAL
BH CV LOWER VASCULAR RIGHT POPLITEAL COMPETENT: NORMAL
BH CV LOWER VASCULAR RIGHT POPLITEAL COMPRESS: NORMAL
BH CV LOWER VASCULAR RIGHT POPLITEAL PHASIC: NORMAL
BH CV LOWER VASCULAR RIGHT POPLITEAL SPONT: NORMAL
BH CV LOWER VASCULAR RIGHT POSTERIOR TIBIAL COMPRESS: NORMAL
BH CV LOWER VASCULAR RIGHT PROFUNDA FEMORAL COMPRESS: NORMAL
BH CV LOWER VASCULAR RIGHT PROXIMAL FEMORAL COMPRESS: NORMAL
BH CV LOWER VASCULAR RIGHT SAPHENOFEMORAL JUNCTION COMPRESS: NORMAL
BUN SERPL-MCNC: 9 MG/DL (ref 6–20)
BUN/CREAT SERPL: 8.4 (ref 7–25)
CALCIUM SPEC-SCNC: 9.9 MG/DL (ref 8.6–10.5)
CHLORIDE SERPL-SCNC: 97 MMOL/L (ref 98–107)
CO2 SERPL-SCNC: 23.7 MMOL/L (ref 22–29)
CREAT SERPL-MCNC: 1.07 MG/DL (ref 0.57–1)
DEPRECATED RDW RBC AUTO: 47.9 FL (ref 37–54)
ERYTHROCYTE [DISTWIDTH] IN BLOOD BY AUTOMATED COUNT: 14.1 % (ref 12.3–15.4)
GFR SERPL CREATININE-BSD FRML MDRD: 53 ML/MIN/1.73
GLUCOSE SERPL-MCNC: 73 MG/DL (ref 65–99)
HCT VFR BLD AUTO: 41.7 % (ref 34–46.6)
HGB BLD-MCNC: 14.1 G/DL (ref 12–15.9)
MAXIMAL PREDICTED HEART RATE: 162 BPM
MCH RBC QN AUTO: 31.8 PG (ref 26.6–33)
MCHC RBC AUTO-ENTMCNC: 33.8 G/DL (ref 31.5–35.7)
MCV RBC AUTO: 94.1 FL (ref 79–97)
PLATELET # BLD AUTO: 186 10*3/MM3 (ref 140–450)
PMV BLD AUTO: 10.8 FL (ref 6–12)
POTASSIUM SERPL-SCNC: 3.5 MMOL/L (ref 3.5–5.2)
RBC # BLD AUTO: 4.43 10*6/MM3 (ref 3.77–5.28)
SODIUM SERPL-SCNC: 135 MMOL/L (ref 136–145)
STRESS TARGET HR: 138 BPM
WBC # BLD AUTO: 3.15 10*3/MM3 (ref 3.4–10.8)

## 2021-08-28 PROCEDURE — 96372 THER/PROPH/DIAG INJ SC/IM: CPT

## 2021-08-28 PROCEDURE — 85027 COMPLETE CBC AUTOMATED: CPT | Performed by: HOSPITALIST

## 2021-08-28 PROCEDURE — 93970 EXTREMITY STUDY: CPT

## 2021-08-28 PROCEDURE — 25010000002 ENOXAPARIN PER 10 MG: Performed by: HOSPITALIST

## 2021-08-28 PROCEDURE — 80048 BASIC METABOLIC PNL TOTAL CA: CPT | Performed by: HOSPITALIST

## 2021-08-28 PROCEDURE — G0378 HOSPITAL OBSERVATION PER HR: HCPCS

## 2021-08-28 RX ORDER — LORAZEPAM 0.5 MG/1
0.5 TABLET ORAL EVERY 6 HOURS PRN
Status: DISCONTINUED | OUTPATIENT
Start: 2021-08-28 | End: 2021-08-29 | Stop reason: HOSPADM

## 2021-08-28 RX ADMIN — ENOXAPARIN SODIUM 90 MG: 100 INJECTION, SOLUTION INTRAVENOUS; SUBCUTANEOUS at 09:10

## 2021-08-28 RX ADMIN — LORAZEPAM 0.5 MG: 0.5 TABLET ORAL at 10:22

## 2021-08-28 RX ADMIN — LORAZEPAM 0.5 MG: 0.5 TABLET ORAL at 22:53

## 2021-08-28 RX ADMIN — TRAZODONE HYDROCHLORIDE 50 MG: 50 TABLET ORAL at 22:53

## 2021-08-28 RX ADMIN — PANTOPRAZOLE SODIUM 40 MG: 40 TABLET, DELAYED RELEASE ORAL at 09:10

## 2021-08-28 RX ADMIN — ESCITALOPRAM 20 MG: 20 TABLET, FILM COATED ORAL at 22:52

## 2021-08-28 RX ADMIN — LORAZEPAM 0.5 MG: 0.5 TABLET ORAL at 16:57

## 2021-08-28 RX ADMIN — ENOXAPARIN SODIUM 90 MG: 100 INJECTION, SOLUTION INTRAVENOUS; SUBCUTANEOUS at 19:32

## 2021-08-28 RX ADMIN — SODIUM CHLORIDE, PRESERVATIVE FREE 10 ML: 5 INJECTION INTRAVENOUS at 09:13

## 2021-08-28 RX ADMIN — SODIUM CHLORIDE, PRESERVATIVE FREE 10 ML: 5 INJECTION INTRAVENOUS at 19:31

## 2021-08-28 RX ADMIN — NEBIVOLOL HYDROCHLORIDE 20 MG: 10 TABLET ORAL at 09:10

## 2021-08-28 RX ADMIN — LOSARTAN POTASSIUM: 50 TABLET, FILM COATED ORAL at 09:10

## 2021-08-28 RX ADMIN — Medication 1000 UNITS: at 09:10

## 2021-08-28 RX ADMIN — BUPROPION HYDROCHLORIDE 300 MG: 300 TABLET, EXTENDED RELEASE ORAL at 09:10

## 2021-08-29 ENCOUNTER — READMISSION MANAGEMENT (OUTPATIENT)
Dept: CALL CENTER | Facility: HOSPITAL | Age: 61
End: 2021-08-29

## 2021-08-29 ENCOUNTER — APPOINTMENT (OUTPATIENT)
Dept: CARDIOLOGY | Facility: HOSPITAL | Age: 61
End: 2021-08-29

## 2021-08-29 VITALS
TEMPERATURE: 98.3 F | RESPIRATION RATE: 16 BRPM | BODY MASS INDEX: 31.96 KG/M2 | SYSTOLIC BLOOD PRESSURE: 142 MMHG | HEIGHT: 65 IN | HEART RATE: 77 BPM | DIASTOLIC BLOOD PRESSURE: 87 MMHG | OXYGEN SATURATION: 95 % | WEIGHT: 191.8 LBS

## 2021-08-29 PROBLEM — R91.1 PULMONARY NODULE: Status: ACTIVE | Noted: 2021-08-29

## 2021-08-29 LAB
ANION GAP SERPL CALCULATED.3IONS-SCNC: 13.5 MMOL/L (ref 5–15)
AORTIC DIMENSIONLESS INDEX: 1 (DI)
BASOPHILS # BLD AUTO: 0.01 10*3/MM3 (ref 0–0.2)
BASOPHILS NFR BLD AUTO: 0.3 % (ref 0–1.5)
BH CV ECHO MEAS - ACS: 2 CM
BH CV ECHO MEAS - AO MAX PG (FULL): -0.57 MMHG
BH CV ECHO MEAS - AO MAX PG: 7.7 MMHG
BH CV ECHO MEAS - AO MEAN PG (FULL): 0 MMHG
BH CV ECHO MEAS - AO MEAN PG: 4 MMHG
BH CV ECHO MEAS - AO V2 MAX: 139 CM/SEC
BH CV ECHO MEAS - AO V2 MEAN: 97.5 CM/SEC
BH CV ECHO MEAS - AO V2 VTI: 28.7 CM
BH CV ECHO MEAS - ASC AORTA: 3.6 CM
BH CV ECHO MEAS - AVA(I,A): 3.1 CM^2
BH CV ECHO MEAS - AVA(I,D): 3.1 CM^2
BH CV ECHO MEAS - AVA(V,A): 3.3 CM^2
BH CV ECHO MEAS - AVA(V,D): 3.3 CM^2
BH CV ECHO MEAS - BSA(HAYCOCK): 2 M^2
BH CV ECHO MEAS - BSA: 1.9 M^2
BH CV ECHO MEAS - BZI_BMI: 32 KILOGRAMS/M^2
BH CV ECHO MEAS - BZI_METRIC_HEIGHT: 165 CM
BH CV ECHO MEAS - BZI_METRIC_WEIGHT: 87 KG
BH CV ECHO MEAS - EDV(CUBED): 85.2 ML
BH CV ECHO MEAS - EDV(MOD-SP2): 85 ML
BH CV ECHO MEAS - EDV(MOD-SP4): 91 ML
BH CV ECHO MEAS - EDV(TEICH): 87.7 ML
BH CV ECHO MEAS - EF(CUBED): 71.4 %
BH CV ECHO MEAS - EF(MOD-BP): 63.4 %
BH CV ECHO MEAS - EF(MOD-SP2): 61.2 %
BH CV ECHO MEAS - EF(MOD-SP4): 63.7 %
BH CV ECHO MEAS - EF(TEICH): 63.3 %
BH CV ECHO MEAS - ESV(CUBED): 24.4 ML
BH CV ECHO MEAS - ESV(MOD-SP2): 33 ML
BH CV ECHO MEAS - ESV(MOD-SP4): 33 ML
BH CV ECHO MEAS - ESV(TEICH): 32.2 ML
BH CV ECHO MEAS - FS: 34.1 %
BH CV ECHO MEAS - IVS/LVPW: 1
BH CV ECHO MEAS - IVSD: 1 CM
BH CV ECHO MEAS - LAT PEAK E' VEL: 10.9 CM/SEC
BH CV ECHO MEAS - LV DIASTOLIC VOL/BSA (35-75): 46.8 ML/M^2
BH CV ECHO MEAS - LV MASS(C)D: 147.8 GRAMS
BH CV ECHO MEAS - LV MASS(C)DI: 76.1 GRAMS/M^2
BH CV ECHO MEAS - LV MAX PG: 8.3 MMHG
BH CV ECHO MEAS - LV MEAN PG: 4 MMHG
BH CV ECHO MEAS - LV SYSTOLIC VOL/BSA (12-30): 17 ML/M^2
BH CV ECHO MEAS - LV V1 MAX: 144 CM/SEC
BH CV ECHO MEAS - LV V1 MEAN: 100 CM/SEC
BH CV ECHO MEAS - LV V1 VTI: 28.1 CM
BH CV ECHO MEAS - LVIDD: 4.4 CM
BH CV ECHO MEAS - LVIDS: 2.9 CM
BH CV ECHO MEAS - LVLD AP2: 7.9 CM
BH CV ECHO MEAS - LVLD AP4: 7.7 CM
BH CV ECHO MEAS - LVLS AP2: 6.3 CM
BH CV ECHO MEAS - LVLS AP4: 6.3 CM
BH CV ECHO MEAS - LVOT AREA (M): 3.1 CM^2
BH CV ECHO MEAS - LVOT AREA: 3.1 CM^2
BH CV ECHO MEAS - LVOT DIAM: 2 CM
BH CV ECHO MEAS - LVPWD: 1 CM
BH CV ECHO MEAS - MED PEAK E' VEL: 7.7 CM/SEC
BH CV ECHO MEAS - MV A DUR: 203 SEC
BH CV ECHO MEAS - MV A MAX VEL: 80.6 CM/SEC
BH CV ECHO MEAS - MV DEC SLOPE: 368 CM/SEC^2
BH CV ECHO MEAS - MV DEC TIME: 198 SEC
BH CV ECHO MEAS - MV E MAX VEL: 71.6 CM/SEC
BH CV ECHO MEAS - MV E/A: 0.89
BH CV ECHO MEAS - MV MAX PG: 2.8 MMHG
BH CV ECHO MEAS - MV MEAN PG: 1 MMHG
BH CV ECHO MEAS - MV P1/2T MAX VEL: 79.1 CM/SEC
BH CV ECHO MEAS - MV P1/2T: 63 MSEC
BH CV ECHO MEAS - MV V2 MAX: 83.8 CM/SEC
BH CV ECHO MEAS - MV V2 MEAN: 49.5 CM/SEC
BH CV ECHO MEAS - MV V2 VTI: 24 CM
BH CV ECHO MEAS - MVA P1/2T LCG: 2.8 CM^2
BH CV ECHO MEAS - MVA(P1/2T): 3.5 CM^2
BH CV ECHO MEAS - MVA(VTI): 3.7 CM^2
BH CV ECHO MEAS - PA ACC TIME: 0.09 SEC
BH CV ECHO MEAS - PA MAX PG (FULL): 1.1 MMHG
BH CV ECHO MEAS - PA MAX PG: 2.8 MMHG
BH CV ECHO MEAS - PA PR(ACCEL): 37.6 MMHG
BH CV ECHO MEAS - PA V2 MAX: 84.2 CM/SEC
BH CV ECHO MEAS - PULM A REVS DUR: 0.1 SEC
BH CV ECHO MEAS - PULM A REVS VEL: 25.7 CM/SEC
BH CV ECHO MEAS - PULM DIAS VEL: 42.2 CM/SEC
BH CV ECHO MEAS - PULM S/D: 1.5
BH CV ECHO MEAS - PULM SYS VEL: 62 CM/SEC
BH CV ECHO MEAS - PVA(V,A): 4.1 CM^2
BH CV ECHO MEAS - PVA(V,D): 4.1 CM^2
BH CV ECHO MEAS - QP/QS: 0.71
BH CV ECHO MEAS - RAP SYSTOLE: 8 MMHG
BH CV ECHO MEAS - RV MAX PG: 1.7 MMHG
BH CV ECHO MEAS - RV MEAN PG: 1 MMHG
BH CV ECHO MEAS - RV V1 MAX: 65.3 CM/SEC
BH CV ECHO MEAS - RV V1 MEAN: 43 CM/SEC
BH CV ECHO MEAS - RV V1 VTI: 11.8 CM
BH CV ECHO MEAS - RVOT AREA: 5.3 CM^2
BH CV ECHO MEAS - RVOT DIAM: 2.6 CM
BH CV ECHO MEAS - RVSP: 36 MMHG
BH CV ECHO MEAS - SI(CUBED): 31.3 ML/M^2
BH CV ECHO MEAS - SI(LVOT): 45.4 ML/M^2
BH CV ECHO MEAS - SI(MOD-SP2): 26.8 ML/M^2
BH CV ECHO MEAS - SI(MOD-SP4): 29.9 ML/M^2
BH CV ECHO MEAS - SI(TEICH): 28.6 ML/M^2
BH CV ECHO MEAS - SV(CUBED): 60.8 ML
BH CV ECHO MEAS - SV(LVOT): 88.3 ML
BH CV ECHO MEAS - SV(MOD-SP2): 52 ML
BH CV ECHO MEAS - SV(MOD-SP4): 58 ML
BH CV ECHO MEAS - SV(RVOT): 62.6 ML
BH CV ECHO MEAS - SV(TEICH): 55.5 ML
BH CV ECHO MEAS - TR MAX PG: 28 MMHG
BH CV ECHO MEAS - TR MAX VEL: 262 CM/SEC
BH CV ECHO MEASUREMENTS AVERAGE E/E' RATIO: 7.7
BH CV XLRA - RV BASE: 3.8 CM
BH CV XLRA - RV LENGTH: 5.8 CM
BH CV XLRA - RV MID: 2.4 CM
BH CV XLRA - TDI S': 11.3 CM/SEC
BUN SERPL-MCNC: 9 MG/DL (ref 6–20)
BUN/CREAT SERPL: 8.1 (ref 7–25)
CALCIUM SPEC-SCNC: 9.7 MG/DL (ref 8.6–10.5)
CHLORIDE SERPL-SCNC: 98 MMOL/L (ref 98–107)
CO2 SERPL-SCNC: 23.5 MMOL/L (ref 22–29)
CREAT SERPL-MCNC: 1.11 MG/DL (ref 0.57–1)
DEPRECATED RDW RBC AUTO: 48.4 FL (ref 37–54)
EOSINOPHIL # BLD AUTO: 0.04 10*3/MM3 (ref 0–0.4)
EOSINOPHIL NFR BLD AUTO: 1.2 % (ref 0.3–6.2)
ERYTHROCYTE [DISTWIDTH] IN BLOOD BY AUTOMATED COUNT: 13.8 % (ref 12.3–15.4)
GFR SERPL CREATININE-BSD FRML MDRD: 50 ML/MIN/1.73
GLUCOSE SERPL-MCNC: 86 MG/DL (ref 65–99)
HCT VFR BLD AUTO: 42.5 % (ref 34–46.6)
HGB BLD-MCNC: 14 G/DL (ref 12–15.9)
IMM GRANULOCYTES # BLD AUTO: 0.01 10*3/MM3 (ref 0–0.05)
IMM GRANULOCYTES NFR BLD AUTO: 0.3 % (ref 0–0.5)
LEFT ATRIUM VOLUME INDEX: 12.6 ML/M2
LYMPHOCYTES # BLD AUTO: 1.05 10*3/MM3 (ref 0.7–3.1)
LYMPHOCYTES NFR BLD AUTO: 32.2 % (ref 19.6–45.3)
MAXIMAL PREDICTED HEART RATE: 162 BPM
MCH RBC QN AUTO: 31.6 PG (ref 26.6–33)
MCHC RBC AUTO-ENTMCNC: 32.9 G/DL (ref 31.5–35.7)
MCV RBC AUTO: 95.9 FL (ref 79–97)
MONOCYTES # BLD AUTO: 0.46 10*3/MM3 (ref 0.1–0.9)
MONOCYTES NFR BLD AUTO: 14.1 % (ref 5–12)
NEUTROPHILS NFR BLD AUTO: 1.69 10*3/MM3 (ref 1.7–7)
NEUTROPHILS NFR BLD AUTO: 51.9 % (ref 42.7–76)
NRBC BLD AUTO-RTO: 0 /100 WBC (ref 0–0.2)
PLATELET # BLD AUTO: 174 10*3/MM3 (ref 140–450)
PMV BLD AUTO: 10.8 FL (ref 6–12)
POTASSIUM SERPL-SCNC: 3.5 MMOL/L (ref 3.5–5.2)
RBC # BLD AUTO: 4.43 10*6/MM3 (ref 3.77–5.28)
SODIUM SERPL-SCNC: 135 MMOL/L (ref 136–145)
STRESS TARGET HR: 138 BPM
WBC # BLD AUTO: 3.26 10*3/MM3 (ref 3.4–10.8)

## 2021-08-29 PROCEDURE — G0378 HOSPITAL OBSERVATION PER HR: HCPCS

## 2021-08-29 PROCEDURE — 85025 COMPLETE CBC W/AUTO DIFF WBC: CPT | Performed by: HOSPITALIST

## 2021-08-29 PROCEDURE — 80048 BASIC METABOLIC PNL TOTAL CA: CPT | Performed by: HOSPITALIST

## 2021-08-29 PROCEDURE — 93306 TTE W/DOPPLER COMPLETE: CPT

## 2021-08-29 PROCEDURE — 25010000002 PERFLUTREN (DEFINITY) 8.476 MG IN SODIUM CHLORIDE (PF) 0.9 % 10 ML INJECTION: Performed by: HOSPITALIST

## 2021-08-29 PROCEDURE — 93306 TTE W/DOPPLER COMPLETE: CPT | Performed by: INTERNAL MEDICINE

## 2021-08-29 PROCEDURE — 25010000002 ENOXAPARIN PER 10 MG: Performed by: HOSPITALIST

## 2021-08-29 PROCEDURE — 96372 THER/PROPH/DIAG INJ SC/IM: CPT

## 2021-08-29 RX ADMIN — Medication 1000 UNITS: at 08:58

## 2021-08-29 RX ADMIN — PERFLUTREN 2 ML: 6.52 INJECTION, SUSPENSION INTRAVENOUS at 10:36

## 2021-08-29 RX ADMIN — ENOXAPARIN SODIUM 90 MG: 100 INJECTION, SOLUTION INTRAVENOUS; SUBCUTANEOUS at 08:58

## 2021-08-29 RX ADMIN — LORAZEPAM 0.5 MG: 0.5 TABLET ORAL at 08:58

## 2021-08-29 RX ADMIN — PANTOPRAZOLE SODIUM 40 MG: 40 TABLET, DELAYED RELEASE ORAL at 08:58

## 2021-08-29 RX ADMIN — AMLODIPINE BESYLATE 5 MG: 5 TABLET ORAL at 08:58

## 2021-08-29 RX ADMIN — LOSARTAN POTASSIUM: 50 TABLET, FILM COATED ORAL at 08:58

## 2021-08-29 RX ADMIN — BUPROPION HYDROCHLORIDE 300 MG: 300 TABLET, EXTENDED RELEASE ORAL at 08:58

## 2021-08-29 RX ADMIN — SODIUM CHLORIDE, PRESERVATIVE FREE 10 ML: 5 INJECTION INTRAVENOUS at 09:00

## 2021-08-29 RX ADMIN — NEBIVOLOL HYDROCHLORIDE 20 MG: 10 TABLET ORAL at 08:58

## 2021-08-29 NOTE — OUTREACH NOTE
Prep Survey      Responses   [facility] patient discharged from?  [facility]   Is LACE score < 7 ?  Yes   Emergency Room discharge w/ pulse ox?  No   Eligibility  [facility]   Date of Admission  08/27/21   Date of Discharge  08/29/21   Discharge Disposition  Home or Self Care   Discharge diagnosis  multiple bilateral pulmonary emboli   Does the patient have one of the following disease processes/diagnoses(primary or secondary)?  Other   Does the patient have Home health ordered?  No   Is there a DME ordered?  No   Prep survey completed?  Yes          Sangeeta Guadalupe RN

## 2021-08-30 ENCOUNTER — TRANSITIONAL CARE MANAGEMENT TELEPHONE ENCOUNTER (OUTPATIENT)
Dept: CALL CENTER | Facility: HOSPITAL | Age: 61
End: 2021-08-30

## 2021-08-30 NOTE — OUTREACH NOTE
Call Center TCM Note      Responses   [facility] patient discharged from?  [place]   Does the patient have one of the following disease processes/diagnoses(primary or secondary)?  Other   TCM attempt successful?  Yes   Call start time  1518   Call end time  1520   Discharge diagnosis  multiple bilateral pulmonary emboli   Meds reviewed with patient/caregiver?  Yes   Is the patient having any side effects they believe may be caused by any medication additions or changes?  No   Does the patient have all medications ordered at discharge?  Yes   Is the patient taking all medications as directed (includes completed medication regime)?  Yes   Does the patient have a primary care provider?   Yes   Does the patient have an appointment with their PCP within 7 days of discharge?  Yes   Comments regarding PCP  PCP APPOINTMENT IS TOMORROW   Has the patient kept scheduled appointments due by today?  N/A   Has home health visited the patient within 72 hours of discharge?  N/A   Psychosocial issues?  No   Did the patient receive a copy of their discharge instructions?  Yes   Nursing interventions  Reviewed instructions with patient   What is the patient's perception of their health status since discharge?  Improving   Is the patient/caregiver able to teach back signs and symptoms related to disease process for when to call PCP?  Yes   Is the patient/caregiver able to teach back signs and symptoms related to disease process for when to call 911?  Yes   Is the patient/caregiver able to teach back the hierarchy of who to call/visit for symptoms/problems? PCP, Specialist, Home health nurse, Urgent Care, ED, 911  Yes   If the patient is a current smoker, are they able to teach back resources for cessation?  Not a smoker   TCM call completed?  Yes          Dinorah Martinez LPN    8/30/2021, 15:27 EDT

## 2021-08-31 ENCOUNTER — OFFICE VISIT (OUTPATIENT)
Dept: FAMILY MEDICINE CLINIC | Facility: CLINIC | Age: 61
End: 2021-08-31

## 2021-08-31 ENCOUNTER — TELEPHONE (OUTPATIENT)
Dept: CARDIOLOGY | Facility: CLINIC | Age: 61
End: 2021-08-31

## 2021-08-31 VITALS
TEMPERATURE: 96.9 F | BODY MASS INDEX: 32.19 KG/M2 | OXYGEN SATURATION: 98 % | DIASTOLIC BLOOD PRESSURE: 84 MMHG | SYSTOLIC BLOOD PRESSURE: 138 MMHG | HEIGHT: 65 IN | HEART RATE: 72 BPM | WEIGHT: 193.2 LBS

## 2021-08-31 DIAGNOSIS — I26.99 BILATERAL PULMONARY EMBOLISM (HCC): ICD-10-CM

## 2021-08-31 DIAGNOSIS — I10 ESSENTIAL HYPERTENSION: Primary | ICD-10-CM

## 2021-08-31 DIAGNOSIS — R82.90 ABNORMAL URINE ODOR: Primary | ICD-10-CM

## 2021-08-31 DIAGNOSIS — F41.0 ANXIETY DISORDER DUE TO GENERAL MEDICAL CONDITION WITH PANIC ATTACK: ICD-10-CM

## 2021-08-31 DIAGNOSIS — F06.4 ANXIETY DISORDER DUE TO GENERAL MEDICAL CONDITION WITH PANIC ATTACK: ICD-10-CM

## 2021-08-31 DIAGNOSIS — R06.02 SHORTNESS OF BREATH: ICD-10-CM

## 2021-08-31 LAB
BILIRUB BLD-MCNC: NEGATIVE MG/DL
CLARITY, POC: CLEAR
COLOR UR: YELLOW
GLUCOSE UR STRIP-MCNC: NEGATIVE MG/DL
KETONES UR QL: NEGATIVE
LEUKOCYTE EST, POC: ABNORMAL
NITRITE UR-MCNC: NEGATIVE MG/ML
PH UR: 7 [PH] (ref 5–8)
PROT UR STRIP-MCNC: NEGATIVE MG/DL
RBC # UR STRIP: NEGATIVE /UL
SP GR UR: 1.01 (ref 1–1.03)
UROBILINOGEN UR QL: NORMAL

## 2021-08-31 PROCEDURE — 81003 URINALYSIS AUTO W/O SCOPE: CPT

## 2021-08-31 PROCEDURE — 99214 OFFICE O/P EST MOD 30 MIN: CPT

## 2021-08-31 PROCEDURE — 71046 X-RAY EXAM CHEST 2 VIEWS: CPT

## 2021-08-31 PROCEDURE — 1111F DSCHRG MED/CURRENT MED MERGE: CPT

## 2021-08-31 NOTE — PROGRESS NOTES
Transitional Care Follow Up Visit  Subjective     Amanda Sherwood is a 60 y.o. female who presents for a transitional care management visit.    Within 48 business hours after discharge our office contacted her via telephone to coordinate her care and needs.      I reviewed and discussed the details of that call along with the discharge summary, hospital problems, inpatient lab results, inpatient diagnostic studies, and consultation reports with Amanda.     Current outpatient and discharge medications have been reconciled for the patient.  Reviewed by: OLVIN Mcgee      Date of TCM Phone Call 8/29/2021   [facility]   Date of Admission 8/27/2021   Date of Discharge 8/29/2021   Discharge Disposition Home or Self Care     Risk for Readmission (LACE) Score: 3 (8/29/2021  6:00 AM)      History of Present Illness   Course During Hospital Stay: Patient stated that she started getting very short of breath on  8/23/2021. It was not getting better and called cardiologist who worked her in on the 27th. D dimer elevated and got Stat CTA which found bilateral pulmonary embolus.  Patient was admitted to [facility] and started on Lovenox.  Patient was never started on oxygen.  Transitioned to Eliquis. Dopplers of bilateral lower extremities were negative for DVT. Echo EF 63% with mild pulmonary hypertension. Had no idea where PE came from.  Has still been short of breath mostly with exertion since hospitalization but patient stated is definitely better than when she first went in.  Has chronic cough, previously saw pulmonologist.  Overall patient is just feeling very fatigued with the past two hospitalizations and like she needs to rebuild her strength.  No complaints of wheezing or PND.  Denies chest pain, headache, dizziness.  Is feeling more anxious due to recent illnesses and not being able to work.  Will follow up with psychiatrist who is managing her Wellbutrin, Lexapro, and Ativan.    The other complaint  today is patient feels like her urine is smelling more strongly.  No burning, dysuria, hematuria, fever, chills, muscle aches, or flank pain.  No nausea or vomiting.  Patient states that she probably has not been drinking as much water recently either.        The following portions of the patient's history were reviewed and updated as appropriate: problem list.medical, family, surgical history, allergies, medications    Review of Systems   Constitutional: Positive for fatigue.   Respiratory: Positive for shortness of breath. Negative for chest tightness and wheezing.    Cardiovascular: Negative for chest pain, palpitations and leg swelling.   Genitourinary: Negative for decreased urine volume, dysuria, flank pain, frequency, hematuria and urgency.   Musculoskeletal: Negative for back pain.   Neurological: Negative for dizziness, light-headedness and headaches.   Psychiatric/Behavioral: The patient is nervous/anxious.        Objective   Physical Exam  Constitutional:       General: She is not in acute distress.  Cardiovascular:      Rate and Rhythm: Normal rate and regular rhythm.      Pulses: Normal pulses.      Heart sounds: Normal heart sounds. No murmur heard.     Pulmonary:      Effort: No respiratory distress.      Breath sounds: Normal breath sounds.   Skin:     General: Skin is warm and dry.      Capillary Refill: Capillary refill takes less than 2 seconds.   Neurological:      General: No focal deficit present.      Mental Status: She is alert.   Psychiatric:         Mood and Affect: Mood is anxious. Affect is tearful.       X-ray chest PA and lateral obtained in office.  Awaiting full radiology report.    Assessment/Plan   Problems Addressed this Visit        Mental Health    Anxiety disorder due to general medical condition with panic attack      Other Visit Diagnoses     Abnormal urine odor    -  Primary    Relevant Orders    POC Urinalysis Dipstick, Automated (Completed)    Urine Culture - Urine, Urine,  Clean Catch    Bilateral pulmonary embolism (CMS/HCC)        Relevant Orders    Ambulatory Referral to Hematology    Shortness of breath        Relevant Orders    XR Chest PA & Lateral (In Office) (Completed)      Diagnoses       Codes Comments    Abnormal urine odor    -  Primary ICD-10-CM: R82.90  ICD-9-CM: 791.9     Bilateral pulmonary embolism (CMS/HCC)     ICD-10-CM: I26.99  ICD-9-CM: 415.19     Shortness of breath     ICD-10-CM: R06.02  ICD-9-CM: 786.05     Anxiety disorder due to general medical condition with panic attack     ICD-10-CM: F06.4, F41.0  ICD-9-CM: 293.84, 300.01              Pleasant 60-year-old female who presents to the clinic for hospital follow-up.  Was hospitalized from 8/27/2021 to 8/29/2021 for bilateral pulmonary embolus.  Patient was never started on oxygen but was started on Lovenox and then transition to Eliquis.  Dopplers of both lower extremities were negative for DVT.  Echo showed EF 63% some mild pulmonary hypertension.  CTA showed the bilateral pulmonary embolus along with a 6 mm nodule that we will do a follow-up CTA for in a few months.  Patient does appear very anxious today and tearful.  She does feel like her shortness of breath has gotten better since the weekend but is still feeling short of breath with exertion.  Patient is overall just feeling very fatigued from these past 2 hospitalizations.    Bilateral pulmonary embolism-continue Eliquis.  Follow-up with cardiology next week as scheduled.  May take a few weeks to recover from recent hospitalizations and to build up strength.  However if symptoms get worse call office immediately.  If extreme shortness of breath or chest pain to go to ER immediately.  We will send hematology referral to assess for any kind of clotting disorder that may have caused the PE.    Abnormal urine odor-UA obtained in office and was positive for small leukocytes.  This may be due to contaminated specimen.  Patient has no complaints of urgency,  burning, pain with urination, blood in urine, or any flank pain.  If any of the symptoms developed counseled patient to call immediately.  We will send urine for culture.    Shortness of breath-chest x-ray obtained in office.  Could not see any new acute processes in x-ray that were not found in the CTA already.  But awaiting full radiology report.    Anxiety-patient usually well controlled on Wellbutrin 150, Lexapro 20, and as needed Ativan 0.5 mg.  Follows psychiatry for these medications.  Counseled to follow-up with psychiatry as soon as possible since patient has been more anxious due to recent hospitalizations.  Patient does states she has good family support.  Counseled patient to rest as much as possible and will give note to be off work for the next 2 weeks to help with anxiety as well and to recover.    I will want to follow this patient very closely due to recent hospitalization for PE.  Patient is seeing cardiology next week and they will be obtaining lab work at that time.  Will review these with patient when I see her again in 2 weeks.  Counseled patient if any of her symptoms are getting worse to immediately call clinic or go to ER.  If patient has any concerns can also call for support.    Medical assistant and I wore mask and eyewear protection during entire encounter.  Patient wore mask.      Electronically signed by OLVIN Mcgee, 08/31/21, 3:50 PM EDT.

## 2021-08-31 NOTE — TELEPHONE ENCOUNTER
Patient was recently hospitalized for pulmonary embolism.  I called to check on her and she is feeling a little bit better.  She still has some residual shortness of breath.  She has been compliant with the apixaban and will be glad to refill that for her.  She is going to restart her lisinopril-HCTZ for elevated blood pressure.  She will return in the next 1 to 2 weeks and will need to repeat a BMP.    Shanon-please see if Dr. Maradiaga has a hospital follow-up visit in the next 1 to 2 weeks.  If not, please schedule with me.  Thank you

## 2021-08-31 NOTE — PROGRESS NOTES
"Chief Complaint  Hospital Follow Up Visit (Blood clots in both lungs. records are in chart she is still having sob. Patient noticed a hernia on the left side of her stomach that was confirmed by ct scan she wanted to mention. She said her urine smells like ammonia wanting urine checked )    Subjective     {Problem List  Visit Diagnosis   Encounters  Notes  Medications  Labs  Result Review Imaging  Media :23}     Amanda Sherwood presents to [facility] PR[facility]  History of Present Illness    Objective   Vital Signs:   /84   Pulse 72   Ht 165.1 cm (65\")   Wt 87.6 kg (193 lb 3.2 oz)   SpO2 98%   BMI 32.15 kg/m²     Physical Exam   Result Review :{Labs  Result Review  Imaging  Med Tab  Media  Procedures :23}   {The following data was reviewed by (Optional):67954}  {Ambulatory Labs (Optional):26410}  {Data reviewed (Optional):73416:::1}          Assessment and Plan {CC Problem List  Visit Diagnosis   ROS  Review (Popup)  Health Maintenance  Quality  BestPractice  Medications  SmartSets  SnapShot Encounters  Media :23}   There are no diagnoses linked to this encounter.  {Time Spent (Optional):86275}  Follow Up {Instructions Charge Capture  Follow-up Communications :23}  No follow-ups on file.  Patient was given instructions and counseling regarding her condition or for health maintenance advice. Please see specific information pulled into the AVS if appropriate.       "

## 2021-09-01 ENCOUNTER — TELEPHONE (OUTPATIENT)
Dept: ONCOLOGY | Facility: CLINIC | Age: 61
End: 2021-09-01

## 2021-09-01 NOTE — TELEPHONE ENCOUNTER
Provider: PHONG    Caller: TASHA    Relationship to Patient:SELF    Phone Number: [phone]    Reason for Call: PATIENT REQUEST TO SEE DR WARNER INSTEAD OF DR DARLING. PT ALSO NEEDS APPOINTMENT RESCHEDULED. PLEASE REASSIGN PATIENT'S DR AND RESCHEDULE APPOINTMENT. CALL PATIENT BACK TO ADVISE AND LVM IF NO ANSWER

## 2021-09-02 ENCOUNTER — TELEPHONE (OUTPATIENT)
Dept: FAMILY MEDICINE CLINIC | Facility: CLINIC | Age: 61
End: 2021-09-02

## 2021-09-02 LAB
BACTERIA UR CULT: NORMAL
BACTERIA UR CULT: NORMAL

## 2021-09-03 ENCOUNTER — TELEPHONE (OUTPATIENT)
Dept: FAMILY MEDICINE CLINIC | Facility: CLINIC | Age: 61
End: 2021-09-03

## 2021-09-03 DIAGNOSIS — R06.02 SHORTNESS OF BREATH: ICD-10-CM

## 2021-09-03 DIAGNOSIS — I26.99 BILATERAL PULMONARY EMBOLISM (HCC): Primary | ICD-10-CM

## 2021-09-03 NOTE — TELEPHONE ENCOUNTER
Patient is needing a new referral. She has already established care with Dr Jordon Seth, current referral is . Will Tasneem authorize a new referral for chronic cough and pulmonary htn? Please advise.

## 2021-09-07 ENCOUNTER — TELEPHONE (OUTPATIENT)
Dept: FAMILY MEDICINE CLINIC | Facility: CLINIC | Age: 61
End: 2021-09-07

## 2021-09-07 DIAGNOSIS — I27.20 PULMONARY HTN (HCC): Primary | ICD-10-CM

## 2021-09-07 DIAGNOSIS — R05.3 CHRONIC COUGH: ICD-10-CM

## 2021-09-09 ENCOUNTER — OFFICE VISIT (OUTPATIENT)
Dept: CARDIOLOGY | Facility: CLINIC | Age: 61
End: 2021-09-09

## 2021-09-09 ENCOUNTER — LAB (OUTPATIENT)
Dept: LAB | Facility: HOSPITAL | Age: 61
End: 2021-09-09

## 2021-09-09 VITALS
HEART RATE: 67 BPM | WEIGHT: 194.8 LBS | SYSTOLIC BLOOD PRESSURE: 120 MMHG | DIASTOLIC BLOOD PRESSURE: 70 MMHG | HEIGHT: 65 IN | BODY MASS INDEX: 32.46 KG/M2

## 2021-09-09 DIAGNOSIS — I10 ESSENTIAL HYPERTENSION: ICD-10-CM

## 2021-09-09 DIAGNOSIS — E78.2 MIXED HYPERLIPIDEMIA: ICD-10-CM

## 2021-09-09 DIAGNOSIS — I10 ESSENTIAL HYPERTENSION: Primary | ICD-10-CM

## 2021-09-09 LAB
ANION GAP SERPL CALCULATED.3IONS-SCNC: 17.1 MMOL/L (ref 5–15)
BUN SERPL-MCNC: 11 MG/DL (ref 6–20)
BUN/CREAT SERPL: 10 (ref 7–25)
CALCIUM SPEC-SCNC: 10.5 MG/DL (ref 8.6–10.5)
CHLORIDE SERPL-SCNC: 84 MMOL/L (ref 98–107)
CO2 SERPL-SCNC: 24.9 MMOL/L (ref 22–29)
CREAT SERPL-MCNC: 1.1 MG/DL (ref 0.57–1)
GFR SERPL CREATININE-BSD FRML MDRD: 51 ML/MIN/1.73
GLUCOSE SERPL-MCNC: 87 MG/DL (ref 65–99)
POTASSIUM SERPL-SCNC: 3.8 MMOL/L (ref 3.5–5.2)
SODIUM SERPL-SCNC: 126 MMOL/L (ref 136–145)

## 2021-09-09 PROCEDURE — 80048 BASIC METABOLIC PNL TOTAL CA: CPT

## 2021-09-09 PROCEDURE — 36415 COLL VENOUS BLD VENIPUNCTURE: CPT

## 2021-09-09 PROCEDURE — 93000 ELECTROCARDIOGRAM COMPLETE: CPT | Performed by: INTERNAL MEDICINE

## 2021-09-09 PROCEDURE — 99214 OFFICE O/P EST MOD 30 MIN: CPT | Performed by: INTERNAL MEDICINE

## 2021-09-09 NOTE — PROGRESS NOTES
Date of Office Visit: 2021  Encounter Provider: Yue Maradiaga MD  Place of Service: [facility] CARDIOLOGY  Patient Name: Amanda Sherwood  :1960      Patient ID:  Amanda Sherwood is a 60 y.o. female is here for  followup for hypertension and recent pulmonary emboli.         History of Present Illness    She has known hypertension for which she takes 3 medications. About 1-2 months ago, she also  had premature ventricular complexes which were new for her. She has a history of SVT.         She does get low platelets and low white counts when she is using ibuprofen. She has had  some gastroesophageal reflux disease, but it has been fairly well controlled.     Her father had myocardial infarction when he was 60s years old requiring angioplasty and  then went on to have coronary bypass grafting. She has never had vascular screening or  coronary calcium score done.     She had bunion surgery and it failed.  She had to have a second bunion surgery and she is still dealing with the consequences of that, and because she missed so much work she lost her job at [employer].  She has gained about 25 pounds because she has not been able to exercise and she has not been eating well.         She had a normal stress nuclear perfusion study and a normal echocardiogram done 2018.     She began having severe vomiting with a GI virus on .  She lost 20 pounds in a week and cannot get out of bed.  She then came to the emergency department on  with acute kidney injury and found to have urinary tract infection.  She was hydrated and treated for this and was discharged.  She said though she was pretty much almost bedridden for a week.  She was admitted -2021 with multisubsegmental pulmonary emboli without acute cor pulmonale.  She presented with short windedness to the [facility] on 2021.  A D-dimer was done and was elevated at 2.09.  She was sent to the emergency  department.  proBNP and troponin were negative.  Her BMP was unremarkable, normal TSH and CBC.  CTA of the chest showed bilateral lower lobe branch pulmonary emboli.  I did review her images on CT.  There is not good imaging of the left main or circumflex.  The RCA is not well visualized but the proximal LAD appears patent.  There is no calcification noted in the aorta.  She had repeat laboratory values done 8/20/2021 which show sodium 135, potassium 3.5, creatinine 1.11.  She had echocardiogram done 8/29/21 changes fraction 63%, no seen valve disease, RVSP 36 mmHg, normal right ventricular size and function.  She had normal bilateral lower extremity venous duplex studies done.      She is very weak and short winded with minimal activity still.  She is trying to exercise again her strength back but she is just having a lot difficulty. She has not had chest pain.  She has not had tachycardia.  She has no f/c/n/v.  She has a cough which may be due to ARB as she has had this prior with ACE.  She had a basic metabolic panel done 9/9/2021 showing sodium 126, creatinine 1.10.    Past Medical History:   Diagnosis Date   • Allergic    • Anxiety 3 years ago   • Depression    • GERD (gastroesophageal reflux disease)    • History of bronchitis    • History of migraine    • Hyperlipidemia     NO MEDS CURRENTLY. WORKING ON   • Hypertension    • Leukopenia     PER HISTORY. HAS SEEN CBC IN THE PAST   • Neck pain    • Obesity (BMI 30.0-34.9) 6/6/2019   • Osteoarthritis    • PVC (premature ventricular contraction)    • Seasonal allergies    • Torn meniscus     LEFT   • Tremor 3 years ago         Past Surgical History:   Procedure Laterality Date   • BREAST BIOPSY      BENIGN. HORACE BREAST   • BUNIONECTOMY Left    • BUNIONECTOMY Left     REVISION   • COLONOSCOPY N/A 2/1/2020    Procedure: COLONOSCOPY TO CECUM AND INTO TERMINAL ILEUM WITH COLD BIOPSY POLYPECTOMY;  Surgeon: Munira Montes MD;  Location: [facility] ENDOSCOPY;  Service:  Gastroenterology   • ENDOSCOPY N/A 2/1/2020    Procedure: ESOPHAGOGASTRODUODENOSCOPY WITH COLD BIOPSIES;  Surgeon: Munira Montes MD;  Location: [facility] ENDOSCOPY;  Service: Gastroenterology   • KNEE ARTHROSCOPY Left 8/22/2018    Procedure: KNEE ARTHROSCOPY LATERAL MENISECTOMY DEBRIDEMENT OF MEDIAL FEMORAL CONDYLE DEFECT DEBRIDEMENT OF ARTHRITIS;  Surgeon: Graciela Crockett MD;  Location:  [facility];  Service: Orthopedics   • SALPINGO OOPHORECTOMY Left    • TONSILLECTOMY     • TONSILLECTOMY  as a child   • TUBAL ABDOMINAL LIGATION         Current Outpatient Medications on File Prior to Visit   Medication Sig Dispense Refill   • amLODIPine (NORVASC) 5 MG tablet Take 1 tablet by mouth 1 (One) Time As Needed (for elevated blood pressure). (Patient taking differently: Take 5 mg by mouth As Needed (for elevated blood pressure).) 90 tablet 3   • Apixaban Starter Pack tablet therapy pack Take two 5 mg tablets by mouth every 12 hours for 7 days. Followed by one 5 mg tablet every 12 hours. (Dispense starter pack if available) 74 tablet 0   • atorvastatin (Lipitor) 40 MG tablet Take 1 tablet by mouth Every Night. Take half tab for 1 week then take full tab 30 tablet 3   • buPROPion XL (WELLBUTRIN XL) 150 MG 24 hr tablet Take 300 mg by mouth Every Morning.     • Bystolic 20 MG tablet TAKE ONE TABLET BY MOUTH DAILY 90 tablet 3   • calcium carbonate (TUMS) 500 MG chewable tablet Chew 1 tablet As Needed.     • cetirizine (zyrTEC) 10 MG tablet Take 10 mg by mouth As Needed.     • Cholecalciferol (VITAMIN D3) 5000 UNITS capsule capsule Take 5,000 Units by mouth daily.     • escitalopram (LEXAPRO) 20 MG tablet Take 1 tablet by mouth Daily. (Patient taking differently: Take 20 mg by mouth Every Night.) 90 tablet 3   • esomeprazole (NexIUM) 20 MG capsule Take 20 mg by mouth Daily.     • LORazepam (Ativan) 0.5 MG tablet Take 0.5 mg by mouth Daily As Needed for Anxiety.     • losartan-hydrochlorothiazide (HYZAAR) 100-25 MG per  "tablet Take 1 tablet by mouth Daily.     • ondansetron (ZOFRAN) 4 MG tablet Take 1 tablet by mouth Every 8 (Eight) Hours As Needed for Nausea. 20 tablet 1   • traZODone (DESYREL) 50 MG tablet Take 50 mg by mouth Every Night.       No current facility-administered medications on file prior to visit.       Social History     Socioeconomic History   • Marital status:      Spouse name: Not on file   • Number of children: Not on file   • Years of education: Not on file   • Highest education level: Not on file   Tobacco Use   • Smoking status: Former Smoker     Packs/day: 0.50     Years: 4.00     Pack years: 2.00     Types: Cigarettes     Quit date: 1987     Years since quittin.8   • Smokeless tobacco: Never Used   Vaping Use   • Vaping Use: Never used   Substance and Sexual Activity   • Alcohol use: Yes     Alcohol/week: 2.0 standard drinks     Types: 2 Cans of beer per week     Comment: caffeine - None   • Drug use: Yes     Types: Marijuana   • Sexual activity: Yes     Partners: Male     Birth control/protection: Post-menopausal           ROS    Procedures    ECG 12 Lead    Date/Time: 2021 10:50 AM  Performed by: Yue Maradiaga MD  Authorized by: Yue Maradiaga MD   Comparison: compared with previous ECG   Similar to previous ECG  Rhythm: sinus rhythm    Clinical impression: normal ECG                Objective:      Vitals:    21 1016   BP: 120/70   BP Location: Right arm   Pulse: 67   Weight: 88.4 kg (194 lb 12.8 oz)   Height: 165.1 cm (65\")     Body mass index is 32.42 kg/m².    Vitals reviewed.   Constitutional:       General: Not in acute distress.     Appearance: Well-developed. Not diaphoretic.   Eyes:      General: No scleral icterus.     Conjunctiva/sclera: Conjunctivae normal.   HENT:      Head: Normocephalic and atraumatic.   Neck:      Thyroid: No thyromegaly.      Vascular: No carotid bruit or JVD.      Lymphadenopathy: No cervical adenopathy.   Pulmonary:      " Effort: Pulmonary effort is normal. No respiratory distress.      Breath sounds: Normal breath sounds. No wheezing. No rhonchi. No rales.   Chest:      Chest wall: Not tender to palpatation.   Cardiovascular:      Normal rate. Regular rhythm.      Murmurs: There is no murmur.      No gallop.   Pulses:     Intact distal pulses.   Edema:     Peripheral edema absent.   Abdominal:      General: Bowel sounds are normal. There is no distension or abdominal bruit.      Palpations: Abdomen is soft. There is no abdominal mass.      Tenderness: There is no abdominal tenderness.   Musculoskeletal:         General: No deformity.      Extremities: No clubbing present.     Cervical back: Neck supple. Skin:     General: Skin is warm and dry. There is no cyanosis.      Coloration: Skin is not pale.      Findings: No rash.   Neurological:      Mental Status: Alert and oriented to person, place, and time.      Cranial Nerves: No cranial nerve deficit.   Psychiatric:         Judgment: Judgment normal.         Lab Review:       Assessment:      Diagnosis Plan   1. Essential hypertension     2. Mixed hyperlipidemia          1. Hypertension, well controlled.   2. Elevated LDL. On atorvastatin.   3. PVCs. Stable.   4. Family history of cardiovascular disease in her father.  5.  Pulmonary embolism, on apixaban.      Plan:       See [facility] in 6 weeks.  Stop Hyzaar for her cough.  It may be usual losartan.  Monitor blood pressure at home.  Take amlodipine daily.  May need spironolactone for blood pressure if her cough does get better off of Hyzaar.  Would not restart hydrochlorothiazide even if the cough persists as I think hydrochlorothiazide is driving her hyponatremia.  She is very weak and still short winded.  I do not think it safe for her to go back to work next week or the week after.  She probably needs at least a couple more weeks to recover.  I recommended that she remain off work for the next 2 to 3 weeks.

## 2021-09-13 ENCOUNTER — TELEPHONE (OUTPATIENT)
Dept: FAMILY MEDICINE CLINIC | Facility: CLINIC | Age: 61
End: 2021-09-13

## 2021-09-13 NOTE — TELEPHONE ENCOUNTER
PATIENT CALLING STATES CARDIOLOGISTS WANTS PATIENT OFF UNTIL September 30TH. CAN THOSE DATES BE ADDED TO THE [facility] PAPERWORK.     CALLBACK # [phone]

## 2021-09-14 ENCOUNTER — OFFICE VISIT (OUTPATIENT)
Dept: FAMILY MEDICINE CLINIC | Facility: CLINIC | Age: 61
End: 2021-09-14

## 2021-09-14 ENCOUNTER — TELEPHONE (OUTPATIENT)
Dept: FAMILY MEDICINE CLINIC | Facility: CLINIC | Age: 61
End: 2021-09-14

## 2021-09-14 VITALS
SYSTOLIC BLOOD PRESSURE: 122 MMHG | WEIGHT: 193.4 LBS | OXYGEN SATURATION: 98 % | HEART RATE: 67 BPM | RESPIRATION RATE: 16 BRPM | TEMPERATURE: 97.1 F | BODY MASS INDEX: 32.18 KG/M2 | DIASTOLIC BLOOD PRESSURE: 82 MMHG

## 2021-09-14 DIAGNOSIS — R06.02 SHORTNESS OF BREATH: Primary | ICD-10-CM

## 2021-09-14 DIAGNOSIS — E87.1 LOW SODIUM LEVELS: ICD-10-CM

## 2021-09-14 DIAGNOSIS — R53.83 OTHER FATIGUE: ICD-10-CM

## 2021-09-14 DIAGNOSIS — F06.4 ANXIETY DISORDER DUE TO GENERAL MEDICAL CONDITION WITH PANIC ATTACK: ICD-10-CM

## 2021-09-14 DIAGNOSIS — R05.3 CHRONIC COUGH: ICD-10-CM

## 2021-09-14 DIAGNOSIS — F41.0 ANXIETY DISORDER DUE TO GENERAL MEDICAL CONDITION WITH PANIC ATTACK: ICD-10-CM

## 2021-09-14 PROCEDURE — 99214 OFFICE O/P EST MOD 30 MIN: CPT

## 2021-09-14 NOTE — ASSESSMENT & PLAN NOTE
Patient is scheduled to see pulmonology at the end of October.  Trying to get patient in sooner so that she can be evaluated.  Right now shortness of breath is mostly with exertion and patient stated she can catch her breath when sitting.  Oxygen saturation 98%.  Counseled patient to get pulse ox so that she can check at home if shortness of breath gets worse.  Patient has already had a recent chest x-ray that had no acute findings and CT scan from hospital visit.  Since symptoms are not getting worse patient does not need further imaging.  Counseled patient to slowly increase exercise as able to tolerate.  If shortness of breath does get worse or experiencing chest pain immediately go to ER.

## 2021-09-14 NOTE — PROGRESS NOTES
Chief Complaint  Shortness of Breath (Follow up, patient said its not getting any better. Discuss [place] paperwork.)    Subjective          Amanda Sherwood presents to [facility] PRIMARY CARE  History of Present Illness     Patient presents to the office for 2-week follow-up.  2 weeks ago patient was seen for hospital follow-up after going to the hospital for shortness of breath and bilateral pulmonary emboli.  When I saw her 2 weeks ago her shortness of breath had improved since being in the hospital but still was experiencing shortness of breath especially with exertion.  Vital signs were stable.  Chest x-ray was obtained at that time and showed no edema or any acute findings.  Patient was then seen by cardiology on 9/9/2021.  At that visit a BMP was drawn and her sodium was 126.  Her Hyzaar was stopped due to her chronic cough getting worse and low sodium.  She is also recommended to stay off work for the next 2 to 3 weeks due to being short winded and weak still.    Today patient presents and is still feeling short winded and weak.  Patient has been trying to increase exercise by using stationary bike and walking.  Patient says that she gets short winded pretty easily and has to rest.  When resting patient is able to catch her breath.  Patient stated she does not feel any worse than before and definitely still feels better than when she initially went into the hospital but is a bit discouraged since she feels like she has not seen much progress.  Patient does have a appointment scheduled with hematology on 9/27/2021 and also with pulmonology with Dr. Jordon Seth the end of October.  Right now she is continuing on Eliquis and tolerating it well.  She is on Norvasc 5 and Bystolic 20 which is controlling her blood pressure.  Discussed with her that if we can we will try to get her into pulmonology sooner so that they can assess her shortness of breath.  She stated she was previously patient of  them due to her chronic cough and wanted to continue care with them.  She denies any lower extremity swelling or recent weight gain.  No chest pain.  No PND.  No fever, chills, muscle aches.  Cough is nonproductive.  She feels like her chronic cough she is had for years is a bit worse and sometimes makes her nauseous.  She has been tested twice for Covid in the past month and both were negative.  Patient is vaccinated.      Anxiety-patient does feel like anxiety is little bit higher due to all the events over the past few months with 2 hospitalizations.  Still tolerating Wellbutrin 300 mg and Lexapro 20 well with as needed Ativan.  Patient is unsure if whether some of her shortness of breath may be due to anxiety but since she feels like the shortness of breath is mostly with exertion she feels like it is not.     Fatigue-patient is still very weak and tired.  Unable to return to work right now and needing off for the rest of the month.  Patient does want a try and see physical therapy since she has a friend who works there to try and help to increase her strength.        Objective   Vital Signs:   /82   Pulse 67   Temp 97.1 °F (36.2 °C)   Resp 16   Wt 87.7 kg (193 lb 6.4 oz)   SpO2 98%   BMI 32.18 kg/m²     Physical Exam  Vitals reviewed.   Constitutional:       General: She is not in acute distress.  Cardiovascular:      Rate and Rhythm: Normal rate and regular rhythm.      Heart sounds: Normal heart sounds.   Pulmonary:      Effort: No respiratory distress.      Breath sounds: Normal breath sounds. No stridor. No wheezing, rhonchi or rales.   Chest:      Chest wall: No tenderness.   Musculoskeletal:         General: Normal range of motion.   Skin:     General: Skin is warm and dry.   Neurological:      Mental Status: She is alert.   Psychiatric:         Mood and Affect: Affect is tearful.        Result Review :   The following data was reviewed by: OLVIN Mcgee on 09/14/2021:  [facility]  8/28/21 8/29/21 9/9/21   BUN 9 9 11   Creatinine 1.07 (A) 1.11 (A) 1.10 (A)   Sodium 135 (A) 135 (A) 126 (A)   Potassium 3.5 3.5 3.8   Chloride 97 (A) 98 84 (A)   CO2 23.7 23.5 24.9   Calcium 9.9 9.7 10.5   (A) Abnormal value                           Assessment and Plan    Diagnoses and all orders for this visit:    1. Shortness of breath (Primary)  Assessment & Plan:  Patient is scheduled to see pulmonology at the end of October.  Trying to get patient in sooner so that she can be evaluated.  Right now shortness of breath is mostly with exertion and patient stated she can catch her breath when sitting.  Oxygen saturation 98%.  Counseled patient to get pulse ox so that she can check at home if shortness of breath gets worse.  Patient has already had a recent chest x-ray that had no acute findings and CT scan from hospital visit.  Since symptoms are not getting worse patient does not need further imaging.  Counseled patient to slowly increase exercise as able to tolerate.  If shortness of breath does get worse or experiencing chest pain immediately go to ER.      2. Other fatigue  Assessment & Plan:  Encouraging patient to increase activity as tolerated.  This patient has had 2 hospitalizations recently it may take a while for strength to be rebuilt.  Patient is interesting in pursuing physical therapy to see if this could help and will get in touch with her friend who works with [employer].       3. Low sodium levels  -     Basic metabolic panel    4. Chronic cough  Assessment & Plan:  Patient has had a cough for years and previously did see Dr. Jordon Adams with [facility].  Patient is requesting to see him again.  Has an appointment the end of October but trying to see if we can get her in sooner.      5. Anxiety disorder due to general medical condition with panic attack  Assessment & Plan:  Patient is a bit more anxious but is able to take Ativan as needed to help with anxiety along with Wellbutrin and  Lexapro.  Continue current regimen for now.  Will reassess in 2 weeks.      Pleasant 60-year-old patient presenting to the office for 2-week follow-up.  I am trying to watch this patient closely since she is had 2 hospitalizations in the past 2 months and the most recent for pulmonary emboli.  Patient status is stable at this point with stable vital signs and oxygen saturation of 98%.  Patient is still very winded with exertion and still very tired.  Discussed this with patient and that it may take a while for strength be rebuilt since she has had 2 hospitalizations recently.  However if her symptoms do become worse patient was counseled to go to ER.  Especially if she is experiencing more shortness of breath or chest pain.  Try to get patient in to her pulmonologist sooner so that she can be further evaluated for the shortness of breath.    Going ahead and drawing up a follow-up BMP since she had a sodium of 126 last week when she was at the cardiologist.  At that time patients hydrochlorothiazide was stopped to help treat the low sodium.    I do want patient to come back in 2 weeks to see if her symptoms are starting to improve and to see her after her hematology doctor's appointment as well.  Counseled patient if symptoms are worse to let me know but with shortness of breath that is worse or chest pain to go to the ER.    At this time I do recommend that patient stay off work through October 2, 2021.    Follow Up   Return in about 2 weeks (around 9/28/2021) for Recheck shortness of breath .  Patient was given instructions and counseling regarding her condition or for health maintenance advice. Please see specific information pulled into the AVS if appropriate.     Medical assistant and I wore mask and eyewear protection during entire encounter.  Patient wore mask.      Electronically signed by OLVIN Mcgee, 09/14/21, 4:56 PM EDT.

## 2021-09-14 NOTE — TELEPHONE ENCOUNTER
Patient called by myself at 1500 because [facility] with Dr. Jordon Adams had a cancellation tomorrow and was able to fit patient into the schedule.  Called patient to discuss this visit.  Patient stated she is unable to go to the pulmonology appointment tomorrow since she cares for her grandchild on Wednesdays and that is her first priority for tomorrow.  I did reiterate to the patient that I would recommend her seeing the pulmonologist as soon as possible and this would be the best time to see them to assess her shortness of breath.  However patient refused and said she would need to go at a different time and is unavailable tomorrow.    DEANDRE Pena was able to find an alternative pulmonary appointment with [facility] on 9/27/2021.  Patient was called by Anna to inform her of this appointment and then transferred to me to speak further about the appointment.  Once again I recommended that the patient take this appointment since it is a month sooner than her original appointment with Dr. Adams and we really needed her to be evaluated sooner.  Patient said that she would really like to stay with [facility] and Dr. Adams since she was already patient with them and works with them and does not want to see any other group.  Patient said she would call [facility] tomorrow to try to get another appointment with them and hopefully sooner.  Once again I reiterated to her my recommendation of going to the earlier appointment on 9/27/2021.  Patient understood but felt comfortable waiting at this point.  Counseled patient once again that if her shortness of breath did get worse that she would need to go to the ER.  Patient understood and understands the risks of waiting for later pulmonology appointment.    Cori BAH

## 2021-09-14 NOTE — ASSESSMENT & PLAN NOTE
Patient has had a cough for years and previously did see Dr. Jordon Adams with [facility].  Patient is requesting to see him again.  Has an appointment the end of October but trying to see if we can get her in sooner.

## 2021-09-14 NOTE — ASSESSMENT & PLAN NOTE
Encouraging patient to increase activity as tolerated.  This patient has had 2 hospitalizations recently it may take a while for strength to be rebuilt.  Patient is interesting in pursuing physical therapy to see if this could help and will get in touch with her friend who works with [employer].

## 2021-09-15 LAB
BUN SERPL-MCNC: 13 MG/DL (ref 6–20)
BUN/CREAT SERPL: 11.1 (ref 7–25)
CALCIUM SERPL-MCNC: 10.5 MG/DL (ref 8.6–10.5)
CHLORIDE SERPL-SCNC: 96 MMOL/L (ref 98–107)
CO2 SERPL-SCNC: 23.6 MMOL/L (ref 22–29)
CREAT SERPL-MCNC: 1.17 MG/DL (ref 0.57–1)
GLUCOSE SERPL-MCNC: 74 MG/DL (ref 65–99)
POTASSIUM SERPL-SCNC: 3.8 MMOL/L (ref 3.5–5.2)
SODIUM SERPL-SCNC: 137 MMOL/L (ref 136–145)

## 2021-09-17 ENCOUNTER — TELEPHONE (OUTPATIENT)
Dept: FAMILY MEDICINE CLINIC | Facility: CLINIC | Age: 61
End: 2021-09-17

## 2021-09-17 NOTE — TELEPHONE ENCOUNTER
Hub staff attempted to follow warm transfer process and was unsuccessful     Caller: Amanda Sherwood    Relationship to patient: Self    Best call back number: [phone] (H)    Patient is needing: PATIENT CALLING IN REGARDS TO WANTING TO LET AGUSTIN GRACIA KNOW THAT SHE HAS AN APPOINTMENT WITH A PULMONOLOGIST ON TUESDAY

## 2021-09-20 NOTE — TELEPHONE ENCOUNTER
Spoke to patient on the phone.  Patient said that she got an appointment with the pulmonologist for tomorrow.  Follow-up with me on 9/28/2021.    Cori BAH

## 2021-09-23 ENCOUNTER — TELEPHONE (OUTPATIENT)
Dept: CARDIOLOGY | Facility: CLINIC | Age: 61
End: 2021-09-23

## 2021-09-23 RX ORDER — SPIRONOLACTONE 50 MG/1
50 TABLET, FILM COATED ORAL DAILY
Qty: 90 TABLET | Refills: 3 | Status: ON HOLD | OUTPATIENT
Start: 2021-09-23 | End: 2022-09-27

## 2021-09-23 NOTE — TELEPHONE ENCOUNTER
9/9/2021  Plan:       See Soo in 6 weeks.  Stop Hyzaar for her cough.  It may be usual losartan.  Monitor blood pressure at home.  Take amlodipine daily.  May need spironolactone for blood pressure if her cough does get better off of Hyzaar.  Would not restart hydrochlorothiazide even if the cough persists as I think hydrochlorothiazide is driving her hyponatremia.  She is very weak and still short winded.  I do not think it safe for her to go back to work next week or the week after.  She probably needs at least a couple more weeks to recover.  I recommended that she remain off work for the next 2 to 3 weeks.      Pt called stating that being off the Hyzaar her diastolic number has not been under 100 and would like to know if there is something else she can take instead    Also taking the Amlodipine has caused her feet to swell

## 2021-09-23 NOTE — PROGRESS NOTES
.     REASON FOR CONSULTATION:   Pulmonary embolism  Provide an opinion on any further workup or treatment                             REQUESTING PHYSICIAN: OLVIN Mcgee  RECORDS OBTAINED:  Records of the patient's history including those obtained from the referring provider were reviewed and summarized in detail.    HISTORY OF PRESENT ILLNESS:  The patient is a 60 y.o. year old female  who is here for follow-up with the above-mentioned history.    Reviewed last note from PCP, 9/14/2021.  That was a hospital follow-up for SOA and bilateral PE.  Still having FORDE but better than initial symptoms.  Discouraged she has not made more progress.  Has follow-up with Dr. Jordon Adams late October.  On Eliquis.    Last seen by Dr. Pugh of our practice, 5/19/2011. That was a follow-up for iron deficiency anemia thought to be due to menstrual cycles for which she was on oral iron 1 tablet daily. She also had new onset mild thrombocytopenia during that visit, . Dr. Pugh planned to follow her. It looks like she did not return for follow-up.    · Doppler left leg 8/17/2021: Normal  · Doppler bilateral legs 8/28/2021: Normal  · CT chest angiogram 8/27/2021: Bilateral lower lobe PEs. Small new 6 mm nodular density LLL, radiologist recommends follow-up.    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    She states in July she developed a GI virus.  She states she lost 20 pounds.  Could not eat or drink.  She states she had a 4-day admission for acute kidney injury and UTI.  She states at home, she was in bed for 5 days beginning around July 25.  As she recovered, she had 2 additional weeks of bed to couch.  Therefore, decreased mobility.  She states when she saw Soo Gama, NP with cardiology, due to complaint of SOA, D-dimer was done which was elevated which led to a CT angiogram 8/27/2021 showing pulmonary emboli.  States she has had tremors for about 3 years.  States she was previously  walking 3 to 4 miles a couple of times per week and playing tennis 3-4 times per week through around May 2021.  Now, it takes quite a bit of effort for her to just climb 1 flight of steps at home.    She states about 3 years ago she had foot surgery which has some complications requiring her to be off work and on a scooter for about 1 year.  She states during that time she gained about 60 pounds.  With effort, she is lost about 10 of those pounds.    No bleeding problems on Eliquis.    The SOA and chest discomfort have improved since initial onset of symptoms and hospitalization but remain problematic    Past Medical History:   Diagnosis Date   • Allergic    • Anxiety 3 years ago   • Depression    • GERD (gastroesophageal reflux disease)    • H/O Leukopenia    • History of anemia    • History of bronchitis    • History of migraine    • Hyperlipidemia     NO MEDS CURRENTLY. WORKING ON   • Hypertension    • Leukopenia     PER HISTORY. HAS SEEN CBC IN THE PAST   • Neck pain    • Obesity (BMI 30.0-34.9) 6/6/2019   • Osteoarthritis    • PVC (premature ventricular contraction)    • Seasonal allergies    • Torn meniscus     LEFT   • Tremor 3 years ago     Past Surgical History:   Procedure Laterality Date   • BREAST BIOPSY Bilateral     BENIGN. HORACE BREAST   • BUNIONECTOMY Left    • BUNIONECTOMY Left     REVISION   • COLONOSCOPY N/A 2/1/2020    Procedure: COLONOSCOPY TO CECUM AND INTO TERMINAL ILEUM WITH COLD BIOPSY POLYPECTOMY;  Surgeon: Munira Montes MD;  Location: [facility] ENDOSCOPY;  Service: Gastroenterology   • DILATATION AND CURETTAGE      AUB no cancer   • ENDOSCOPY N/A 2/1/2020    Procedure: ESOPHAGOGASTRODUODENOSCOPY WITH COLD BIOPSIES;  Surgeon: Munira Montes MD;  Location: [facility] ENDOSCOPY;  Service: Gastroenterology   • KNEE ARTHROSCOPY Left 8/22/2018    Procedure: KNEE ARTHROSCOPY LATERAL MENISECTOMY DEBRIDEMENT OF MEDIAL FEMORAL CONDYLE DEFECT DEBRIDEMENT OF ARTHRITIS;  Surgeon: Graciela Crockett MD;   Location:  [facility];  Service: Orthopedics   • MENISCECTOMY Left    • SALPINGO OOPHORECTOMY Left    • TONSILLECTOMY  as a child   • TUBAL ABDOMINAL LIGATION     • WISDOM TOOTH EXTRACTION         MEDICATIONS    Current Outpatient Medications:   •  albuterol sulfate  (90 Base) MCG/ACT inhaler, , Disp: , Rfl:   •  amLODIPine (NORVASC) 5 MG tablet, Take 1 tablet by mouth 1 (One) Time As Needed (for elevated blood pressure). (Patient taking differently: Take 5 mg by mouth As Needed (for elevated blood pressure).), Disp: 90 tablet, Rfl: 3  •  Apixaban Starter Pack tablet therapy pack, Take two 5 mg tablets by mouth every 12 hours for 7 days. Followed by one 5 mg tablet every 12 hours. (Dispense starter pack if available), Disp: 74 tablet, Rfl: 0  •  atorvastatin (Lipitor) 40 MG tablet, Take 1 tablet by mouth Every Night. Take half tab for 1 week then take full tab, Disp: 30 tablet, Rfl: 3  •  buPROPion XL (WELLBUTRIN XL) 150 MG 24 hr tablet, Take 300 mg by mouth Every Morning., Disp: , Rfl:   •  calcium carbonate (TUMS) 500 MG chewable tablet, Chew 1 tablet As Needed., Disp: , Rfl:   •  cetirizine (zyrTEC) 10 MG tablet, Take 10 mg by mouth As Needed., Disp: , Rfl:   •  Cholecalciferol (VITAMIN D3) 5000 UNITS capsule capsule, Take 5,000 Units by mouth daily., Disp: , Rfl:   •  escitalopram (LEXAPRO) 20 MG tablet, Take 1 tablet by mouth Daily. (Patient taking differently: Take 20 mg by mouth Every Night.), Disp: 90 tablet, Rfl: 3  •  esomeprazole (NexIUM) 20 MG capsule, Take 20 mg by mouth Daily., Disp: , Rfl:   •  LORazepam (Ativan) 0.5 MG tablet, Take 0.5 mg by mouth Daily As Needed for Anxiety., Disp: , Rfl:   •  nebivolol (Bystolic) 20 MG tablet, TAKE ONE TABLET BY MOUTH DAILY, Disp: 90 tablet, Rfl: 3  •  ondansetron (ZOFRAN) 4 MG tablet, Take 1 tablet by mouth Every 8 (Eight) Hours As Needed for Nausea., Disp: 20 tablet, Rfl: 1  •  spironolactone (ALDACTONE) 50 MG tablet, Take 1 tablet by mouth Daily., Disp:  90 tablet, Rfl: 3  •  Symbicort 160-4.5 MCG/ACT inhaler, , Disp: , Rfl:   •  traZODone (DESYREL) 50 MG tablet, Take 50 mg by mouth Every Night., Disp: , Rfl:     ALLERGIES:     Allergies   Allergen Reactions   • Celecoxib Hives and Swelling     hives   • Ace Inhibitors Cough   • Lisinopril Cough       SOCIAL HISTORY:       Social History     Socioeconomic History   • Marital status:      Spouse name: Not on file   • Number of children: 2   • Years of education: Not on file   • Highest education level: Not on file   Tobacco Use   • Smoking status: Former Smoker     Packs/day: 0.50     Years: 4.00     Pack years: 2.00     Types: Cigarettes     Quit date: 1987     Years since quittin.9   • Smokeless tobacco: Never Used   Vaping Use   • Vaping Use: Never used   Substance and Sexual Activity   • Alcohol use: Yes     Alcohol/week: 2.0 standard drinks     Types: 2 Cans of beer per week     Comment: caffeine - None   • Drug use: Yes     Types: Marijuana   • Sexual activity: Yes     Partners: Male     Birth control/protection: Post-menopausal         FAMILY HISTORY:  Family History   Problem Relation Age of Onset   • Dementia Mother    • Arthritis Mother    • Other Mother         Alzheimer's   • Stroke Father    • Heart disease Father    • Hypertension Father    • Diabetes Father    • Cancer Father    • Colon cancer Father    • Hyperlipidemia Father    • Breast cancer Sister    • Atrial fibrillation Sister    • Alcohol abuse Sister    • Asthma Sister    • Hypertension Sister    • Ovarian cancer Maternal Aunt    • Malig Hyperthermia Neg Hx        REVIEW OF SYSTEMS:  Review of Systems   Constitutional: Negative for activity change.   HENT: Negative for nosebleeds and trouble swallowing.    Respiratory: Negative for shortness of breath and wheezing.    Cardiovascular: Negative for chest pain and palpitations.   Gastrointestinal: Negative for constipation, diarrhea and nausea.   Genitourinary: Negative for  "dysuria and hematuria.   Musculoskeletal: Negative for arthralgias and myalgias.   Skin: Negative for rash and wound.   Neurological: Negative for seizures and syncope.   Hematological: Negative for adenopathy. Does not bruise/bleed easily.   Psychiatric/Behavioral: Negative for confusion.              Vitals:    09/27/21 1004   BP: 173/99   Pulse: 77   Resp: 18   Temp: 97.5 °F (36.4 °C)   TempSrc: Temporal   SpO2: 91%   Weight: 89 kg (196 lb 1.6 oz)   Height: 165.1 cm (65\")   PainSc: 0-No pain     Current Status 9/27/2021   ECOG score 0      PHYSICAL EXAM:      CONSTITUTIONAL:  Vital signs reviewed.  No distress, looks comfortable.  EYES:  Conjunctivae and lids unremarkable.  PERRLA  EARS,NOSE,MOUTH,THROAT:  Ears and nose appear unremarkable.  Lips, teeth, gums appear unremarkable.  RESPIRATORY:  Normal respiratory effort.  Lungs clear to auscultation bilaterally.  CARDIOVASCULAR:  Normal S1, S2.  No murmurs rubs or gallops.  No significant lower extremity edema.  GASTROINTESTINAL: Abdomen appears unremarkable.  Nontender.  No hepatomegaly.  No splenomegaly.  LYMPHATIC:  No cervical, supraclavicular, axillary lymphadenopathy.  MUSCULOSKELETAL:  Unremarkable gait and station.  Unremarkable digits/nails.  No cyanosis or clubbing.  SKIN:  Warm.  No rashes.  PSYCHIATRIC:  Normal judgment and insight.  Normal mood and affect.      RECENT LABS:        WBC   Date Value Ref Range Status   09/27/2021 3.47 3.40 - 10.80 10*3/mm3 Final   08/29/2021 3.26 (L) 3.40 - 10.80 10*3/mm3 Final   08/28/2021 3.15 (L) 3.40 - 10.80 10*3/mm3 Final   08/27/2021 4.73 3.40 - 10.80 10*3/mm3 Final   08/16/2021 3.23 (L) 3.40 - 10.80 10*3/mm3 Final   08/07/2021 3.81 3.40 - 10.80 10*3/mm3 Final   08/06/2021 4.96 3.40 - 10.80 10*3/mm3 Final   06/20/2019 4.42 3.40 - 10.80 10*3/mm3 Final   06/19/2019 3.96 (L) 4.5 - 11.0 10*3/uL Final   06/18/2019 3.68 3.40 - 10.80 10*3/mm3 Final   09/22/2018 3.86 (L) 4.50 - 10.70 10*3/mm3 Final   08/15/2018 2.74 (L) " 4.50 - 10.70 10*3/mm3 Final   10/27/2016 3.65 (L) 4.50 - 10.70 10*3/mm3 Final   09/13/2016 2.42 (L) 4.50 - 10.70 10*3/mm3 Final   04/29/2016 3.05 (L) 4.50 - 10.70 10*3/mm3 Final     Hemoglobin   Date Value Ref Range Status   09/27/2021 14.1 12.0 - 15.9 g/dL Final   08/29/2021 14.0 12.0 - 15.9 g/dL Final   08/28/2021 14.1 12.0 - 15.9 g/dL Final   08/27/2021 15.4 12.0 - 15.9 g/dL Final   08/16/2021 13.4 12.0 - 15.9 g/dL Final   08/07/2021 12.9 12.0 - 15.9 g/dL Final   08/06/2021 14.9 12.0 - 15.9 g/dL Final   06/20/2019 13.7 12.0 - 15.9 g/dL Final   06/19/2019 14.6 12.0 - 16.0 g/dL Final   06/18/2019 12.9 12.0 - 15.9 g/dL Final   09/22/2018 14.7 11.9 - 15.5 g/dL Final   08/15/2018 13.8 11.9 - 15.5 g/dL Final   10/27/2016 13.8 11.9 - 15.5 g/dL Final   09/13/2016 13.6 11.9 - 15.5 g/dL Final   04/29/2016 13.5 11.9 - 15.5 g/dL Final     Platelets   Date Value Ref Range Status   09/27/2021 178 140 - 450 10*3/mm3 Final   08/29/2021 174 140 - 450 10*3/mm3 Final   08/28/2021 186 140 - 450 10*3/mm3 Final   08/27/2021 233 140 - 450 10*3/mm3 Final   08/16/2021 181 140 - 450 10*3/mm3 Final   08/07/2021 259 140 - 450 10*3/mm3 Final   08/06/2021 373 140 - 450 10*3/mm3 Final   06/20/2019 180 140 - 450 10*3/mm3 Final   06/19/2019 184 140 - 440 10*3/uL Final   06/18/2019 160 140 - 450 10*3/mm3 Final   09/22/2018 169 140 - 500 10*3/mm3 Final   08/15/2018 164 140 - 500 10*3/mm3 Final   10/27/2016 161 140 - 500 10*3/mm3 Final   09/13/2016 176 140 - 500 10*3/mm3 Final   04/29/2016 187 140 - 500 10*3/mm3 Final       Assessment/Plan   Multiple subsegmental pulmonary emboli without acute cor pulmonale (CMS/HCC)  - Factor 5 Leiden  - Factor II, DNA Analysis  - Antithrombin III  - Protein C Activity  - Protein S Functional  - Protein S Antigen, Free  - Anticardiolipin Antibody, IgG / M, Qn  - Lupus Anticoagulant Reflex  - Beta-2 Glycoprotein Antibodies        Amanda Sherwood   *Bilateral lower lobe pulmonary emboli on CT 8/27/2021.  · Doppler  left leg 8/17/2021: Normal  · Doppler bilateral legs 8/28/2021: Normal  · CT chest angiogram 8/27/2021: Bilateral lower lobe PEs.   · CT images personally viewed by me: Bilateral PE  · On Eliquis    *Risk factors for clotting:  · Overweight  · Was not on HRT.  (Understands should not take any HRT in the future).  · Only smoked a little in high school and college.  No smoking since.  · No prior personal history of clots  · No family history of clots  · Immobility.  July 2020 1 GI virus: Lost 20 pounds.  4-day admission for FANNY and UTI.  Subsequently at home, in bed x5 days late July 2021.  Then, bed to couch x2 weeks.    *Length of anticoagulation (Eliquis).  · Reversible risk factor: Immobility  · However, prolonged SOA and chest discomfort (significant issues improved but persist 1 month after Eliquis started)  · Check hypercoagulable labs  · Because she had a reversible risk factor, 6 months of Eliquis would be reasonable.  However, with her prolonged symptoms which are improved but remains significant after 1 month of Eliquis, I told her it might be best to try to stay on some Eliquis at the end of 6 months, but we will see how she is doing and review hypercoagulable labs.    *Unsteadiness on feet, overall fatigue and weakness.  · On day of initial consult, 9/27/2021: Difficulty getting up and down off the examining table.  Unsteady on feet.  I recommended a cane or wheelchair.  She declined.  · She is planning physical therapy.  States she has been trying to walk but cannot walk more than 1/4 mile.  I recommended she use her stationary bike multiple times per day to try to build up strength.    *Improved, but ongoing SOA and chest discomfort.  · She follows with cardiology and pulmonology and states they are aware.  · She states Dr. Jordon Adams prescribed Symbicort and a rescue inhaler.  · She states cardiology told her an echocardiogram shows pulmonary hypertension.    *Possible pulmonary nodule, LLL on CT  8/27/2021  · Small new 6 mm nodular density LLL, radiologist recommends follow-up.  · States her PCP is going to do another CT about 3 months from the last    Plan  · Hypercoagulable labs  · Return in a few weeks to review the results with me.    CT images personally viewed by me.  Records reviewed and summarized.

## 2021-09-23 NOTE — TELEPHONE ENCOUNTER
Spoke with pt her cough did not get any better off the Hyzaar    Informed her of new medication that was sent into pharmacy for her

## 2021-09-27 ENCOUNTER — LAB (OUTPATIENT)
Dept: LAB | Facility: HOSPITAL | Age: 61
End: 2021-09-27

## 2021-09-27 ENCOUNTER — CONSULT (OUTPATIENT)
Dept: ONCOLOGY | Facility: CLINIC | Age: 61
End: 2021-09-27

## 2021-09-27 VITALS
BODY MASS INDEX: 32.67 KG/M2 | RESPIRATION RATE: 18 BRPM | DIASTOLIC BLOOD PRESSURE: 99 MMHG | SYSTOLIC BLOOD PRESSURE: 173 MMHG | HEIGHT: 65 IN | WEIGHT: 196.1 LBS | HEART RATE: 77 BPM | TEMPERATURE: 97.5 F | OXYGEN SATURATION: 91 %

## 2021-09-27 DIAGNOSIS — I26.94 MULTIPLE SUBSEGMENTAL PULMONARY EMBOLI WITHOUT ACUTE COR PULMONALE (HCC): Primary | ICD-10-CM

## 2021-09-27 DIAGNOSIS — I26.99 BILATERAL PULMONARY EMBOLISM (HCC): Primary | ICD-10-CM

## 2021-09-27 LAB
BASOPHILS # BLD AUTO: 0.03 10*3/MM3 (ref 0–0.2)
BASOPHILS NFR BLD AUTO: 0.9 % (ref 0–1.5)
DEPRECATED RDW RBC AUTO: 42.8 FL (ref 37–54)
EOSINOPHIL # BLD AUTO: 0.03 10*3/MM3 (ref 0–0.4)
EOSINOPHIL NFR BLD AUTO: 0.9 % (ref 0.3–6.2)
ERYTHROCYTE [DISTWIDTH] IN BLOOD BY AUTOMATED COUNT: 12.7 % (ref 12.3–15.4)
HCT VFR BLD AUTO: 40.7 % (ref 34–46.6)
HGB BLD-MCNC: 14.1 G/DL (ref 12–15.9)
IMM GRANULOCYTES # BLD AUTO: 0.02 10*3/MM3 (ref 0–0.05)
IMM GRANULOCYTES NFR BLD AUTO: 0.6 % (ref 0–0.5)
LYMPHOCYTES # BLD AUTO: 0.85 10*3/MM3 (ref 0.7–3.1)
LYMPHOCYTES NFR BLD AUTO: 24.5 % (ref 19.6–45.3)
MCH RBC QN AUTO: 32 PG (ref 26.6–33)
MCHC RBC AUTO-ENTMCNC: 34.6 G/DL (ref 31.5–35.7)
MCV RBC AUTO: 92.3 FL (ref 79–97)
MONOCYTES # BLD AUTO: 0.3 10*3/MM3 (ref 0.1–0.9)
MONOCYTES NFR BLD AUTO: 8.6 % (ref 5–12)
NEUTROPHILS NFR BLD AUTO: 2.24 10*3/MM3 (ref 1.7–7)
NEUTROPHILS NFR BLD AUTO: 64.5 % (ref 42.7–76)
NRBC BLD AUTO-RTO: 0 /100 WBC (ref 0–0.2)
PLATELET # BLD AUTO: 178 10*3/MM3 (ref 140–450)
PMV BLD AUTO: 10.5 FL (ref 6–12)
RBC # BLD AUTO: 4.41 10*6/MM3 (ref 3.77–5.28)
WBC # BLD AUTO: 3.47 10*3/MM3 (ref 3.4–10.8)

## 2021-09-27 PROCEDURE — 85025 COMPLETE CBC W/AUTO DIFF WBC: CPT

## 2021-09-27 PROCEDURE — 85303 CLOT INHIBIT PROT C ACTIVITY: CPT | Performed by: INTERNAL MEDICINE

## 2021-09-27 PROCEDURE — 36415 COLL VENOUS BLD VENIPUNCTURE: CPT

## 2021-09-27 PROCEDURE — 85306 CLOT INHIBIT PROT S FREE: CPT | Performed by: INTERNAL MEDICINE

## 2021-09-27 PROCEDURE — 99245 OFF/OP CONSLTJ NEW/EST HI 55: CPT | Performed by: INTERNAL MEDICINE

## 2021-09-27 PROCEDURE — 85300 ANTITHROMBIN III ACTIVITY: CPT | Performed by: INTERNAL MEDICINE

## 2021-09-27 RX ORDER — ALBUTEROL SULFATE 90 UG/1
AEROSOL, METERED RESPIRATORY (INHALATION)
COMMUNITY
Start: 2021-09-21 | End: 2021-10-26 | Stop reason: SDUPTHER

## 2021-09-27 RX ORDER — BUDESONIDE AND FORMOTEROL FUMARATE DIHYDRATE 160; 4.5 UG/1; UG/1
AEROSOL RESPIRATORY (INHALATION)
COMMUNITY
Start: 2021-09-21 | End: 2021-10-26

## 2021-09-28 ENCOUNTER — OFFICE VISIT (OUTPATIENT)
Dept: FAMILY MEDICINE CLINIC | Facility: CLINIC | Age: 61
End: 2021-09-28

## 2021-09-28 VITALS
WEIGHT: 196 LBS | HEART RATE: 74 BPM | TEMPERATURE: 97.7 F | DIASTOLIC BLOOD PRESSURE: 90 MMHG | SYSTOLIC BLOOD PRESSURE: 162 MMHG | BODY MASS INDEX: 32.62 KG/M2 | OXYGEN SATURATION: 98 % | RESPIRATION RATE: 16 BRPM

## 2021-09-28 DIAGNOSIS — R91.1 PULMONARY NODULE: ICD-10-CM

## 2021-09-28 DIAGNOSIS — I10 ESSENTIAL HYPERTENSION: ICD-10-CM

## 2021-09-28 DIAGNOSIS — I26.94 MULTIPLE SUBSEGMENTAL PULMONARY EMBOLI WITHOUT ACUTE COR PULMONALE (HCC): Primary | ICD-10-CM

## 2021-09-28 DIAGNOSIS — F06.4 ANXIETY DISORDER DUE TO GENERAL MEDICAL CONDITION WITH PANIC ATTACK: ICD-10-CM

## 2021-09-28 DIAGNOSIS — R06.02 SHORTNESS OF BREATH: ICD-10-CM

## 2021-09-28 DIAGNOSIS — F41.0 ANXIETY DISORDER DUE TO GENERAL MEDICAL CONDITION WITH PANIC ATTACK: ICD-10-CM

## 2021-09-28 DIAGNOSIS — R05.3 CHRONIC COUGH: ICD-10-CM

## 2021-09-28 DIAGNOSIS — R53.83 OTHER FATIGUE: ICD-10-CM

## 2021-09-28 LAB
AT III PPP CHRO-ACNC: 95 % (ref 90–134)
CARDIOLIPIN IGG SER IA-ACNC: <9 GPL U/ML (ref 0–14)
CARDIOLIPIN IGM SER IA-ACNC: <9 MPL U/ML (ref 0–12)
PROT C ACT/NOR PPP: 114 % (ref 86–163)
PROT S ACT/NOR PPP: >150 % (ref 70–127)
PROT S FREE PPP-ACNC: <10 % (ref 49–138)

## 2021-09-28 PROCEDURE — 99214 OFFICE O/P EST MOD 30 MIN: CPT

## 2021-09-28 NOTE — ASSESSMENT & PLAN NOTE
Patient still struggling with weakness and fatigue due to recent hospitalizations.  Patient is very discouraged by her progress and is trying to increase activity.  Patient is planning to see physical therapy tomorrow.  Discussed plan of following up with physical therapy to see if they can help her increase her strength.  However if this does not help in the next couple weeks when a follow-up with her may do some further evaluation for the weakness, fatigue and associated tremors.

## 2021-09-28 NOTE — TELEPHONE ENCOUNTER
Pt reurns a call today stating that her diastolic is still 100 or higher, she does not feel the spironolactone is helping. She did not  when I attempted to call her, she may not have recognized the [facility] office number. I asked her to call me back when she is available

## 2021-09-28 NOTE — ASSESSMENT & PLAN NOTE
Patient's blood pressure is elevated today.  Cardiology is managing blood pressure medication and has been adjusting it recently.  Patient to call cardiology today to see about adjusting again.  Follow-up with cardiology.

## 2021-09-28 NOTE — PROGRESS NOTES
Chief Complaint  Shortness of Breath (Follow up)    Subjective          Amanda Sherwood presents to [facility] PRIMARY [facility]ARE  History of Present Illness     Patient presents to the clinic for 2-week follow-up from shortness of breath due to pulmonary emboli.     Pulmonary emboli and shortness of breath-patient is still recovering from her pulmonary emboli and is still taking the Eliquis.  Today patient appears to be less short of breath than before but patient says with exertion she still can get short of breath.  No wheezing.  Patient was able to get in to see pulmonary Dr. Smith on 9/21/2021.  At that visit he started her on Symbicort and as needed albuterol inhaler.  Patient also told him about the cough that is mostly persistent at night.  He wanted to do some follow-up PFTs in October along with a follow-up echo on 19 October to follow-up on pulmonary hypertension to see if this may be causing some of the shortness of breath.  No complaints of chest pain.  On 8/27/2021 patient was also found to have a pulmonary nodule that was new from previous CT of the abdomen pelvis from 8/8/2021.  Nodule was measuring 6 mm.  Patient was needing a follow-up CT and wanted to discuss when we could order that.  Patient also saw hematology yesterday to follow-up on pulmonary emboli.  Labs were drawn and waiting for results.  Follow-up with hematology is on 25 October.  Follow-up with pulmonology is in the end of October.  Unable to see pulmonology records at this time.    Fatigue-patient is still very tired and weak.  Patient said that this past weekend she did fall and scraped her right heel but otherwise no trauma.  Patient is trying to walk a quarter of a mile around the block with help from her  and use the stationary bicycle.  Patient is going to see physical therapy starting tomorrow to see if they can help her increase her strength.  Patient does have a history of tremors that come and go but  they have been more persistent with the weakness lately.  They have been bilateral but no other neuro deficits.  No headache, visual deficits, dropping of objects.  No numbness or tingling.  Weakness seems to be generalized.    Hypertension-today patient's blood pressure is elevated at 162/90.  No complaints of chest pain, dizziness, headache.  No shortness of breath the middle the night.  Patient does have a follow-up appointment with cardiology on October 19.  However patient has been calling cardiology recently and they have been adjusting her blood pressure medication.  She was recently started on spironolactone she does not feel like it is working well.  Patient is following up today and calling cardiology to see what medications need to be adjusted for blood pressure.    Anxiety/depression-patient does appear very anxious today and said she is just very overwhelmed with everything that has been going on and wants her life back.  Patient is unable to watch her grandson at this time and this makes her very tearful.  She is just very tired of being so weak and unable to get back to work.  Patient does have a follow-up with psychiatry in 2 weeks.  Encourage patient to continue to follow-up and to follow-up with psychology.  Patient said in the past she has seen psychology for counseling and it was helpful and will follow up with them.  No suicidal homicidal thoughts.      Objective   Vital Signs:   /90   Pulse 74   Temp 97.7 °F (36.5 °C)   Resp 16   Wt 88.9 kg (196 lb)   SpO2 98%   BMI 32.62 kg/m²     Physical Exam  Vitals reviewed.   Constitutional:       General: She is not in acute distress.  HENT:      Head: Normocephalic.   Cardiovascular:      Rate and Rhythm: Normal rate and regular rhythm.      Pulses: Normal pulses.      Heart sounds: Normal heart sounds.   Pulmonary:      Effort: Pulmonary effort is normal. No respiratory distress.      Breath sounds: Normal breath sounds. No stridor. No  wheezing, rhonchi or rales.   Skin:     General: Skin is warm and dry.   Neurological:      Mental Status: She is alert.      Motor: Tremor present.   Psychiatric:         Mood and Affect: Mood normal.        Result Review :                 Assessment and Plan    Diagnoses and all orders for this visit:    1. Multiple subsegmental pulmonary emboli without acute cor pulmonale (CMS/HCC) (Primary)  Assessment & Plan:  Follow-up with hematology and pulmonology.  Continue taking Eliquis.      2. Pulmonary nodule  Assessment & Plan:  Planning a follow-up CT of the chest in October to reevaluate pulmonary nodule.      3. Chronic cough  Assessment & Plan:  Continue current regimen of Symbicort and albuterol inhaler.  Follow-up with pulmonology.      4. Shortness of breath  Assessment & Plan:  Patient stated that pulmonology has plan of follow-up PFT and echocardiogram in October. Today in the clinic patient shortness of breath does appear to be better than when I saw her 2 weeks ago.  Patient counseled that if shortness of breath gets worse to go to ED.  Otherwise follow-up with pulmonology.      5. Other fatigue  Assessment & Plan:  Patient still struggling with weakness and fatigue due to recent hospitalizations.  Patient is very discouraged by her progress and is trying to increase activity.  Patient is planning to see physical therapy tomorrow.  Discussed plan of following up with physical therapy to see if they can help her increase her strength.  However if this does not help in the next couple weeks when a follow-up with her may do some further evaluation for the weakness, fatigue and associated tremors.      6. Essential hypertension  Assessment & Plan:  Patient's blood pressure is elevated today.  Cardiology is managing blood pressure medication and has been adjusting it recently.  Patient to call cardiology today to see about adjusting again.  Follow-up with cardiology.      7. Anxiety disorder due to general  medical condition with panic attack  Assessment & Plan:  Patient did appear very anxious and tearful today.  Patient is really wanting to get better and increase her strength since all these hospitalizations recently and feels like she is having slow progress.  Patient does have a follow-up with psychiatry in 2 weeks.  Patient said that she will also reach out to her psychologist to see about counseling to help during this stressful time.      Pleasant 60-year-old female who presents to the clinic for 2-week follow-up.  Patient had 2 hospitalizations in August 1-2 acute kidney injury in the second due to pulmonary emboli.  Patient shortness of breath does appear to be better today and is following up with pulmonology, cardiology, hematology.  As far as her weakness and fatigue go I have encouraged her to continue with physical therapy.  However if these interventions do not help the next couple weeks with any improvement then I want to see her to see about doing further work-up as far as what may be causing the weakness and fatigue.  Counseled patient that in the meantime due to her weakness she may need to use a cane.  But also see what physical therapy recommends tomorrow.  If symptoms of weakness and fatigue or shortness of breath get worse come back to clinic or go to ER.  I do believe that the patient still needs to stay off work right now due to her fatigue and shortness of breath with exertion.  Otherwise since patient is being followed closely by pulmonology, cardiology and hematology and psychiatry I will follow up with patient in a month but she can come back in sooner if her weakness or fatigue are not improving.      Follow Up   Return in about 4 weeks (around 10/26/2021) for Recheck fatigue / shortness of breath.  Patient was given instructions and counseling regarding her condition or for health maintenance advice. Please see specific information pulled into the AVS if appropriate.     Medical  assistant and I wore mask and eyewear protection during entire encounter.  Patient wore mask.      Electronically signed by OLVIN Mcgee, 09/28/21, 12:29 PM EDT.

## 2021-09-28 NOTE — ASSESSMENT & PLAN NOTE
Patient did appear very anxious and tearful today.  Patient is really wanting to get better and increase her strength since all these hospitalizations recently and feels like she is having slow progress.  Patient does have a follow-up with psychiatry in 2 weeks.  Patient said that she will also reach out to her psychologist to see about counseling to help during this stressful time.

## 2021-09-28 NOTE — ASSESSMENT & PLAN NOTE
Patient stated that pulmonology has plan of follow-up PFT and echocardiogram in October. Today in the clinic patient shortness of breath does appear to be better than when I saw her 2 weeks ago.  Patient counseled that if shortness of breath gets worse to go to ED.  Otherwise follow-up with pulmonology.

## 2021-09-29 ENCOUNTER — TELEPHONE (OUTPATIENT)
Dept: FAMILY MEDICINE CLINIC | Facility: CLINIC | Age: 61
End: 2021-09-29

## 2021-09-29 LAB
B2 GLYCOPROT1 IGA SER-ACNC: <9 GPI IGA UNITS (ref 0–25)
B2 GLYCOPROT1 IGG SER-ACNC: <9 GPI IGG UNITS (ref 0–20)
B2 GLYCOPROT1 IGM SER-ACNC: <9 GPI IGM UNITS (ref 0–32)
DRVVT SCREEN TO CONFIRM RATIO: 1.2 RATIO (ref 0.8–1.2)
LA 2 SCREEN W REFLEX-IMP: ABNORMAL
MIXING DRVVT: 50.5 SEC (ref 0–40.4)
SCREEN APTT: 40.9 SEC (ref 0–51.9)
SCREEN DRVVT: 65.3 SEC (ref 0–47)

## 2021-09-30 DIAGNOSIS — I10 ESSENTIAL HYPERTENSION: Primary | ICD-10-CM

## 2021-09-30 RX ORDER — LOSARTAN POTASSIUM 50 MG/1
50 TABLET ORAL DAILY
Qty: 90 TABLET | Refills: 3 | Status: SHIPPED | OUTPATIENT
Start: 2021-09-30 | End: 2022-07-07 | Stop reason: SDUPTHER

## 2021-10-01 ENCOUNTER — TELEPHONE (OUTPATIENT)
Dept: FAMILY MEDICINE CLINIC | Facility: CLINIC | Age: 61
End: 2021-10-01

## 2021-10-01 DIAGNOSIS — R53.1 WEAKNESS: ICD-10-CM

## 2021-10-01 DIAGNOSIS — R53.83 FATIGUE, UNSPECIFIED TYPE: Primary | ICD-10-CM

## 2021-10-01 NOTE — TELEPHONE ENCOUNTER
Called patient to follow-up on physical therapy call.  Name and date of birth verified.  Patient verified that she has been able to go to physical therapy this week and it has helped.  She is starting to use a cane but feels like it is helping her overall weakness and she is seeing some slow improvement with building her strength back up.  She also said her shortness of breath is better now.  Patient also said that she followed up with cardiology and is getting her losartan 50 mg restarted today.  Just waiting on the pharmacy.  Otherwise no complaints.  Told her to let me know when the BMP is done with cardiology in a couple weeks so then we can do the follow-up CT scan the chest.    Cori BAH

## 2021-10-01 NOTE — TELEPHONE ENCOUNTER
Patient already called cardiology to get her blood pressure medication changed yesterday. [facility] called and order placed for physical therapy to treat fatigue and weakness.    Cori BAH

## 2021-10-12 ENCOUNTER — TELEPHONE (OUTPATIENT)
Dept: FAMILY MEDICINE CLINIC | Facility: CLINIC | Age: 61
End: 2021-10-12

## 2021-10-12 NOTE — TELEPHONE ENCOUNTER
Caller: Amanda Sherwood     Relationship to Patient: SELF    Phone Number: [phone]     Reason for Call: [employer] SENT IN PATIENT'S [place] PAPERWORK BUT IT WAS NOT APPROVED. SHE WAS TOLD IT WILL BE APPROVED IF IT'S CHANGED TO A PERSONAL LEAVE. ALL AGUSTIN HAS TO DO IS CHANGE THE DATE ON THE PAST PAPERWORK FROM 8/30 TO 9/27 AND INITIAL IT AND THEM FAX THEM OVER.

## 2021-10-13 NOTE — TELEPHONE ENCOUNTER
Can you follow-up with the patient and see which paperwork needs to be reviewed and changed?  Also do we still have copies of those or do I need to fill out a new thing?    Cori BAH

## 2021-10-14 ENCOUNTER — APPOINTMENT (OUTPATIENT)
Dept: LAB | Facility: HOSPITAL | Age: 61
End: 2021-10-14

## 2021-10-15 ENCOUNTER — LAB (OUTPATIENT)
Dept: LAB | Facility: HOSPITAL | Age: 61
End: 2021-10-15

## 2021-10-15 PROCEDURE — 81241 F5 GENE: CPT | Performed by: INTERNAL MEDICINE

## 2021-10-15 PROCEDURE — 81240 F2 GENE: CPT | Performed by: INTERNAL MEDICINE

## 2021-10-15 PROCEDURE — 36415 COLL VENOUS BLD VENIPUNCTURE: CPT | Performed by: INTERNAL MEDICINE

## 2021-10-18 LAB
F5 GENE MUT ANL BLD/T: NORMAL
FACTOR II, DNA ANALYSIS: NORMAL

## 2021-10-19 ENCOUNTER — HOSPITAL ENCOUNTER (OUTPATIENT)
Dept: CARDIOLOGY | Facility: HOSPITAL | Age: 61
Discharge: HOME | End: 2021-10-19
Admitting: NURSE PRACTITIONER

## 2021-10-19 ENCOUNTER — OFFICE VISIT (OUTPATIENT)
Dept: CARDIOLOGY | Facility: CLINIC | Age: 61
End: 2021-10-19

## 2021-10-19 ENCOUNTER — TELEPHONE (OUTPATIENT)
Dept: CARDIOLOGY | Facility: CLINIC | Age: 61
End: 2021-10-19

## 2021-10-19 VITALS
SYSTOLIC BLOOD PRESSURE: 110 MMHG | DIASTOLIC BLOOD PRESSURE: 80 MMHG | WEIGHT: 196 LBS | OXYGEN SATURATION: 97 % | BODY MASS INDEX: 32.65 KG/M2 | HEIGHT: 65 IN | HEART RATE: 76 BPM

## 2021-10-19 VITALS
WEIGHT: 192 LBS | BODY MASS INDEX: 31.99 KG/M2 | DIASTOLIC BLOOD PRESSURE: 84 MMHG | OXYGEN SATURATION: 98 % | HEIGHT: 65 IN | HEART RATE: 73 BPM | SYSTOLIC BLOOD PRESSURE: 124 MMHG

## 2021-10-19 DIAGNOSIS — I10 PRIMARY HYPERTENSION: Primary | ICD-10-CM

## 2021-10-19 DIAGNOSIS — R06.02 SHORTNESS OF BREATH: ICD-10-CM

## 2021-10-19 DIAGNOSIS — R05.3 CHRONIC COUGH: ICD-10-CM

## 2021-10-19 DIAGNOSIS — I26.94 MULTIPLE SUBSEGMENTAL PULMONARY EMBOLI WITHOUT ACUTE COR PULMONALE (HCC): ICD-10-CM

## 2021-10-19 LAB
AORTIC ARCH: 2.6 CM
ASCENDING AORTA: 3 CM
BH CV ECHO MEAS - ACS: 2 CM
BH CV ECHO MEAS - AO MAX PG (FULL): 1.4 MMHG
BH CV ECHO MEAS - AO MAX PG: 8.8 MMHG
BH CV ECHO MEAS - AO MEAN PG (FULL): 1.2 MMHG
BH CV ECHO MEAS - AO MEAN PG: 5.5 MMHG
BH CV ECHO MEAS - AO ROOT AREA (BSA CORRECTED): 1.5
BH CV ECHO MEAS - AO ROOT AREA: 6.9 CM^2
BH CV ECHO MEAS - AO ROOT DIAM: 3 CM
BH CV ECHO MEAS - AO V2 MAX: 148.3 CM/SEC
BH CV ECHO MEAS - AO V2 MEAN: 110.7 CM/SEC
BH CV ECHO MEAS - AO V2 VTI: 34.2 CM
BH CV ECHO MEAS - ASC AORTA: 3 CM
BH CV ECHO MEAS - AVA(I,A): 2.4 CM^2
BH CV ECHO MEAS - AVA(I,D): 2.4 CM^2
BH CV ECHO MEAS - AVA(V,A): 2.6 CM^2
BH CV ECHO MEAS - AVA(V,D): 2.6 CM^2
BH CV ECHO MEAS - BSA(HAYCOCK): 2.1 M^2
BH CV ECHO MEAS - BSA: 2 M^2
BH CV ECHO MEAS - BZI_BMI: 32.6 KILOGRAMS/M^2
BH CV ECHO MEAS - BZI_METRIC_HEIGHT: 165.1 CM
BH CV ECHO MEAS - BZI_METRIC_WEIGHT: 88.9 KG
BH CV ECHO MEAS - EDV(CUBED): 69.2 ML
BH CV ECHO MEAS - EDV(MOD-SP2): 75 ML
BH CV ECHO MEAS - EDV(MOD-SP4): 82 ML
BH CV ECHO MEAS - EDV(TEICH): 74.4 ML
BH CV ECHO MEAS - EF(CUBED): 63.5 %
BH CV ECHO MEAS - EF(MOD-BP): 66 %
BH CV ECHO MEAS - EF(MOD-SP2): 64 %
BH CV ECHO MEAS - EF(MOD-SP4): 65.9 %
BH CV ECHO MEAS - EF(TEICH): 55.5 %
BH CV ECHO MEAS - ESV(CUBED): 25.2 ML
BH CV ECHO MEAS - ESV(MOD-SP2): 27 ML
BH CV ECHO MEAS - ESV(MOD-SP4): 28 ML
BH CV ECHO MEAS - ESV(TEICH): 33.1 ML
BH CV ECHO MEAS - FS: 28.6 %
BH CV ECHO MEAS - IVS/LVPW: 0.95
BH CV ECHO MEAS - IVSD: 0.97 CM
BH CV ECHO MEAS - LAT PEAK E' VEL: 10 CM/SEC
BH CV ECHO MEAS - LV DIASTOLIC VOL/BSA (35-75): 41.8 ML/M^2
BH CV ECHO MEAS - LV MASS(C)D: 130.7 GRAMS
BH CV ECHO MEAS - LV MASS(C)DI: 66.7 GRAMS/M^2
BH CV ECHO MEAS - LV MAX PG: 7.4 MMHG
BH CV ECHO MEAS - LV MEAN PG: 4.3 MMHG
BH CV ECHO MEAS - LV SYSTOLIC VOL/BSA (12-30): 14.3 ML/M^2
BH CV ECHO MEAS - LV V1 MAX: 136.2 CM/SEC
BH CV ECHO MEAS - LV V1 MEAN: 96.9 CM/SEC
BH CV ECHO MEAS - LV V1 VTI: 28.1 CM
BH CV ECHO MEAS - LVIDD: 4.1 CM
BH CV ECHO MEAS - LVIDS: 2.9 CM
BH CV ECHO MEAS - LVLD AP2: 6.9 CM
BH CV ECHO MEAS - LVLD AP4: 7.3 CM
BH CV ECHO MEAS - LVLS AP2: 6.4 CM
BH CV ECHO MEAS - LVLS AP4: 6.1 CM
BH CV ECHO MEAS - LVOT AREA (M): 2.8 CM^2
BH CV ECHO MEAS - LVOT AREA: 2.9 CM^2
BH CV ECHO MEAS - LVOT DIAM: 1.9 CM
BH CV ECHO MEAS - LVPWD: 1 CM
BH CV ECHO MEAS - MED PEAK E' VEL: 8.1 CM/SEC
BH CV ECHO MEAS - MV A DUR: 0.11 SEC
BH CV ECHO MEAS - MV A MAX VEL: 77.1 CM/SEC
BH CV ECHO MEAS - MV DEC SLOPE: 602.1 CM/SEC^2
BH CV ECHO MEAS - MV DEC TIME: 0.16 SEC
BH CV ECHO MEAS - MV E MAX VEL: 87.5 CM/SEC
BH CV ECHO MEAS - MV E/A: 1.1
BH CV ECHO MEAS - MV MAX PG: 3.1 MMHG
BH CV ECHO MEAS - MV MEAN PG: 1.5 MMHG
BH CV ECHO MEAS - MV P1/2T MAX VEL: 88 CM/SEC
BH CV ECHO MEAS - MV P1/2T: 42.8 MSEC
BH CV ECHO MEAS - MV V2 MAX: 88 CM/SEC
BH CV ECHO MEAS - MV V2 MEAN: 58 CM/SEC
BH CV ECHO MEAS - MV V2 VTI: 24.5 CM
BH CV ECHO MEAS - MVA P1/2T LCG: 2.5 CM^2
BH CV ECHO MEAS - MVA(P1/2T): 5.1 CM^2
BH CV ECHO MEAS - MVA(VTI): 3.3 CM^2
BH CV ECHO MEAS - PA ACC TIME: 0.07 SEC
BH CV ECHO MEAS - PA MAX PG (FULL): 2 MMHG
BH CV ECHO MEAS - PA MAX PG: 3.7 MMHG
BH CV ECHO MEAS - PA PR(ACCEL): 46.9 MMHG
BH CV ECHO MEAS - PA V2 MAX: 96.4 CM/SEC
BH CV ECHO MEAS - PULM A REVS DUR: 0.09 SEC
BH CV ECHO MEAS - PULM A REVS VEL: 26.2 CM/SEC
BH CV ECHO MEAS - PULM DIAS VEL: 31.4 CM/SEC
BH CV ECHO MEAS - PULM S/D: 1.8
BH CV ECHO MEAS - PULM SYS VEL: 58.1 CM/SEC
BH CV ECHO MEAS - PVA(V,A): 2.5 CM^2
BH CV ECHO MEAS - PVA(V,D): 2.5 CM^2
BH CV ECHO MEAS - QP/QS: 0.69
BH CV ECHO MEAS - RAP SYSTOLE: 3 MMHG
BH CV ECHO MEAS - RV MAX PG: 1.7 MMHG
BH CV ECHO MEAS - RV MEAN PG: 1 MMHG
BH CV ECHO MEAS - RV V1 MAX: 66 CM/SEC
BH CV ECHO MEAS - RV V1 MEAN: 47.1 CM/SEC
BH CV ECHO MEAS - RV V1 VTI: 15 CM
BH CV ECHO MEAS - RVOT AREA: 3.7 CM^2
BH CV ECHO MEAS - RVOT DIAM: 2.2 CM
BH CV ECHO MEAS - RVSP: 33.7 MMHG
BH CV ECHO MEAS - SI(AO): 119.5 ML/M^2
BH CV ECHO MEAS - SI(CUBED): 22.4 ML/M^2
BH CV ECHO MEAS - SI(LVOT): 41 ML/M^2
BH CV ECHO MEAS - SI(MOD-SP2): 24.5 ML/M^2
BH CV ECHO MEAS - SI(MOD-SP4): 27.5 ML/M^2
BH CV ECHO MEAS - SI(TEICH): 21.1 ML/M^2
BH CV ECHO MEAS - SUP REN AO DIAM: 1.8 CM
BH CV ECHO MEAS - SV(AO): 234.3 ML
BH CV ECHO MEAS - SV(CUBED): 44 ML
BH CV ECHO MEAS - SV(LVOT): 80.5 ML
BH CV ECHO MEAS - SV(MOD-SP2): 48 ML
BH CV ECHO MEAS - SV(MOD-SP4): 54 ML
BH CV ECHO MEAS - SV(RVOT): 55.3 ML
BH CV ECHO MEAS - SV(TEICH): 41.3 ML
BH CV ECHO MEAS - TAPSE (>1.6): 2.3 CM
BH CV ECHO MEAS - TR MAX VEL: 276.9 CM/SEC
BH CV ECHO MEASUREMENTS AVERAGE E/E' RATIO: 9.67
BH CV VAS BP RIGHT ARM: NORMAL MMHG
BH CV XLRA - RV BASE: 3 CM
BH CV XLRA - RV LENGTH: 7.2 CM
BH CV XLRA - RV MID: 2.9 CM
BH CV XLRA - TDI S': 10.3 CM/SEC
LEFT ATRIUM VOLUME INDEX: 25 ML/M2
MAXIMAL PREDICTED HEART RATE: 162 BPM
SINUS: 2.7 CM
STJ: 2.9 CM
STRESS TARGET HR: 138 BPM

## 2021-10-19 PROCEDURE — 93306 TTE W/DOPPLER COMPLETE: CPT | Performed by: INTERNAL MEDICINE

## 2021-10-19 PROCEDURE — 25010000002 PERFLUTREN (DEFINITY) 8.476 MG IN SODIUM CHLORIDE (PF) 0.9 % 10 ML INJECTION: Performed by: NURSE PRACTITIONER

## 2021-10-19 PROCEDURE — 93000 ELECTROCARDIOGRAM COMPLETE: CPT | Performed by: NURSE PRACTITIONER

## 2021-10-19 PROCEDURE — 99214 OFFICE O/P EST MOD 30 MIN: CPT | Performed by: NURSE PRACTITIONER

## 2021-10-19 PROCEDURE — 93306 TTE W/DOPPLER COMPLETE: CPT

## 2021-10-19 RX ORDER — AMLODIPINE BESYLATE 2.5 MG/1
2.5 TABLET ORAL DAILY
Qty: 90 TABLET | Refills: 0 | Status: SHIPPED | OUTPATIENT
Start: 2021-10-19 | End: 2022-06-09 | Stop reason: ALTCHOICE

## 2021-10-19 RX ADMIN — PERFLUTREN 1.5 ML: 6.52 INJECTION, SUSPENSION INTRAVENOUS at 12:30

## 2021-10-19 NOTE — PROGRESS NOTES
Date of Office Visit: 10/19/2021  Encounter Provider: OLVIN Perea  Place of Service: [facility] CARDIOLOGY  Patient Name: Amanda Sherwood  :1960  Primary Cardiologist: Dr. Yue Maradiaga     Chief Complaint   Patient presents with   • Shortness of Breath   • Hypertension   • Follow-up   :     HPI: Amanda Sherwood is a pleasant 60 y.o. female who presents today for follow-up on palpitations and hypertension. I have reviewed her medical records.     She has a known history of GERD, hyperlipidemia, and hypertension.  We have seen her for PSVT and premature ventricular contractions (PVC). In 2018, she had a normal nuclear stress test completed.  She was previously on amlodipine 5 mg daily and developed worsening lower extremity edema so she now takes the medication as needed for hypertension. She has known anxiety and depression.    In 2021, she had a viral illness with nausea and vomiting.  She presented to the [facility] [place] ED with generalized weakness and fatigue.  She was diagnosed with acute kidney injury (creatinine 2.61) and a UTI.  Her losartan/HCT was discontinued and her creatinine improved to 1.29.  She followed up with me in the office and reported left lower quadrant abdominal pain and shortness of breath.  Her hands and fingers were edematous.  Her blood pressure had been elevated at home so she restarted her losartan/HCTZ at her previous dose.  She was dyspneic in the office and I recommended that she present to the ED for further evaluation.    She was admitted to the hospital with multisubsegmental pulmonary emboli and was was treated with apixaban.    In 2021, she followed up with Dr. Maradiaga post hospitalization.  She continued to feel weak and had shortness of breath.  Her Hyzaar was discontinued because she had a cough.  Dr. Maradiaga said she did not want her on HCTZ because she felt like it was contributing to  "hyponatremia.  Her cough did not improve off the Hyzaar.  Dr. Maradiaga recommended spironolactone 50 mg daily, Bystolic 20 mg daily, and amlodipine 2.5 mg daily.  Her blood pressure remained elevated so she was started on losartan 50 mg daily and recommended to have a follow-up BMP.    She presents today for follow-up visit on her blood pressure and her , William is accompanying her.  She just had an echocardiogram completed and I reviewed the results with her.  She still needs to have the BMP completed and wants to have that completed at her PCP office next week.  Her blood pressure is excellent today.  She is taking amlodipine 5 mg as needed for diastolic blood pressure above 90 mmHg.  She takes the extra dose of amlodipine about twice per week.  She continues to experience shortness of breath with talking and exertion.  She feels that this has improved.  She still has a chronic cough which is her biggest concern and sometimes feels like she has a \"mucous plug\".  She recently saw Dr. Jayant Smith and he tried her on Symbicort which she does not feel has helped.  She denies chest pain, palpitations, dizziness, syncope, or bleeding.  Her  noticed that she had some purple bruising on her back today.      Past Medical History:   Diagnosis Date   • Allergic    • Anxiety 3 years ago   • Chronic kidney disease    • Depression    • GERD (gastroesophageal reflux disease)    • H/O Leukopenia    • History of anemia    • History of bronchitis    • History of migraine    • Hyperlipidemia     NO MEDS CURRENTLY. WORKING ON   • Hypertension    • Leukopenia     PER HISTORY. HAS SEEN CBC IN THE PAST   • Neck pain    • Obesity (BMI 30.0-34.9) 6/6/2019   • Osteoarthritis    • PVC (premature ventricular contraction)    • Seasonal allergies    • Torn meniscus     LEFT   • Tremor 3 years ago       Past Surgical History:   Procedure Laterality Date   • BREAST BIOPSY Bilateral     BENIGN. HORACE BREAST   • BUNIONECTOMY Left    • " BUNIONECTOMY Left     REVISION   • COLONOSCOPY N/A 2020    Procedure: COLONOSCOPY TO CECUM AND INTO TERMINAL ILEUM WITH COLD BIOPSY POLYPECTOMY;  Surgeon: Munira Montes MD;  Location: [facility] ENDOSCOPY;  Service: Gastroenterology   • DILATATION AND CURETTAGE      AUB no cancer   • ENDOSCOPY N/A 2020    Procedure: ESOPHAGOGASTRODUODENOSCOPY WITH COLD BIOPSIES;  Surgeon: Munira Montes MD;  Location[facility] END[facility]COPY;  Service: Gastroenterology   • KNEE ARTHROSCOPY Left 2018    Procedure: KNEE ARTHROSCOPY LATERAL MENISECTOMY DEBRIDEMENT OF MEDIAL FEMORAL CONDYLE DEFECT DEBRIDEMENT OF ARTHRITIS;  Surgeon: Graciela Crockett MD;  [place] [facility] [place]  Service: [place]opedics   • MENISCECTOMY Left    • SALPINGO OOPHORECTOMY Left    • TONSILLECTOMY  as a child   • TUBAL ABDOMINAL LIGATION     • WISDOM TOOTH EXTRACTION         Social History     Socioeconomic History   • Marital status:    • Number of children: 2   • Years of education: College   Tobacco Use   • Smoking status: Former Smoker     Packs/day: 0.50     Years: 4.00     Pack years: 2.00     Types: Cigarettes     Quit date: 1987     Years since quittin.9   • Smokeless tobacco: Never Used   Vaping Use   • Vaping Use: Never used   Substance and Sexual Activity   • Alcohol use: Yes     Alcohol/week: 2.0 standard drinks     Types: 2 Cans of beer per week     Comment: caffeine - None   • Drug use: Yes     Types: Marijuana   • Sexual activity: Yes     Partners: Male     Birth control/protection: Post-menopausal       Family History   Problem Relation Age of Onset   • Dementia Mother    • Arthritis Mother    • Other Mother         Alzheimer's   • Stroke Father    • Heart disease Father    • Hypertension Father    • Diabetes Father    • Cancer Father    • Colon cancer Father    • Hyperlipidemia Father    • Breast cancer Sister    • Atrial fibrillation Sister    • Alcohol abuse Sister    • Asthma Sister    • Hypertension Sister    •  Ovarian cancer Maternal Aunt    • Prostate cancer Brother    • Malig Hyperthermia Neg Hx        The following portion of the patient's history were reviewed and updated as appropriate: past medical history, past surgical history, past social history, past family history, allergies, current medications, and problem list.    Review of Systems   Constitutional: Negative.   Cardiovascular: Positive for dyspnea on exertion and leg swelling.   Respiratory: Positive for cough and shortness of breath.    Hematologic/Lymphatic: Negative.    Musculoskeletal: Positive for myalgias.   Neurological: Negative.    Psychiatric/Behavioral: Positive for depression. The patient is nervous/anxious.        Allergies   Allergen Reactions   • Celecoxib Hives and Swelling     hives   • Ace Inhibitors Cough   • Lisinopril Cough         Current Outpatient Medications:   •  albuterol sulfate  (90 Base) MCG/ACT inhaler, , Disp: , Rfl:   •  amLODIPine (NORVASC) 5 MG tablet, Take 1 tablet by mouth 1 (One) Time As Needed (for elevated blood pressure). (Patient taking differently: Take 5 mg by mouth As Needed (for elevated blood pressure).), Disp: 90 tablet, Rfl: 3  •  atorvastatin (Lipitor) 40 MG tablet, Take 1 tablet by mouth Every Night. Take half tab for 1 week then take full tab, Disp: 30 tablet, Rfl: 3  •  buPROPion XL (WELLBUTRIN XL) 150 MG 24 hr tablet, Take 300 mg by mouth Every Morning., Disp: , Rfl:   •  calcium carbonate (TUMS) 500 MG chewable tablet, Chew 1 tablet As Needed., Disp: , Rfl:   •  cetirizine (zyrTEC) 10 MG tablet, Take 10 mg by mouth As Needed., Disp: , Rfl:   •  Cholecalciferol (VITAMIN D3) 5000 UNITS capsule capsule, Take 5,000 Units by mouth daily., Disp: , Rfl:   •  escitalopram (LEXAPRO) 20 MG tablet, Take 1 tablet by mouth Daily. (Patient taking differently: Take 20 mg by mouth Every Night.), Disp: 90 tablet, Rfl: 3  •  esomeprazole (NexIUM) 20 MG capsule, Take 20 mg by mouth Daily., Disp: , Rfl:   •   "LORazepam (Ativan) 0.5 MG tablet, Take 0.5 mg by mouth Daily As Needed for Anxiety., Disp: , Rfl:   •  losartan (Cozaar) 50 MG tablet, Take 1 tablet by mouth Daily., Disp: 90 tablet, Rfl: 3  •  nebivolol (Bystolic) 20 MG tablet, TAKE ONE TABLET BY MOUTH DAILY, Disp: 90 tablet, Rfl: 3  •  ondansetron (ZOFRAN) 4 MG tablet, Take 1 tablet by mouth Every 8 (Eight) Hours As Needed for Nausea., Disp: 20 tablet, Rfl: 1  •  spironolactone (ALDACTONE) 50 MG tablet, Take 1 tablet by mouth Daily., Disp: 90 tablet, Rfl: 3  •  Symbicort 160-4.5 MCG/ACT inhaler, , Disp: , Rfl:   •  traZODone (DESYREL) 50 MG tablet, Take 50 mg by mouth Every Night., Disp: , Rfl:   •  apixaban (ELIQUIS) 5 MG tablet tablet, Take 1 tablet by mouth Every 12 (Twelve) Hours., Disp: 60 tablet, Rfl: 11  No current facility-administered medications for this visit.        Objective:     Vitals:    10/19/21 1159   BP: 124/84   BP Location: Left arm   Pulse: 73   SpO2: 98%   Weight: 87.1 kg (192 lb)   Height: 165.1 cm (65\")     Body mass index is 31.95 kg/m².    PHYSICAL EXAM:    Vitals Reviewed.   General Appearance: No acute distress, well developed and well nourished. Obese.   Eyes: Conjunctiva and lids: No erythema, swelling, or discharge. Sclera non-icteric.   HENT: Atraumatic, normocephalic. External eyes, ears, and nose normal. No hearing loss noted. Mucous membranes normal. Lips not cyanotic. Neck supple with no tenderness.  Respiratory: Short of breath at rest and with talking. Shallow breaths.    Clear to auscultation. No rales, crackles, rhonchi, or wheezing auscultated.   Cardiovascular:  Jugular Venous Pressure: Normal  Heart Rate and Rhythm: Normal, Heart Sounds: Normal S1 and S2. No S3 or S4 noted.  Murmurs: No murmurs noted. No rubs, thrills, or gallops.   Lower Extremities: Bilateral trace lower extremity edema noted.  Gastrointestinal:  Abdomen soft, non-distended, non-tender. Normal bowel sounds.    Musculoskeletal: Normal movement of " extremities  Skin and Nails: General appearance normal. No pallor, cyanosis, diaphoresis. Skin temperature normal. No clubbing of fingernails.  Purple spider veins noted on back and possible bruising.  Psychiatric: Patient alert and oriented to person, place, and time. Speech and behavior appropriate. Normal mood and affect.       ECG 12 Lead    Date/Time: 10/19/2021 12:30 PM  Performed by: Soo Pryor APRN  Authorized by: Soo Pryor APRN   Comparison: compared with previous ECG from 9/9/2021  Similar to previous ECG  Rhythm: sinus rhythm  Rate: normal  BPM: 71  Conduction: conduction normal  ST Segments: ST segments normal  T Waves: T waves normal  QRS axis: normal  Other: no other findings    Clinical impression: normal ECG              Assessment:       Diagnosis Plan   1. Primary hypertension  Case Request Cath Lab: Coronary angiography, Left heart cath, Left ventriculography, Right heart cath   2. Multiple subsegmental pulmonary emboli without acute cor pulmonale (HCC)  Case Request Cath Lab: Coronary angiography, Left heart cath, Left ventriculography, Right heart cath   3. Shortness of breath  Case Request Cath Lab: Coronary angiography, Left heart cath, Left ventriculography, Right heart cath   4. Chronic cough  Case Request Cath Lab: Coronary angiography, Left heart cath, Left ventriculography, Right heart cath          Plan:       1.  Hypertension: Blood pressure well controlled on current medication regimen.  Instead of having to take the amlodipine 5 mg as needed, she is going to try amlodipine 2.5 mg daily.  She will call me with an update in 1 week.  She will have a BMP completed at her PCP office next week.    2.  Pulmonary Emboli: Continue apixaban 5 mg twice per day.    3/4.  Shortness of Breath and Cough: She feels that her shortness of breath has improved, the cough has not.  She was tried on Symbicort and this did not make any difference in her cough or breathing.    5.  She has  purple discoloration (bruising and/or spider veins) noted on her upper back.  I feel this is most likely due to her consistent coughing since she has been on the apixaban.    6.  Dr. Maradiaga recommended that she hold her Bystolic till Saturday to see if her shortness of breath and cough improved.  If this does not help, she is recommended to restart the Bystolic.  Dr. Maradiaga has also sent a message to Dr. Jordon Seth to see if the patient can be seen sooner.  Dr. Maradiaga is recommended proceeding with a right and left heart catheterization.  The risk versus benefits were discussed and the patient would like to proceed.    As always, it has been a pleasure to participate in your patient's care. Thank you.       Sincerely,       OLVIN Short  [facility] Cardiology      · COVID-19 Precautions - Patient was compliant in wearing a mask. When I saw the patient, I used appropriate personal protective equipment (PPE) including mask and eye shield (standard procedure).  Additionally, I used gown and gloves if indicated.  Hand hygiene was completed before and after seeing the patient.  · Dictated utilizing Dragon Dictation  · I spent 35 minutes reviewing her medical records/testing/previous office notes/labs, face-to-face interaction with patient, physical examination, formulating the plan of care, and discussion of plan of care with patient.

## 2021-10-20 ENCOUNTER — TRANSCRIBE ORDERS (OUTPATIENT)
Dept: CARDIOLOGY | Facility: CLINIC | Age: 61
End: 2021-10-20

## 2021-10-20 ENCOUNTER — TELEPHONE (OUTPATIENT)
Dept: CARDIOLOGY | Facility: CLINIC | Age: 61
End: 2021-10-20

## 2021-10-20 DIAGNOSIS — Z01.818 OTHER SPECIFIED PRE-OPERATIVE EXAMINATION: Primary | ICD-10-CM

## 2021-10-20 DIAGNOSIS — Z13.6 SCREENING FOR ISCHEMIC HEART DISEASE: ICD-10-CM

## 2021-10-20 DIAGNOSIS — Z01.810 PRE-OPERATIVE CARDIOVASCULAR EXAMINATION: ICD-10-CM

## 2021-10-20 NOTE — TELEPHONE ENCOUNTER
PT has been scheduled 10/28/21 for R+L Heart Cath with SG. She has some concerns about holding the Eliquis 2 days prior to the procedure, and is nervous about the procedure in general. Could you please give her a call and go over her concerns with her?     Thank you so much,    Amalia CARROLL

## 2021-10-20 NOTE — TELEPHONE ENCOUNTER
Please see below:    Hello,    I submitted a PA for this patients procedure on [employer]IM, however it did not approve, as their ECHO was normal.  [id number] information is provided below.    Ordering Provider: Soo Pryor  Rendering Provider: Vanessa Bennett  Date Scheduled: 10/28/2021  Procedure and CPT: 73270 (R & L HC with Cors)  Patient Name: Amanda Sherwood  Patient : 1960  Patient MRN: [id number]  Insurance Carrier: [employer]  Insurance ID: [id number]  Case number if applicable: None   Insurance Phone for P2P: [phone] Option 1, 1, 2      Thank you,    Amalia CARROLL

## 2021-10-21 ENCOUNTER — TELEPHONE (OUTPATIENT)
Dept: CARDIOLOGY | Facility: CLINIC | Age: 61
End: 2021-10-21

## 2021-10-21 ENCOUNTER — TRANSCRIBE ORDERS (OUTPATIENT)
Dept: CARDIOLOGY | Facility: CLINIC | Age: 61
End: 2021-10-21

## 2021-10-21 DIAGNOSIS — Z01.818 OTHER SPECIFIED PRE-OPERATIVE EXAMINATION: Primary | ICD-10-CM

## 2021-10-21 NOTE — TELEPHONE ENCOUNTER
----- Message from Yue Maradiaga MD sent at 10/21/2021  1:14 PM EDT -----  Yes - he will see her next week.  Pls call and make sure that she has heard from him.       ----- Message -----  From: Soo Pryor APRN  Sent: 10/21/2021   1:10 PM EDT  To: OLVIN Terrell, Yue Maradiaga MD      Did you ever talk to Dr. Jordon Seth?  ----- Message -----  From: Soo Pryor APRN  Sent: 10/19/2021  12:52 PM EDT  To: OLVIN Terrell, Jordon Seth MD      I saw patient today. She continues to have shortness of breath and a really bad cough. She was tried on symbicort, but not helping. She is not scheduled to see you until the end of November. Could she see you sooner? Thanks. OLVIN Short

## 2021-10-21 NOTE — TELEPHONE ENCOUNTER
Dr. Maradiaga said the patient is going to have an appointment with the pulmonologist next week.

## 2021-10-21 NOTE — TELEPHONE ENCOUNTER
I was going to call patient to answer her concerns.  She currently is on the apixaban for recent pulmonary emboli and scheduling told her to hold 48 hours before the heart catheterization.  Please advise.

## 2021-10-21 NOTE — PROGRESS NOTES
.     REASON FOR FOLLOWUP :   Pulmonary embolism    HISTORY OF PRESENT ILLNESS:  The patient is a 60 y.o. year old female  who is here for follow-up with the above-mentioned history.    No new problems.  No bleeding.  Continues on Eliquis.    Past Medical History:   Diagnosis Date   • Allergic    • Anxiety 3 years ago   • Chronic kidney disease    • Depression    • GERD (gastroesophageal reflux disease)    • H/O Leukopenia    • History of anemia    • History of bronchitis    • History of migraine    • Hyperlipidemia     NO MEDS CURRENTLY. WORKING ON   • Hypertension    • Leukopenia     PER HISTORY. HAS SEEN CBC IN THE PAST   • Neck pain    • Obesity (BMI 30.0-34.9) 6/6/2019   • Osteoarthritis    • PVC (premature ventricular contraction)    • Seasonal allergies    • Torn meniscus     LEFT   • Tremor 3 years ago     Past Surgical History:   Procedure Laterality Date   • BREAST BIOPSY Bilateral     BENIGN. HORACE BREAST   • BUNIONECTOMY Left    • BUNIONECTOMY Left     REVISION   • COLONOSCOPY N/A 2/1/2020    Procedure: COLONOSCOPY TO CECUM AND INTO TERMINAL ILEUM WITH COLD BIOPSY POLYPECTOMY;  Surgeon: Munira Montes MD;  Location: [facility] ENDOSCOPY;  Service: Gastroenterology   • DILATATION AND CURETTAGE      AUB no cancer   • ENDOSCOPY N/A 2/1/2020    Procedure: ESOPHAGOGASTRODUODENOSCOPY WITH COLD BIOPSIES;  Surgeon: Munira Montes MD;  Location: [facility] ENDOSCOPY;  Service: Gastroenterology   • KNEE ARTHROSCOPY Left 8/22/2018    Procedure: KNEE ARTHROSCOPY LATERAL MENISECTOMY DEBRIDEMENT OF MEDIAL FEMORAL CONDYLE DEFECT DEBRIDEMENT OF ARTHRITIS;  Surgeon: Graciela Crockett MD;  Location: [facility] OR [facility];  Service: Orthopedics   • MENISCECTOMY Left    • SALPINGO OOPHORECTOMY Left    • TONSILLECTOMY  as a child   • TUBAL ABDOMINAL LIGATION     • WISDOM TOOTH EXTRACTION         MEDICATIONS    Current Outpatient Medications:   •  albuterol sulfate  (90 Base) MCG/ACT inhaler, , Disp: , Rfl:   •  albuterol  sulfate  (90 Base) MCG/ACT inhaler, Inhale 2 puffs into the lungs every 4 hours as needed., Disp: 18 g, Rfl: 10  •  amLODIPine (NORVASC) 2.5 MG tablet, Take 1 tablet by mouth Daily., Disp: 90 tablet, Rfl: 0  •  amLODIPine (NORVASC) 5 MG tablet, Take 1 tablet by mouth Daily As Needed for elevated blood pressure, Disp: 90 tablet, Rfl: 0  •  apixaban (ELIQUIS) 5 MG tablet tablet, Take 1 tablet by mouth Every 12 (Twelve) Hours., Disp: 60 tablet, Rfl: 11  •  ARIPiprazole (ABILIFY) 10 MG tablet, Take 1 tablet by mouth Every Morning., Disp: 30 tablet, Rfl: 0  •  ARIPiprazole (ABILIFY) 2 MG tablet, Take 1 tablet by mouth every night at bedtime., Disp: 90 tablet, Rfl: 0  •  atorvastatin (Lipitor) 40 MG tablet, Take 1 tablet by mouth Every Night. Take half tab for 1 week then take full tab, Disp: 30 tablet, Rfl: 3  •  budesonide-formoterol (SYMBICORT) 160-4.5 MCG/ACT inhaler, Inhale 2 puffs 2 (two) times a day., Disp: 10.2 g, Rfl: 10  •  buPROPion XL (WELLBUTRIN XL) 150 MG 24 hr tablet, Take 300 mg by mouth Every Morning., Disp: , Rfl:   •  buPROPion XL (WELLBUTRIN XL) 300 MG 24 hr tablet, Take 1 tablet by mouth Every Morning., Disp: 90 tablet, Rfl: 0  •  calcium carbonate (TUMS) 500 MG chewable tablet, Chew 1 tablet As Needed., Disp: , Rfl:   •  cetirizine (zyrTEC) 10 MG tablet, Take 10 mg by mouth As Needed., Disp: , Rfl:   •  Cholecalciferol (VITAMIN D3) 5000 UNITS capsule capsule, Take 5,000 Units by mouth daily., Disp: , Rfl:   •  escitalopram (LEXAPRO) 20 MG tablet, Take 1 tablet by mouth Daily. (Patient taking differently: Take 20 mg by mouth Every Night.), Disp: 90 tablet, Rfl: 0  •  esomeprazole (NexIUM) 20 MG capsule, Take 20 mg by mouth Daily., Disp: , Rfl:   •  LORazepam (Ativan) 0.5 MG tablet, Take 0.5 mg by mouth Daily As Needed for Anxiety., Disp: , Rfl:   •  losartan (Cozaar) 50 MG tablet, Take 1 tablet by mouth Daily., Disp: 90 tablet, Rfl: 3  •  losartan-hydrochlorothiazide (HYZAAR) 100-25 MG per  tablet, Take 1 tablet by mouth Daily., Disp: 90 tablet, Rfl: 0  •  nebivolol (Bystolic) 20 MG tablet, TAKE ONE TABLET BY MOUTH DAILY, Disp: 90 tablet, Rfl: 3  •  ondansetron (ZOFRAN) 4 MG tablet, Take 1 tablet by mouth Every 8 (Eight) Hours As Needed for Nausea., Disp: 20 tablet, Rfl: 1  •  ondansetron (ZOFRAN) 4 MG tablet, Take 1 tablet by mouth Every 8 (Eight) Hours as needed for nausea., Disp: 20 tablet, Rfl: 0  •  spironolactone (ALDACTONE) 50 MG tablet, Take 1 tablet by mouth Daily., Disp: 90 tablet, Rfl: 3  •  Symbicort 160-4.5 MCG/ACT inhaler, , Disp: , Rfl:   •  traZODone (DESYREL) 50 MG tablet, Take 50 mg by mouth Every Night., Disp: , Rfl:   •  traZODone (DESYREL) 50 MG tablet, Take 2 tablets by mouth every night at bedtime as needed, Disp: 180 tablet, Rfl: 0    ALLERGIES:     Allergies   Allergen Reactions   • Celecoxib Hives and Swelling     hives   • Ace Inhibitors Cough   • Lisinopril Cough       SOCIAL HISTORY:       Social History     Socioeconomic History   • Marital status:    • Number of children: 2   • Years of education: College   Tobacco Use   • Smoking status: Former Smoker     Packs/day: 0.50     Years: 4.00     Pack years: 2.00     Types: Cigarettes     Quit date: 1987     Years since quittin.0   • Smokeless tobacco: Never Used   Vaping Use   • Vaping Use: Never used   Substance and Sexual Activity   • Alcohol use: Yes     Alcohol/week: 2.0 standard drinks     Types: 2 Cans of beer per week     Comment: caffeine - None   • Drug use: Yes     Types: Marijuana   • Sexual activity: Yes     Partners: Male     Birth control/protection: Post-menopausal         FAMILY HISTORY:  Family History   Problem Relation Age of Onset   • Dementia Mother    • Arthritis Mother    • Other Mother         Alzheimer's   • Stroke Father    • Heart disease Father    • Hypertension Father    • Diabetes Father    • Cancer Father    • Colon cancer Father    • Hyperlipidemia Father    • Breast cancer  "Sister    • Atrial fibrillation Sister    • Alcohol abuse Sister    • Asthma Sister    • Hypertension Sister    • Ovarian cancer Maternal Aunt    • Prostate cancer Brother    • Malig Hyperthermia Neg Hx        REVIEW OF SYSTEMS:  Review of Systems   Constitutional: Negative for activity change.   HENT: Negative for nosebleeds and trouble swallowing.    Respiratory: Negative for shortness of breath and wheezing.    Cardiovascular: Negative for chest pain and palpitations.   Gastrointestinal: Negative for constipation, diarrhea and nausea.   Genitourinary: Negative for dysuria and hematuria.   Musculoskeletal: Negative for arthralgias and myalgias.   Skin: Negative for rash and wound.   Neurological: Negative for seizures and syncope.   Hematological: Negative for adenopathy. Does not bruise/bleed easily.   Psychiatric/Behavioral: Negative for confusion.              Vitals:    10/25/21 0925   BP: 126/83   Pulse: 78   Resp: 18   Temp: 97.3 °F (36.3 °C)   TempSrc: Temporal   SpO2: 95%   Weight: 87.5 kg (192 lb 14.4 oz)   Height: 165.2 cm (65.04\")   PainSc: 0-No pain     Current Status 10/25/2021   ECOG score 0      PHYSICAL EXAM:        CONSTITUTIONAL:  Vital signs reviewed.  No distress, looks comfortable.  EYES:  Conjunctiva and lids unremarkable.  PERRLA  EARS,NOSE,MOUTH,THROAT:  Ears and nose appear unremarkable.  Lips, teeth, gums appear unremarkable.  RESPIRATORY:  Normal respiratory effort.  Lungs clear to auscultation bilaterally.  CARDIOVASCULAR:  Normal S1, S2.  No murmurs rubs or gallops.  No significant lower extremity edema.  GASTROINTESTINAL: Abdomen appears unremarkable.  Nontender.  No hepatomegaly.  No splenomegaly.  LYMPHATIC:  No cervical, supraclavicular, axillary lymphadenopathy.  SKIN:  Warm.  No rashes.  PSYCHIATRIC:  Normal judgment and insight.  Normal mood and affect.          RECENT LABS:        WBC   Date Value Ref Range Status   09/27/2021 3.47 3.40 - 10.80 10*3/mm3 Final   08/29/2021 3.26 " (L) 3.40 - 10.80 10*3/mm3 Final   08/28/2021 3.15 (L) 3.40 - 10.80 10*3/mm3 Final   08/27/2021 4.73 3.40 - 10.80 10*3/mm3 Final   08/16/2021 3.23 (L) 3.40 - 10.80 10*3/mm3 Final   08/07/2021 3.81 3.40 - 10.80 10*3/mm3 Final   08/06/2021 4.96 3.40 - 10.80 10*3/mm3 Final   06/20/2019 4.42 3.40 - 10.80 10*3/mm3 Final   06/19/2019 3.96 (L) 4.5 - 11.0 10*3/uL Final   06/18/2019 3.68 3.40 - 10.80 10*3/mm3 Final   09/22/2018 3.86 (L) 4.50 - 10.70 10*3/mm3 Final   08/15/2018 2.74 (L) 4.50 - 10.70 10*3/mm3 Final   10/27/2016 3.65 (L) 4.50 - 10.70 10*3/mm3 Final   09/13/2016 2.42 (L) 4.50 - 10.70 10*3/mm3 Final   04/29/2016 3.05 (L) 4.50 - 10.70 10*3/mm3 Final     Hemoglobin   Date Value Ref Range Status   09/27/2021 14.1 12.0 - 15.9 g/dL Final   08/29/2021 14.0 12.0 - 15.9 g/dL Final   08/28/2021 14.1 12.0 - 15.9 g/dL Final   08/27/2021 15.4 12.0 - 15.9 g/dL Final   08/16/2021 13.4 12.0 - 15.9 g/dL Final   08/07/2021 12.9 12.0 - 15.9 g/dL Final   08/06/2021 14.9 12.0 - 15.9 g/dL Final   06/20/2019 13.7 12.0 - 15.9 g/dL Final   06/19/2019 14.6 12.0 - 16.0 g/dL Final   06/18/2019 12.9 12.0 - 15.9 g/dL Final   09/22/2018 14.7 11.9 - 15.5 g/dL Final   08/15/2018 13.8 11.9 - 15.5 g/dL Final   10/27/2016 13.8 11.9 - 15.5 g/dL Final   09/13/2016 13.6 11.9 - 15.5 g/dL Final   04/29/2016 13.5 11.9 - 15.5 g/dL Final     Platelets   Date Value Ref Range Status   09/27/2021 178 140 - 450 10*3/mm3 Final   08/29/2021 174 140 - 450 10*3/mm3 Final   08/28/2021 186 140 - 450 10*3/mm3 Final   08/27/2021 233 140 - 450 10*3/mm3 Final   08/16/2021 181 140 - 450 10*3/mm3 Final   08/07/2021 259 140 - 450 10*3/mm3 Final   08/06/2021 373 140 - 450 10*3/mm3 Final   06/20/2019 180 140 - 450 10*3/mm3 Final   06/19/2019 184 140 - 440 10*3/uL Final   06/18/2019 160 140 - 450 10*3/mm3 Final   09/22/2018 169 140 - 500 10*3/mm3 Final   08/15/2018 164 140 - 500 10*3/mm3 Final   10/27/2016 161 140 - 500 10*3/mm3 Final   09/13/2016 176 140 - 500 10*3/mm3 Final    04/29/2016 187 140 - 500 10*3/mm3 Final       Assessment/Plan   Multiple subsegmental pulmonary emboli without acute cor pulmonale (HCC)  - Protein S Functional  - Protein S Antigen, Free  - Protein S Functional  - Protein S Antigen, Free  - Protein S Antigen, Total  - Protein S Antigen, Total  - CBC & Differential        Amanda Sherwood   *Bilateral lower lobe pulmonary emboli on CT 8/27/2021.  · Doppler left leg 8/17/2021: Normal  · Doppler bilateral legs 8/28/2021: Normal  · CT chest angiogram 8/27/2021: Bilateral lower lobe PEs.   · Continue Eliquis    *Risk factors for clotting:  · Overweight  · Was not on HRT.  (Understands should not take any HRT in the future).  · Only smoked a little in high school and college.  No smoking since.  · No prior personal history of clots  · No family history of clots  · Immobility.  July 2021 GI virus: Lost 20 pounds.  4-day admission for FANNY and UTI.  Subsequently at home, in bed x5 days late July 2021.  Then, bed to couch x2 weeks.  · Unremarkable: Prothrombin gene mutation, factor V Leiden, Antithrombin, protein C activity, protein S activity, anticardiolipin antibodies, beta-2 glycoprotein antibodies, lupus anticoagulant    *Possible Protein S deficiency  · Protein S free <10% on 9/27/2021 (protein S activity >150%).  · Typically all types of protein S deficiency have a decreased activity  · Repeat protein S testing: Total, free, activity    *Length of anticoagulation (Eliquis).  · Reversible risk factor: Immobility  · However, prolonged SOA and chest discomfort (significant issues improved but persist 1 month after Eliquis started)  · Check hypercoagulable labs  · Because she had a reversible risk factor, 6 months of Eliquis would be reasonable.  However, with her prolonged symptoms which are improved but remained significant after 1 month of Eliquis, I told her it might be best to try to stay on some Eliquis at the end of 6 months, but we will see how she is doing and  review hypercoagulable labs.    *Unsteadiness on feet, overall fatigue and weakness.  · On day of initial consult, 9/27/2021: Difficulty getting up and down off the examining table.  Unsteady on feet.  I recommended a cane or wheelchair.  She declined.  · She is planning physical therapy.  States she has been trying to walk but cannot walk more than 1/4 mile.  I recommended she use her stationary bike multiple times per day to try to build up strength.    *Improved, but ongoing SOA and chest discomfort.  · She follows with cardiology and pulmonology and states they are aware.  · She states Dr. Jordon Adams prescribed Symbicort and a rescue inhaler.  · She states cardiology told her an echocardiogram shows pulmonary hypertension.    *Possible pulmonary nodule, LLL on CT 8/27/2021  · Small new 6 mm nodular density LLL, radiologist recommends follow-up.  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    Plan  · Protein S free, total, activity today  · MD 4 months.  1 week prior: Protein S free, activity, total (if protein S studies today are normal, we can cancel the protein S studies before she sees me in 4 months).  · Regardless, I want to see her in 4 months so we can decide whether to continue therapeutic dose Eliquis or decrease to prophylactic dose.  When she sees me in 4 months that we will complete 6 months of therapeutic anticoagulation

## 2021-10-21 NOTE — TELEPHONE ENCOUNTER
I spoke with Dr. Maradiaga. She recommended holding apixiban for one day before heart cath. Then restart asap after heart cath.  The orders have been placed for Covid and labs.     She is worried about her kidneys. I told her we will watch her kidneys closely. We discussed potential risks with the procedure.     She will be seeing the pulmonologist on 11/4. She will call with concerns.

## 2021-10-25 ENCOUNTER — OFFICE VISIT (OUTPATIENT)
Dept: ONCOLOGY | Facility: CLINIC | Age: 61
End: 2021-10-25

## 2021-10-25 ENCOUNTER — OFFICE VISIT (OUTPATIENT)
Dept: LAB | Facility: HOSPITAL | Age: 61
End: 2021-10-25

## 2021-10-25 VITALS
RESPIRATION RATE: 18 BRPM | TEMPERATURE: 97.3 F | OXYGEN SATURATION: 95 % | SYSTOLIC BLOOD PRESSURE: 126 MMHG | DIASTOLIC BLOOD PRESSURE: 83 MMHG | HEART RATE: 78 BPM | BODY MASS INDEX: 32.14 KG/M2 | HEIGHT: 65 IN | WEIGHT: 192.9 LBS

## 2021-10-25 DIAGNOSIS — I26.94 MULTIPLE SUBSEGMENTAL PULMONARY EMBOLI WITHOUT ACUTE COR PULMONALE (HCC): Primary | ICD-10-CM

## 2021-10-25 DIAGNOSIS — I26.94 MULTIPLE SUBSEGMENTAL PULMONARY EMBOLI WITHOUT ACUTE COR PULMONALE (HCC): ICD-10-CM

## 2021-10-25 PROCEDURE — 99214 OFFICE O/P EST MOD 30 MIN: CPT | Performed by: INTERNAL MEDICINE

## 2021-10-25 PROCEDURE — 85306 CLOT INHIBIT PROT S FREE: CPT | Performed by: INTERNAL MEDICINE

## 2021-10-25 PROCEDURE — 36415 COLL VENOUS BLD VENIPUNCTURE: CPT | Performed by: INTERNAL MEDICINE

## 2021-10-26 ENCOUNTER — LAB (OUTPATIENT)
Dept: LAB | Facility: HOSPITAL | Age: 61
End: 2021-10-26

## 2021-10-26 ENCOUNTER — OFFICE VISIT (OUTPATIENT)
Dept: FAMILY MEDICINE CLINIC | Facility: CLINIC | Age: 61
End: 2021-10-26

## 2021-10-26 VITALS
OXYGEN SATURATION: 97 % | HEART RATE: 76 BPM | TEMPERATURE: 97.1 F | BODY MASS INDEX: 31.75 KG/M2 | DIASTOLIC BLOOD PRESSURE: 80 MMHG | SYSTOLIC BLOOD PRESSURE: 108 MMHG | RESPIRATION RATE: 16 BRPM | WEIGHT: 191 LBS

## 2021-10-26 DIAGNOSIS — R05.3 CHRONIC COUGH: ICD-10-CM

## 2021-10-26 DIAGNOSIS — I10 PRIMARY HYPERTENSION: ICD-10-CM

## 2021-10-26 DIAGNOSIS — F41.0 ANXIETY DISORDER DUE TO GENERAL MEDICAL CONDITION WITH PANIC ATTACK: ICD-10-CM

## 2021-10-26 DIAGNOSIS — R19.8 GI SYMPTOM: ICD-10-CM

## 2021-10-26 DIAGNOSIS — Z01.810 PRE-OPERATIVE CARDIOVASCULAR EXAMINATION: ICD-10-CM

## 2021-10-26 DIAGNOSIS — R12 HEARTBURN: ICD-10-CM

## 2021-10-26 DIAGNOSIS — Z01.818 OTHER SPECIFIED PRE-OPERATIVE EXAMINATION: ICD-10-CM

## 2021-10-26 DIAGNOSIS — I26.94 MULTIPLE SUBSEGMENTAL PULMONARY EMBOLI WITHOUT ACUTE COR PULMONALE (HCC): ICD-10-CM

## 2021-10-26 DIAGNOSIS — Z13.6 SCREENING FOR ISCHEMIC HEART DISEASE: ICD-10-CM

## 2021-10-26 DIAGNOSIS — R53.83 OTHER FATIGUE: Primary | ICD-10-CM

## 2021-10-26 DIAGNOSIS — R06.02 SHORTNESS OF BREATH: ICD-10-CM

## 2021-10-26 DIAGNOSIS — F06.4 ANXIETY DISORDER DUE TO GENERAL MEDICAL CONDITION WITH PANIC ATTACK: ICD-10-CM

## 2021-10-26 LAB
ANION GAP SERPL CALCULATED.3IONS-SCNC: 14.4 MMOL/L (ref 5–15)
BASOPHILS # BLD AUTO: 0.01 10*3/MM3 (ref 0–0.2)
BASOPHILS NFR BLD AUTO: 0.2 % (ref 0–1.5)
BUN SERPL-MCNC: 11 MG/DL (ref 6–20)
BUN/CREAT SERPL: 9.2 (ref 7–25)
CALCIUM SPEC-SCNC: 10.7 MG/DL (ref 8.6–10.5)
CHLORIDE SERPL-SCNC: 98 MMOL/L (ref 98–107)
CO2 SERPL-SCNC: 24.6 MMOL/L (ref 22–29)
CREAT SERPL-MCNC: 1.2 MG/DL (ref 0.57–1)
DEPRECATED RDW RBC AUTO: 41.9 FL (ref 37–54)
EOSINOPHIL # BLD AUTO: 0.05 10*3/MM3 (ref 0–0.4)
EOSINOPHIL NFR BLD AUTO: 1.1 % (ref 0.3–6.2)
ERYTHROCYTE [DISTWIDTH] IN BLOOD BY AUTOMATED COUNT: 12.1 % (ref 12.3–15.4)
GFR SERPL CREATININE-BSD FRML MDRD: 46 ML/MIN/1.73
GLUCOSE SERPL-MCNC: 86 MG/DL (ref 65–99)
HCT VFR BLD AUTO: 42.6 % (ref 34–46.6)
HGB BLD-MCNC: 14.6 G/DL (ref 12–15.9)
IMM GRANULOCYTES # BLD AUTO: 0.01 10*3/MM3 (ref 0–0.05)
IMM GRANULOCYTES NFR BLD AUTO: 0.2 % (ref 0–0.5)
LYMPHOCYTES # BLD AUTO: 1.09 10*3/MM3 (ref 0.7–3.1)
LYMPHOCYTES NFR BLD AUTO: 23.8 % (ref 19.6–45.3)
MCH RBC QN AUTO: 32.3 PG (ref 26.6–33)
MCHC RBC AUTO-ENTMCNC: 34.3 G/DL (ref 31.5–35.7)
MCV RBC AUTO: 94.2 FL (ref 79–97)
MONOCYTES # BLD AUTO: 0.48 10*3/MM3 (ref 0.1–0.9)
MONOCYTES NFR BLD AUTO: 10.5 % (ref 5–12)
NEUTROPHILS NFR BLD AUTO: 2.94 10*3/MM3 (ref 1.7–7)
NEUTROPHILS NFR BLD AUTO: 64.2 % (ref 42.7–76)
NRBC BLD AUTO-RTO: 0 /100 WBC (ref 0–0.2)
PLATELET # BLD AUTO: 194 10*3/MM3 (ref 140–450)
PMV BLD AUTO: 10.7 FL (ref 6–12)
POTASSIUM SERPL-SCNC: 5 MMOL/L (ref 3.5–5.2)
PROT S ACT/NOR PPP: >150 % (ref 70–127)
PROT S FREE PPP-ACNC: >150 % (ref 49–138)
RBC # BLD AUTO: 4.52 10*6/MM3 (ref 3.77–5.28)
SODIUM SERPL-SCNC: 137 MMOL/L (ref 136–145)
WBC # BLD AUTO: 4.58 10*3/MM3 (ref 3.4–10.8)

## 2021-10-26 PROCEDURE — 80048 BASIC METABOLIC PNL TOTAL CA: CPT

## 2021-10-26 PROCEDURE — U0004 COV-19 TEST NON-CDC HGH THRU: HCPCS | Performed by: INTERNAL MEDICINE

## 2021-10-26 PROCEDURE — C9803 HOPD COVID-19 SPEC COLLECT: HCPCS | Performed by: INTERNAL MEDICINE

## 2021-10-26 PROCEDURE — 99214 OFFICE O/P EST MOD 30 MIN: CPT

## 2021-10-26 PROCEDURE — 36415 COLL VENOUS BLD VENIPUNCTURE: CPT

## 2021-10-26 PROCEDURE — 85025 COMPLETE CBC W/AUTO DIFF WBC: CPT

## 2021-10-26 RX ORDER — BENZONATATE 100 MG/1
100 CAPSULE ORAL 3 TIMES DAILY PRN
Qty: 30 CAPSULE | Refills: 0 | Status: SHIPPED | OUTPATIENT
Start: 2021-10-26 | End: 2021-12-03 | Stop reason: SDUPTHER

## 2021-10-26 RX ORDER — ONDANSETRON 4 MG/1
4 TABLET, FILM COATED ORAL EVERY 8 HOURS PRN
Qty: 20 TABLET | Refills: 1 | Status: SHIPPED | OUTPATIENT
Start: 2021-10-26 | End: 2022-03-04

## 2021-10-26 NOTE — PROGRESS NOTES
Chief Complaint  Shortness of Breath ( 4week f/u)    Subjective          Amanda Sherwood presents to [facility] PRIMARY [facility]  History of Present Illness     Patient presents office to follow-up on fatigue and shortness of breath.    Fatigue and weakness-patient has been regularly seeing physical therapy over the past 2 weeks and she says that this has been helping her generalized weakness.  Patient is able to walk more than a quarter of a mile but still feels very tired afterward.  Patient feels much more stable now and that she can get around better as well.  However patient says she still feels very fatigued from when she wakes up in the morning to when she goes to bed at night.  Patient is scheduled to get a right and left heart cath to see if there is any dysfunction with a heart on Thursday 28.  Patient also has a follow-up with pulmonology on 11/4/2021 to do PFTs.  Waiting to see if any of this may be contributing to the overall fatigue.  Patient also seeing hematology and last visit was yesterday.  Last CBC was on 9/27/2021 and hemoglobin was stable at 14.  No signs of bleeding.    Shortness of breath-patient stated that overall her shortness of breath has improved.  Patient is able to get through conversation without feeling short of breath.  Patient is taking her Symbicort daily along with an as needed albuterol inhaler.  No wheezing.  No chest pain.  Patient has follow-up with pulmonology on 11/4/2021 to have PFTs completed.  Oxygen stable and saturation today is 97%.  Patient is still needing a follow-up CT scan for pulmonary nodule in August.  Since patient is getting heart cath done this week will follow up with patient in 1 month to recheck BMP and get CT scan complete.    Pulmonary emboli -this is stable for now.  Patient is seeing hematology to see what may be the cause of the blood clots.  Patient has follow-up lab work done to look at protein S antigens.  Waiting on those to  result at this time.  Patient is still taking Eliquis 5 mg daily.  No active signs of bleeding.  Patient over the past week has noticed some bruising on her back.  Patient brought this up to cardiology and to hematology.  Both said this may be related to her coughing and being on the blood thinner.  There is no pain to the bruise site.  No trauma associated.  It has not grown in size that the patient has noticed.      Chronic cough - patient said that she has a severe cough that is usually in the evening, at night, in the morning.  Denies any paroxysmal nocturnal dyspnea.  Cough occasionally will make her need to throw up because it is so strong.  Cardiology has tried stopping losartan along with Bystolic to see if this may be contributing to cough.  However even with both of those stopped patient has still has a cough.  Patient is still taking the Bystolic and the losartan at this time.  Patient currently just taking Delsym for the cough which does not help.  Cough is not improved with the Symbicort.  Patient is seeing pulmonology again next week 11/4/2021.  Check and PFTs.  Patient will follow-up with them as well to discuss this cough.    Hypertension is being managed by cardiology at this time.  Blood pressure stable today at oh 8/80.  No chest pain, headache, shortness of breath, lightheadedness or dizziness.  No lower extremity edema.  Patient ran out taking Bystolic 20 mg, losartan 50 mg, spironolactone 50 mg, and Norvasc 5 mg as needed.    Reflux-patient says she does have a history of reflux.  Currently taking Nexium 20 mg daily.  Patient says that she has not had increase in heartburn in her chest.  But has had more nausea recently especially with the coughing.  Unsure at this time if coughing and nausea may be related to different kind of food intake.  At this time she is to eat more bland foods to help with nausea.  Patient is also taking as needed Zofran and she has not noticed a significant difference  with that.  Patient has previously seen gastroenterology.  They performed her last colonoscopy along with an EGD which showed gastritis.  Patient has not followed up since.  EGD and colonoscopy on 2/1/2020 and were completed by Munira Montes MD.  Patient still and patient may follow-up with them if the symptoms do not get any better.    Anxiety-patient does see psychiatry and last visit was about 2 weeks ago.  At that time they did add on Abilify to the Wellbutrin 300 and Lexapro 20 mg.  Patient feels like overall even though she has been through a lot and has had a lot of stress she is feeling strong mentally at this time.  No suicidal thoughts.  Following up with them in 2 months.    Patient has an appointment today for preop testing for the right and left heart cath.  Obtaining Covid today along with CBC and BMP per cardiology orders.      Objective   Vital Signs:   /80   Pulse 76   Temp 97.1 °F (36.2 °C)   Resp 16   Wt 86.6 kg (191 lb)   SpO2 97%   BMI 31.75 kg/m²     Physical Exam  Vitals reviewed.   HENT:      Head: Normocephalic.   Cardiovascular:      Rate and Rhythm: Normal rate and regular rhythm.      Heart sounds: Normal heart sounds. No murmur heard.      Pulmonary:      Effort: Pulmonary effort is normal. No respiratory distress.      Breath sounds: Normal breath sounds. No stridor. No wheezing or rhonchi.   Musculoskeletal:         General: Normal range of motion.   Skin:     General: Skin is warm and dry.   Neurological:      Mental Status: She is alert.   Psychiatric:         Mood and Affect: Mood normal.        Result Review :                 Assessment and Plan {CC Problem List  Visit Diagnosis   ROS  Review (Popup)  Health Maintenance  Quality  BestPractice  Medications  SmartSets  SnapShot Encounters  Media :23}   Diagnoses and all orders for this visit:    1. Other fatigue (Primary)  Assessment & Plan:  Continue with physical therapy at this time.  Awaiting to see what  the right and left heart cath show to see if the fatigue may be heart related.  Patient also to have PFTs done next week per [facility].  Following up in 1 month to discuss these results and to see if further evaluation needs to be done.      2. Shortness of breath  Assessment & Plan:  Shortness of breath is much improved.  Next visit with pulmonology on 11/4/2021.  When patient returns in 1 month to see me to recheck fatigue and shortness of breath will redraw a BMP to make sure kidney function is stable and then will proceed with CT of the chest to follow-up on pulmonary nodule.      3. Multiple subsegmental pulmonary emboli without acute cor pulmonale (HCC)  Assessment & Plan:  Currently being followed by pulmonology and hematology.  Next appointment with pulmonology is 11/4/2021.  Saw hematology yesterday and awaiting lab results.  Still trying to determine etiology of pulmonary emboli.  Bruising to the back probably due to cough, both assessed and evaluated by hematology and cardiology as well.  Both believe it is due to the cough and being on Eliquis.  Counseled patient if the bruise does grow or becomes painful or any other signs of bleeding to come back to clinic.      4. Chronic cough  Assessment & Plan:  Sending in Tessalon Perles to see if this may help with the cough.  Also gave [facility] handout for GERD to see if any of this cough may be related to reflux.  Patient has follow-up with pulmonology on 11/4/2021 where PFTs will be done.  Counseled patient to discuss the chronic cough with them to see if they have any other options for treatment.    Orders:  -     benzonatate (Tessalon Perles) 100 MG capsule; Take 1 capsule by mouth 3 (Three) Times a Day As Needed for Cough.  Dispense: 30 capsule; Refill: 0    5. Primary hypertension  Assessment & Plan:  Stable.  Managed by cardiology.  Follow-up with cardio with next appointment on 11/23/2021.      6. Heartburn  Assessment & Plan:  Patient ran out taking  Nexium 20 mg.  Continue with this.  Concerned that this is chronic cough and some of the nausea may be related to some uncontrolled heartburn.  Gave patient handout for [facility] that discussed diet changes to help with heartburn.  Cough can be a significant sign for heartburn as well.  If this does not help with the nausea counseled patient she may need to call Dr. Montes with GI for further evaluation since she is an established patient with them.      7. Anxiety disorder due to general medical condition with panic attack  Assessment & Plan:  Currently seeing psych.  Last visit 2 weeks ago next visit in 2 months.  Starting Abilify per psych when it goes to the pharmacy.  Continue to see psych for management of anxiety.        Follow Up   Return in about 4 weeks (around 11/23/2021) for Recheck fatigue / lung nodule.  Patient was given instructions and counseling regarding her condition or for health maintenance advice. Please see specific information pulled into the AVS if appropriate.     Medical assistant and I wore mask and eyewear protection during entire encounter.  Patient wore mask.      Electronically signed by OLVIN Mcgee, 10/26/21, 12:22 PM EDT.

## 2021-10-26 NOTE — ASSESSMENT & PLAN NOTE
Continue with physical therapy at this time.  Awaiting to see what the right and left heart cath show to see if the fatigue may be heart related.  Patient also to have PFTs done next week per [facility].  Following up in 1 month to discuss these results and to see if further evaluation needs to be done.

## 2021-10-26 NOTE — ASSESSMENT & PLAN NOTE
Sending in Tessalon Perles to see if this may help with the cough.  Also gave [facility] handout for GERD to see if any of this cough may be related to reflux.  Patient has follow-up with pulmonology on 11/4/2021 where PFTs will be done.  Counseled patient to discuss the chronic cough with them to see if they have any other options for treatment.

## 2021-10-26 NOTE — ASSESSMENT & PLAN NOTE
Patient ran out taking Nexium 20 mg.  Continue with this.  Concerned that this is chronic cough and some of the nausea may be related to some uncontrolled heartburn.  Gave patient handout for [facility] that discussed diet changes to help with heartburn.  Cough can be a significant sign for heartburn as well.  If this does not help with the nausea counseled patient she may need to call Dr. Montes with GI for further evaluation since she is an established patient with them.

## 2021-10-26 NOTE — ASSESSMENT & PLAN NOTE
Currently seeing psych.  Last visit 2 weeks ago next visit in 2 months.  Starting Abilify per psych when it goes to the pharmacy.  Continue to see psych for management of anxiety.

## 2021-10-26 NOTE — TELEPHONE ENCOUNTER
Rx Refill Note  Requested Prescriptions     Pending Prescriptions Disp Refills   • ondansetron (ZOFRAN) 4 MG tablet 20 tablet 1     Sig: Take 1 tablet by mouth Every 8 (Eight) Hours As Needed for Nausea.      Last office visit with prescribing clinician: 10/26/2021      Next office visit with prescribing clinician: 11/30/2021            Hector Ahumada MA  10/26/21, 13:31 EDT

## 2021-10-26 NOTE — ASSESSMENT & PLAN NOTE
Shortness of breath is much improved.  Next visit with pulmonology on 11/4/2021.  When patient returns in 1 month to see me to recheck fatigue and shortness of breath will redraw a BMP to make sure kidney function is stable and then will proceed with CT of the chest to follow-up on pulmonary nodule.

## 2021-10-26 NOTE — ASSESSMENT & PLAN NOTE
Currently being followed by pulmonology and hematology.  Next appointment with pulmonology is 11/4/2021.  Saw hematology yesterday and awaiting lab results.  Still trying to determine etiology of pulmonary emboli.  Bruising to the back probably due to cough, both assessed and evaluated by hematology and cardiology as well.  Both believe it is due to the cough and being on Eliquis.  Counseled patient if the bruise does grow or becomes painful or any other signs of bleeding to come back to clinic.

## 2021-10-27 ENCOUNTER — DOCUMENTATION (OUTPATIENT)
Dept: ONCOLOGY | Facility: CLINIC | Age: 61
End: 2021-10-27

## 2021-10-27 LAB
PROT S AG ACT/NOR PPP IA: 102 % (ref 60–150)
SARS-COV-2 ORF1AB RESP QL NAA+PROBE: NOT DETECTED

## 2021-10-27 NOTE — PROGRESS NOTES
Please call her and tell her that the repeat protein S testing is now normal (it is no longer low.  Therefore, it is normal).  Therefore, we do not need to check the protein S labs again    When she sees me in 4 months, she can have a CBC the same day she sees me.  No other labs.

## 2021-10-28 ENCOUNTER — HOSPITAL ENCOUNTER (OUTPATIENT)
Facility: HOSPITAL | Age: 61
Setting detail: HOSPITAL OUTPATIENT SURGERY
Discharge: HOME | End: 2021-10-28
Attending: INTERNAL MEDICINE | Admitting: INTERNAL MEDICINE

## 2021-10-28 ENCOUNTER — TELEPHONE (OUTPATIENT)
Dept: ONCOLOGY | Facility: CLINIC | Age: 61
End: 2021-10-28

## 2021-10-28 VITALS
SYSTOLIC BLOOD PRESSURE: 107 MMHG | DIASTOLIC BLOOD PRESSURE: 72 MMHG | WEIGHT: 189.3 LBS | OXYGEN SATURATION: 94 % | HEART RATE: 64 BPM | RESPIRATION RATE: 16 BRPM | HEIGHT: 65 IN | TEMPERATURE: 97.2 F | BODY MASS INDEX: 31.54 KG/M2

## 2021-10-28 DIAGNOSIS — I26.94 MULTIPLE SUBSEGMENTAL PULMONARY EMBOLI WITHOUT ACUTE COR PULMONALE (HCC): ICD-10-CM

## 2021-10-28 DIAGNOSIS — I10 PRIMARY HYPERTENSION: ICD-10-CM

## 2021-10-28 DIAGNOSIS — R05.3 CHRONIC COUGH: ICD-10-CM

## 2021-10-28 DIAGNOSIS — R06.02 SHORTNESS OF BREATH: ICD-10-CM

## 2021-10-28 LAB
HCT VFR BLDA CALC: 39 % (ref 38–51)
HCT VFR BLDA CALC: 39 % (ref 38–51)
HGB BLDA-MCNC: 13.3 G/DL (ref 12–17)
HGB BLDA-MCNC: 13.3 G/DL (ref 12–17)
SAO2 % BLDA: 77 % (ref 95–98)
SAO2 % BLDA: 99 % (ref 95–98)

## 2021-10-28 PROCEDURE — C1769 GUIDE WIRE: HCPCS | Performed by: INTERNAL MEDICINE

## 2021-10-28 PROCEDURE — C1894 INTRO/SHEATH, NON-LASER: HCPCS | Performed by: INTERNAL MEDICINE

## 2021-10-28 PROCEDURE — 93460 R&L HRT ART/VENTRICLE ANGIO: CPT | Performed by: INTERNAL MEDICINE

## 2021-10-28 PROCEDURE — 85014 HEMATOCRIT: CPT

## 2021-10-28 PROCEDURE — 25010000002 FENTANYL CITRATE (PF) 50 MCG/ML SOLUTION: Performed by: INTERNAL MEDICINE

## 2021-10-28 PROCEDURE — 0 IOPAMIDOL PER 1 ML: Performed by: INTERNAL MEDICINE

## 2021-10-28 PROCEDURE — 25010000002 MIDAZOLAM PER 1 MG: Performed by: INTERNAL MEDICINE

## 2021-10-28 PROCEDURE — 25010000002 HEPARIN (PORCINE) PER 1000 UNITS: Performed by: INTERNAL MEDICINE

## 2021-10-28 PROCEDURE — 85018 HEMOGLOBIN: CPT

## 2021-10-28 RX ORDER — SODIUM CHLORIDE 0.9 % (FLUSH) 0.9 %
10 SYRINGE (ML) INJECTION AS NEEDED
Status: DISCONTINUED | OUTPATIENT
Start: 2021-10-28 | End: 2021-10-28 | Stop reason: HOSPADM

## 2021-10-28 RX ORDER — SODIUM CHLORIDE 9 MG/ML
75 INJECTION, SOLUTION INTRAVENOUS CONTINUOUS
Status: DISCONTINUED | OUTPATIENT
Start: 2021-10-28 | End: 2021-10-28 | Stop reason: HOSPADM

## 2021-10-28 RX ORDER — SODIUM CHLORIDE 0.9 % (FLUSH) 0.9 %
3 SYRINGE (ML) INJECTION EVERY 12 HOURS SCHEDULED
Status: DISCONTINUED | OUTPATIENT
Start: 2021-10-28 | End: 2021-10-28 | Stop reason: HOSPADM

## 2021-10-28 RX ORDER — SODIUM CHLORIDE 0.9 % (FLUSH) 0.9 %
10 SYRINGE (ML) INJECTION AS NEEDED
Status: CANCELLED | OUTPATIENT
Start: 2021-10-28

## 2021-10-28 RX ORDER — SODIUM CHLORIDE 0.9 % (FLUSH) 0.9 %
10 SYRINGE (ML) INJECTION EVERY 12 HOURS SCHEDULED
Status: CANCELLED | OUTPATIENT
Start: 2021-10-28

## 2021-10-28 RX ORDER — FENTANYL CITRATE 50 UG/ML
INJECTION, SOLUTION INTRAMUSCULAR; INTRAVENOUS AS NEEDED
Status: DISCONTINUED | OUTPATIENT
Start: 2021-10-28 | End: 2021-10-28 | Stop reason: HOSPADM

## 2021-10-28 RX ORDER — MIDAZOLAM HYDROCHLORIDE 1 MG/ML
INJECTION INTRAMUSCULAR; INTRAVENOUS AS NEEDED
Status: DISCONTINUED | OUTPATIENT
Start: 2021-10-28 | End: 2021-10-28 | Stop reason: HOSPADM

## 2021-10-28 RX ORDER — LIDOCAINE HYDROCHLORIDE 20 MG/ML
INJECTION, SOLUTION INFILTRATION; PERINEURAL AS NEEDED
Status: DISCONTINUED | OUTPATIENT
Start: 2021-10-28 | End: 2021-10-28 | Stop reason: HOSPADM

## 2021-10-28 RX ORDER — ACETAMINOPHEN 325 MG/1
650 TABLET ORAL EVERY 4 HOURS PRN
Status: CANCELLED | OUTPATIENT
Start: 2021-10-28

## 2021-10-28 RX ADMIN — SODIUM CHLORIDE 75 ML/HR: 9 INJECTION, SOLUTION INTRAVENOUS at 07:37

## 2021-11-08 RX ORDER — NEBIVOLOL 20 MG/1
20 TABLET ORAL DAILY
Qty: 90 TABLET | Refills: 0 | Status: SHIPPED | OUTPATIENT
Start: 2021-11-08 | End: 2022-03-04

## 2021-11-08 RX ORDER — NEBIVOLOL 20 MG/1
TABLET ORAL
Qty: 90 TABLET | Refills: 3 | Status: CANCELLED | OUTPATIENT
Start: 2021-11-08

## 2021-11-11 RX ORDER — PROMETHAZINE HYDROCHLORIDE 12.5 MG/1
12.5 TABLET ORAL EVERY 6 HOURS PRN
Qty: 15 TABLET | Refills: 0 | Status: SHIPPED | OUTPATIENT
Start: 2021-11-11 | End: 2022-03-04 | Stop reason: SDUPTHER

## 2021-11-18 ENCOUNTER — OFFICE VISIT (OUTPATIENT)
Dept: GASTROENTEROLOGY | Facility: CLINIC | Age: 61
End: 2021-11-18

## 2021-11-18 VITALS — WEIGHT: 194 LBS | HEIGHT: 65 IN | TEMPERATURE: 97.4 F | BODY MASS INDEX: 32.32 KG/M2

## 2021-11-18 DIAGNOSIS — R10.13 EPIGASTRIC PAIN: ICD-10-CM

## 2021-11-18 DIAGNOSIS — R11.0 NAUSEA: Primary | ICD-10-CM

## 2021-11-18 DIAGNOSIS — K21.00 GASTROESOPHAGEAL REFLUX DISEASE WITH ESOPHAGITIS WITHOUT HEMORRHAGE: ICD-10-CM

## 2021-11-18 PROCEDURE — 99214 OFFICE O/P EST MOD 30 MIN: CPT | Performed by: INTERNAL MEDICINE

## 2021-11-18 RX ORDER — OMEPRAZOLE 40 MG/1
40 CAPSULE, DELAYED RELEASE ORAL DAILY
Qty: 30 CAPSULE | Refills: 2 | Status: SHIPPED | OUTPATIENT
Start: 2021-11-18 | End: 2021-12-19 | Stop reason: SDUPTHER

## 2021-11-18 RX ORDER — PROCHLORPERAZINE MALEATE 5 MG/1
5 TABLET ORAL EVERY 6 HOURS PRN
Qty: 40 TABLET | Refills: 1 | Status: SHIPPED | OUTPATIENT
Start: 2021-11-18 | End: 2022-04-18

## 2021-11-18 NOTE — PROGRESS NOTES
Subjective   Chief Complaint   Patient presents with   • Heartburn   • Nausea   • Vomiting       Amanda Sherwodo is a  60 y.o. female here for heartburn, nausea and vomiting.  Patient was last seen in early 2020 at the time of her EGD and colonoscopy.  She had esophagitis and gastritis at that time.  She has a history of colon cancer in her father-she had a hyperplastic polyp removed at the time of her colonoscopy.    Pt reports that she had a severe gastroenteritis-like illness in late July.  She had nausea vomiting and diarrhea for about 3 weeks.  She lost 20 pounds during this time.  She eventually was hospitalized for an acute kidney injury.  She was discharged but then readmitted about 2 weeks later with bilateral pulmonary emboli.  She is now on Eliquis.  She lost a significant amount of muscle mass and is undergoing physical therapy.    Despite resolution of the initial infection, she continues to have GI symptoms.  She has been nauseated for months - she makes herself eat.  Weight now stable.  She has tried Zofran 8 mg with no improvement.  She is also tried Phenergan 12.5 mg with continued symptoms.  She has no vertiginous symptoms.  She describes a burning midepigastric pain more to the left side.  CT scan of the abdomen and pelvis without contrast in August showed a small hiatal hernia no other significant GI problems.  She had an EGD and colonoscopy in early 2020.  She had mild esophagitis at that time.    She continues to feel weak and soa - has upcoming CTA.  Had a recent normal cardiac cath.  HPI  Past Medical History:   Diagnosis Date   • Acute kidney injury (HCC)    • Allergic    • Anxiety 3 years ago   • Depression    • GERD (gastroesophageal reflux disease)    • H/O Leukopenia    • Hernia    • History of anemia    • History of bronchitis    • History of migraine    • Hyperlipidemia     NO MEDS CURRENTLY. WORKING ON   • Hypertension    • Leukopenia     PER HISTORY. HAS SEEN CBC IN THE PAST   •  Neck pain    • Obesity (BMI 30.0-34.9) 6/6/2019   • Osteoarthritis    • PVC (premature ventricular contraction)    • Seasonal allergies    • Torn meniscus     LEFT   • Tremor 3 years ago     Past Surgical History:   Procedure Laterality Date   • BREAST BIOPSY Bilateral     BENIGN. HORACE BREAST   • BUNIONECTOMY Left    • BUNIONECTOMY Left     REVISION   • CARDIAC CATHETERIZATION N/A 10/28/2021    Procedure: Coronary angiography;  Surgeon: Vanessa Bennett MD;  Location: [facility] CATH INVASIVE LOCATION;  Service: Cardiovascular;  Laterality: N/A;   • CARDIAC CATHETERIZATION N/A 10/28/2021    Procedure: Left heart cath;  Surgeon: Vanessa Bennett MD;  Location: [facility] CATH INVASIVE LOCATION;  Service: Cardiovascular;  Laterality: N/A;   • CARDIAC CATHETERIZATION N/A 10/28/2021    Procedure: Right heart cath;  Surgeon: Vanessa Bennett MD;  [facility];  Service: Cardiovascular;  Laterality: N/A;   • COLONOSCOPY N/A 2/1/2020    Procedure: COLONOSCOPY TO CECUM AND INTO TERMINAL ILEUM WITH COLD BIOPSY POLYPECTOMY;  Surgeon: Munira Montes MD;  Location: [facility] ENDOSCOPY;  Service: Gastroenterology   • DILATATION AND CURETTAGE      AUB no cancer   • ENDOSCOPY N/A 2/1/2020    Procedure: ESOPHAGOGASTRODUODENOSCOPY WITH COLD BIOPSIES;  Surgeon: uMnira Montes MD;  Location: [facility] ENDOSCOPY;  Service: Gastroenterology   • KNEE ARTHROSCOPY Left 8/22/2018    Procedure: KNEE ARTHROSCOPY LATERAL MENISECTOMY DEBRIDEMENT OF MEDIAL FEMORAL CONDYLE DEFECT DEBRIDEMENT OF ARTHRITIS;  Surgeon: Graciela Crockett MD;  Location: [facility] OR [facility];  Service: Orthopedics   • MENISCECTOMY Left    • SALPINGO OOPHORECTOMY Left    • TONSILLECTOMY  as a child   • TUBAL ABDOMINAL LIGATION     • UPPER GASTROINTESTINAL ENDOSCOPY     • WISDOM TOOTH EXTRACTION         Current Outpatient Medications:   •  albuterol sulfate  (90 Base) MCG/ACT inhaler, Inhale 2 puffs into the lungs every 4 hours as needed., Disp: 18 g,  Rfl: 10  •  amLODIPine (NORVASC) 2.5 MG tablet, Take 1 tablet by mouth Daily., Disp: 90 tablet, Rfl: 0  •  apixaban (ELIQUIS) 5 MG tablet tablet, Take 1 tablet by mouth Every 12 (Twelve) Hours. Resume on 10/29/2021, Disp: 60 tablet, Rfl: 11  •  ARIPiprazole (ABILIFY) 2 MG tablet, Take 1 tablet by mouth every night at bedtime., Disp: 90 tablet, Rfl: 0  •  atorvastatin (Lipitor) 40 MG tablet, Take 1 tablet by mouth Every Night., Disp: 30 tablet, Rfl: 3  •  benzonatate (Tessalon Perles) 100 MG capsule, Take 1 capsule by mouth 3 (Three) Times a Day As Needed for Cough., Disp: 30 capsule, Rfl: 0  •  budesonide-formoterol (SYMBICORT) 160-4.5 MCG/ACT inhaler, Inhale 2 puffs 2 (two) times a day., Disp: 10.2 g, Rfl: 10  •  buPROPion XL (WELLBUTRIN XL) 300 MG 24 hr tablet, Take 1 tablet by mouth Every Morning., Disp: 90 tablet, Rfl: 0  •  calcium carbonate (TUMS) 500 MG chewable tablet, Chew 1 tablet As Needed., Disp: , Rfl:   •  cetirizine (zyrTEC) 10 MG tablet, Take 10 mg by mouth As Needed., Disp: , Rfl:   •  Cholecalciferol (VITAMIN D3) 5000 UNITS capsule capsule, Take 5,000 Units by mouth daily., Disp: , Rfl:   •  escitalopram (LEXAPRO) 20 MG tablet, Take 1 tablet by mouth Daily. (Patient taking differently: Take 20 mg by mouth Every Night.), Disp: 90 tablet, Rfl: 0  •  LORazepam (Ativan) 0.5 MG tablet, Take 0.5 mg by mouth Daily As Needed for Anxiety., Disp: , Rfl:   •  losartan (Cozaar) 50 MG tablet, Take 1 tablet by mouth Daily., Disp: 90 tablet, Rfl: 3  •  nebivolol (Bystolic) 20 MG tablet, TAKE ONE TABLET BY MOUTH DAILY, Disp: 90 tablet, Rfl: 0  •  ondansetron (ZOFRAN) 4 MG tablet, Take 1 tablet by mouth Every 8 (Eight) Hours As Needed for Nausea., Disp: 20 tablet, Rfl: 1  •  promethazine (PHENERGAN) 12.5 MG tablet, Take 1 tablet by mouth Every 6 (Six) Hours As Needed for Nausea or Vomiting., Disp: 15 tablet, Rfl: 0  •  spironolactone (ALDACTONE) 50 MG tablet, Take 1 tablet by mouth Daily., Disp: 90 tablet, Rfl:  3  •  traZODone (DESYREL) 50 MG tablet, Take 2 tablets by mouth every night at bedtime as needed, Disp: 180 tablet, Rfl: 0  •  omeprazole (priLOSEC) 40 MG capsule, Take 1 capsule by mouth Daily., Disp: 30 capsule, Rfl: 2  •  prochlorperazine (COMPAZINE) 5 MG tablet, Take 1 tablet by mouth Every 6 (Six) Hours As Needed for Nausea or Vomiting., Disp: 40 tablet, Rfl: 1  PRN Meds:.  Allergies   Allergen Reactions   • Celecoxib Hives and Swelling     hives   • Ace Inhibitors Cough   • Lisinopril Cough     Social History     Socioeconomic History   • Marital status:    • Number of children: 2   • Years of education: College   Tobacco Use   • Smoking status: Former Smoker     Packs/day: 0.50     Years: 4.00     Pack years: 2.00     Types: Cigarettes     Quit date: 1987     Years since quittin.0   • Smokeless tobacco: Never Used   Vaping Use   • Vaping Use: Never used   Substance and Sexual Activity   • Alcohol use: Yes     Alcohol/week: 2.0 standard drinks     Types: 2 Cans of beer per week     Comment: caffeine - None   • Drug use: Not Currently     Types: Marijuana   • Sexual activity: Yes     Partners: Male     Birth control/protection: Post-menopausal     Family History   Problem Relation Age of Onset   • Dementia Mother    • Arthritis Mother    • Other Mother         Alzheimer's   • Stroke Father    • Heart disease Father    • Hypertension Father    • Diabetes Father    • Cancer Father    • Colon cancer Father    • Hyperlipidemia Father    • Colon polyps Father    • Breast cancer Sister    • Atrial fibrillation Sister    • Alcohol abuse Sister    • Asthma Sister    • Hypertension Sister    • Ovarian cancer Maternal Aunt    • Prostate cancer Brother    • Malig Hyperthermia Neg Hx      Review of Systems   Constitutional: Positive for appetite change and fatigue. Negative for unexpected weight change.   Respiratory: Positive for shortness of breath.    Gastrointestinal: Positive for abdominal pain and  nausea. Negative for blood in stool, constipation and diarrhea.     Vitals:    11/18/21 0927   Temp: 97.4 °F (36.3 °C)         11/18/21 0927   Weight: 88 kg (194 lb)       Objective   Physical Exam  Constitutional:       Appearance: She is well-developed.   HENT:      Head: Normocephalic and atraumatic.   Eyes:      General: No scleral icterus.  Pulmonary:      Effort: Pulmonary effort is normal.   Abdominal:      General: There is no distension.      Palpations: Abdomen is soft.      Tenderness: There is no abdominal tenderness.   Skin:     General: Skin is warm and dry.   Neurological:      Mental Status: She is alert.       No radiology results for the last 7 days    Assessment/Plan   Diagnoses and all orders for this visit:    Nausea  -     NM Gastric Emptying; Future  -     Lipase  -     Amylase    Epigastric pain  -     NM Gastric Emptying; Future  -     Lipase  -     Amylase    Gastroesophageal reflux disease with esophagitis without hemorrhage    Other orders  -     omeprazole (priLOSEC) 40 MG capsule; Take 1 capsule by mouth Daily.  -     prochlorperazine (COMPAZINE) 5 MG tablet; Take 1 tablet by mouth Every 6 (Six) Hours As Needed for Nausea or Vomiting.      Plan:  · She has had significant changes in her health this year it is difficult to pinpoint what exactly is causing her nausea at this point.  She has known acid reflux with esophagitis which certainly could be playing a role so we will increase her proton pump inhibitor to 40 mg every morning.  We will try Compazine for nausea instead of Phenergan and Zofran.  We will check a gastric emptying study to see if dysmotility is playing a role.  She does not seem to be on any significant medications which could be the source of her nausea.  We will check pancreatic enzymes given her midepigastric burning pain and nausea to rule out pancreatitis.  She had a noncontrast CT in August and unfortunately her renal function still has not normalized to the point  that we could get a contrasted study.

## 2021-11-23 ENCOUNTER — TELEPHONE (OUTPATIENT)
Dept: GASTROENTEROLOGY | Facility: CLINIC | Age: 61
End: 2021-11-23

## 2021-11-23 NOTE — TELEPHONE ENCOUNTER
Overdue alert received for amylase and lipase.      Call to pt.  States was stuck x2 at visit.  Sees pcp on 11/30 - will have drawn there.      States GES at [facility] cannot be done until Jan.  Advise may have done at [facility] if wishes.  States would like to have done at [facility].  Advise will fax order, and then pt can call [phone] to arrange.  Verb understanding.     GES order faxed to [phone] - confirmation received.  See media tab.

## 2021-11-29 ENCOUNTER — TELEPHONE (OUTPATIENT)
Dept: FAMILY MEDICINE CLINIC | Facility: CLINIC | Age: 61
End: 2021-11-29

## 2021-11-29 ENCOUNTER — OFFICE VISIT (OUTPATIENT)
Dept: FAMILY MEDICINE CLINIC | Facility: CLINIC | Age: 61
End: 2021-11-29

## 2021-11-29 VITALS
DIASTOLIC BLOOD PRESSURE: 86 MMHG | HEART RATE: 76 BPM | SYSTOLIC BLOOD PRESSURE: 140 MMHG | RESPIRATION RATE: 16 BRPM | BODY MASS INDEX: 32.62 KG/M2 | WEIGHT: 196 LBS | TEMPERATURE: 96.6 F | OXYGEN SATURATION: 95 %

## 2021-11-29 DIAGNOSIS — J40 BRONCHITIS: Primary | ICD-10-CM

## 2021-11-29 DIAGNOSIS — R53.83 OTHER FATIGUE: ICD-10-CM

## 2021-11-29 DIAGNOSIS — I26.94 MULTIPLE SUBSEGMENTAL PULMONARY EMBOLI WITHOUT ACUTE COR PULMONALE (HCC): ICD-10-CM

## 2021-11-29 DIAGNOSIS — R05.9 COUGH: ICD-10-CM

## 2021-11-29 DIAGNOSIS — R79.89 ELEVATED SERUM CREATININE: ICD-10-CM

## 2021-11-29 DIAGNOSIS — R12 HEARTBURN: ICD-10-CM

## 2021-11-29 DIAGNOSIS — R91.1 PULMONARY NODULE: ICD-10-CM

## 2021-11-29 PROCEDURE — 99214 OFFICE O/P EST MOD 30 MIN: CPT

## 2021-11-29 PROCEDURE — 71046 X-RAY EXAM CHEST 2 VIEWS: CPT

## 2021-11-29 RX ORDER — METHYLPREDNISOLONE 4 MG/1
TABLET ORAL
Qty: 1 EACH | Refills: 0 | Status: SHIPPED | OUTPATIENT
Start: 2021-11-29 | End: 2022-03-04

## 2021-11-29 NOTE — TELEPHONE ENCOUNTER
Caller: Amanda Sherwood    Relationship: Self    Best call back number: [phone] (H)    What medication are you requesting: Z-PACK    What are your current symptoms:BRONCHITIS     How long have you been experiencing symptoms:     Have you had these symptoms before:    [x] Yes  [] No    Have you been treated for these symptoms before:   [x] Yes  [] No    If a prescription is needed, what is your preferred pharmacy and phone number[facility] [address] [place] [address] [facility] AT [facility] [phone] [facility] [phone]           Additional notes:PATIENT CALLED TO INQUIRE IF AGUSTIN GRACIA IS STILL GOING TO SEND THE PRESCRIPTION TO THE PHARMACY FOR PATIENT'S BRONCHITIS SYMPTOMS.        THANKS

## 2021-11-29 NOTE — ASSESSMENT & PLAN NOTE
Currently being followed by gastroenterology.  Continue omeprazole as prescribed and Compazine for nausea.

## 2021-11-29 NOTE — ASSESSMENT & PLAN NOTE
Patient need repeat CT.  Pulmonology note on 11/9/2021 said they would order the repeat CT.  Patient is waiting to hear back from them.

## 2021-11-29 NOTE — ASSESSMENT & PLAN NOTE
Getting progressively better with physical therapy.  Continue current regimen of physical therapy for now.

## 2021-11-30 LAB
BUN SERPL-MCNC: 13 MG/DL (ref 6–24)
BUN/CREAT SERPL: 11 (ref 9–23)
CALCIUM SERPL-MCNC: 10.3 MG/DL (ref 8.7–10.2)
CHLORIDE SERPL-SCNC: 101 MMOL/L (ref 96–106)
CO2 SERPL-SCNC: 26 MMOL/L (ref 20–29)
CREAT SERPL-MCNC: 1.18 MG/DL (ref 0.57–1)
GLUCOSE SERPL-MCNC: 94 MG/DL (ref 65–99)
LABCORP SARS-COV-2, NAA 2 DAY TAT: NORMAL
LIPASE SERPL-CCNC: 47 U/L (ref 14–72)
POTASSIUM SERPL-SCNC: 4.3 MMOL/L (ref 3.5–5.2)
SARS-COV-2 RNA RESP QL NAA+PROBE: NOT DETECTED
SODIUM SERPL-SCNC: 142 MMOL/L (ref 134–144)

## 2021-11-30 NOTE — TELEPHONE ENCOUNTER
Patient called yesterday at 1400.  Discussed that at this time I would prefer to wait for the chest x-ray results to come back before starting an antibiotic.  Especially since there are some potential risks with azithromycin and Eliquis.  Steroids were sent into the pharmacy.  Patient agreeable to this.    Cori BAH

## 2021-12-01 DIAGNOSIS — N17.9 ACUTE KIDNEY INJURY (HCC): Primary | ICD-10-CM

## 2021-12-03 DIAGNOSIS — R05.3 CHRONIC COUGH: ICD-10-CM

## 2021-12-03 NOTE — TELEPHONE ENCOUNTER
Rx Refill Note  Requested Prescriptions     Pending Prescriptions Disp Refills   • benzonatate (Tessalon Perles) 100 MG capsule 30 capsule 0     Sig: Take 1 capsule by mouth 3 (Three) Times a Day As Needed for Cough.      Last office visit with prescribing clinician: 11/29/2021      Next office visit with prescribing clinician: 2/28/2022            Nazia Salgado MA/LMR  12/03/21, 12:56 EST

## 2021-12-06 RX ORDER — BENZONATATE 100 MG/1
100 CAPSULE ORAL 3 TIMES DAILY PRN
Qty: 30 CAPSULE | Refills: 0 | Status: SHIPPED | OUTPATIENT
Start: 2021-12-06 | End: 2022-08-08

## 2021-12-06 RX ORDER — NEBIVOLOL 20 MG/1
TABLET ORAL
Qty: 90 TABLET | Refills: 0 | Status: SHIPPED | OUTPATIENT
Start: 2021-12-06 | End: 2021-12-09

## 2021-12-07 ENCOUNTER — TELEPHONE (OUTPATIENT)
Dept: CARDIOLOGY | Facility: CLINIC | Age: 61
End: 2021-12-07

## 2021-12-07 ENCOUNTER — TELEPHONE (OUTPATIENT)
Dept: FAMILY MEDICINE CLINIC | Facility: CLINIC | Age: 61
End: 2021-12-07

## 2021-12-07 ENCOUNTER — TRANSCRIBE ORDERS (OUTPATIENT)
Dept: ADMINISTRATIVE | Facility: HOSPITAL | Age: 61
End: 2021-12-07

## 2021-12-07 DIAGNOSIS — Z12.31 SCREENING MAMMOGRAM, ENCOUNTER FOR: Primary | ICD-10-CM

## 2021-12-07 NOTE — TELEPHONE ENCOUNTER
"nebivolol PA received, initiated and denied. Due to \"The member must meet the following criteria: has tried or cannot use at least two of the following generic beta blockers: carvedilol tablet, atenolol tablet, metoprolol (tartrate OR succinate)\". Pt only used metoprolol during a hospital stay in 2018. Please advise.    DA  "

## 2021-12-07 NOTE — TELEPHONE ENCOUNTER
Caller: Amanda Sherwood    Relationship: Self    Best call back number: [phone] (H)    What is the medical concern/diagnosis:PATIENT STATES  [facility]    What specialty or service is being requested: KIDNEY SPECIALIST    What is the provider, practice or medical service name: DR FELIPE RODAS    What is the office location: [place]     What is the office phone number: [phone]    Any additional details:

## 2021-12-09 ENCOUNTER — TELEPHONE (OUTPATIENT)
Dept: CARDIOLOGY | Facility: CLINIC | Age: 61
End: 2021-12-09

## 2021-12-09 RX ORDER — METOPROLOL SUCCINATE 50 MG/1
50 TABLET, EXTENDED RELEASE ORAL NIGHTLY
Qty: 90 TABLET | Refills: 3 | Status: SHIPPED | OUTPATIENT
Start: 2021-12-09 | End: 2022-10-01 | Stop reason: HOSPADM

## 2021-12-09 NOTE — TELEPHONE ENCOUNTER
----- Message from Oly Gaston CMA sent at 12/9/2021 12:22 PM EST -----  Regarding: FW: Bystolic refill    ----- Message -----  From: Amanda Sherwood  Sent: 12/9/2021  10:46 AM EST  To: Farhan Fried [facility]  Subject: Bystolic refill                                  Soo, I spoke with [facility] this morning and apparently there is now a generic for Bystolic. Hopefully this will be cheaper. They also said they sent a message to your office re need for an approval for nescesity. It's my new insurance. I'll send my insurance info. Please let me know if you need more information re my insurance. I'm out of the Bystolic and will need it soon. Can you let me know if you have samples to get me through?   Thanks,  Amanda Newman: member ID [id number] Member Name: William Sherwood Group ID: [id number]

## 2021-12-09 NOTE — TELEPHONE ENCOUNTER
Sydnie-patient sent me this message through my chart.  Do you mind taking care of it for me?  Thank you

## 2021-12-20 RX ORDER — OMEPRAZOLE 40 MG/1
40 CAPSULE, DELAYED RELEASE ORAL DAILY
Qty: 30 CAPSULE | Refills: 2 | Status: SHIPPED | OUTPATIENT
Start: 2021-12-20 | End: 2022-03-26 | Stop reason: SDUPTHER

## 2021-12-30 ENCOUNTER — TELEPHONE (OUTPATIENT)
Dept: FAMILY MEDICINE CLINIC | Facility: CLINIC | Age: 61
End: 2021-12-30

## 2022-01-10 ENCOUNTER — TELEPHONE (OUTPATIENT)
Dept: FAMILY MEDICINE CLINIC | Facility: CLINIC | Age: 62
End: 2022-01-10

## 2022-01-10 DIAGNOSIS — R91.1 INCIDENTAL LUNG NODULE, > 3MM AND < 8MM: Primary | ICD-10-CM

## 2022-01-10 NOTE — TELEPHONE ENCOUNTER
Talked to pt she said, she never got a follow up CT of the chest, she wants to know can you go head and order it for her she wants you to take control of everything else, her lung doctor hasn't order it. She sees the Kidney doctor the end of Feb.

## 2022-01-10 NOTE — TELEPHONE ENCOUNTER
I placed an order for CT chest without contrast.  Please let the patient know the order has been placed and we are doing without contrast due to her lower kidney function.  I was able to discuss this with the radiology team and they said getting a CT without contrast would be appropriate.

## 2022-01-10 NOTE — TELEPHONE ENCOUNTER
Can you see if this patient ever got a follow-up CT of the chest?  I was following the patient but then pulmonary was monitoring her lung status and in their note said that they were going to order the CT of the chest.  I had told the patient at my previous visit that if they do not order it to let me know so I can put the order in and I have not seen it resulted yet.    Thanks!

## 2022-01-25 DIAGNOSIS — E78.2 MIXED HYPERLIPIDEMIA: ICD-10-CM

## 2022-01-25 RX ORDER — ATORVASTATIN CALCIUM 40 MG/1
40 TABLET, FILM COATED ORAL NIGHTLY
Qty: 30 TABLET | Refills: 3 | Status: SHIPPED | OUTPATIENT
Start: 2022-01-25 | End: 2022-05-27 | Stop reason: SDUPTHER

## 2022-02-01 ENCOUNTER — TELEPHONE (OUTPATIENT)
Dept: ONCOLOGY | Facility: CLINIC | Age: 62
End: 2022-02-01

## 2022-02-01 NOTE — TELEPHONE ENCOUNTER
Returned call to patient who wants to have her lab done on 2/3/2022 at her PCP office.  I told her that the only lab that she needs that day based on Dr. Villafuerte's note is a CBC, there is a CBC order in the computer.  She will see Dr. Villafuerte on 2/7/2022, but lab appointment for that day will be canceled.

## 2022-02-01 NOTE — TELEPHONE ENCOUNTER
Caller: Amanda Sherwood    Relationship to patient: Self    Best call back number: [phone]    Chief complaint: PATIENT REQUESTING TO COMPLETE 2/7 LABS AT PCP ON 2/3/22

## 2022-02-07 ENCOUNTER — APPOINTMENT (OUTPATIENT)
Dept: LAB | Facility: HOSPITAL | Age: 62
End: 2022-02-07

## 2022-02-08 ENCOUNTER — HOSPITAL ENCOUNTER (OUTPATIENT)
Dept: MAMMOGRAPHY | Facility: HOSPITAL | Age: 62
Discharge: HOME | End: 2022-02-08
Admitting: OBSTETRICS & GYNECOLOGY

## 2022-02-08 ENCOUNTER — TELEPHONE (OUTPATIENT)
Dept: FAMILY MEDICINE CLINIC | Facility: CLINIC | Age: 62
End: 2022-02-08

## 2022-02-08 DIAGNOSIS — Z13.6 SCREENING, ISCHEMIC HEART DISEASE: ICD-10-CM

## 2022-02-08 DIAGNOSIS — E87.1 LOW SODIUM LEVELS: ICD-10-CM

## 2022-02-08 DIAGNOSIS — R53.83 FATIGUE, UNSPECIFIED TYPE: Primary | ICD-10-CM

## 2022-02-08 DIAGNOSIS — R53.83 FATIGUE, UNSPECIFIED TYPE: ICD-10-CM

## 2022-02-08 DIAGNOSIS — Z12.31 SCREENING MAMMOGRAM, ENCOUNTER FOR: ICD-10-CM

## 2022-02-08 PROCEDURE — 77067 SCR MAMMO BI INCL CAD: CPT

## 2022-02-08 PROCEDURE — 77063 BREAST TOMOSYNTHESIS BI: CPT

## 2022-02-08 NOTE — TELEPHONE ENCOUNTER
Called pt to reschedule lab from 2/4/22.  Could not reach pt, left VM to call back.  Will send a [employer] message.

## 2022-02-14 ENCOUNTER — HOSPITAL ENCOUNTER (OUTPATIENT)
Dept: CT IMAGING | Facility: HOSPITAL | Age: 62
Discharge: HOME | End: 2022-02-14

## 2022-02-14 ENCOUNTER — TELEPHONE (OUTPATIENT)
Dept: ONCOLOGY | Facility: CLINIC | Age: 62
End: 2022-02-14

## 2022-02-14 DIAGNOSIS — R91.1 INCIDENTAL LUNG NODULE, > 3MM AND < 8MM: ICD-10-CM

## 2022-02-14 PROCEDURE — 71250 CT THORAX DX C-: CPT

## 2022-02-14 NOTE — TELEPHONE ENCOUNTER
----- Message from April Angelica Lu RN sent at 2/11/2022  9:54 AM EST -----  Regarding: FW: appt Dr Code  2/7 lab below not done  This pt missed her appt. On 02/07 with the weather not sure if she wants to reschedule?  ----- Message -----  From: Shauna Zamora  Sent: 2/1/2022   1:43 PM EST  To: April Angelica Lu RN  Subject: appt Dr Villafuerte  2/7 lab below not done             See note he wanted these a week prior and they need to be approved too I do believe   Check with him to see if they should come this week and then please let Isabel know what to do   Thank you

## 2022-02-17 ENCOUNTER — HOSPITAL ENCOUNTER (OUTPATIENT)
Dept: NUCLEAR MEDICINE | Facility: HOSPITAL | Age: 62
Discharge: HOME | End: 2022-02-17

## 2022-02-17 DIAGNOSIS — R10.13 EPIGASTRIC PAIN: ICD-10-CM

## 2022-02-17 DIAGNOSIS — R11.0 NAUSEA: ICD-10-CM

## 2022-02-17 PROCEDURE — 78264 GASTRIC EMPTYING IMG STUDY: CPT

## 2022-02-17 PROCEDURE — A9541 TC99M SULFUR COLLOID: HCPCS | Performed by: INTERNAL MEDICINE

## 2022-02-17 PROCEDURE — 0 TECHNETIUM SULFUR COLLOID: Performed by: INTERNAL MEDICINE

## 2022-02-17 RX ADMIN — TECHNETIUM TC 99M SULFUR COLLOID 1 DOSE: KIT at 07:16

## 2022-02-21 ENCOUNTER — TELEPHONE (OUTPATIENT)
Dept: GASTROENTEROLOGY | Facility: CLINIC | Age: 62
End: 2022-02-21

## 2022-02-21 NOTE — TELEPHONE ENCOUNTER
----- Message from Munira Montes MD sent at 2/21/2022  8:32 AM EST -----  Gastric emptying test shows delayed emptying and 2 hours and minimally delayed emptying at 4 hours.  This may be contributing to her ongoing nausea.  Recommend small meals multiple times a day.  Avoid foods high in fat or fiber.  Use liquid supplements when nausea prohibits intake.  Follow-up as scheduled

## 2022-03-04 ENCOUNTER — OFFICE VISIT (OUTPATIENT)
Dept: GASTROENTEROLOGY | Facility: CLINIC | Age: 62
End: 2022-03-04

## 2022-03-04 VITALS — WEIGHT: 210 LBS | TEMPERATURE: 98.2 F | BODY MASS INDEX: 34.99 KG/M2 | HEIGHT: 65 IN

## 2022-03-04 DIAGNOSIS — R10.11 RIGHT UPPER QUADRANT ABDOMINAL PAIN: ICD-10-CM

## 2022-03-04 DIAGNOSIS — R11.2 NAUSEA AND VOMITING, INTRACTABILITY OF VOMITING NOT SPECIFIED, UNSPECIFIED VOMITING TYPE: ICD-10-CM

## 2022-03-04 DIAGNOSIS — K30 DELAYED GASTRIC EMPTYING: Primary | ICD-10-CM

## 2022-03-04 PROCEDURE — 99214 OFFICE O/P EST MOD 30 MIN: CPT | Performed by: PHYSICIAN ASSISTANT

## 2022-03-04 RX ORDER — ONDANSETRON 4 MG/1
4 TABLET, FILM COATED ORAL EVERY 8 HOURS PRN
Qty: 60 TABLET | Refills: 1 | Status: SHIPPED | OUTPATIENT
Start: 2022-03-04

## 2022-03-04 RX ORDER — PROMETHAZINE HYDROCHLORIDE 12.5 MG/1
12.5 TABLET ORAL EVERY 6 HOURS PRN
Qty: 15 TABLET | Refills: 0 | Status: SHIPPED | OUTPATIENT
Start: 2022-03-04 | End: 2022-04-18

## 2022-03-04 RX ORDER — ONDANSETRON 4 MG/1
4 TABLET, FILM COATED ORAL EVERY 8 HOURS PRN
Qty: 60 TABLET | Refills: 1 | Status: SHIPPED | OUTPATIENT
Start: 2022-03-04 | End: 2022-03-04

## 2022-03-04 RX ORDER — METOCLOPRAMIDE 5 MG/1
5 TABLET ORAL
Qty: 120 TABLET | Refills: 0 | Status: CANCELLED | OUTPATIENT
Start: 2022-03-04 | End: 2022-04-03

## 2022-03-04 NOTE — PROGRESS NOTES
Chief Complaint  Heartburn, Nausea, Vomiting, Gas, and Bloated    Subjective        History of Present Illness  Amanda Sherwood is a  61 y.o. female patient of Dr. Montes, new to me today, here for complaints of heartburn, nausea, vomiting, gas and bloating.     2/14/2022: Gastric emptying study with delayed emptying at 2 hours but only mild delay at 4 hours.     EGD and colonoscopy from 2/2020 with esophagitis, gastritis, mild duodenitis,  nonbleeding internal hemorrhoids, diverticulosis, hyperplastic polyp. Family history of colorectal cancer in father so next colonoscopy in 5 years.     She reports since July she has been unable to eat or keep up with her water intake due to severe nausea daily.  She reports she is able to intake a small amount of Diet Coke throughout the day and consume a small meal at night with the aid of marijuana.  She has tried Compazine, Zofran and Phenergan which provide minimal relief in nausea. Not following gastroparesis diet.  Does report intermittent right upper quadrant pain with radiation to the shoulder.  She denies any particular food associations.  She notes a daily bowel movement typically at 4 AM and thinks it is a complete evacuation without zi blood or melena.    She does have significant anxiety and depression which she follows with a psychiatrist and is currently on Wellbutrin, Lexapro and Abilify.     Outside labs from 2/22/2022: Normal PTH, normal vitamin D, BMP with a slightly elevated glucose, BUN 13, creatinine increased to 1.25, elevated serum calcium at 10.5.      Past Medical History:   Diagnosis Date   • Acute kidney injury (HCC)    • Allergic    • Anxiety 3 years ago   • Depression    • GERD (gastroesophageal reflux disease)    • H/O Leukopenia    • Hernia    • History of anemia    • History of bronchitis    • History of migraine    • Hyperlipidemia     NO MEDS CURRENTLY. WORKING ON   • Hypertension    • Leukopenia     PER HISTORY. HAS SEEN CBC IN THE PAST    • Neck pain    • Obesity (BMI 30.0-34.9) 6/6/2019   • Osteoarthritis    • PVC (premature ventricular contraction)    • Seasonal allergies    • Torn meniscus     LEFT   • Tremor 3 years ago       Past Surgical History:   Procedure Laterality Date   • BREAST BIOPSY Bilateral     BENIGN. HORACE BREAST   • BUNIONECTOMY Left    • BUNIONECTOMY Left     REVISION   • CARDIAC CATHETERIZATION N/A 10/28/2021    Procedure: Coronary angiography;  Surgeon: Vanessa Bennett MD;  [facility];  Service: Cardiovascular;  Laterality: N/A;   • CARDIAC CATHETERIZATION N/A 10/28/2021    Procedure: Left heart cath;  Surgeon: Vanessa Bennett MD;  [facility];  Service: Cardiovascular;  Laterality: N/A;   • CARDIAC CATHETERIZATION N/A 10/28/2021    Procedure: Right heart cath;  Surgeon: Vanessa Bennett MD;  [facility];  Service: Cardiovascular;  Laterality: N/A;   • COLONOSCOPY N/A 2/1/2020    Procedure: COLONOSCOPY TO CECUM AND INTO TERMINAL ILEUM WITH COLD BIOPSY POLYPECTOMY;  Surgeon: Munira Montes MD;  Location: [facility] ENDOSCOPY;  Service: Gastroenterology   • DILATATION AND CURETTAGE      AUB no cancer   • ENDOSCOPY N/A 2/1/2020    Procedure: ESOPHAGOGASTRODUODENOSCOPY WITH COLD BIOPSIES;  Surgeon: Munira Montes MD;  [facility];  Service: Gastroenterology   • KNEE ARTHROSCOPY Left 8/22/2018    Procedure: KNEE ARTHROSCOPY LATERAL MENISECTOMY DEBRIDEMENT OF MEDIAL FEMORAL CONDYLE DEFECT DEBRIDEMENT OF ARTHRITIS;  Surgeon: Graciela Crockett MD;  [place] [facility] [place] [facility] [place]  Service: Orthopedics   • MENISCECTOMY Left    • SALPINGO OOPHORECTOMY Left    • TONSILLECTOMY  as a child   • TUBAL ABDOMINAL LIGATION     • UPPER GASTROINTESTINAL ENDOSCOPY     • WISDOM TOOTH EXTRACTION         Family History   Problem Relation Age of Onset   • Dementia Mother    • Arthritis Mother    • Other Mother         Alzheimer's   • Stroke Father    •  Heart disease Father    • Hypertension Father    • Diabetes Father    • Cancer Father    • Colon cancer Father    • Hyperlipidemia Father    • Colon polyps Father    • Breast cancer Sister 65   • Atrial fibrillation Sister    • Alcohol abuse Sister    • Asthma Sister    • Hypertension Sister    • Ovarian cancer Maternal Aunt    • Prostate cancer Brother    • Malig Hyperthermia Neg Hx        Social History     Socioeconomic History   • Marital status:    • Number of children: 2   • Years of education: College   Tobacco Use   • Smoking status: Former Smoker     Packs/day: 0.50     Years: 4.00     Pack years: 2.00     Types: Cigarettes     Quit date: 1987     Years since quittin.3   • Smokeless tobacco: Never Used   Vaping Use   • Vaping Use: Never used   Substance and Sexual Activity   • Alcohol use: Yes     Alcohol/week: 2.0 standard drinks     Types: 2 Cans of beer per week     Comment: caffeine - None   • Drug use: Not Currently     Types: Marijuana   • Sexual activity: Yes     Partners: Male     Birth control/protection: Post-menopausal       Allergies   Allergen Reactions   • Celecoxib Hives and Swelling     hives   • Ace Inhibitors Cough   • Lisinopril Cough       Current Outpatient Medications on File Prior to Visit   Medication Sig Dispense Refill   • albuterol sulfate  (90 Base) MCG/ACT inhaler Inhale 2 puffs into the lungs every 4 hours as needed. 18 g 10   • amLODIPine (NORVASC) 2.5 MG tablet Take 1 tablet by mouth Daily. 90 tablet 0   • apixaban (ELIQUIS) 5 MG tablet tablet Take 1 tablet by mouth Every 12 (Twelve) Hours. 60 tablet 11   • ARIPiprazole (ABILIFY) 2 MG tablet Take 1 tablet by mouth every night at bedtime. 90 tablet 0   • atorvastatin (Lipitor) 40 MG tablet Take 1 tablet by mouth Every Night. 30 tablet 3   • benzonatate (Tessalon Perles) 100 MG capsule Take 1 capsule by mouth 3 (Three) Times a Day As Needed for Cough. 30 capsule 0   • bimatoprost (LUMIGAN) 0.03 %  ophthalmic drops Apply 1 drop to base of lashes daily 5 mL 98   • budesonide-formoterol (SYMBICORT) 160-4.5 MCG/ACT inhaler Inhale 2 puffs 2 (two) times a day. 10.2 g 10   • buPROPion XL (WELLBUTRIN XL) 300 MG 24 hr tablet Take 1 tablet by mouth Every Morning. 90 tablet 0   • calcium carbonate (TUMS) 500 MG chewable tablet Chew 1 tablet As Needed.     • cetirizine (zyrTEC) 10 MG tablet Take 10 mg by mouth As Needed.     • Cholecalciferol (VITAMIN D3) 5000 UNITS capsule capsule Take 5,000 Units by mouth daily.     • escitalopram (LEXAPRO) 20 MG tablet Take 1 tablet by mouth every night at bedtime. 90 tablet 0   • LORazepam (Ativan) 0.5 MG tablet Take 0.5 mg by mouth Daily As Needed for Anxiety.     • losartan (Cozaar) 50 MG tablet Take 1 tablet by mouth Daily. 90 tablet 3   • metoprolol succinate XL (TOPROL-XL) 50 MG 24 hr tablet Take 1 tablet by mouth Every Night. 90 tablet 3   • omeprazole (priLOSEC) 40 MG capsule Take 1 capsule by mouth Daily. 30 capsule 2   • prochlorperazine (COMPAZINE) 5 MG tablet Take 1 tablet by mouth Every 6 (Six) Hours As Needed for Nausea or Vomiting. 40 tablet 1   • spironolactone (ALDACTONE) 50 MG tablet Take 1 tablet by mouth Daily. 90 tablet 3   • traZODone (DESYREL) 50 MG tablet Take 2 tablets by mouth At Night at bedtime As Needed. 180 tablet 0   • [DISCONTINUED] ondansetron (ZOFRAN) 4 MG tablet Take 1 tablet by mouth Every 8 (Eight) Hours As Needed for Nausea. 20 tablet 1   • [DISCONTINUED] promethazine (PHENERGAN) 12.5 MG tablet Take 1 tablet by mouth Every 6 (Six) Hours As Needed for Nausea or Vomiting. 15 tablet 0   • apixaban (ELIQUIS) 5 MG tablet tablet Take 1 tablet by mouth Every 12 (Twelve) Hours. Resume on 10/29/2021 60 tablet 11   • ARIPiprazole (ABILIFY) 2 MG tablet Take 1 tablet by mouth every night at bedtime. 90 tablet 0   • buPROPion XL (WELLBUTRIN XL) 300 MG 24 hr tablet Take 1 tablet by mouth Every Morning. 90 tablet 0   • traZODone (DESYREL) 50 MG tablet Take 2  "tablets by mouth every night at bedtime as needed 180 tablet 0   • [DISCONTINUED] methylPREDNISolone (MEDROL) 4 MG dose pack Take as directed on package instructions. 1 each 0   • [DISCONTINUED] nebivolol (Bystolic) 20 MG tablet Take 1 tablet by mouth Daily. 90 tablet 0     No current facility-administered medications on file prior to visit.       Review of Systems   Constitutional: Negative for chills, fever, unexpected weight gain and unexpected weight loss.   HENT: Negative for trouble swallowing.    Respiratory: Negative for cough and shortness of breath.    Cardiovascular: Negative for chest pain and palpitations.   Gastrointestinal: Positive for abdominal pain, nausea, vomiting and GERD. Negative for blood in stool, constipation and diarrhea.        Objective   Vital Signs:   Temp 98.2 °F (36.8 °C)   Ht 165.1 cm (65\")   Wt 95.3 kg (210 lb)   BMI 34.95 kg/m²       Physical Exam  Vitals and nursing note reviewed.   Constitutional:       General: She is not in acute distress.     Appearance: Normal appearance. She is not ill-appearing.   HENT:      Head: Normocephalic and atraumatic.      Right Ear: External ear normal.      Left Ear: External ear normal.   Eyes:      General: No scleral icterus.     Conjunctiva/sclera: Conjunctivae normal.      Pupils: Pupils are equal, round, and reactive to light.   Cardiovascular:      Rate and Rhythm: Normal rate and regular rhythm.      Heart sounds: Normal heart sounds.   Pulmonary:      Effort: Pulmonary effort is normal.      Breath sounds: Normal breath sounds.   Abdominal:      General: Abdomen is flat. Bowel sounds are normal. There is no distension.      Palpations: Abdomen is soft.      Tenderness: There is abdominal tenderness (mild tenderness to palpation right upper quadrant ). There is no guarding or rebound.   Musculoskeletal:      Cervical back: Normal range of motion and neck supple.   Skin:     General: Skin is warm and dry.   Neurological:      Mental " Status: She is alert and oriented to person, place, and time.   Psychiatric:      Comments: Tearful and anxious           Result Review :                   Assessment and Plan    Diagnoses and all orders for this visit:    1. Delayed gastric emptying (Primary)  -     XR Abdomen KUB; Future  -     US Gallbladder; Future  -     ondansetron (ZOFRAN) 4 MG tablet; Take 1 tablet by mouth Every 8 (Eight) Hours As Needed for Nausea.  Dispense: 60 tablet; Refill: 1    2. Right upper quadrant abdominal pain    3. Nausea and vomiting, intractability of vomiting not specified, unspecified vomiting type    Other orders  -     Discontinue: ondansetron (ZOFRAN) 4 MG tablet; Take 1 tablet by mouth Every 8 (Eight) Hours As Needed for Nausea.  Dispense: 60 tablet; Refill: 1  -     promethazine (PHENERGAN) 12.5 MG tablet; Take 1 tablet by mouth Every 6 (Six) Hours As Needed for Nausea or Vomiting.  Dispense: 15 tablet; Refill: 0      · Reviewed gastroparesis diet; handout provided prior to check out.   · We did discuss the potential addition of Reglan and the potential side effects.  Patient would like to hold off on addition of this medication at this time which I certainly think is reasonable given that she has not tried a gastroparesis diet.  · Prescription refilled for Zofran and Phenergan.  · Obtain KUB.  · Obtain right upper quadrant ultrasound to rule out gallbladder etiology.        Follow Up   Return in about 4 weeks (around 4/1/2022) for Dr. Montes.    Theo dictation used throughout this note.     JED Bradley

## 2022-03-07 ENCOUNTER — TELEPHONE (OUTPATIENT)
Dept: GASTROENTEROLOGY | Facility: CLINIC | Age: 62
End: 2022-03-07

## 2022-03-07 ENCOUNTER — LAB (OUTPATIENT)
Dept: LAB | Facility: HOSPITAL | Age: 62
End: 2022-03-07

## 2022-03-07 DIAGNOSIS — I26.94 MULTIPLE SUBSEGMENTAL PULMONARY EMBOLI WITHOUT ACUTE COR PULMONALE: ICD-10-CM

## 2022-03-07 LAB
BASOPHILS # BLD AUTO: 0.01 10*3/MM3 (ref 0–0.2)
BASOPHILS NFR BLD AUTO: 0.2 % (ref 0–1.5)
DEPRECATED RDW RBC AUTO: 38.6 FL (ref 37–54)
EOSINOPHIL # BLD AUTO: 0.06 10*3/MM3 (ref 0–0.4)
EOSINOPHIL NFR BLD AUTO: 1.5 % (ref 0.3–6.2)
ERYTHROCYTE [DISTWIDTH] IN BLOOD BY AUTOMATED COUNT: 11.4 % (ref 12.3–15.4)
HCT VFR BLD AUTO: 38.6 % (ref 34–46.6)
HGB BLD-MCNC: 13.1 G/DL (ref 12–15.9)
IMM GRANULOCYTES # BLD AUTO: 0.02 10*3/MM3 (ref 0–0.05)
IMM GRANULOCYTES NFR BLD AUTO: 0.5 % (ref 0–0.5)
LYMPHOCYTES # BLD AUTO: 1.07 10*3/MM3 (ref 0.7–3.1)
LYMPHOCYTES NFR BLD AUTO: 26.2 % (ref 19.6–45.3)
MCH RBC QN AUTO: 31.9 PG (ref 26.6–33)
MCHC RBC AUTO-ENTMCNC: 33.9 G/DL (ref 31.5–35.7)
MCV RBC AUTO: 93.9 FL (ref 79–97)
MONOCYTES # BLD AUTO: 0.41 10*3/MM3 (ref 0.1–0.9)
MONOCYTES NFR BLD AUTO: 10 % (ref 5–12)
NEUTROPHILS NFR BLD AUTO: 2.51 10*3/MM3 (ref 1.7–7)
NEUTROPHILS NFR BLD AUTO: 61.6 % (ref 42.7–76)
NRBC BLD AUTO-RTO: 0 /100 WBC (ref 0–0.2)
PLATELET # BLD AUTO: 185 10*3/MM3 (ref 140–450)
PMV BLD AUTO: 10.1 FL (ref 6–12)
RBC # BLD AUTO: 4.11 10*6/MM3 (ref 3.77–5.28)
WBC NRBC COR # BLD: 4.08 10*3/MM3 (ref 3.4–10.8)

## 2022-03-07 PROCEDURE — 85025 COMPLETE CBC W/AUTO DIFF WBC: CPT

## 2022-03-07 PROCEDURE — 36415 COLL VENOUS BLD VENIPUNCTURE: CPT

## 2022-03-07 PROCEDURE — 85306 CLOT INHIBIT PROT S FREE: CPT | Performed by: INTERNAL MEDICINE

## 2022-03-07 NOTE — TELEPHONE ENCOUNTER
Per 03/04/2022 office note, patient needs to be seen in 4 weeks. You have no availability at this time. Please advise

## 2022-03-08 LAB — PROT S ACT/NOR PPP: >150 % (ref 70–127)

## 2022-03-09 LAB
PROT S AG ACT/NOR PPP IA: 106 % (ref 60–150)
PROT S FREE AG ACT/NOR PPP IA: 142 % (ref 61–136)

## 2022-03-14 ENCOUNTER — APPOINTMENT (OUTPATIENT)
Dept: LAB | Facility: HOSPITAL | Age: 62
End: 2022-03-14

## 2022-03-14 ENCOUNTER — OFFICE VISIT (OUTPATIENT)
Dept: ONCOLOGY | Facility: CLINIC | Age: 62
End: 2022-03-14

## 2022-03-14 VITALS
HEART RATE: 73 BPM | TEMPERATURE: 98 F | HEIGHT: 65 IN | SYSTOLIC BLOOD PRESSURE: 109 MMHG | DIASTOLIC BLOOD PRESSURE: 76 MMHG | BODY MASS INDEX: 34.37 KG/M2 | OXYGEN SATURATION: 97 % | RESPIRATION RATE: 15 BRPM | WEIGHT: 206.3 LBS

## 2022-03-14 DIAGNOSIS — I26.94 MULTIPLE SUBSEGMENTAL PULMONARY EMBOLI WITHOUT ACUTE COR PULMONALE: Primary | ICD-10-CM

## 2022-03-14 PROCEDURE — 99213 OFFICE O/P EST LOW 20 MIN: CPT | Performed by: INTERNAL MEDICINE

## 2022-03-14 NOTE — PROGRESS NOTES
.     REASON FOR FOLLOWUP :   Pulmonary embolism    HISTORY OF PRESENT ILLNESS:  The patient is a 61 y.o. year old female  who is here for follow-up with the above-mentioned history.    She is following with GI.  Her main issue is nausea.  Has had some nausea since her GI illness.  This is been worse over the past couple of months.  She states she has some mild gastroparesis.    Has been slightly SOA since the PE.  Saw Dr. Jordon Adams.  He asked her to have a test to look for asthma.  She states she is worried about having the test and therefore is not done this yet but she plans to follow-up with him.    No bleeding.    She states she is not moving around much so she is worried about stopping Eliquis.    Past Medical History:   Diagnosis Date   • Acute kidney injury (HCC)    • Allergic    • Anxiety 3 years ago   • Depression    • GERD (gastroesophageal reflux disease)    • H/O Leukopenia    • Hernia    • History of anemia    • History of bronchitis    • History of migraine    • Hyperlipidemia     NO MEDS CURRENTLY. WORKING ON   • Hypertension    • Leukopenia     PER HISTORY. HAS SEEN CBC IN THE PAST   • Neck pain    • Obesity (BMI 30.0-34.9) 6/6/2019   • Osteoarthritis    • PVC (premature ventricular contraction)    • Seasonal allergies    • Torn meniscus     LEFT   • Tremor 3 years ago     Past Surgical History:   Procedure Laterality Date   • BREAST BIOPSY Bilateral     BENIGN. HORACE BREAST   • BUNIONECTOMY Left    • BUNIONECTOMY Left     REVISION   • CARDIAC CATHETERIZATION N/A 10/28/2021    Procedure: Coronary angiography;  Surgeon: Vanessa Bennett MD;  Location: [facility] CATH INVASIVE LOCATION;  Service: Cardiovascular;  Laterality: N/A;   • CARDIAC CATHETERIZATION N/A 10/28/2021    Procedure: Left heart cath;  Surgeon: Vanessa Bennett MD;  Location: [facility] CATH INVASIVE LOCATION;  Service: Cardiovascular;  Laterality: N/A;   • CARDIAC CATHETERIZATION N/A 10/28/2021    Procedure: Right heart cath;   Surgeon: Vanessa Bennett MD;  Location: [facility] CATH INVASIVE LOCATION;  Service: Cardiovascular;  Laterality: N/A;   • COLONOSCOPY N/A 2/1/2020    Procedure: COLONOSCOPY TO CECUM AND INTO TERMINAL ILEUM WITH COLD BIOPSY POLYPECTOMY;  Surgeon: Munira Montes MD;  [facility];  Service: Gastroenterology   • DILATATION AND CURETTAGE      AUB no cancer   • ENDOSCOPY N/A 2/1/2020    Procedure: ESOPHAGOGASTRODUODENOSCOPY WITH COLD BIOPSIES;  Surgeon: Munira Montes MD;  [facility];  Service: Gastroenterology   • KNEE ARTHROSCOPY Left 8/22/2018    Procedure: KNEE ARTHROSCOPY LATERAL MENISECTOMY DEBRIDEMENT OF MEDIAL FEMORAL CONDYLE DEFECT DEBRIDEMENT OF ARTHRITIS;  Surgeon: Graciela Crockett MD;  Location:  [facility];  Service: Orthopedics   • MENISCECTOMY Left    • SALPINGO OOPHORECTOMY Left    • TONSILLECTOMY  as a child   • TUBAL ABDOMINAL LIGATION     • UPPER GASTROINTESTINAL ENDOSCOPY     • WISDOM TOOTH EXTRACTION         MEDICATIONS    Current Outpatient Medications:   •  albuterol sulfate  (90 Base) MCG/ACT inhaler, Inhale 2 puffs into the lungs every 4 hours as needed., Disp: 18 g, Rfl: 10  •  amLODIPine (NORVASC) 2.5 MG tablet, Take 1 tablet by mouth Daily., Disp: 90 tablet, Rfl: 0  •  apixaban (ELIQUIS) 5 MG tablet tablet, Take 1 tablet by mouth Every 12 (Twelve) Hours., Disp: 60 tablet, Rfl: 11  •  apixaban (ELIQUIS) 5 MG tablet tablet, Take 1 tablet by mouth Every 12 (Twelve) Hours. Resume on 10/29/2021, Disp: 60 tablet, Rfl: 11  •  ARIPiprazole (ABILIFY) 2 MG tablet, Take 1 tablet by mouth every night at bedtime., Disp: 90 tablet, Rfl: 0  •  ARIPiprazole (ABILIFY) 2 MG tablet, Take 1 tablet by mouth every night at bedtime., Disp: 90 tablet, Rfl: 0  •  atorvastatin (Lipitor) 40 MG tablet, Take 1 tablet by mouth Every Night., Disp: 30 tablet, Rfl: 3  •  benzonatate (Tessalon Perles) 100 MG capsule, Take 1 capsule by mouth 3 (Three) Times a Day As Needed for  Cough., Disp: 30 capsule, Rfl: 0  •  bimatoprost (LUMIGAN) 0.03 % ophthalmic drops, Apply 1 drop to base of lashes daily, Disp: 5 mL, Rfl: 98  •  budesonide-formoterol (SYMBICORT) 160-4.5 MCG/ACT inhaler, Inhale 2 puffs 2 (two) times a day., Disp: 10.2 g, Rfl: 10  •  buPROPion XL (WELLBUTRIN XL) 300 MG 24 hr tablet, Take 1 tablet by mouth Every Morning., Disp: 90 tablet, Rfl: 0  •  buPROPion XL (WELLBUTRIN XL) 300 MG 24 hr tablet, Take 1 tablet by mouth Every Morning., Disp: 90 tablet, Rfl: 0  •  calcium carbonate (TUMS) 500 MG chewable tablet, Chew 1 tablet As Needed., Disp: , Rfl:   •  cetirizine (zyrTEC) 10 MG tablet, Take 10 mg by mouth As Needed., Disp: , Rfl:   •  Cholecalciferol (VITAMIN D3) 5000 UNITS capsule capsule, Take 5,000 Units by mouth daily., Disp: , Rfl:   •  escitalopram (LEXAPRO) 20 MG tablet, Take 1 tablet by mouth every night at bedtime., Disp: 90 tablet, Rfl: 0  •  LORazepam (ATIVAN) 0.5 MG tablet, Take 0.5 mg by mouth Daily As Needed for Anxiety., Disp: , Rfl:   •  losartan (Cozaar) 50 MG tablet, Take 1 tablet by mouth Daily., Disp: 90 tablet, Rfl: 3  •  metoprolol succinate XL (TOPROL-XL) 50 MG 24 hr tablet, Take 1 tablet by mouth Every Night., Disp: 90 tablet, Rfl: 3  •  omeprazole (priLOSEC) 40 MG capsule, Take 1 capsule by mouth Daily., Disp: 30 capsule, Rfl: 2  •  ondansetron (ZOFRAN) 4 MG tablet, Take 1 tablet by mouth Every 8 (Eight) Hours As Needed for Nausea., Disp: 60 tablet, Rfl: 1  •  prochlorperazine (COMPAZINE) 5 MG tablet, Take 1 tablet by mouth Every 6 (Six) Hours As Needed for Nausea or Vomiting., Disp: 40 tablet, Rfl: 1  •  promethazine (PHENERGAN) 12.5 MG tablet, Take 1 tablet by mouth Every 6 (Six) Hours As Needed for Nausea or Vomiting., Disp: 15 tablet, Rfl: 0  •  spironolactone (ALDACTONE) 50 MG tablet, Take 1 tablet by mouth Daily., Disp: 90 tablet, Rfl: 3  •  traZODone (DESYREL) 50 MG tablet, Take 2 tablets by mouth every night at bedtime as needed, Disp: 180 tablet,  Rfl: 0  •  traZODone (DESYREL) 50 MG tablet, Take 2 tablets by mouth At Night at bedtime As Needed., Disp: 180 tablet, Rfl: 0    ALLERGIES:     Allergies   Allergen Reactions   • Celecoxib Hives and Swelling     hives   • Ace Inhibitors Cough   • Lisinopril Cough       SOCIAL HISTORY:       Social History     Socioeconomic History   • Marital status:    • Number of children: 2   • Years of education: College   Tobacco Use   • Smoking status: Former Smoker     Packs/day: 0.50     Years: 4.00     Pack years: 2.00     Types: Cigarettes     Quit date: 1987     Years since quittin.3   • Smokeless tobacco: Never Used   Vaping Use   • Vaping Use: Never used   Substance and Sexual Activity   • Alcohol use: Yes     Alcohol/week: 2.0 standard drinks     Types: 2 Cans of beer per week     Comment: caffeine - None   • Drug use: Not Currently     Types: Marijuana   • Sexual activity: Yes     Partners: Male     Birth control/protection: Post-menopausal         FAMILY HISTORY:  Family History   Problem Relation Age of Onset   • Dementia Mother    • Arthritis Mother    • Other Mother         Alzheimer's   • Stroke Father    • Heart disease Father    • Hypertension Father    • Diabetes Father    • Cancer Father    • Colon cancer Father    • Hyperlipidemia Father    • Colon polyps Father    • Breast cancer Sister 65   • Atrial fibrillation Sister    • Alcohol abuse Sister    • Asthma Sister    • Hypertension Sister    • Ovarian cancer Maternal Aunt    • Prostate cancer Brother    • Malig Hyperthermia Neg Hx        REVIEW OF SYSTEMS:  Review of Systems   Constitutional: Negative for activity change.   HENT: Negative for nosebleeds and trouble swallowing.    Respiratory: Positive for shortness of breath. Negative for wheezing.    Cardiovascular: Negative for palpitations.   Gastrointestinal: Positive for nausea. Negative for constipation and diarrhea.   Genitourinary: Negative for dysuria and hematuria.  "  Musculoskeletal: Negative for arthralgias and myalgias.   Skin: Negative for rash and wound.   Neurological: Negative for seizures and syncope.   Hematological: Negative for adenopathy. Does not bruise/bleed easily.   Psychiatric/Behavioral: Negative for confusion.              Vitals:    03/14/22 1042   BP: 109/76   Pulse: 73   Resp: 15   Temp: 98 °F (36.7 °C)   TempSrc: Temporal   SpO2: 97%   Weight: 93.6 kg (206 lb 4.8 oz)   Height: 165.1 cm (65\")   PainSc: 0-No pain     Current Status 3/14/2022   ECOG score 0      PHYSICAL EXAM:        CONSTITUTIONAL:  Vital signs reviewed.  No distress, looks comfortable.  EYES:  Conjunctiva and lids unremarkable.  PERRLA  EARS,NOSE,MOUTH,THROAT:  Ears and nose appear unremarkable.  Lips, teeth, gums appear unremarkable.  RESPIRATORY:  Normal respiratory effort.  Lungs clear to auscultation bilaterally.  CARDIOVASCULAR:  Normal S1, S2.  No murmurs rubs or gallops.  No significant lower extremity edema.  GASTROINTESTINAL: Abdomen appears unremarkable.  Nontender.  No hepatomegaly.  No splenomegaly.  LYMPHATIC:  No cervical, supraclavicular, axillary lymphadenopathy.  SKIN:  Warm.  No rashes.  PSYCHIATRIC:  Normal judgment and insight.  Normal mood and affect.        RECENT LABS:        WBC   Date Value Ref Range Status   03/07/2022 4.08 3.40 - 10.80 10*3/mm3 Final   10/26/2021 4.58 3.40 - 10.80 10*3/mm3 Final   09/27/2021 3.47 3.40 - 10.80 10*3/mm3 Final   08/29/2021 3.26 (L) 3.40 - 10.80 10*3/mm3 Final   08/28/2021 3.15 (L) 3.40 - 10.80 10*3/mm3 Final   08/27/2021 4.73 3.40 - 10.80 10*3/mm3 Final   08/16/2021 3.23 (L) 3.40 - 10.80 10*3/mm3 Final   08/07/2021 3.81 3.40 - 10.80 10*3/mm3 Final   08/06/2021 4.96 3.40 - 10.80 10*3/mm3 Final   06/20/2019 4.42 3.40 - 10.80 10*3/mm3 Final   06/19/2019 3.96 (L) 4.5 - 11.0 10*3/uL Final   06/18/2019 3.68 3.40 - 10.80 10*3/mm3 Final   09/22/2018 3.86 (L) 4.50 - 10.70 10*3/mm3 Final   08/15/2018 2.74 (L) 4.50 - 10.70 10*3/mm3 Final "   10/27/2016 3.65 (L) 4.50 - 10.70 10*3/mm3 Final   09/13/2016 2.42 (L) 4.50 - 10.70 10*3/mm3 Final   04/29/2016 3.05 (L) 4.50 - 10.70 10*3/mm3 Final     Hemoglobin   Date Value Ref Range Status   03/07/2022 13.1 12.0 - 15.9 g/dL Final   10/28/2021 13.3 12.0 - 17.0 g/dL Final   10/28/2021 13.3 12.0 - 17.0 g/dL Final   10/26/2021 14.6 12.0 - 15.9 g/dL Final   09/27/2021 14.1 12.0 - 15.9 g/dL Final   08/29/2021 14.0 12.0 - 15.9 g/dL Final   08/28/2021 14.1 12.0 - 15.9 g/dL Final   08/27/2021 15.4 12.0 - 15.9 g/dL Final   08/16/2021 13.4 12.0 - 15.9 g/dL Final   08/07/2021 12.9 12.0 - 15.9 g/dL Final   08/06/2021 14.9 12.0 - 15.9 g/dL Final   06/20/2019 13.7 12.0 - 15.9 g/dL Final   06/19/2019 14.6 12.0 - 16.0 g/dL Final   06/18/2019 12.9 12.0 - 15.9 g/dL Final   09/22/2018 14.7 11.9 - 15.5 g/dL Final   08/15/2018 13.8 11.9 - 15.5 g/dL Final   10/27/2016 13.8 11.9 - 15.5 g/dL Final   09/13/2016 13.6 11.9 - 15.5 g/dL Final   04/29/2016 13.5 11.9 - 15.5 g/dL Final     Platelets   Date Value Ref Range Status   03/07/2022 185 140 - 450 10*3/mm3 Final   10/26/2021 194 140 - 450 10*3/mm3 Final   09/27/2021 178 140 - 450 10*3/mm3 Final   08/29/2021 174 140 - 450 10*3/mm3 Final   08/28/2021 186 140 - 450 10*3/mm3 Final   08/27/2021 233 140 - 450 10*3/mm3 Final   08/16/2021 181 140 - 450 10*3/mm3 Final   08/07/2021 259 140 - 450 10*3/mm3 Final   08/06/2021 373 140 - 450 10*3/mm3 Final   06/20/2019 180 140 - 450 10*3/mm3 Final   06/19/2019 184 140 - 440 10*3/uL Final   06/18/2019 160 140 - 450 10*3/mm3 Final   09/22/2018 169 140 - 500 10*3/mm3 Final   08/15/2018 164 140 - 500 10*3/mm3 Final   10/27/2016 161 140 - 500 10*3/mm3 Final   09/13/2016 176 140 - 500 10*3/mm3 Final   04/29/2016 187 140 - 500 10*3/mm3 Final       Assessment/Plan   There are no diagnoses linked to this encounter.      Amanda Sherwood   *Bilateral lower lobe pulmonary emboli on CT 8/27/2021.  · Doppler left leg 8/17/2021: Normal  · Doppler bilateral legs  8/28/2021: Normal  · CT chest angiogram 8/27/2021: Bilateral lower lobe PEs.   · 3/14/2022: Completed 6 months therapeutic Eliquis.  This was associated with a reversible risk factor: Immobility from GI illness.  However, she states due to ongoing nausea she is not very mobile and does not want to stop Eliquis.  Therefore, decreased Eliquis 2.5 mg twice per day (prophylactic dose).  If she becomes more mobile in the future, it would be reasonable to stop Eliquis and begin aspirin 81 mg daily.    *Risk factors for clotting:  · Overweight  · Was not on HRT.  (Understands should not take any HRT in the future).  · Only smoked a little in high school and college.  No smoking since.  · No prior personal history of clots  · No family history of clots  · Immobility.  July 2021 GI virus: Lost 20 pounds.  4-day admission for FANNY and UTI.  Subsequently at home, in bed x5 days late July 2021.  Then, bed to couch x2 weeks.  · Unremarkable: Prothrombin gene mutation, factor V Leiden, Antithrombin, protein C activity, protein S activity, anticardiolipin antibodies, beta-2 glycoprotein antibodies, lupus anticoagulant.  Although protein S free initially low at less than 10% on 9/27/2021, repeat, on 10/25/2021 and 3/7/2022 protein S studies normal.    *Prior Protein S low free level  · Protein S free <10% on 9/27/2021 (protein S activity >150%).  · Typically all types of protein S deficiency have a decreased activity  · Repeat protein S testing of free and activity unremarkable on 10/5/2021 and 3/7/2022.  Therefore, the prior low value is not felt to be significant    *Nausea  Present since the GI illness.  Worse since around January 2022.  Following with GI.  Was found to have some gastroparesis.    *Unsteadiness on feet, overall fatigue and weakness.  · On day of initial consult, 9/27/2021: Difficulty getting up and down off the examining table.  Unsteady on feet.  I recommended a cane or wheelchair.  She declined.  · She is  planning physical therapy.  States she has been trying to walk but cannot walk more than 1/4 mile.  I recommended she use her stationary bike multiple times per day to try to build up strength.    *Improved, but ongoing SOA and chest discomfort.  · She follows with cardiology and pulmonology and states they are aware.  · She states Dr. Jordon Adams prescribed Symbicort and a rescue inhaler.  · She states cardiology told her an echocardiogram shows pulmonary hypertension.  · After the PE, she followed up with Dr. Jordon Adams.  She states he asked her to do a test to look for asthma.  She is worried about having the test done and therefore has not followed up with him.  She states she will follow up with him.    *Possible pulmonary nodule, LLL on CT 8/27/2021  · Small new 6 mm nodular density LLL, radiologist recommends follow-up.  · PCP did a follow-up CT.  · CT 2/14/2022: Resolution.  However, the radiologist recommended to consider follow-up CT in 1 year.  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  · She no longer works.  States she is unable to work due to the nausea    Plan  · Decrease Eliquis to prophylactic dose, 2.5 mg twice per day.  · Okay to stop Eliquis when she becomes more mobile.  If Eliquis is stopped in the future, add aspirin 81 mg daily unless she has or develops a contraindication.  · No follow-up here

## 2022-03-18 ENCOUNTER — HOSPITAL ENCOUNTER (OUTPATIENT)
Dept: ULTRASOUND IMAGING | Facility: HOSPITAL | Age: 62
Discharge: HOME | End: 2022-03-18

## 2022-03-18 ENCOUNTER — HOSPITAL ENCOUNTER (OUTPATIENT)
Dept: GENERAL RADIOLOGY | Facility: HOSPITAL | Age: 62
Discharge: HOME | End: 2022-03-18

## 2022-03-18 DIAGNOSIS — K30 DELAYED GASTRIC EMPTYING: ICD-10-CM

## 2022-03-18 PROCEDURE — 76705 ECHO EXAM OF ABDOMEN: CPT

## 2022-03-18 PROCEDURE — 74018 RADEX ABDOMEN 1 VIEW: CPT

## 2022-03-21 RX ORDER — SENNA PLUS 8.6 MG/1
1 TABLET ORAL DAILY
Qty: 15 TABLET | Refills: 3 | Status: SHIPPED | OUTPATIENT
Start: 2022-03-21 | End: 2022-04-18

## 2022-03-21 NOTE — PROGRESS NOTES
Can you please contact Amanda and let her I have reviewed the images along with Dr. Montes. She does have a moderate stool burden. We would like her to begin taking senna once daily and move to a liquid diet for now.

## 2022-03-22 ENCOUNTER — TELEPHONE (OUTPATIENT)
Dept: GASTROENTEROLOGY | Facility: CLINIC | Age: 62
End: 2022-03-22

## 2022-03-22 NOTE — TELEPHONE ENCOUNTER
----- Message from JED Bradley sent at 3/21/2022 11:58 AM EDT -----  Can you please contact Amanda and let her I have reviewed the images along with Dr. Montes. She does have a moderate stool burden. We would like her to begin taking senna once daily and move to a liquid diet for now.

## 2022-03-25 ENCOUNTER — TELEPHONE (OUTPATIENT)
Dept: GASTROENTEROLOGY | Facility: CLINIC | Age: 62
End: 2022-03-25

## 2022-03-25 NOTE — TELEPHONE ENCOUNTER
----- Message from Amanda Sherwood sent at 3/25/2022  1:00 PM EDT -----  Regarding: tests results  Dr Montes,  I have an upcoming appointment with you. But I was wondering if you've looked at my KUB and US of my gallbladder?  Thank you,  Amanda Sherwood

## 2022-03-25 NOTE — TELEPHONE ENCOUNTER
For results of KUB see call dated 03/22/2022.    Pt's message forwarded to Dr Montes for results of us.

## 2022-03-25 NOTE — TELEPHONE ENCOUNTER
Yes I have reviewed the kub with Keena Nicole - see previous message regarding recs of senna.  gb u/s shows fatty liver but no explanation for discomfort - f/u as scheduled

## 2022-03-26 RX ORDER — OMEPRAZOLE 40 MG/1
40 CAPSULE, DELAYED RELEASE ORAL DAILY
Qty: 30 CAPSULE | Refills: 2 | Status: CANCELLED | OUTPATIENT
Start: 2022-03-26

## 2022-03-28 RX ORDER — OMEPRAZOLE 40 MG/1
40 CAPSULE, DELAYED RELEASE ORAL DAILY
Qty: 30 CAPSULE | Refills: 11 | Status: SHIPPED | OUTPATIENT
Start: 2022-03-28 | End: 2022-04-18 | Stop reason: SDUPTHER

## 2022-04-18 ENCOUNTER — OFFICE VISIT (OUTPATIENT)
Dept: GASTROENTEROLOGY | Facility: CLINIC | Age: 62
End: 2022-04-18

## 2022-04-18 VITALS
TEMPERATURE: 97.1 F | DIASTOLIC BLOOD PRESSURE: 70 MMHG | SYSTOLIC BLOOD PRESSURE: 112 MMHG | BODY MASS INDEX: 33.82 KG/M2 | HEIGHT: 65 IN | WEIGHT: 203 LBS

## 2022-04-18 DIAGNOSIS — R11.2 NAUSEA AND VOMITING, UNSPECIFIED VOMITING TYPE: Primary | ICD-10-CM

## 2022-04-18 DIAGNOSIS — K30 DELAYED GASTRIC EMPTYING: ICD-10-CM

## 2022-04-18 DIAGNOSIS — K59.04 CHRONIC IDIOPATHIC CONSTIPATION: ICD-10-CM

## 2022-04-18 PROCEDURE — 99214 OFFICE O/P EST MOD 30 MIN: CPT | Performed by: INTERNAL MEDICINE

## 2022-04-18 RX ORDER — OMEPRAZOLE 40 MG/1
40 CAPSULE, DELAYED RELEASE ORAL
Qty: 60 CAPSULE | Refills: 3 | Status: SHIPPED | OUTPATIENT
Start: 2022-04-18 | End: 2022-08-26

## 2022-04-18 RX ORDER — METOCLOPRAMIDE 5 MG/1
5 TABLET ORAL
Qty: 28 TABLET | Refills: 0 | Status: SHIPPED | OUTPATIENT
Start: 2022-04-18 | End: 2022-05-02

## 2022-04-18 RX ORDER — METOCLOPRAMIDE 5 MG/1
5 TABLET ORAL
Qty: 28 TABLET | Refills: 0 | Status: SHIPPED | OUTPATIENT
Start: 2022-04-18 | End: 2022-04-18 | Stop reason: SDUPTHER

## 2022-04-18 NOTE — PROGRESS NOTES
Subjective   Chief Complaint   Patient presents with   • Gastroparesis       Amanda Sherwood is a  61 y.o. female here for a follow up visit for gastroparesis, nausea.     She continues to struggle with debilitating nausea.  She reports that often times she wakes up in middle the night or the early morning and she is nauseated or dry heaves.  Dinner is usually the biggest meal of the day sometimes the only meal of the day.  Both she and her  report that she is not eating much.  Her weight has not changed.  She is more sedentary due to her symptoms.  She has been unable to work because of her symptoms.  She denies any abdominal pain.  She is constipated but she has been taking senna with diarrhea.  She is only able to tolerate about every third day.  She has not seen any blood in her stool.    2/14/2022: Gastric emptying study with delayed emptying at 2 hours but only mild delay at 4 hours.     EGD and colonoscopy from 2/2020 with esophagitis, gastritis, mild duodenitis,  nonbleeding internal hemorrhoids, diverticulosis, hyperplastic polyp. Family history of colorectal cancer in father so next colonoscopy in 5 years.     HPI  Past Medical History:   Diagnosis Date   • Acute kidney injury (HCC)    • Allergic    • Anxiety 3 years ago   • Depression    • GERD (gastroesophageal reflux disease)    • H/O Leukopenia    • Hernia    • History of anemia    • History of bronchitis    • History of migraine    • Hyperlipidemia     NO MEDS CURRENTLY. WORKING ON   • Hypertension    • Leukopenia     PER HISTORY. HAS SEEN CBC IN THE PAST   • Neck pain    • Obesity (BMI 30.0-34.9) 6/6/2019   • Osteoarthritis    • PVC (premature ventricular contraction)    • Seasonal allergies    • Torn meniscus     LEFT   • Tremor 3 years ago     Past Surgical History:   Procedure Laterality Date   • BREAST BIOPSY Bilateral     BENIGN. HORACE BREAST   • BUNIONECTOMY Left    • BUNIONECTOMY Left     REVISION   • CARDIAC CATHETERIZATION N/A  10/28/2021    Procedure: Coronary angiography;  Surgeon: Vanessa Bennett MD;  Location: [facility] CATH INVASIVE LOCATION;  Service: Cardiovascular;  Laterality: N/A;   • CARDIAC CATHETERIZATION N/A 10/28/2021    Procedure: Left heart cath;  Surgeon: Vanessa Bennett MD;  Location: [facility] CATH INVASIVE LOCATION;  Service: Cardiovascular;  Laterality: N/A;   • CARDIAC CATHETERIZATION N/A 10/28/2021    Procedure: Right heart cath;  Surgeon: Vanessa Bennett MD;  Location: [facility] CATH INVASIVE LOCATION;  Service: Cardiovascular;  Laterality: N/A;   • COLONOSCOPY N/A 2/1/2020    Procedure: COLONOSCOPY TO CECUM AND INTO TERMINAL ILEUM WITH COLD BIOPSY POLYPECTOMY;  Surgeon: Munira Montes MD;  Location: [facility] ENDOSCOPY;  Service: Gastroenterology   • DILATATION AND CURETTAGE      AUB no cancer   • ENDOSCOPY N/A 2/1/2020    Procedure: ESOPHAGOGASTRODUODENOSCOPY WITH COLD BIOPSIES;  Surgeon: Munira Montes MD;  Location: [facility] ENDOSCOPY;  Service: Gastroenterology   • KNEE ARTHROSCOPY Left 8/22/2018    Procedure: KNEE ARTHROSCOPY LATERAL MENISECTOMY DEBRIDEMENT OF MEDIAL FEMORAL CONDYLE DEFECT DEBRIDEMENT OF ARTHRITIS;  Surgeon: Graciela Crockett MD;  Location:  [facility] [facility];  Service: Orthopedics   • MENISCECTOMY Left    • SALPINGO OOPHORECTOMY Left    • TONSILLECTOMY  as a child   • TUBAL ABDOMINAL LIGATION     • UPPER GASTROINTESTINAL ENDOSCOPY     • WISDOM TOOTH EXTRACTION         Current Outpatient Medications:   •  albuterol sulfate  (90 Base) MCG/ACT inhaler, Inhale 2 puffs into the lungs every 4 hours as needed., Disp: 18 g, Rfl: 10  •  amLODIPine (NORVASC) 2.5 MG tablet, Take 1 tablet by mouth Daily., Disp: 90 tablet, Rfl: 0  •  apixaban (ELIQUIS) 5 MG tablet tablet, Take 1 tablet by mouth 2 (Two) Times a Day., Disp: 180 tablet, Rfl: 0  •  atorvastatin (Lipitor) 40 MG tablet, Take 1 tablet by mouth Every Night., Disp: 30 tablet, Rfl: 3  •  benzonatate (Tessalon Perles) 100 MG capsule, Take 1 capsule  by mouth 3 (Three) Times a Day As Needed for Cough., Disp: 30 capsule, Rfl: 0  •  bimatoprost (LUMIGAN) 0.03 % ophthalmic drops, Apply 1 drop to base of lashes daily, Disp: 5 mL, Rfl: 98  •  budesonide-formoterol (SYMBICORT) 160-4.5 MCG/ACT inhaler, Inhale 2 puffs 2 (two) times a day., Disp: 10.2 g, Rfl: 10  •  buPROPion XL (WELLBUTRIN XL) 300 MG 24 hr tablet, Take 1 tablet by mouth Every Morning., Disp: 90 tablet, Rfl: 0  •  calcium carbonate (TUMS) 500 MG chewable tablet, Chew 1 tablet As Needed., Disp: , Rfl:   •  cetirizine (zyrTEC) 10 MG tablet, Take 10 mg by mouth As Needed., Disp: , Rfl:   •  Cholecalciferol (VITAMIN D3) 5000 UNITS capsule capsule, Take 5,000 Units by mouth daily., Disp: , Rfl:   •  escitalopram (LEXAPRO) 20 MG tablet, Take 1 tablet by mouth Daily., Disp: 90 tablet, Rfl: 0  •  LORazepam (ATIVAN) 0.5 MG tablet, Take 0.5 mg by mouth Daily As Needed for Anxiety., Disp: , Rfl:   •  losartan (Cozaar) 50 MG tablet, Take 1 tablet by mouth Daily., Disp: 90 tablet, Rfl: 3  •  metoclopramide (REGLAN) 5 MG tablet, Take 1 tablet by mouth 2 (Two) Times a Day Before Meals for 14 days., Disp: 28 tablet, Rfl: 0  •  metoprolol succinate XL (TOPROL-XL) 50 MG 24 hr tablet, Take 1 tablet by mouth Every Night., Disp: 90 tablet, Rfl: 3  •  omeprazole (priLOSEC) 40 MG capsule, Take 1 capsule by mouth 2 (Two) Times a Day Before Meals., Disp: 60 capsule, Rfl: 3  •  ondansetron (ZOFRAN) 4 MG tablet, Take 1 tablet by mouth Every 8 (Eight) Hours As Needed for Nausea., Disp: 60 tablet, Rfl: 1  •  spironolactone (ALDACTONE) 50 MG tablet, Take 1 tablet by mouth Daily., Disp: 90 tablet, Rfl: 3  •  traZODone (DESYREL) 50 MG tablet, Take 2 tablets by mouth every night at bedtime as needed, Disp: 180 tablet, Rfl: 0  PRN Meds:.  Allergies   Allergen Reactions   • Celecoxib Hives and Swelling     hives   • Ace Inhibitors Cough   • Lisinopril Cough     Social History     Socioeconomic History   • Marital status:    •  Number of children: 2   • Years of education: College   Tobacco Use   • Smoking status: Former Smoker     Packs/day: 0.50     Years: 4.00     Pack years: 2.00     Types: Cigarettes     Quit date: 1987     Years since quittin.4   • Smokeless tobacco: Never Used   Vaping Use   • Vaping Use: Never used   Substance and Sexual Activity   • Alcohol use: Yes     Alcohol/week: 2.0 standard drinks     Types: 2 Cans of beer per week     Comment: caffeine - None   • Drug use: Not Currently     Types: Marijuana   • Sexual activity: Yes     Partners: Male     Birth control/protection: Post-menopausal     Family History   Problem Relation Age of Onset   • Dementia Mother    • Arthritis Mother    • Other Mother         Alzheimer's   • Stroke Father    • Heart disease Father    • Hypertension Father    • Diabetes Father    • Cancer Father    • Colon cancer Father    • Hyperlipidemia Father    • Colon polyps Father    • Breast cancer Sister 65   • Atrial fibrillation Sister    • Alcohol abuse Sister    • Asthma Sister    • Hypertension Sister    • Ovarian cancer Maternal Aunt    • Prostate cancer Brother    • Malig Hyperthermia Neg Hx      Review of Systems   Constitutional: Negative for appetite change and unexpected weight change.   Gastrointestinal: Positive for diarrhea, nausea and vomiting.     Vitals:    22 1153   BP: 112/70   Temp: 97.1 °F (36.2 °C)         22  1153   Weight: 92.1 kg (203 lb)       Objective   Physical Exam  Constitutional:       Appearance: Normal appearance. She is well-developed.   HENT:      Head: Normocephalic and atraumatic.   Eyes:      General: No scleral icterus.     Conjunctiva/sclera: Conjunctivae normal.   Pulmonary:      Effort: Pulmonary effort is normal.   Abdominal:      General: There is no distension.      Palpations: Abdomen is soft.   Musculoskeletal:      Cervical back: Normal range of motion and neck supple.   Skin:     General: Skin is warm and dry.   Neurological:       Mental Status: She is alert.   Psychiatric:         Mood and Affect: Mood normal.         Behavior: Behavior normal.       No radiology results for the last 7 days    Assessment/Plan   Diagnoses and all orders for this visit:    Nausea and vomiting, unspecified vomiting type  -     FL Upper GI With Air Contrast & SBFT; Future    Delayed gastric emptying    Chronic idiopathic constipation    Other orders  -     Discontinue: metoclopramide (REGLAN) 5 MG tablet; Take 1 tablet by mouth 2 (Two) Times a Day Before Meals for 14 days.  -     omeprazole (priLOSEC) 40 MG capsule; Take 1 capsule by mouth 2 (Two) Times a Day Before Meals.  -     metoclopramide (REGLAN) 5 MG tablet; Take 1 tablet by mouth 2 (Two) Times a Day Before Meals for 14 days.      Plan:  · Recommend increase omeprazole to 40 mg twice daily  · Discussed diet modification to improve symptoms at length  · Trial low-dose Reglan twice daily x14 days-discussed the risk and benefits with this medication.  She is unlikely to have any significant issue with such a low dose for such a short time.  · Start fiber supplement daily-senna seems to be too strong for her.  · Reviewed the KUB with her-there is a single nonspecific dilated loop of bowel.  Given her ongoing symptoms without clear etiology, will check upper GI with small bowel follow-through to rule out any obstructive process that could be contributing to her ongoing nausea.  We will schedule follow-up thereafter

## 2022-05-02 ENCOUNTER — APPOINTMENT (OUTPATIENT)
Dept: GENERAL RADIOLOGY | Facility: HOSPITAL | Age: 62
End: 2022-05-02

## 2022-05-10 ENCOUNTER — TELEPHONE (OUTPATIENT)
Dept: GASTROENTEROLOGY | Facility: CLINIC | Age: 62
End: 2022-05-10

## 2022-05-10 RX ORDER — METOCLOPRAMIDE 10 MG/1
10 TABLET ORAL 4 TIMES DAILY
Qty: 120 TABLET | Refills: 0 | Status: SHIPPED | OUTPATIENT
Start: 2022-05-10 | End: 2022-07-11

## 2022-05-10 NOTE — TELEPHONE ENCOUNTER
There is a drug interaction between Phenergan and Reglan-if she still nauseated I would recommend increasing her Reglan dose for now to 10 mg q. AC/at bedtime

## 2022-05-10 NOTE — TELEPHONE ENCOUNTER
----- Message from Amanda Sherwood sent at 5/10/2022 11:58 AM EDT -----  Regarding: Persistant N/V  Dr Montes,  I am still so nauseated. The Zofran and compazine aren't helping right now. Could I maybe get a few Phenergan pills to see if that helps? I'm still on the Reglan. I use the pharmacy on [facility] [address] [facility].   Thank you,  Amanda Sherwood

## 2022-05-16 ENCOUNTER — APPOINTMENT (OUTPATIENT)
Dept: GENERAL RADIOLOGY | Facility: HOSPITAL | Age: 62
End: 2022-05-16

## 2022-05-20 ENCOUNTER — TELEPHONE (OUTPATIENT)
Dept: GASTROENTEROLOGY | Facility: CLINIC | Age: 62
End: 2022-05-20

## 2022-05-20 NOTE — TELEPHONE ENCOUNTER
----- Message from Amanda Sherwood sent at 5/20/2022  1:07 PM EDT -----  Regarding: reglan refill  Dr Montes I haven't been able to get my UGI because we lost insurance. when we figure this out I'll get my UGI. In the mean time I'm out of my reglan. If its fairly inexpensive you can call it to [facility] on [address]. If expensive could you call it to [facility]. My nausea is no better but I don't want it  to get worse.  Thank you  Amanda Sherwood

## 2022-05-20 NOTE — TELEPHONE ENCOUNTER
See rx of 5/10/22 for reglan 10 mg 4x/day, #120, R0 to [facility] with receipt confirmed.     Call to pt.  Advise of above.  Verb understanding.

## 2022-05-27 DIAGNOSIS — E78.2 MIXED HYPERLIPIDEMIA: ICD-10-CM

## 2022-05-27 RX ORDER — ATORVASTATIN CALCIUM 40 MG/1
40 TABLET, FILM COATED ORAL NIGHTLY
Qty: 30 TABLET | Refills: 3 | Status: ON HOLD | OUTPATIENT
Start: 2022-05-27 | End: 2022-09-27

## 2022-06-09 ENCOUNTER — TELEPHONE (OUTPATIENT)
Dept: CARDIOLOGY | Facility: CLINIC | Age: 62
End: 2022-06-09

## 2022-06-09 ENCOUNTER — LAB (OUTPATIENT)
Dept: LAB | Facility: HOSPITAL | Age: 62
End: 2022-06-09

## 2022-06-09 ENCOUNTER — OFFICE VISIT (OUTPATIENT)
Dept: CARDIOLOGY | Facility: CLINIC | Age: 62
End: 2022-06-09

## 2022-06-09 VITALS
WEIGHT: 192 LBS | HEART RATE: 69 BPM | SYSTOLIC BLOOD PRESSURE: 140 MMHG | HEIGHT: 65 IN | DIASTOLIC BLOOD PRESSURE: 82 MMHG | BODY MASS INDEX: 31.99 KG/M2

## 2022-06-09 DIAGNOSIS — I26.94 MULTIPLE SUBSEGMENTAL PULMONARY EMBOLI WITHOUT ACUTE COR PULMONALE: ICD-10-CM

## 2022-06-09 DIAGNOSIS — I10 PRIMARY HYPERTENSION: ICD-10-CM

## 2022-06-09 DIAGNOSIS — K31.84 GASTROPARESIS: ICD-10-CM

## 2022-06-09 DIAGNOSIS — I10 PRIMARY HYPERTENSION: Primary | ICD-10-CM

## 2022-06-09 DIAGNOSIS — E66.9 OBESITY (BMI 30.0-34.9): ICD-10-CM

## 2022-06-09 LAB
ALBUMIN SERPL-MCNC: 4.9 G/DL (ref 3.5–5.2)
ALBUMIN/GLOB SERPL: 2.2 G/DL
ALP SERPL-CCNC: 76 U/L (ref 39–117)
ALT SERPL W P-5'-P-CCNC: 94 U/L (ref 1–33)
ANION GAP SERPL CALCULATED.3IONS-SCNC: 17 MMOL/L (ref 5–15)
AST SERPL-CCNC: 83 U/L (ref 1–32)
BILIRUB SERPL-MCNC: 0.5 MG/DL (ref 0–1.2)
BUN SERPL-MCNC: 9 MG/DL (ref 6–20)
BUN/CREAT SERPL: 6.7 (ref 7–25)
CALCIUM SPEC-SCNC: 10.2 MG/DL (ref 8.6–10.5)
CHLORIDE SERPL-SCNC: 89 MMOL/L (ref 98–107)
CHOLEST SERPL-MCNC: 162 MG/DL (ref 0–200)
CO2 SERPL-SCNC: 20 MMOL/L (ref 22–29)
CREAT SERPL-MCNC: 1.35 MG/DL (ref 0.57–1)
D DIMER PPP FEU-MCNC: 0.17 MCGFEU/ML (ref 0–0.49)
EGFRCR SERPLBLD CKD-EPI 2021: 45.4 ML/MIN/1.73
GLOBULIN UR ELPH-MCNC: 2.2 GM/DL
GLUCOSE SERPL-MCNC: 73 MG/DL (ref 65–99)
HDLC SERPL-MCNC: 97 MG/DL (ref 40–60)
LDLC SERPL CALC-MCNC: 53 MG/DL (ref 0–100)
LDLC/HDLC SERPL: 0.55 {RATIO}
MAGNESIUM SERPL-MCNC: 1.8 MG/DL (ref 1.6–2.6)
POTASSIUM SERPL-SCNC: 4.9 MMOL/L (ref 3.5–5.2)
PROT SERPL-MCNC: 7.1 G/DL (ref 6–8.5)
SODIUM SERPL-SCNC: 126 MMOL/L (ref 136–145)
TRIGL SERPL-MCNC: 59 MG/DL (ref 0–150)
TSH SERPL DL<=0.05 MIU/L-ACNC: 1.18 UIU/ML (ref 0.27–4.2)
VLDLC SERPL-MCNC: 12 MG/DL (ref 5–40)

## 2022-06-09 PROCEDURE — 80061 LIPID PANEL: CPT

## 2022-06-09 PROCEDURE — 36415 COLL VENOUS BLD VENIPUNCTURE: CPT

## 2022-06-09 PROCEDURE — 80053 COMPREHEN METABOLIC PANEL: CPT

## 2022-06-09 PROCEDURE — 93000 ELECTROCARDIOGRAM COMPLETE: CPT | Performed by: INTERNAL MEDICINE

## 2022-06-09 PROCEDURE — 83735 ASSAY OF MAGNESIUM: CPT

## 2022-06-09 PROCEDURE — 99214 OFFICE O/P EST MOD 30 MIN: CPT | Performed by: INTERNAL MEDICINE

## 2022-06-09 PROCEDURE — 84443 ASSAY THYROID STIM HORMONE: CPT

## 2022-06-09 PROCEDURE — 85379 FIBRIN DEGRADATION QUANT: CPT

## 2022-06-09 NOTE — PROGRESS NOTES
Date of Office Visit: 2022  Encounter Provider: Yue Maradiaga MD  Place of Service: [facility] CARDIOLOGY  Patient Name: Amanda Sherwood  :1960      Patient ID:  Amanda Sherwood is a 61 y.o. female is here for  followup for hypertension.        History of Present Illness    She has known hypertension, PVCs, SVT, gastroparesis and GERD. She does get low platelets and low white counts when she is using ibuprofen.  She had pulmonary emboli 2021.     Her father had myocardial infarction when he was 60s years old requiring angioplasty and  then went on to have coronary bypass grafting. She has never had vascular screening or  coronary calcium score done.     She had bunion surgery and it failed.  She had to have a second bunion surgery and she is still dealing with the consequences of that, and because she missed so much work she lost her job at [employer].  She has gained about 25 pounds because she has not been able to exercise        She had a normal stress nuclear perfusion study and a normal echocardiogram done 2018.     She began having severe vomiting with a GI virus on 2021.  She lost 20 pounds in a week.  She then came to the emergency department on  with acute kidney injury and found to have urinary tract infection.  She was hydrated and treated for this and was discharged.  She said though she was pretty much almost bedridden for a week.  She was admitted -2021 with multisubsegmental pulmonary emboli without acute cor pulmonale.  She presented with short windedness to the [facility] on 2021.  Her lower extremity venous duplex study showed no evidence of DVT. A D-dimer was done and was elevated at 2.09.  She was sent to the emergency department.  CTA of the chest showed bilateral lower lobe branch pulmonary emboli.  I did review her images on CT.  There is not good imaging of the left main or circumflex.  The RCA is not well  visualized but the proximal LAD appears patent.  There is no calcification noted in the aorta.  She had echocardiogram done 8/29/21 changes fraction 63%, no seen valve disease, RVSP 36 mmHg, normal right ventricular size and function.  She had normal bilateral lower extremity venous duplex studies done.      Cardiac catheterization done 10/28/2021 for short windedness showed normal coronary arteries, normal left and right-sided pressures.  Echo done 10/19/2021 showed ejection fraction 66 to 70%, normal chamber sizes and normal valvular structure and function.     Labs on 3/7/2022 showed elevated free protein S antigen with normal total protein S antigen, greater than 150 protein S functional, otherwise normal CBC.  She had a BMP done 11/29/2021 showing creatinine 1.18, otherwise normal BMP with normal lipase.    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  She is seeing a psychiatrist.  This has been very hard for her.  She is very tearful today.  She has no chest pain with exertion because she is been so sedentary but she is feeling some left rib cage pain.  She does not feel her heart racing or skipping and she is had no dizziness, syncope or falls.  She was diagnosed with gastroparesis 6 months ago and follows with Dr. Montes.  She sees Dr. Villafuerte for her history of blood clots.  She was told she could go off the Eliquis but she is fearful to do so.  She has significant lower extremity pain in both lower extremities and is concerned that she has a clot there.  Her energy level is quite poor.  She hardly eats anything because of the gastroparesis.    Past Medical History:   Diagnosis Date   • Acute kidney injury (HCC)    • Allergic    • Anxiety 3 years ago   • Depression    • GERD (gastroesophageal reflux disease)    • H/O Leukopenia    • Hernia    • History of anemia    • History of bronchitis    • History of migraine    • Hyperlipidemia     NO MEDS CURRENTLY. WORKING ON   • Hypertension    •  Leukopenia     PER HISTORY. HAS SEEN CBC IN THE PAST   • Neck pain    • Obesity (BMI 30.0-34.9) 6/6/2019   • Osteoarthritis    • PVC (premature ventricular contraction)    • Seasonal allergies    • Torn meniscus     LEFT   • Tremor 3 years ago         Past Surgical History:   Procedure Laterality Date   • BREAST BIOPSY Bilateral     BENIGN. HORACE BREAST   • BUNIONECTOMY Left    • BUNIONECTOMY Left     REVISION   • CARDIAC CATHETERIZATION N/A 10/28/2021    Procedure: Coronary angiography;  Surgeon: Vanessa Bennett MD;  Location: [facility] CATH INVASIVE LOCATION;  Service: Cardiovascular;  Laterality: N/A;   • CARDIAC CATHETERIZATION N/A 10/28/2021    Procedure: Left heart cath;  Surgeon: Vanessa Bennett MD;  Location: [facility] CATH INVASIVE LOCATION;  Service: Cardiovascular;  Laterality: N/A;   • CARDIAC CATHETERIZATION N/A 10/28/2021    Procedure: Right heart cath;  Surgeon: Vanessa Bennett MD;  Location: [facility] CATH INVASIVE LOCATION;  Service: Cardiovascular;  Laterality: N/A;   • COLONOSCOPY N/A 2/1/2020    Procedure: COLONOSCOPY TO CECUM AND INTO TERMINAL ILEUM WITH COLD BIOPSY POLYPECTOMY;  Surgeon: Munira Montes MD;  Location: [facility] ENDOSCOPY;  Service: Gastroenterology   • DILATATION AND CURETTAGE      AUB no cancer   • ENDOSCOPY N/A 2/1/2020    Procedure: ESOPHAGOGASTRODUODENOSCOPY WITH COLD BIOPSIES;  Surgeon: Munira Montes MD;  Location: [facility] ENDOSCOPY;  Service: Gastroenterology   • KNEE ARTHROSCOPY Left 8/22/2018    Procedure: KNEE ARTHROSCOPY LATERAL MENISECTOMY DEBRIDEMENT OF MEDIAL FEMORAL CONDYLE DEFECT DEBRIDEMENT OF ARTHRITIS;  Surgeon: Graciela Crockett MD;  Location: [facility];  Service: Orthopedics   • MENISCECTOMY Left    • SALPINGO OOPHORECTOMY Left    • TONSILLECTOMY  as a child   • TUBAL ABDOMINAL LIGATION     • UPPER GASTROINTESTINAL ENDOSCOPY     • WISDOM TOOTH EXTRACTION         Current Outpatient Medications on File Prior to Visit   Medication Sig Dispense Refill   •  albuterol sulfate  (90 Base) MCG/ACT inhaler Inhale 2 puffs into the lungs every 4 hours as needed. 18 g 10   • apixaban (ELIQUIS) 5 MG tablet tablet Take 1 tablet by mouth 2 (Two) Times a Day. 180 tablet 0   • atorvastatin (Lipitor) 40 MG tablet Take 1 tablet by mouth Every Night. 30 tablet 3   • benzonatate (Tessalon Perles) 100 MG capsule Take 1 capsule by mouth 3 (Three) Times a Day As Needed for Cough. 30 capsule 0   • buPROPion XL (WELLBUTRIN XL) 300 MG 24 hr tablet Take 1 tablet by mouth Every Morning. 90 tablet 0   • calcium carbonate (TUMS) 500 MG chewable tablet Chew 1 tablet As Needed.     • cetirizine (zyrTEC) 10 MG tablet Take 10 mg by mouth As Needed.     • escitalopram (LEXAPRO) 20 MG tablet Take 1 tablet by mouth Daily. 90 tablet 0   • LORazepam (ATIVAN) 0.5 MG tablet Take 0.5 mg by mouth Daily As Needed for Anxiety.     • losartan (Cozaar) 50 MG tablet Take 1 tablet by mouth Daily. 90 tablet 3   • metoclopramide (REGLAN) 10 MG tablet Take 1 tablet by mouth 4 (Four) Times a Day. 120 tablet 0   • metoprolol succinate XL (TOPROL-XL) 50 MG 24 hr tablet Take 1 tablet by mouth Every Night. 90 tablet 3   • omeprazole (priLOSEC) 40 MG capsule Take 1 capsule by mouth 2 (Two) Times a Day Before Meals. 60 capsule 3   • ondansetron (ZOFRAN) 4 MG tablet Take 1 tablet by mouth Every 8 (Eight) Hours As Needed for Nausea. 60 tablet 1   • spironolactone (ALDACTONE) 50 MG tablet Take 1 tablet by mouth Daily. 90 tablet 3   • traZODone (DESYREL) 50 MG tablet Take 2 tablets by mouth every night at bedtime as needed 180 tablet 0   • Cholecalciferol (VITAMIN D3) 5000 UNITS capsule capsule Take 5,000 Units by mouth daily.     • [DISCONTINUED] amLODIPine (NORVASC) 2.5 MG tablet Take 1 tablet by mouth Daily. 90 tablet 0   • [DISCONTINUED] bimatoprost (LUMIGAN) 0.03 % ophthalmic drops Apply 1 drop to base of lashes daily 5 mL 98   • [DISCONTINUED] budesonide-formoterol (SYMBICORT) 160-4.5 MCG/ACT inhaler Inhale 2 puffs  "2 (two) times a day. 10.2 g 10   • [DISCONTINUED] traZODone (DESYREL) 50 MG tablet Take 2 tablets by mouth At Night at bedtime As Needed. 180 tablet 0     No current facility-administered medications on file prior to visit.       Social History     Socioeconomic History   • Marital status:    • Number of children: 2   • Years of education: College   Tobacco Use   • Smoking status: Former Smoker     Packs/day: 0.50     Years: 4.00     Pack years: 2.00     Types: Cigarettes     Quit date: 1987     Years since quittin.6   • Smokeless tobacco: Never Used   Vaping Use   • Vaping Use: Never used   Substance and Sexual Activity   • Alcohol use: Yes     Alcohol/week: 2.0 standard drinks     Types: 2 Cans of beer per week     Comment: caffeine - None   • Drug use: Not Currently     Types: Marijuana   • Sexual activity: Yes     Partners: Male     Birth control/protection: Post-menopausal           ROS    Procedures    ECG 12 Lead    Date/Time: 2022 9:40 AM  Performed by: Yue Maradiaga MD  Authorized by: Yue Maradiaga MD   Comparison: compared with previous ECG   Similar to previous ECG  Rhythm: sinus rhythm    Clinical impression: normal ECG                Objective:      Vitals:    22 0925   BP: 140/82   Pulse: 69   Weight: 87.1 kg (192 lb)   Height: 165.1 cm (65\")     Body mass index is 31.95 kg/m².    Vitals reviewed.   Constitutional:       General: Not in acute distress.     Appearance: Well-developed. Obese. Not diaphoretic.   Eyes:      General: No scleral icterus.     Conjunctiva/sclera: Conjunctivae normal.   HENT:      Head: Normocephalic and atraumatic.   Neck:      Thyroid: No thyromegaly.      Vascular: No carotid bruit or JVD.      Lymphadenopathy: No cervical adenopathy.   Pulmonary:      Effort: Pulmonary effort is normal. No respiratory distress.      Breath sounds: Normal breath sounds. No wheezing. No rhonchi. No rales.   Chest:      Chest wall: Not tender to " palpatation.   Cardiovascular:      Normal rate. Regular rhythm.      Murmurs: There is no murmur.      No gallop.   Pulses:     Intact distal pulses.   Edema:     Peripheral edema absent.   Abdominal:      General: Bowel sounds are normal. There is no distension or abdominal bruit.      Palpations: Abdomen is soft. There is no abdominal mass.      Tenderness: There is no abdominal tenderness.   Musculoskeletal:         General: No deformity.      Extremities: No clubbing present.     Cervical back: Neck supple. Skin:     General: Skin is warm and dry. There is no cyanosis.      Coloration: Skin is not pale.      Findings: No rash.   Neurological:      Mental Status: Alert and oriented to person, place, and time.      Cranial Nerves: No cranial nerve deficit.   Psychiatric:         Judgment: Judgment normal.         Lab Review:       Assessment:      Diagnosis Plan   1. Primary hypertension  Comprehensive Metabolic Panel    Magnesium    Lipid Panel    TSH   2. Obesity (BMI 30.0-34.9)  Comprehensive Metabolic Panel    Magnesium    Lipid Panel    TSH   3. Multiple subsegmental pulmonary emboli without acute cor pulmonale (HCC)  D-dimer, Quantitative    Comprehensive Metabolic Panel    Magnesium    Lipid Panel    TSH   4. Gastroparesis  Comprehensive Metabolic Panel    Magnesium    Lipid Panel    TSH     1. Hypertension, well controlled.   2. Elevated LDL. On atorvastatin.   3. PVCs. Stable.   4. Family history of cardiovascular disease in her father.  5.  Pulmonary embolism 8/2021, on apixaban.   6.  Gastroparesis.     Plan:       Check labs due to dehydration and pain in her legs as well as her left side, see [facility] in 6 months, no medication changes but her blood pressure at home has been 120/70 and she is losing weight.  Advised cardiovascular exercise to get herself reconditioned.  If she can do this, we may be able to start decreasing her blood pressure medications.

## 2022-06-09 NOTE — TELEPHONE ENCOUNTER
Notified patient of results/recommendations. Patient verbalized understanding.    Elisa Reagan RN  Triage [facility]

## 2022-06-24 NOTE — TELEPHONE ENCOUNTER
Patient requested a refill on Eliquis to be sent to [facility] on [address].    Next OV-12/13/22-TK  Last OV-06/09/22-RM    Plan:       Check labs due to dehydration and pain in her legs as well as her left side, see [facility] in 6 months, no medication changes but her blood pressure at home has been 120/70 and she is losing weight.  Advised cardiovascular exercise to get herself reconditioned.  If she can do this, we may be able to start decreasing her blood pressure medications.

## 2022-07-01 ENCOUNTER — TELEPHONE (OUTPATIENT)
Dept: GASTROENTEROLOGY | Facility: CLINIC | Age: 62
End: 2022-07-01

## 2022-07-01 ENCOUNTER — OFFICE VISIT (OUTPATIENT)
Dept: GASTROENTEROLOGY | Facility: CLINIC | Age: 62
End: 2022-07-01

## 2022-07-01 VITALS
TEMPERATURE: 96.8 F | BODY MASS INDEX: 30.52 KG/M2 | DIASTOLIC BLOOD PRESSURE: 76 MMHG | SYSTOLIC BLOOD PRESSURE: 125 MMHG | HEART RATE: 82 BPM | WEIGHT: 183.2 LBS | HEIGHT: 65 IN

## 2022-07-01 DIAGNOSIS — K21.00 GASTROESOPHAGEAL REFLUX DISEASE WITH ESOPHAGITIS WITHOUT HEMORRHAGE: ICD-10-CM

## 2022-07-01 DIAGNOSIS — K31.84 GASTROPARESIS: Primary | ICD-10-CM

## 2022-07-01 DIAGNOSIS — R11.2 NAUSEA AND VOMITING, UNSPECIFIED VOMITING TYPE: ICD-10-CM

## 2022-07-01 PROCEDURE — 99215 OFFICE O/P EST HI 40 MIN: CPT | Performed by: PHYSICIAN ASSISTANT

## 2022-07-01 NOTE — TELEPHONE ENCOUNTER
Called pt and advised it is ok for her to hold her eliquis 48hour prior to scope    Looks like Dr Montes has an opening on 8/9/22 please call the pt and schedule  Thanks

## 2022-07-01 NOTE — TELEPHONE ENCOUNTER
Dr Maradiaga,  Dr Montes has this pt scheduled for an EGD on 8/9/22.  She would like to know if this pt can hold their Eliquis 48 hours prior to the scope  Thanks  Myriam BOATENG

## 2022-07-01 NOTE — TELEPHONE ENCOUNTER
----- Message from JED Bradley sent at 7/1/2022 12:55 PM EDT -----  Please contact patient cardiologist and get clearance to proceed and hold Eliquis prior to EGD. Thanks

## 2022-07-05 RX ORDER — LOSARTAN POTASSIUM AND HYDROCHLOROTHIAZIDE 25; 100 MG/1; MG/1
TABLET ORAL
Qty: 30 TABLET | Refills: 11 | OUTPATIENT
Start: 2022-07-05

## 2022-07-07 RX ORDER — LOSARTAN POTASSIUM 50 MG/1
50 TABLET ORAL DAILY
Qty: 90 TABLET | Refills: 3 | Status: ON HOLD | OUTPATIENT
Start: 2022-07-07 | End: 2022-09-27

## 2022-07-11 RX ORDER — METOCLOPRAMIDE 10 MG/1
10 TABLET ORAL 3 TIMES DAILY
Qty: 90 TABLET | Refills: 0 | Status: SHIPPED | OUTPATIENT
Start: 2022-07-11 | End: 2022-08-25

## 2022-08-05 ENCOUNTER — TELEPHONE (OUTPATIENT)
Dept: GASTROENTEROLOGY | Facility: CLINIC | Age: 62
End: 2022-08-05

## 2022-08-05 NOTE — TELEPHONE ENCOUNTER
Message received from [facility] - pt scheduled for EGD 8/9, but does not know time, and has not received instructions.    Message to Isatu.

## 2022-08-08 ENCOUNTER — TELEPHONE (OUTPATIENT)
Dept: GASTROENTEROLOGY | Facility: CLINIC | Age: 62
End: 2022-08-08

## 2022-08-08 ENCOUNTER — OFFICE VISIT (OUTPATIENT)
Dept: FAMILY MEDICINE CLINIC | Facility: CLINIC | Age: 62
End: 2022-08-08

## 2022-08-08 VITALS
BODY MASS INDEX: 29.16 KG/M2 | HEART RATE: 79 BPM | HEIGHT: 65 IN | WEIGHT: 175 LBS | SYSTOLIC BLOOD PRESSURE: 116 MMHG | TEMPERATURE: 97.6 F | OXYGEN SATURATION: 98 % | DIASTOLIC BLOOD PRESSURE: 74 MMHG

## 2022-08-08 DIAGNOSIS — F41.0 ANXIETY DISORDER DUE TO GENERAL MEDICAL CONDITION WITH PANIC ATTACK: ICD-10-CM

## 2022-08-08 DIAGNOSIS — E87.1 HYPONATREMIA: ICD-10-CM

## 2022-08-08 DIAGNOSIS — E78.2 MIXED HYPERLIPIDEMIA: ICD-10-CM

## 2022-08-08 DIAGNOSIS — K31.84 GASTROPARESIS: ICD-10-CM

## 2022-08-08 DIAGNOSIS — I10 PRIMARY HYPERTENSION: ICD-10-CM

## 2022-08-08 DIAGNOSIS — Z00.00 HEALTH CARE MAINTENANCE: Primary | ICD-10-CM

## 2022-08-08 DIAGNOSIS — F06.4 ANXIETY DISORDER DUE TO GENERAL MEDICAL CONDITION WITH PANIC ATTACK: ICD-10-CM

## 2022-08-08 PROBLEM — R11.2 NAUSEA AND VOMITING: Status: ACTIVE | Noted: 2022-08-08

## 2022-08-08 PROBLEM — N39.0 ACUTE UTI (URINARY TRACT INFECTION): Status: RESOLVED | Noted: 2021-08-06 | Resolved: 2022-08-08

## 2022-08-08 PROBLEM — K21.00 GASTROESOPHAGEAL REFLUX DISEASE WITH ESOPHAGITIS WITHOUT HEMORRHAGE: Status: ACTIVE | Noted: 2022-08-08

## 2022-08-08 PROCEDURE — 99214 OFFICE O/P EST MOD 30 MIN: CPT | Performed by: STUDENT IN AN ORGANIZED HEALTH CARE EDUCATION/TRAINING PROGRAM

## 2022-08-08 NOTE — TELEPHONE ENCOUNTER
----- Message from Binu Quintana sent at 8/8/2022 10:37 AM EDT -----  Contact: [phone]  Cassi was taken yesterday and this morning and is scheduled for her scope tomorrow . Please call back and advise asap

## 2022-08-08 NOTE — PROGRESS NOTES
"Chief Complaint  Hypertension (bp check ) and Establish Care (New pt establish care)    Subjective        Amanda Sherwood presents to [facility]  Ms. Sherwood presents for annual wellness visit today.     Doing well overall. Major concern continues to be gastroparesis. She has lost 35lbs since March 2022 due to nausea/vomiting. She also states that she has altered sensation of taste which contributes to inability to eat. She is currently seeing GI who has planned endoscopy for tomorrow. She is prescribed metoclopramide but only able to tolerate BID dosing.     PTSD is controlled with medications - follows with Psych and was recently recommended to psychologist who specializes in PTSD. Has not made appt yet.       Objective   Vital Signs:  /74   Pulse 79   Temp 97.6 °F (36.4 °C)   Ht 165.1 cm (65\")   Wt 79.4 kg (175 lb)   SpO2 98%   BMI 29.12 kg/m²   Estimated body mass index is 29.12 kg/m² as calculated from the following:    Height as of this encounter: 165.1 cm (65\").    Weight as of this encounter: 79.4 kg (175 lb).    BMI is >= 25 and <30. (Overweight) The following options were offered after discussion;: none (medical contraindication)      Physical Exam  Constitutional:       General: She is not in acute distress.  Eyes:      Conjunctiva/sclera: Conjunctivae normal.   Cardiovascular:      Rate and Rhythm: Normal rate and regular rhythm.   Pulmonary:      Effort: Pulmonary effort is normal. No respiratory distress.      Breath sounds: Normal breath sounds.   Abdominal:      General: Abdomen is flat. Bowel sounds are normal. There is no distension.      Palpations: Abdomen is soft. There is no mass.      Tenderness: There is no abdominal tenderness. There is no guarding or rebound.   Musculoskeletal:      Right lower leg: No edema.      Left lower leg: No edema.   Skin:     General: Skin is warm and dry.   Neurological:      Mental Status: She is alert and oriented to " person, place, and time.   Psychiatric:         Mood and Affect: Mood normal.         Behavior: Behavior normal.        Result Review :  The following data was reviewed by: lEsie Underwood MD on 08/08/2022:  Common labs    Common Labsle 11/29/21 3/7/22 6/9/22 6/9/22      1028 1028   Glucose 94   73   BUN 13   9   Creatinine 1.18 (A)   1.35 (A)   eGFR Non  Am 51 (A)      eGFR  Am 59 (A)      Sodium 142   126 (A)   Potassium 4.3   4.9   Chloride 101   89 (A)   Calcium 10.3 (A)   10.2   Albumin    4.90   Total Bilirubin    0.5   Alkaline Phosphatase    76   AST (SGOT)    83 (A)   ALT (SGPT)    94 (A)   WBC  4.08     Hemoglobin  13.1     Hematocrit  38.6     Platelets  185     Total Cholesterol   162    Triglycerides   59    HDL Cholesterol   97 (A)    LDL Cholesterol    53    (A) Abnormal value       Comments are available for some flowsheets but are not being displayed.           Data reviewed: GI note reviewed - plan for EGD on 8/9/2022. Continue metoclopramide but may consider domperidone. Etiology of gastroparesis unknown - N/V may be side effect of bupropion.          Assessment and Plan   Diagnoses and all orders for this visit:    1. Health care maintenance (Primary)  Assessment & Plan:  Mammogram: recently obtained at [facility]. Patient states she obtains yearly. Will request records.  Pap: recently performed at [facility]. No history of abnormals. Will request records.   LDCT: never smoker  Cscope: last 2020; due 2025    Metabolic: labs UTD, reviewed FLP, CMP from 6/9/2022.   Immunizations: review at next appt.       2. Primary hypertension  Assessment & Plan:  Hypertension is unchanged.  Checks BP at home; within goal of <140/90.   Denies headache, vision changes, syncope, or lightheadedness.   Continue current treatment regimen of losartan 50mg daily.   Blood pressure will be reassessed at the next regular appointment.    Orders:  -     Comprehensive Metabolic Panel; Future    3.  Gastroparesis  Assessment & Plan:  Diagnosed by gastric emptying study 2/17/2022. Began following a viral GI illness.   Follows with GI - plan to undergo EGD 8/9/2022.   Has had 35lb weight loss since 3/2022.  Currently on metoclopramide 10mg BID (could not tolerate TID dosing due to diarrhea). Continue medication. Await EGD results.      4. Hyponatremia  Assessment & Plan:  Na 126 on last CMP 6/2022.  Psychiatrist is currently weaning off Lexapro - currently at 5mg from 20mg.   Asymptomatic.   Could be 2/2 SSRI but patient also with poor PO intake in setting of gastroparesis. Other lab works suggestive of dehydration so could also be related to that.  Would like to repeat CMP today, patient will return within the next month to obtain as she is unable to get today.       5. Anxiety disorder due to general medical condition with panic attack  Assessment & Plan:  Psychological condition is improving with treatment.  Continue current treatment regimen.   Follows with Psychiatry and going to start seeing a psychologist.  Patient is downtitrating lexapro - currently on 5mg daily. Also on bupropion XL 300mg AM.   Psychological condition  will be reassessed at the next regular appointment.      6. Mixed hyperlipidemia  Assessment & Plan:  Lipid abnormalities are unchanged.  Well controlled on atorvastatin 40mg daily.  Lipids will be reassessed in 1 year.           I spent 25 minutes caring for Amanda on this date of service. This time includes time spent by me in the following activities:preparing for the visit, reviewing tests, performing a medically appropriate examination and/or evaluation , counseling and educating the patient/family/caregiver, ordering medications, tests, or procedures and documenting information in the medical record  Follow Up   Return in about 6 months (around 2/8/2023) for follow up for gastroparesis.  Patient was given instructions and counseling regarding her condition or for health maintenance  advice. Please see specific information pulled into the AVS if appropriate.

## 2022-08-08 NOTE — ASSESSMENT & PLAN NOTE
Psychological condition is improving with treatment.  Continue current treatment regimen.   Follows with Psychiatry and going to start seeing a psychologist.  Patient is downtitrating lexapro - currently on 5mg daily. Also on bupropion XL 300mg AM.   Psychological condition  will be reassessed at the next regular appointment.

## 2022-08-08 NOTE — ASSESSMENT & PLAN NOTE
Na 126 on last CMP 6/2022.  Psychiatrist is currently weaning off Lexapro - currently at 5mg from 20mg.   Asymptomatic.   Could be 2/2 SSRI but patient also with poor PO intake in setting of gastroparesis. Other lab works suggestive of dehydration so could also be related to that.  Would like to repeat CMP today, patient will return within the next month to obtain as she is unable to get today.

## 2022-08-08 NOTE — ASSESSMENT & PLAN NOTE
Lipid abnormalities are unchanged.  Well controlled on atorvastatin 40mg daily.  Lipids will be reassessed in 1 year.

## 2022-08-08 NOTE — ASSESSMENT & PLAN NOTE
Hypertension is unchanged.  Checks BP at home; within goal of <140/90.   Denies headache, vision changes, syncope, or lightheadedness.   Continue current treatment regimen of losartan 50mg daily.   Blood pressure will be reassessed at the next regular appointment.

## 2022-08-08 NOTE — ASSESSMENT & PLAN NOTE
Diagnosed by gastric emptying study 2/17/2022. Began following a viral GI illness.   Follows with GI - plan to undergo EGD 8/9/2022.   Has had 35lb weight loss since 3/2022.  Currently on metoclopramide 10mg BID (could not tolerate TID dosing due to diarrhea). Continue medication. Await EGD results.

## 2022-08-08 NOTE — TELEPHONE ENCOUNTER
August 8, 2022    Lynn Estrada, CMA  to Me    CG      7:15 AM   She will arrive at 8:30am     **Call to pt.  Advise of above.  Verb understanding.  States just realized she was supposed to be off eliquis for 2 days - has not been.   Advise will need to reschedule.      Message to DR Montes and Scheduling.  Mary Lou Humphrey RN.

## 2022-08-08 NOTE — ASSESSMENT & PLAN NOTE
Mammogram: recently obtained at [facility]. Patient states she obtains yearly. Will request records.  Pap: recently performed at [facility]. No history of abnormals. Will request records.   LDCT: never smoker  Cscope: last 2020; due 2025    Metabolic: labs UTD, reviewed FLP, CMP from 6/9/2022.   Immunizations: review at next appt.

## 2022-08-17 ENCOUNTER — PATIENT ROUNDING (BHMG ONLY) (OUTPATIENT)
Dept: FAMILY MEDICINE CLINIC | Facility: CLINIC | Age: 62
End: 2022-08-17

## 2022-08-17 NOTE — PROGRESS NOTES
August 17, 2022    Hello, may I speak with Amanda Sherwood?    My name is Aleisha       I am  with [facility]  [address] [place] [id number] [phone].    Before we get started may I verify your date of birth? 1960    I am calling to officially welcome you to our practice and ask about your recent visit. Is this a good time to talk? No sent my chart message

## 2022-08-24 ENCOUNTER — TELEPHONE (OUTPATIENT)
Dept: GASTROENTEROLOGY | Facility: CLINIC | Age: 62
End: 2022-08-24

## 2022-08-24 NOTE — TELEPHONE ENCOUNTER
Attempt to contact [facility] failed-n/a [phone]    Called patient-explained she would need to have them reach out to her Primary Care MD to complete-she voiced understanding and said she would call them tomorrow

## 2022-08-25 RX ORDER — METOCLOPRAMIDE 10 MG/1
TABLET ORAL
Qty: 90 TABLET | Refills: 0 | Status: SHIPPED | OUTPATIENT
Start: 2022-08-25 | End: 2022-11-18

## 2022-08-26 RX ORDER — OMEPRAZOLE 40 MG/1
CAPSULE, DELAYED RELEASE ORAL
Qty: 60 CAPSULE | Refills: 2 | Status: SHIPPED | OUTPATIENT
Start: 2022-08-26 | End: 2022-12-29 | Stop reason: SDUPTHER

## 2022-08-26 NOTE — TELEPHONE ENCOUNTER
"Denys request received for omeprazole 40 mg 2x/day.     See o/v note of 7/1/22: \"Continue 40 mg omeprazole as this seems to be helping with heartburn/reflux.    Call to pt.  Confirms is taking 2x/day with good control of reflux symptoms.     Denys completed as requested, #60, R2 (appt 10/6 with Keena Nicole.)    Update to Keena Nicole.     "

## 2022-09-07 ENCOUNTER — TELEPHONE (OUTPATIENT)
Dept: FAMILY MEDICINE CLINIC | Facility: CLINIC | Age: 62
End: 2022-09-07

## 2022-09-07 NOTE — TELEPHONE ENCOUNTER
"----- Message from Elsie Underwood MD sent at 9/7/2022 10:08 AM EDT -----  Regarding: [facility] Form  Do you mind to call Ms. Sherwood and let her know that I do not perform disability physicals? Her 's office faxed paperwork to us.     If she asks who should complete this, please let her know that a lot of times urgent treatment centers will complete these for a fee. She should google \"[place] disability physicals\" or ask her 's office if they have recommendations.     Thanks!    "

## 2022-09-19 ENCOUNTER — TELEPHONE (OUTPATIENT)
Dept: GASTROENTEROLOGY | Facility: CLINIC | Age: 62
End: 2022-09-19

## 2022-09-27 ENCOUNTER — TELEPHONE (OUTPATIENT)
Dept: CARDIOLOGY | Facility: CLINIC | Age: 62
End: 2022-09-27

## 2022-09-27 ENCOUNTER — HOSPITAL ENCOUNTER (OUTPATIENT)
Facility: HOSPITAL | Age: 62
Discharge: HOME HEALTH | End: 2022-10-01
Attending: EMERGENCY MEDICINE | Admitting: EMERGENCY MEDICINE

## 2022-09-27 ENCOUNTER — APPOINTMENT (OUTPATIENT)
Dept: GENERAL RADIOLOGY | Facility: HOSPITAL | Age: 62
End: 2022-09-27

## 2022-09-27 DIAGNOSIS — R11.2 NAUSEA AND VOMITING, UNSPECIFIED VOMITING TYPE: ICD-10-CM

## 2022-09-27 DIAGNOSIS — S80.11XA HEMATOMA OF RIGHT LOWER EXTREMITY, INITIAL ENCOUNTER: ICD-10-CM

## 2022-09-27 DIAGNOSIS — R06.09 EXERTIONAL DYSPNEA: ICD-10-CM

## 2022-09-27 DIAGNOSIS — Z79.01 ANTICOAGULATED: ICD-10-CM

## 2022-09-27 DIAGNOSIS — D64.9 SYMPTOMATIC ANEMIA: Primary | ICD-10-CM

## 2022-09-27 DIAGNOSIS — S80.12XA HEMATOMA OF LEFT LOWER EXTREMITY, INITIAL ENCOUNTER: ICD-10-CM

## 2022-09-27 DIAGNOSIS — R53.1 GENERALIZED WEAKNESS: ICD-10-CM

## 2022-09-27 PROBLEM — Z86.711 HISTORY OF PULMONARY EMBOLISM: Status: ACTIVE | Noted: 2022-09-27

## 2022-09-27 PROBLEM — R06.00 DYSPNEA: Status: ACTIVE | Noted: 2021-09-14

## 2022-09-27 LAB
ABO GROUP BLD: NORMAL
ALBUMIN SERPL-MCNC: 3.2 G/DL (ref 3.5–5.2)
ALBUMIN/GLOB SERPL: 1.1 G/DL
ALP SERPL-CCNC: 149 U/L (ref 39–117)
ALT SERPL W P-5'-P-CCNC: 24 U/L (ref 1–33)
ANION GAP SERPL CALCULATED.3IONS-SCNC: 17.3 MMOL/L (ref 5–15)
AST SERPL-CCNC: 28 U/L (ref 1–32)
BASOPHILS # BLD AUTO: 0.01 10*3/MM3 (ref 0–0.2)
BASOPHILS NFR BLD AUTO: 0.3 % (ref 0–1.5)
BILIRUB SERPL-MCNC: 1.1 MG/DL (ref 0–1.2)
BLD GP AB SCN SERPL QL: NEGATIVE
BUN SERPL-MCNC: 8 MG/DL (ref 6–20)
BUN/CREAT SERPL: 5.8 (ref 7–25)
CALCIUM SPEC-SCNC: 9.6 MG/DL (ref 8.6–10.5)
CHLORIDE SERPL-SCNC: 99 MMOL/L (ref 98–107)
CO2 SERPL-SCNC: 16.7 MMOL/L (ref 22–29)
CREAT SERPL-MCNC: 1.37 MG/DL (ref 0.57–1)
DEPRECATED RDW RBC AUTO: 41.7 FL (ref 37–54)
EGFRCR SERPLBLD CKD-EPI 2021: 44.6 ML/MIN/1.73
EOSINOPHIL # BLD AUTO: 0.02 10*3/MM3 (ref 0–0.4)
EOSINOPHIL NFR BLD AUTO: 0.7 % (ref 0.3–6.2)
ERYTHROCYTE [DISTWIDTH] IN BLOOD BY AUTOMATED COUNT: 12.6 % (ref 12.3–15.4)
GLOBULIN UR ELPH-MCNC: 2.8 GM/DL
GLUCOSE SERPL-MCNC: 118 MG/DL (ref 65–99)
HCT VFR BLD AUTO: 25.5 % (ref 34–46.6)
HGB BLD-MCNC: 8.7 G/DL (ref 12–15.9)
IMM GRANULOCYTES # BLD AUTO: 0.02 10*3/MM3 (ref 0–0.05)
IMM GRANULOCYTES NFR BLD AUTO: 0.7 % (ref 0–0.5)
LYMPHOCYTES # BLD AUTO: 0.53 10*3/MM3 (ref 0.7–3.1)
LYMPHOCYTES NFR BLD AUTO: 18.5 % (ref 19.6–45.3)
MAGNESIUM SERPL-MCNC: 1.8 MG/DL (ref 1.6–2.6)
MCH RBC QN AUTO: 30.7 PG (ref 26.6–33)
MCHC RBC AUTO-ENTMCNC: 34.1 G/DL (ref 31.5–35.7)
MCV RBC AUTO: 90.1 FL (ref 79–97)
MONOCYTES # BLD AUTO: 0.27 10*3/MM3 (ref 0.1–0.9)
MONOCYTES NFR BLD AUTO: 9.4 % (ref 5–12)
NEUTROPHILS NFR BLD AUTO: 2.02 10*3/MM3 (ref 1.7–7)
NEUTROPHILS NFR BLD AUTO: 70.4 % (ref 42.7–76)
NRBC BLD AUTO-RTO: 0 /100 WBC (ref 0–0.2)
NT-PROBNP SERPL-MCNC: 1174 PG/ML (ref 0–900)
PLATELET # BLD AUTO: 232 10*3/MM3 (ref 140–450)
PMV BLD AUTO: 10.7 FL (ref 6–12)
POTASSIUM SERPL-SCNC: 4.4 MMOL/L (ref 3.5–5.2)
PROT SERPL-MCNC: 6 G/DL (ref 6–8.5)
QT INTERVAL: 386 MS
RBC # BLD AUTO: 2.83 10*6/MM3 (ref 3.77–5.28)
RH BLD: POSITIVE
SODIUM SERPL-SCNC: 133 MMOL/L (ref 136–145)
T&S EXPIRATION DATE: NORMAL
TROPONIN T SERPL-MCNC: <0.01 NG/ML (ref 0–0.03)
WBC NRBC COR # BLD: 2.87 10*3/MM3 (ref 3.4–10.8)

## 2022-09-27 PROCEDURE — 85025 COMPLETE CBC W/AUTO DIFF WBC: CPT | Performed by: EMERGENCY MEDICINE

## 2022-09-27 PROCEDURE — G0378 HOSPITAL OBSERVATION PER HR: HCPCS

## 2022-09-27 PROCEDURE — 86850 RBC ANTIBODY SCREEN: CPT | Performed by: EMERGENCY MEDICINE

## 2022-09-27 PROCEDURE — 36430 TRANSFUSION BLD/BLD COMPNT: CPT

## 2022-09-27 PROCEDURE — 83880 ASSAY OF NATRIURETIC PEPTIDE: CPT | Performed by: EMERGENCY MEDICINE

## 2022-09-27 PROCEDURE — P9016 RBC LEUKOCYTES REDUCED: HCPCS

## 2022-09-27 PROCEDURE — 86923 COMPATIBILITY TEST ELECTRIC: CPT

## 2022-09-27 PROCEDURE — 86900 BLOOD TYPING SEROLOGIC ABO: CPT

## 2022-09-27 PROCEDURE — 83735 ASSAY OF MAGNESIUM: CPT | Performed by: EMERGENCY MEDICINE

## 2022-09-27 PROCEDURE — 86901 BLOOD TYPING SEROLOGIC RH(D): CPT | Performed by: EMERGENCY MEDICINE

## 2022-09-27 PROCEDURE — 84484 ASSAY OF TROPONIN QUANT: CPT | Performed by: EMERGENCY MEDICINE

## 2022-09-27 PROCEDURE — 80053 COMPREHEN METABOLIC PANEL: CPT | Performed by: EMERGENCY MEDICINE

## 2022-09-27 PROCEDURE — 86901 BLOOD TYPING SEROLOGIC RH(D): CPT

## 2022-09-27 PROCEDURE — 86900 BLOOD TYPING SEROLOGIC ABO: CPT | Performed by: EMERGENCY MEDICINE

## 2022-09-27 PROCEDURE — 71045 X-RAY EXAM CHEST 1 VIEW: CPT

## 2022-09-27 PROCEDURE — 36415 COLL VENOUS BLD VENIPUNCTURE: CPT

## 2022-09-27 PROCEDURE — 93005 ELECTROCARDIOGRAM TRACING: CPT

## 2022-09-27 PROCEDURE — 93010 ELECTROCARDIOGRAM REPORT: CPT | Performed by: INTERNAL MEDICINE

## 2022-09-27 PROCEDURE — 99285 EMERGENCY DEPT VISIT HI MDM: CPT

## 2022-09-27 RX ORDER — BUPROPION HYDROCHLORIDE 300 MG/1
300 TABLET ORAL EVERY MORNING
Status: DISCONTINUED | OUTPATIENT
Start: 2022-09-28 | End: 2022-10-01 | Stop reason: HOSPADM

## 2022-09-27 RX ORDER — SODIUM CHLORIDE 0.9 % (FLUSH) 0.9 %
10 SYRINGE (ML) INJECTION EVERY 12 HOURS SCHEDULED
Status: DISCONTINUED | OUTPATIENT
Start: 2022-09-27 | End: 2022-10-01 | Stop reason: HOSPADM

## 2022-09-27 RX ORDER — ACETAMINOPHEN 650 MG/1
650 SUPPOSITORY RECTAL EVERY 4 HOURS PRN
Status: DISCONTINUED | OUTPATIENT
Start: 2022-09-27 | End: 2022-10-01 | Stop reason: HOSPADM

## 2022-09-27 RX ORDER — SODIUM CHLORIDE 0.9 % (FLUSH) 0.9 %
10 SYRINGE (ML) INJECTION AS NEEDED
Status: DISCONTINUED | OUTPATIENT
Start: 2022-09-27 | End: 2022-10-01 | Stop reason: HOSPADM

## 2022-09-27 RX ORDER — CETIRIZINE HYDROCHLORIDE 10 MG/1
10 TABLET ORAL AS NEEDED
Status: DISCONTINUED | OUTPATIENT
Start: 2022-09-27 | End: 2022-09-27

## 2022-09-27 RX ORDER — ALBUTEROL SULFATE 2.5 MG/3ML
2.5 SOLUTION RESPIRATORY (INHALATION) EVERY 6 HOURS PRN
Status: DISCONTINUED | OUTPATIENT
Start: 2022-09-27 | End: 2022-10-01 | Stop reason: HOSPADM

## 2022-09-27 RX ORDER — PANTOPRAZOLE SODIUM 40 MG/1
40 TABLET, DELAYED RELEASE ORAL EVERY MORNING
Status: DISCONTINUED | OUTPATIENT
Start: 2022-09-28 | End: 2022-10-01 | Stop reason: HOSPADM

## 2022-09-27 RX ORDER — ONDANSETRON 2 MG/ML
4 INJECTION INTRAMUSCULAR; INTRAVENOUS EVERY 6 HOURS PRN
Status: DISCONTINUED | OUTPATIENT
Start: 2022-09-27 | End: 2022-10-01 | Stop reason: HOSPADM

## 2022-09-27 RX ORDER — SPIRONOLACTONE 50 MG/1
50 TABLET, FILM COATED ORAL DAILY
Status: DISCONTINUED | OUTPATIENT
Start: 2022-09-27 | End: 2022-09-27

## 2022-09-27 RX ORDER — METOCLOPRAMIDE 10 MG/1
10 TABLET ORAL 3 TIMES DAILY
Status: DISCONTINUED | OUTPATIENT
Start: 2022-09-27 | End: 2022-10-01 | Stop reason: HOSPADM

## 2022-09-27 RX ORDER — LOSARTAN POTASSIUM 50 MG/1
50 TABLET ORAL DAILY
Status: DISCONTINUED | OUTPATIENT
Start: 2022-09-27 | End: 2022-09-27

## 2022-09-27 RX ORDER — LORAZEPAM 0.5 MG/1
0.5 TABLET ORAL DAILY PRN
Status: DISCONTINUED | OUTPATIENT
Start: 2022-09-27 | End: 2022-09-27

## 2022-09-27 RX ORDER — ACETAMINOPHEN 325 MG/1
650 TABLET ORAL EVERY 4 HOURS PRN
Status: DISCONTINUED | OUTPATIENT
Start: 2022-09-27 | End: 2022-10-01 | Stop reason: HOSPADM

## 2022-09-27 RX ORDER — TRAZODONE HYDROCHLORIDE 100 MG/1
100 TABLET ORAL NIGHTLY PRN
Status: DISCONTINUED | OUTPATIENT
Start: 2022-09-27 | End: 2022-09-27

## 2022-09-27 RX ORDER — LORAZEPAM 0.5 MG/1
0.5 TABLET ORAL EVERY 12 HOURS PRN
Status: DISCONTINUED | OUTPATIENT
Start: 2022-09-27 | End: 2022-10-01 | Stop reason: HOSPADM

## 2022-09-27 RX ORDER — ACETAMINOPHEN 160 MG/5ML
650 SOLUTION ORAL EVERY 4 HOURS PRN
Status: DISCONTINUED | OUTPATIENT
Start: 2022-09-27 | End: 2022-10-01 | Stop reason: HOSPADM

## 2022-09-27 RX ORDER — BUPROPION HYDROCHLORIDE 150 MG/1
150 TABLET ORAL EVERY MORNING
Status: DISCONTINUED | OUTPATIENT
Start: 2022-09-28 | End: 2022-09-27

## 2022-09-27 RX ORDER — ESCITALOPRAM OXALATE 20 MG/1
20 TABLET ORAL DAILY
Status: DISCONTINUED | OUTPATIENT
Start: 2022-09-27 | End: 2022-09-27

## 2022-09-27 RX ORDER — ATORVASTATIN CALCIUM 20 MG/1
40 TABLET, FILM COATED ORAL NIGHTLY
Status: DISCONTINUED | OUTPATIENT
Start: 2022-09-27 | End: 2022-09-27

## 2022-09-27 RX ORDER — CETIRIZINE HYDROCHLORIDE 10 MG/1
10 TABLET ORAL DAILY PRN
Status: DISCONTINUED | OUTPATIENT
Start: 2022-09-27 | End: 2022-10-01 | Stop reason: HOSPADM

## 2022-09-27 RX ORDER — NITROGLYCERIN 0.4 MG/1
0.4 TABLET SUBLINGUAL
Status: DISCONTINUED | OUTPATIENT
Start: 2022-09-27 | End: 2022-10-01 | Stop reason: HOSPADM

## 2022-09-27 RX ORDER — METOPROLOL SUCCINATE 50 MG/1
50 TABLET, EXTENDED RELEASE ORAL NIGHTLY
Status: DISCONTINUED | OUTPATIENT
Start: 2022-09-27 | End: 2022-09-29

## 2022-09-27 RX ADMIN — LORAZEPAM 0.5 MG: 0.5 TABLET ORAL at 21:58

## 2022-09-27 RX ADMIN — METOPROLOL SUCCINATE 50 MG: 50 TABLET, FILM COATED, EXTENDED RELEASE ORAL at 21:58

## 2022-09-27 NOTE — TELEPHONE ENCOUNTER
She may need to go to the ER - not sure if she is having bleeding based on the description.      rm

## 2022-09-27 NOTE — TELEPHONE ENCOUNTER
Patient called because she said that on Friday the back of her legs from her heel to her buttock are black and blue. She said they are not swollen or hard to touch, but she feels like if she straightens her legs she is going to tear her muscles.     She also says that she is experiencing SOA and tachycardia on exertion. She said walking to her bathroom this AM which is about 20ft her HR got up to 117. She said if she does steps her HR will be in the 130s-140s. Resting HR is in the 80s.    She said her BP has been on the lower end recently and her sodium has been low, so her kidney doctor took her off of a lot of her medications. The only cardiac meds she is on is eliquis 5mg BID and metoprolol 50mg every night. She was unable to check her BP at this time, but her last BP that she took yesterday was 110/70.     Elisa Reagan RN  Triage [facility] [place] [facility]

## 2022-09-27 NOTE — TELEPHONE ENCOUNTER
Notified patient about recommendations. She will go to the ER.    Elisa Reagan RN  Triage [facility]

## 2022-09-28 ENCOUNTER — APPOINTMENT (OUTPATIENT)
Dept: CARDIOLOGY | Facility: HOSPITAL | Age: 62
End: 2022-09-28

## 2022-09-28 PROBLEM — N18.31 STAGE 3A CHRONIC KIDNEY DISEASE (CKD): Status: ACTIVE | Noted: 2022-09-28

## 2022-09-28 LAB
ALBUMIN SERPL-MCNC: 2.9 G/DL (ref 3.5–5.2)
ALBUMIN/GLOB SERPL: 1.4 G/DL
ALP SERPL-CCNC: 117 U/L (ref 39–117)
ALT SERPL W P-5'-P-CCNC: 15 U/L (ref 1–33)
ANION GAP SERPL CALCULATED.3IONS-SCNC: 14 MMOL/L (ref 5–15)
AST SERPL-CCNC: 21 U/L (ref 1–32)
BASOPHILS # BLD AUTO: 0 10*3/MM3 (ref 0–0.2)
BASOPHILS NFR BLD AUTO: 0 % (ref 0–1.5)
BH BB BLOOD EXPIRATION DATE: NORMAL
BH BB BLOOD EXPIRATION DATE: NORMAL
BH BB BLOOD TYPE BARCODE: 6200
BH BB BLOOD TYPE BARCODE: 6200
BH BB DISPENSE STATUS: NORMAL
BH BB DISPENSE STATUS: NORMAL
BH BB PRODUCT CODE: NORMAL
BH BB PRODUCT CODE: NORMAL
BH BB UNIT NUMBER: NORMAL
BH BB UNIT NUMBER: NORMAL
BH CV LOWER VASCULAR LEFT COMMON FEMORAL AUGMENT: NORMAL
BH CV LOWER VASCULAR LEFT COMMON FEMORAL COMPETENT: NORMAL
BH CV LOWER VASCULAR LEFT COMMON FEMORAL COMPRESS: NORMAL
BH CV LOWER VASCULAR LEFT COMMON FEMORAL PHASIC: NORMAL
BH CV LOWER VASCULAR LEFT COMMON FEMORAL SPONT: NORMAL
BH CV LOWER VASCULAR LEFT DISTAL FEMORAL COMPRESS: NORMAL
BH CV LOWER VASCULAR LEFT GASTRONEMIUS COMPRESS: NORMAL
BH CV LOWER VASCULAR LEFT GREATER SAPH AK COMPRESS: NORMAL
BH CV LOWER VASCULAR LEFT GREATER SAPH BK COMPRESS: NORMAL
BH CV LOWER VASCULAR LEFT LESSER SAPH COMPRESS: NORMAL
BH CV LOWER VASCULAR LEFT MID FEMORAL AUGMENT: NORMAL
BH CV LOWER VASCULAR LEFT MID FEMORAL COMPETENT: NORMAL
BH CV LOWER VASCULAR LEFT MID FEMORAL COMPRESS: NORMAL
BH CV LOWER VASCULAR LEFT MID FEMORAL PHASIC: NORMAL
BH CV LOWER VASCULAR LEFT MID FEMORAL SPONT: NORMAL
BH CV LOWER VASCULAR LEFT PERONEAL COMPRESS: NORMAL
BH CV LOWER VASCULAR LEFT POPLITEAL AUGMENT: NORMAL
BH CV LOWER VASCULAR LEFT POPLITEAL COMPETENT: NORMAL
BH CV LOWER VASCULAR LEFT POPLITEAL COMPRESS: NORMAL
BH CV LOWER VASCULAR LEFT POPLITEAL PHASIC: NORMAL
BH CV LOWER VASCULAR LEFT POPLITEAL SPONT: NORMAL
BH CV LOWER VASCULAR LEFT POSTERIOR TIBIAL COMPRESS: NORMAL
BH CV LOWER VASCULAR LEFT PROFUNDA FEMORAL COMPRESS: NORMAL
BH CV LOWER VASCULAR LEFT PROXIMAL FEMORAL COMPRESS: NORMAL
BH CV LOWER VASCULAR LEFT SAPHENOFEMORAL JUNCTION COMPRESS: NORMAL
BH CV LOWER VASCULAR RIGHT COMMON FEMORAL AUGMENT: NORMAL
BH CV LOWER VASCULAR RIGHT COMMON FEMORAL COMPETENT: NORMAL
BH CV LOWER VASCULAR RIGHT COMMON FEMORAL COMPRESS: NORMAL
BH CV LOWER VASCULAR RIGHT COMMON FEMORAL PHASIC: NORMAL
BH CV LOWER VASCULAR RIGHT COMMON FEMORAL SPONT: NORMAL
BH CV LOWER VASCULAR RIGHT DISTAL FEMORAL COMPRESS: NORMAL
BH CV LOWER VASCULAR RIGHT GASTRONEMIUS COMPRESS: NORMAL
BH CV LOWER VASCULAR RIGHT GREATER SAPH AK COMPRESS: NORMAL
BH CV LOWER VASCULAR RIGHT GREATER SAPH BK COMPRESS: NORMAL
BH CV LOWER VASCULAR RIGHT LESSER SAPH COMPRESS: NORMAL
BH CV LOWER VASCULAR RIGHT MID FEMORAL AUGMENT: NORMAL
BH CV LOWER VASCULAR RIGHT MID FEMORAL COMPETENT: NORMAL
BH CV LOWER VASCULAR RIGHT MID FEMORAL COMPRESS: NORMAL
BH CV LOWER VASCULAR RIGHT MID FEMORAL PHASIC: NORMAL
BH CV LOWER VASCULAR RIGHT MID FEMORAL SPONT: NORMAL
BH CV LOWER VASCULAR RIGHT PERONEAL COMPRESS: NORMAL
BH CV LOWER VASCULAR RIGHT POPLITEAL AUGMENT: NORMAL
BH CV LOWER VASCULAR RIGHT POPLITEAL COMPETENT: NORMAL
BH CV LOWER VASCULAR RIGHT POPLITEAL COMPRESS: NORMAL
BH CV LOWER VASCULAR RIGHT POPLITEAL PHASIC: NORMAL
BH CV LOWER VASCULAR RIGHT POPLITEAL SPONT: NORMAL
BH CV LOWER VASCULAR RIGHT POSTERIOR TIBIAL COMPRESS: NORMAL
BH CV LOWER VASCULAR RIGHT PROFUNDA FEMORAL COMPRESS: NORMAL
BH CV LOWER VASCULAR RIGHT PROXIMAL FEMORAL COMPRESS: NORMAL
BH CV LOWER VASCULAR RIGHT SAPHENOFEMORAL JUNCTION COMPRESS: NORMAL
BILIRUB SERPL-MCNC: 1.1 MG/DL (ref 0–1.2)
BUN SERPL-MCNC: 9 MG/DL (ref 6–20)
BUN/CREAT SERPL: 7.5 (ref 7–25)
CALCIUM SPEC-SCNC: 8.9 MG/DL (ref 8.6–10.5)
CHLORIDE SERPL-SCNC: 101 MMOL/L (ref 98–107)
CK SERPL-CCNC: 43 U/L (ref 20–180)
CO2 SERPL-SCNC: 18 MMOL/L (ref 22–29)
CREAT SERPL-MCNC: 1.2 MG/DL (ref 0.57–1)
CROSSMATCH INTERPRETATION: NORMAL
CROSSMATCH INTERPRETATION: NORMAL
CRP SERPL-MCNC: 3.81 MG/DL (ref 0–0.5)
DEPRECATED RDW RBC AUTO: 47.9 FL (ref 37–54)
EGFRCR SERPLBLD CKD-EPI 2021: 52.3 ML/MIN/1.73
EOSINOPHIL # BLD AUTO: 0.02 10*3/MM3 (ref 0–0.4)
EOSINOPHIL NFR BLD AUTO: 1 % (ref 0.3–6.2)
ERYTHROCYTE [DISTWIDTH] IN BLOOD BY AUTOMATED COUNT: 15.6 % (ref 12.3–15.4)
ERYTHROCYTE [SEDIMENTATION RATE] IN BLOOD: 28 MM/HR (ref 0–30)
FERRITIN SERPL-MCNC: 423 NG/ML (ref 13–150)
FOLATE SERPL-MCNC: 2.55 NG/ML (ref 4.78–24.2)
GLOBULIN UR ELPH-MCNC: 2.1 GM/DL
GLUCOSE SERPL-MCNC: 85 MG/DL (ref 65–99)
HBA1C MFR BLD: 5.1 % (ref 4.8–5.6)
HCT VFR BLD AUTO: 28.6 % (ref 34–46.6)
HGB BLD-MCNC: 10 G/DL (ref 12–15.9)
IMM GRANULOCYTES # BLD AUTO: 0.01 10*3/MM3 (ref 0–0.05)
IMM GRANULOCYTES NFR BLD AUTO: 0.5 % (ref 0–0.5)
IRON 24H UR-MRATE: 87 MCG/DL (ref 37–145)
IRON SATN MFR SERPL: 38 % (ref 20–50)
LDH SERPL-CCNC: 174 U/L (ref 135–214)
LYMPHOCYTES # BLD AUTO: 0.46 10*3/MM3 (ref 0.7–3.1)
LYMPHOCYTES NFR BLD AUTO: 23.1 % (ref 19.6–45.3)
MAXIMAL PREDICTED HEART RATE: 161 BPM
MCH RBC QN AUTO: 29.1 PG (ref 26.6–33)
MCHC RBC AUTO-ENTMCNC: 35 G/DL (ref 31.5–35.7)
MCV RBC AUTO: 83.1 FL (ref 79–97)
MONOCYTES # BLD AUTO: 0.25 10*3/MM3 (ref 0.1–0.9)
MONOCYTES NFR BLD AUTO: 12.6 % (ref 5–12)
NEUTROPHILS NFR BLD AUTO: 1.25 10*3/MM3 (ref 1.7–7)
NEUTROPHILS NFR BLD AUTO: 62.8 % (ref 42.7–76)
NRBC BLD AUTO-RTO: 0 /100 WBC (ref 0–0.2)
PLATELET # BLD AUTO: 167 10*3/MM3 (ref 140–450)
PMV BLD AUTO: 10.9 FL (ref 6–12)
POTASSIUM SERPL-SCNC: 3.8 MMOL/L (ref 3.5–5.2)
PROT SERPL-MCNC: 5 G/DL (ref 6–8.5)
RBC # BLD AUTO: 3.44 10*6/MM3 (ref 3.77–5.28)
RETICS # AUTO: 0.1 10*6/MM3 (ref 0.02–0.13)
RETICS/RBC NFR AUTO: 2.8 % (ref 0.7–1.9)
SODIUM SERPL-SCNC: 133 MMOL/L (ref 136–145)
SODIUM UR-SCNC: <20 MMOL/L
STRESS TARGET HR: 137 BPM
TIBC SERPL-MCNC: 228 MCG/DL (ref 298–536)
TRANSFERRIN SERPL-MCNC: 153 MG/DL (ref 200–360)
UNIT  ABO: NORMAL
UNIT  ABO: NORMAL
UNIT  RH: NORMAL
UNIT  RH: NORMAL
VIT B12 BLD-MCNC: 932 PG/ML (ref 211–946)
WBC NRBC COR # BLD: 1.99 10*3/MM3 (ref 3.4–10.8)

## 2022-09-28 PROCEDURE — 82746 ASSAY OF FOLIC ACID SERUM: CPT | Performed by: INTERNAL MEDICINE

## 2022-09-28 PROCEDURE — 99215 OFFICE O/P EST HI 40 MIN: CPT | Performed by: INTERNAL MEDICINE

## 2022-09-28 PROCEDURE — 83540 ASSAY OF IRON: CPT | Performed by: INTERNAL MEDICINE

## 2022-09-28 PROCEDURE — 84300 ASSAY OF URINE SODIUM: CPT | Performed by: INTERNAL MEDICINE

## 2022-09-28 PROCEDURE — 86140 C-REACTIVE PROTEIN: CPT | Performed by: HOSPITALIST

## 2022-09-28 PROCEDURE — 85045 AUTOMATED RETICULOCYTE COUNT: CPT | Performed by: HOSPITALIST

## 2022-09-28 PROCEDURE — 85025 COMPLETE CBC W/AUTO DIFF WBC: CPT | Performed by: INTERNAL MEDICINE

## 2022-09-28 PROCEDURE — 93970 EXTREMITY STUDY: CPT

## 2022-09-28 PROCEDURE — 85652 RBC SED RATE AUTOMATED: CPT | Performed by: HOSPITALIST

## 2022-09-28 PROCEDURE — 99214 OFFICE O/P EST MOD 30 MIN: CPT | Performed by: PHYSICIAN ASSISTANT

## 2022-09-28 PROCEDURE — 82550 ASSAY OF CK (CPK): CPT | Performed by: HOSPITALIST

## 2022-09-28 PROCEDURE — 82728 ASSAY OF FERRITIN: CPT | Performed by: INTERNAL MEDICINE

## 2022-09-28 PROCEDURE — 84466 ASSAY OF TRANSFERRIN: CPT | Performed by: INTERNAL MEDICINE

## 2022-09-28 PROCEDURE — 99214 OFFICE O/P EST MOD 30 MIN: CPT | Performed by: STUDENT IN AN ORGANIZED HEALTH CARE EDUCATION/TRAINING PROGRAM

## 2022-09-28 PROCEDURE — 82607 VITAMIN B-12: CPT | Performed by: INTERNAL MEDICINE

## 2022-09-28 PROCEDURE — 80053 COMPREHEN METABOLIC PANEL: CPT | Performed by: INTERNAL MEDICINE

## 2022-09-28 PROCEDURE — 83036 HEMOGLOBIN GLYCOSYLATED A1C: CPT | Performed by: INTERNAL MEDICINE

## 2022-09-28 PROCEDURE — 83615 LACTATE (LD) (LDH) ENZYME: CPT | Performed by: INTERNAL MEDICINE

## 2022-09-28 PROCEDURE — G0378 HOSPITAL OBSERVATION PER HR: HCPCS

## 2022-09-28 PROCEDURE — 83935 ASSAY OF URINE OSMOLALITY: CPT | Performed by: INTERNAL MEDICINE

## 2022-09-28 RX ORDER — FOLIC ACID 1 MG/1
1 TABLET ORAL DAILY
Status: DISCONTINUED | OUTPATIENT
Start: 2022-09-29 | End: 2022-10-01 | Stop reason: HOSPADM

## 2022-09-28 RX ADMIN — Medication 10 ML: at 20:18

## 2022-09-28 RX ADMIN — LORAZEPAM 0.5 MG: 0.5 TABLET ORAL at 20:26

## 2022-09-28 RX ADMIN — METOCLOPRAMIDE 10 MG: 10 TABLET ORAL at 08:49

## 2022-09-28 RX ADMIN — LORAZEPAM 0.5 MG: 0.5 TABLET ORAL at 08:49

## 2022-09-28 RX ADMIN — METOPROLOL SUCCINATE 50 MG: 50 TABLET, FILM COATED, EXTENDED RELEASE ORAL at 21:46

## 2022-09-28 RX ADMIN — BUPROPION HYDROCHLORIDE 300 MG: 300 TABLET, EXTENDED RELEASE ORAL at 08:49

## 2022-09-28 RX ADMIN — PANTOPRAZOLE SODIUM 40 MG: 40 TABLET, DELAYED RELEASE ORAL at 08:49

## 2022-09-29 ENCOUNTER — APPOINTMENT (OUTPATIENT)
Dept: CT IMAGING | Facility: HOSPITAL | Age: 62
End: 2022-09-29

## 2022-09-29 ENCOUNTER — ANESTHESIA EVENT (OUTPATIENT)
Dept: GASTROENTEROLOGY | Facility: HOSPITAL | Age: 62
End: 2022-09-29

## 2022-09-29 ENCOUNTER — ANESTHESIA (OUTPATIENT)
Dept: GASTROENTEROLOGY | Facility: HOSPITAL | Age: 62
End: 2022-09-29

## 2022-09-29 LAB
ANION GAP SERPL CALCULATED.3IONS-SCNC: 10.1 MMOL/L (ref 5–15)
APTT PPP: 26.9 SECONDS (ref 22.7–35.4)
BASOPHILS # BLD AUTO: 0.01 10*3/MM3 (ref 0–0.2)
BASOPHILS NFR BLD AUTO: 0.5 % (ref 0–1.5)
BUN SERPL-MCNC: 10 MG/DL (ref 6–20)
BUN/CREAT SERPL: 8.3 (ref 7–25)
CALCIUM SPEC-SCNC: 9 MG/DL (ref 8.6–10.5)
CHLORIDE SERPL-SCNC: 104 MMOL/L (ref 98–107)
CHROMATIN AB SERPL-ACNC: <10 IU/ML (ref 0–14)
CO2 SERPL-SCNC: 17.9 MMOL/L (ref 22–29)
CREAT SERPL-MCNC: 1.2 MG/DL (ref 0.57–1)
DEPRECATED RDW RBC AUTO: 51.3 FL (ref 37–54)
EGFRCR SERPLBLD CKD-EPI 2021: 52.3 ML/MIN/1.73
EOSINOPHIL # BLD AUTO: 0.03 10*3/MM3 (ref 0–0.4)
EOSINOPHIL NFR BLD AUTO: 1.5 % (ref 0.3–6.2)
ERYTHROCYTE [DISTWIDTH] IN BLOOD BY AUTOMATED COUNT: 15.9 % (ref 12.3–15.4)
FIBRINOGEN PPP-MCNC: 521 MG/DL (ref 219–464)
GLUCOSE SERPL-MCNC: 84 MG/DL (ref 65–99)
HCT VFR BLD AUTO: 30.7 % (ref 34–46.6)
HGB BLD-MCNC: 10.2 G/DL (ref 12–15.9)
IMM GRANULOCYTES # BLD AUTO: 0.01 10*3/MM3 (ref 0–0.05)
IMM GRANULOCYTES NFR BLD AUTO: 0.5 % (ref 0–0.5)
INR PPP: 1.14 (ref 0.9–1.1)
LYMPHOCYTES # BLD AUTO: 0.42 10*3/MM3 (ref 0.7–3.1)
LYMPHOCYTES NFR BLD AUTO: 21.5 % (ref 19.6–45.3)
MCH RBC QN AUTO: 29.6 PG (ref 26.6–33)
MCHC RBC AUTO-ENTMCNC: 33.2 G/DL (ref 31.5–35.7)
MCV RBC AUTO: 89 FL (ref 79–97)
MONOCYTES # BLD AUTO: 0.22 10*3/MM3 (ref 0.1–0.9)
MONOCYTES NFR BLD AUTO: 11.3 % (ref 5–12)
NEUTROPHILS NFR BLD AUTO: 1.26 10*3/MM3 (ref 1.7–7)
NEUTROPHILS NFR BLD AUTO: 64.7 % (ref 42.7–76)
NRBC BLD AUTO-RTO: 0 /100 WBC (ref 0–0.2)
OSMOLALITY UR: 285 MOSM/KG
PLATELET # BLD AUTO: 177 10*3/MM3 (ref 140–450)
PMV BLD AUTO: 10.9 FL (ref 6–12)
POTASSIUM SERPL-SCNC: 4.1 MMOL/L (ref 3.5–5.2)
PROTHROMBIN TIME: 14.5 SECONDS (ref 11.7–14.2)
RBC # BLD AUTO: 3.45 10*6/MM3 (ref 3.77–5.28)
SODIUM SERPL-SCNC: 132 MMOL/L (ref 136–145)
TSH SERPL DL<=0.05 MIU/L-ACNC: 2.2 UIU/ML (ref 0.27–4.2)
WBC NRBC COR # BLD: 1.95 10*3/MM3 (ref 3.4–10.8)

## 2022-09-29 PROCEDURE — 85025 COMPLETE CBC W/AUTO DIFF WBC: CPT | Performed by: INTERNAL MEDICINE

## 2022-09-29 PROCEDURE — 83516 IMMUNOASSAY NONANTIBODY: CPT | Performed by: INTERNAL MEDICINE

## 2022-09-29 PROCEDURE — 86235 NUCLEAR ANTIGEN ANTIBODY: CPT | Performed by: INTERNAL MEDICINE

## 2022-09-29 PROCEDURE — 86225 DNA ANTIBODY NATIVE: CPT | Performed by: INTERNAL MEDICINE

## 2022-09-29 PROCEDURE — 97162 PT EVAL MOD COMPLEX 30 MIN: CPT

## 2022-09-29 PROCEDURE — 86037 ANCA TITER EACH ANTIBODY: CPT | Performed by: INTERNAL MEDICINE

## 2022-09-29 PROCEDURE — 74176 CT ABD & PELVIS W/O CONTRAST: CPT

## 2022-09-29 PROCEDURE — G0378 HOSPITAL OBSERVATION PER HR: HCPCS

## 2022-09-29 PROCEDURE — 85610 PROTHROMBIN TIME: CPT | Performed by: INTERNAL MEDICINE

## 2022-09-29 PROCEDURE — 99214 OFFICE O/P EST MOD 30 MIN: CPT | Performed by: INTERNAL MEDICINE

## 2022-09-29 PROCEDURE — 97110 THERAPEUTIC EXERCISES: CPT

## 2022-09-29 PROCEDURE — 85384 FIBRINOGEN ACTIVITY: CPT | Performed by: INTERNAL MEDICINE

## 2022-09-29 PROCEDURE — 25010000002 PROPOFOL 10 MG/ML EMULSION

## 2022-09-29 PROCEDURE — 84630 ASSAY OF ZINC: CPT | Performed by: HOSPITALIST

## 2022-09-29 PROCEDURE — 88182 CELL MARKER STUDY: CPT | Performed by: INTERNAL MEDICINE

## 2022-09-29 PROCEDURE — 43239 EGD BIOPSY SINGLE/MULTIPLE: CPT | Performed by: INTERNAL MEDICINE

## 2022-09-29 PROCEDURE — 85730 THROMBOPLASTIN TIME PARTIAL: CPT | Performed by: INTERNAL MEDICINE

## 2022-09-29 PROCEDURE — 86431 RHEUMATOID FACTOR QUANT: CPT | Performed by: INTERNAL MEDICINE

## 2022-09-29 PROCEDURE — 88185 FLOWCYTOMETRY/TC ADD-ON: CPT | Performed by: INTERNAL MEDICINE

## 2022-09-29 PROCEDURE — 80048 BASIC METABOLIC PNL TOTAL CA: CPT | Performed by: INTERNAL MEDICINE

## 2022-09-29 PROCEDURE — 84443 ASSAY THYROID STIM HORMONE: CPT | Performed by: INTERNAL MEDICINE

## 2022-09-29 PROCEDURE — 88305 TISSUE EXAM BY PATHOLOGIST: CPT | Performed by: INTERNAL MEDICINE

## 2022-09-29 PROCEDURE — 86200 CCP ANTIBODY: CPT | Performed by: INTERNAL MEDICINE

## 2022-09-29 PROCEDURE — 84207 ASSAY OF VITAMIN B-6: CPT | Performed by: HOSPITALIST

## 2022-09-29 PROCEDURE — 82085 ASSAY OF ALDOLASE: CPT | Performed by: INTERNAL MEDICINE

## 2022-09-29 PROCEDURE — 88184 FLOWCYTOMETRY/ TC 1 MARKER: CPT | Performed by: INTERNAL MEDICINE

## 2022-09-29 PROCEDURE — 0 DIATRIZOATE MEGLUMINE & SODIUM PER 1 ML: Performed by: INTERNAL MEDICINE

## 2022-09-29 RX ORDER — PROPOFOL 10 MG/ML
VIAL (ML) INTRAVENOUS AS NEEDED
Status: DISCONTINUED | OUTPATIENT
Start: 2022-09-29 | End: 2022-09-29 | Stop reason: SURG

## 2022-09-29 RX ORDER — LIDOCAINE HYDROCHLORIDE 20 MG/ML
INJECTION, SOLUTION INFILTRATION; PERINEURAL AS NEEDED
Status: DISCONTINUED | OUTPATIENT
Start: 2022-09-29 | End: 2022-09-29 | Stop reason: SURG

## 2022-09-29 RX ORDER — SODIUM CHLORIDE 9 MG/ML
30 INJECTION, SOLUTION INTRAVENOUS CONTINUOUS PRN
Status: DISCONTINUED | OUTPATIENT
Start: 2022-09-29 | End: 2022-10-01 | Stop reason: HOSPADM

## 2022-09-29 RX ADMIN — LIDOCAINE HYDROCHLORIDE 60 MG: 20 INJECTION, SOLUTION INFILTRATION; PERINEURAL at 10:03

## 2022-09-29 RX ADMIN — PROPOFOL 50 MG: 10 INJECTION, EMULSION INTRAVENOUS at 10:03

## 2022-09-29 RX ADMIN — METOPROLOL SUCCINATE 75 MG: 25 TABLET, EXTENDED RELEASE ORAL at 20:28

## 2022-09-29 RX ADMIN — PROPOFOL 50 MG: 10 INJECTION, EMULSION INTRAVENOUS at 10:09

## 2022-09-29 RX ADMIN — LORAZEPAM 0.5 MG: 0.5 TABLET ORAL at 21:23

## 2022-09-29 RX ADMIN — Medication 1 MG: at 12:58

## 2022-09-29 RX ADMIN — BUPROPION HYDROCHLORIDE 300 MG: 300 TABLET, EXTENDED RELEASE ORAL at 12:58

## 2022-09-29 RX ADMIN — SODIUM CHLORIDE 30 ML/HR: 9 INJECTION, SOLUTION INTRAVENOUS at 09:41

## 2022-09-29 RX ADMIN — LORAZEPAM 0.5 MG: 0.5 TABLET ORAL at 12:58

## 2022-09-29 RX ADMIN — Medication 10 ML: at 20:28

## 2022-09-29 RX ADMIN — PANTOPRAZOLE SODIUM 40 MG: 40 TABLET, DELAYED RELEASE ORAL at 12:58

## 2022-09-29 RX ADMIN — PROPOFOL 50 MG: 10 INJECTION, EMULSION INTRAVENOUS at 10:06

## 2022-09-29 RX ADMIN — DIATRIZOATE MEGLUMINE AND DIATRIZOATE SODIUM 30 ML: 660; 100 LIQUID ORAL; RECTAL at 12:48

## 2022-09-29 NOTE — ANESTHESIA PREPROCEDURE EVALUATION
Anesthesia Evaluation                  Airway   Mallampati: II  Neck ROM: full  No difficulty expected  Dental      Pulmonary     breath sounds clear to auscultation  (+) pulmonary embolism,   Cardiovascular     ECG reviewed  Rhythm: regular    (+) hypertension,       Neuro/Psych  (+) psychiatric history,    GI/Hepatic/Renal/Endo    (+)  GERD,  renal disease,     Musculoskeletal     (+) neck pain,   Abdominal    Substance History      OB/GYN          Other   blood dyscrasia (leucopenia),       Other Comment: Hb 10.2                  Anesthesia Plan    ASA 3     MAC       Anesthetic plan, risks, benefits, and alternatives have been provided, discussed and informed consent has been obtained with: patient.        CODE STATUS:    Code Status (Patient has no pulse and is not breathing): CPR (Attempt to Resuscitate)  Medical Interventions (Patient has pulse or is breathing): Full Support

## 2022-09-30 LAB
ALDOLASE SERPL-CCNC: 2.2 U/L (ref 3.3–10.3)
BASOPHILS # BLD AUTO: 0 10*3/MM3 (ref 0–0.2)
BASOPHILS NFR BLD AUTO: 0 % (ref 0–1.5)
C-ANCA TITR SER IF: NORMAL TITER
CENTROMERE B AB SER-ACNC: <0.2 AI (ref 0–0.9)
CHROMATIN AB SERPL-ACNC: <0.2 AI (ref 0–0.9)
DEPRECATED RDW RBC AUTO: 48.6 FL (ref 37–54)
DSDNA AB SER-ACNC: <1 IU/ML (ref 0–9)
ENA JO1 AB SER-ACNC: <0.2 AI (ref 0–0.9)
ENA RNP AB SER-ACNC: <0.2 AI (ref 0–0.9)
ENA SCL70 AB SER-ACNC: <0.2 AI (ref 0–0.9)
ENA SM AB SER-ACNC: <0.2 AI (ref 0–0.9)
ENA SS-A AB SER-ACNC: <0.2 AI (ref 0–0.9)
ENA SS-B AB SER-ACNC: <0.2 AI (ref 0–0.9)
EOSINOPHIL # BLD AUTO: 0.02 10*3/MM3 (ref 0–0.4)
EOSINOPHIL NFR BLD AUTO: 1.1 % (ref 0.3–6.2)
ERYTHROCYTE [DISTWIDTH] IN BLOOD BY AUTOMATED COUNT: 15.5 % (ref 12.3–15.4)
HCT VFR BLD AUTO: 27.8 % (ref 34–46.6)
HGB BLD-MCNC: 9.3 G/DL (ref 12–15.9)
HGB BLD-MCNC: 9.4 G/DL (ref 12–15.9)
IMM GRANULOCYTES # BLD AUTO: 0.01 10*3/MM3 (ref 0–0.05)
IMM GRANULOCYTES NFR BLD AUTO: 0.6 % (ref 0–0.5)
LAB AP CASE REPORT: NORMAL
LYMPHOCYTES # BLD AUTO: 0.45 10*3/MM3 (ref 0.7–3.1)
LYMPHOCYTES NFR BLD AUTO: 25.7 % (ref 19.6–45.3)
Lab: NORMAL
MCH RBC QN AUTO: 29.2 PG (ref 26.6–33)
MCHC RBC AUTO-ENTMCNC: 33.5 G/DL (ref 31.5–35.7)
MCV RBC AUTO: 87.1 FL (ref 79–97)
MONOCYTES # BLD AUTO: 0.21 10*3/MM3 (ref 0.1–0.9)
MONOCYTES NFR BLD AUTO: 12 % (ref 5–12)
MYELOPEROXIDASE AB SER IA-ACNC: <0.2 UNITS (ref 0–0.9)
NEUTROPHILS NFR BLD AUTO: 1.06 10*3/MM3 (ref 1.7–7)
NEUTROPHILS NFR BLD AUTO: 60.6 % (ref 42.7–76)
NRBC BLD AUTO-RTO: 0 /100 WBC (ref 0–0.2)
P-ANCA ATYPICAL TITR SER IF: NORMAL TITER
P-ANCA TITR SER IF: NORMAL TITER
PATH REPORT.FINAL DX SPEC: NORMAL
PATH REPORT.GROSS SPEC: NORMAL
PLATELET # BLD AUTO: 186 10*3/MM3 (ref 140–450)
PMV BLD AUTO: 11.1 FL (ref 6–12)
PROTEINASE3 AB SER IA-ACNC: <0.2 UNITS (ref 0–0.9)
RBC # BLD AUTO: 3.19 10*6/MM3 (ref 3.77–5.28)
REF LAB TEST METHOD: NORMAL
WBC NRBC COR # BLD: 1.75 10*3/MM3 (ref 3.4–10.8)

## 2022-09-30 PROCEDURE — 85025 COMPLETE CBC W/AUTO DIFF WBC: CPT | Performed by: INTERNAL MEDICINE

## 2022-09-30 PROCEDURE — 99214 OFFICE O/P EST MOD 30 MIN: CPT | Performed by: INTERNAL MEDICINE

## 2022-09-30 PROCEDURE — 85018 HEMOGLOBIN: CPT | Performed by: PHYSICIAN ASSISTANT

## 2022-09-30 PROCEDURE — G0378 HOSPITAL OBSERVATION PER HR: HCPCS

## 2022-09-30 PROCEDURE — 99214 OFFICE O/P EST MOD 30 MIN: CPT | Performed by: PHYSICIAN ASSISTANT

## 2022-09-30 RX ORDER — POLYETHYLENE GLYCOL 3350 17 G/17G
0.5 POWDER, FOR SOLUTION ORAL EVERY 12 HOURS
Status: DISCONTINUED | OUTPATIENT
Start: 2022-09-30 | End: 2022-10-01 | Stop reason: HOSPADM

## 2022-09-30 RX ORDER — BISACODYL 5 MG/1
20 TABLET, DELAYED RELEASE ORAL DAILY PRN
Status: DISCONTINUED | OUTPATIENT
Start: 2022-09-30 | End: 2022-10-01 | Stop reason: HOSPADM

## 2022-09-30 RX ORDER — SODIUM BICARBONATE 650 MG/1
650 TABLET ORAL 3 TIMES DAILY
Status: DISCONTINUED | OUTPATIENT
Start: 2022-09-30 | End: 2022-10-01 | Stop reason: HOSPADM

## 2022-09-30 RX ADMIN — SODIUM BICARBONATE TAB 650 MG 650 MG: 650 TAB at 20:57

## 2022-09-30 RX ADMIN — LORAZEPAM 0.5 MG: 0.5 TABLET ORAL at 20:58

## 2022-09-30 RX ADMIN — BUPROPION HYDROCHLORIDE 300 MG: 300 TABLET, EXTENDED RELEASE ORAL at 06:42

## 2022-09-30 RX ADMIN — METOCLOPRAMIDE 10 MG: 10 TABLET ORAL at 18:22

## 2022-09-30 RX ADMIN — BISACODYL 20 MG: 5 TABLET ORAL at 20:58

## 2022-09-30 RX ADMIN — Medication 10 ML: at 20:58

## 2022-09-30 RX ADMIN — Medication 1 MG: at 08:41

## 2022-09-30 RX ADMIN — METOPROLOL SUCCINATE 75 MG: 25 TABLET, EXTENDED RELEASE ORAL at 20:58

## 2022-09-30 RX ADMIN — POLYETHYLENE GLYCOL 3350 0.5 BOTTLE: 17 POWDER, FOR SOLUTION ORAL at 18:21

## 2022-09-30 RX ADMIN — LORAZEPAM 0.5 MG: 0.5 TABLET ORAL at 08:41

## 2022-09-30 RX ADMIN — PANTOPRAZOLE SODIUM 40 MG: 40 TABLET, DELAYED RELEASE ORAL at 06:42

## 2022-10-01 ENCOUNTER — ANESTHESIA (OUTPATIENT)
Dept: GASTROENTEROLOGY | Facility: HOSPITAL | Age: 62
End: 2022-10-01

## 2022-10-01 ENCOUNTER — ANESTHESIA EVENT (OUTPATIENT)
Dept: GASTROENTEROLOGY | Facility: HOSPITAL | Age: 62
End: 2022-10-01

## 2022-10-01 ENCOUNTER — HOME HEALTH ADMISSION (OUTPATIENT)
Dept: HOME HEALTH SERVICES | Facility: HOME HEALTHCARE | Age: 62
End: 2022-10-01

## 2022-10-01 ENCOUNTER — READMISSION MANAGEMENT (OUTPATIENT)
Dept: CALL CENTER | Facility: HOSPITAL | Age: 62
End: 2022-10-01

## 2022-10-01 VITALS
HEIGHT: 65 IN | BODY MASS INDEX: 27.16 KG/M2 | WEIGHT: 163 LBS | HEART RATE: 74 BPM | OXYGEN SATURATION: 100 % | TEMPERATURE: 97.8 F | RESPIRATION RATE: 14 BRPM | DIASTOLIC BLOOD PRESSURE: 87 MMHG | SYSTOLIC BLOOD PRESSURE: 137 MMHG

## 2022-10-01 LAB
ALBUMIN SERPL-MCNC: 3 G/DL (ref 3.5–5.2)
ANION GAP SERPL CALCULATED.3IONS-SCNC: 19.9 MMOL/L (ref 5–15)
BASOPHILS # BLD AUTO: 0.01 10*3/MM3 (ref 0–0.2)
BASOPHILS NFR BLD AUTO: 0.5 % (ref 0–1.5)
BUN SERPL-MCNC: 11 MG/DL (ref 6–20)
BUN/CREAT SERPL: 8.6 (ref 7–25)
CALCIUM SPEC-SCNC: 8.7 MG/DL (ref 8.6–10.5)
CHLORIDE SERPL-SCNC: 99 MMOL/L (ref 98–107)
CO2 SERPL-SCNC: 15.1 MMOL/L (ref 22–29)
CREAT SERPL-MCNC: 1.28 MG/DL (ref 0.57–1)
DEPRECATED RDW RBC AUTO: 52.9 FL (ref 37–54)
EGFRCR SERPLBLD CKD-EPI 2021: 48.4 ML/MIN/1.73
EOSINOPHIL # BLD AUTO: 0.04 10*3/MM3 (ref 0–0.4)
EOSINOPHIL NFR BLD AUTO: 1.8 % (ref 0.3–6.2)
ERYTHROCYTE [DISTWIDTH] IN BLOOD BY AUTOMATED COUNT: 15.9 % (ref 12.3–15.4)
GLUCOSE SERPL-MCNC: 88 MG/DL (ref 65–99)
HCT VFR BLD AUTO: 28.3 % (ref 34–46.6)
HGB BLD-MCNC: 9.3 G/DL (ref 12–15.9)
LYMPHOCYTES # BLD AUTO: 0.44 10*3/MM3 (ref 0.7–3.1)
LYMPHOCYTES NFR BLD AUTO: 20.3 % (ref 19.6–45.3)
MAGNESIUM SERPL-MCNC: 1.8 MG/DL (ref 1.6–2.6)
MCH RBC QN AUTO: 29.2 PG (ref 26.6–33)
MCHC RBC AUTO-ENTMCNC: 32.9 G/DL (ref 31.5–35.7)
MCV RBC AUTO: 89 FL (ref 79–97)
MONOCYTES # BLD AUTO: 0.31 10*3/MM3 (ref 0.1–0.9)
MONOCYTES NFR BLD AUTO: 14.3 % (ref 5–12)
NEUTROPHILS NFR BLD AUTO: 1.3 10*3/MM3 (ref 1.7–7)
NEUTROPHILS NFR BLD AUTO: 59.9 % (ref 42.7–76)
PHOSPHATE SERPL-MCNC: 4.3 MG/DL (ref 2.5–4.5)
PLATELET # BLD AUTO: 176 10*3/MM3 (ref 140–450)
PMV BLD AUTO: 12.2 FL (ref 6–12)
POTASSIUM SERPL-SCNC: 3.7 MMOL/L (ref 3.5–5.2)
RBC # BLD AUTO: 3.18 10*6/MM3 (ref 3.77–5.28)
SODIUM SERPL-SCNC: 134 MMOL/L (ref 136–145)
VIT B6 SERPL-MCNC: 4.6 UG/L (ref 3.4–65.2)
WBC NRBC COR # BLD: 2.17 10*3/MM3 (ref 3.4–10.8)
ZINC SERPL-MCNC: 57 UG/DL (ref 44–115)

## 2022-10-01 PROCEDURE — 83735 ASSAY OF MAGNESIUM: CPT | Performed by: INTERNAL MEDICINE

## 2022-10-01 PROCEDURE — 25010000002 PROPOFOL 10 MG/ML EMULSION: Performed by: ANESTHESIOLOGY

## 2022-10-01 PROCEDURE — G0378 HOSPITAL OBSERVATION PER HR: HCPCS

## 2022-10-01 PROCEDURE — 85025 COMPLETE CBC W/AUTO DIFF WBC: CPT | Performed by: INTERNAL MEDICINE

## 2022-10-01 PROCEDURE — 45380 COLONOSCOPY AND BIOPSY: CPT | Performed by: INTERNAL MEDICINE

## 2022-10-01 PROCEDURE — 80069 RENAL FUNCTION PANEL: CPT | Performed by: HOSPITALIST

## 2022-10-01 PROCEDURE — 88305 TISSUE EXAM BY PATHOLOGIST: CPT | Performed by: INTERNAL MEDICINE

## 2022-10-01 RX ORDER — SODIUM CHLORIDE 9 MG/ML
30 INJECTION, SOLUTION INTRAVENOUS CONTINUOUS PRN
Status: DISCONTINUED | OUTPATIENT
Start: 2022-10-01 | End: 2022-10-01 | Stop reason: HOSPADM

## 2022-10-01 RX ORDER — METOPROLOL SUCCINATE 25 MG/1
75 TABLET, EXTENDED RELEASE ORAL NIGHTLY
Qty: 90 TABLET | Refills: 0 | Status: SHIPPED | OUTPATIENT
Start: 2022-10-01 | End: 2022-10-13 | Stop reason: SDUPTHER

## 2022-10-01 RX ORDER — LIDOCAINE HYDROCHLORIDE 20 MG/ML
INJECTION, SOLUTION INFILTRATION; PERINEURAL AS NEEDED
Status: DISCONTINUED | OUTPATIENT
Start: 2022-10-01 | End: 2022-10-01 | Stop reason: SURG

## 2022-10-01 RX ORDER — PROPOFOL 10 MG/ML
VIAL (ML) INTRAVENOUS AS NEEDED
Status: DISCONTINUED | OUTPATIENT
Start: 2022-10-01 | End: 2022-10-01 | Stop reason: SURG

## 2022-10-01 RX ORDER — SODIUM BICARBONATE 650 MG/1
650 TABLET ORAL 3 TIMES DAILY
Qty: 90 TABLET | Refills: 0 | Status: SHIPPED | OUTPATIENT
Start: 2022-10-01

## 2022-10-01 RX ORDER — ASPIRIN 81 MG/1
81 TABLET ORAL DAILY
Qty: 30 TABLET | Refills: 0 | Status: SHIPPED | OUTPATIENT
Start: 2022-10-01

## 2022-10-01 RX ORDER — FOLIC ACID 1 MG/1
1 TABLET ORAL DAILY
Qty: 30 TABLET | Refills: 0 | Status: SHIPPED | OUTPATIENT
Start: 2022-10-02

## 2022-10-01 RX ORDER — SODIUM CHLORIDE, SODIUM LACTATE, POTASSIUM CHLORIDE, CALCIUM CHLORIDE 600; 310; 30; 20 MG/100ML; MG/100ML; MG/100ML; MG/100ML
INJECTION, SOLUTION INTRAVENOUS CONTINUOUS PRN
Status: DISCONTINUED | OUTPATIENT
Start: 2022-10-01 | End: 2022-10-01 | Stop reason: SURG

## 2022-10-01 RX ADMIN — PROPOFOL 50 MG: 10 INJECTION, EMULSION INTRAVENOUS at 09:18

## 2022-10-01 RX ADMIN — Medication 10 ML: at 10:51

## 2022-10-01 RX ADMIN — Medication 1 MG: at 10:50

## 2022-10-01 RX ADMIN — SODIUM BICARBONATE TAB 650 MG 650 MG: 650 TAB at 10:50

## 2022-10-01 RX ADMIN — SODIUM CHLORIDE 30 ML/HR: 9 INJECTION, SOLUTION INTRAVENOUS at 08:44

## 2022-10-01 RX ADMIN — LIDOCAINE HYDROCHLORIDE 60 MG: 20 INJECTION, SOLUTION INFILTRATION; PERINEURAL at 09:16

## 2022-10-01 RX ADMIN — LORAZEPAM 0.5 MG: 0.5 TABLET ORAL at 10:51

## 2022-10-01 RX ADMIN — SODIUM CHLORIDE, POTASSIUM CHLORIDE, SODIUM LACTATE AND CALCIUM CHLORIDE: 600; 310; 30; 20 INJECTION, SOLUTION INTRAVENOUS at 09:33

## 2022-10-01 RX ADMIN — PROPOFOL 150 MCG/KG/MIN: 10 INJECTION, EMULSION INTRAVENOUS at 09:19

## 2022-10-01 NOTE — ANESTHESIA POSTPROCEDURE EVALUATION
Patient: Amanda Sherwood    Procedure Summary     Date: 10/01/22 Room / Location:  [facility]    Anesthesia Start: 0913 Anesthesia Stop: 0938    Procedure: COLONOSCOPY TO CECUM/TI WITH BIOPSY AND POLYPECTOMY ( COLD BX) (N/A ) Diagnosis:       Symptomatic anemia      (Symptomatic anemia [D64.9])    Surgeons: Jovanny Simms MD Provider: Domingo Cervantes MD    Anesthesia Type: MAC ASA Status: 3          Anesthesia Type: MAC    Vitals  Vitals Value Taken Time   /73 10/01/22 0956   Temp     Pulse 71 10/01/22 0956   Resp 16 10/01/22 0956   SpO2 100 % 10/01/22 0956           Post Anesthesia Care and Evaluation    Patient location during evaluation: PACU  Patient participation: complete - patient participated  Level of consciousness: awake  Pain score: 1  Pain management: adequate    Airway patency: patent  Anesthetic complications: No anesthetic complications  PONV Status: none  Cardiovascular status: acceptable  Respiratory status: acceptable  Hydration status: acceptable

## 2022-10-01 NOTE — ANESTHESIA PREPROCEDURE EVALUATION
Anesthesia Evaluation     Patient summary reviewed                Airway   No difficulty expected  Dental      Pulmonary     breath sounds clear to auscultation  (+) pulmonary embolism,   Cardiovascular     ECG reviewed  Rhythm: regular    (+) hypertension,       Neuro/Psych  GI/Hepatic/Renal/Endo    (+)  GERD,  renal disease CRI,     Musculoskeletal     (+) neck pain,   Abdominal    Substance History      OB/GYN          Other                        Anesthesia Plan    ASA 3     MAC       Anesthetic plan, risks, benefits, and alternatives have been provided, discussed and informed consent has been obtained with: patient.        CODE STATUS:    Code Status (Patient has no pulse and is not breathing): CPR (Attempt to Resuscitate)  Medical Interventions (Patient has pulse or is breathing): Full Support

## 2022-10-01 NOTE — OUTREACH NOTE
Prep Survey    Flowsheet Row Responses   [facility] [place] patient discharged from? [place]   Is LACE score < 7 ? Yes   Emergency Room discharge w/ pulse ox? No   Eligibility [facility]   Date of Admission 09/27/22   Date of Discharge 10/01/22   Discharge Disposition [facility]-Health Care [facility] [place] [facility]   Discharge diagnosis COLONOSCOPY TO CECUM/TI WITH BIOPSY AND POLYPECTOMY    Does the patient have one of the following disease processes/diagnoses(primary or secondary)? Other   Does the patient have Home health ordered? Yes   What is the Home health agency?  [facility]   Is there a DME ordered? Yes   What DME was ordered? [employer]   Prep survey completed? Yes          CHRISTOPHER LEAL - Registered Nurse

## 2022-10-02 ENCOUNTER — HOME CARE VISIT (OUTPATIENT)
Dept: HOME HEALTH SERVICES | Facility: HOME HEALTHCARE | Age: 62
End: 2022-10-02

## 2022-10-02 VITALS
TEMPERATURE: 97.3 F | RESPIRATION RATE: 18 BRPM | HEART RATE: 75 BPM | DIASTOLIC BLOOD PRESSURE: 61 MMHG | SYSTOLIC BLOOD PRESSURE: 115 MMHG | OXYGEN SATURATION: 99 %

## 2022-10-02 PROCEDURE — G0151 HHCP-SERV OF PT,EA 15 MIN: HCPCS

## 2022-10-03 ENCOUNTER — TRANSITIONAL CARE MANAGEMENT TELEPHONE ENCOUNTER (OUTPATIENT)
Dept: CALL CENTER | Facility: HOSPITAL | Age: 62
End: 2022-10-03

## 2022-10-03 ENCOUNTER — TELEPHONE (OUTPATIENT)
Dept: CARDIOLOGY | Facility: CLINIC | Age: 62
End: 2022-10-03

## 2022-10-03 ENCOUNTER — DOCUMENTATION (OUTPATIENT)
Dept: ONCOLOGY | Facility: CLINIC | Age: 62
End: 2022-10-03

## 2022-10-03 NOTE — TELEPHONE ENCOUNTER
I called and spoke with her.  Decided not to restart eliquis - not needed at this time.  Restart low dose asa in a couple of weeks.      When is her next appt with me or aprn?     rm

## 2022-10-03 NOTE — HOME HEALTH
"Reason for referral: 62 yo F referred to home health physical therapy with decreased ability to transfer and amb related to recent hospitalization for anemia and hematoma in [place]    Subjective:\"I just want to walk again.\" per patient    Past Medical History: stage 3 chronic kidney disease, h/o of pulmonary embolism, GERD, HTN, OA, anxiety disorder    Previous level of function: IND with amb without device, IND with ADL's/    Home environment/support: lives with spouse who provides assist as needed. Bed/full bath upstairs. Has FWW    Assessment: Skilled physical therapy 3w1, 2w3 is needed due to decreased ability to transfer and amb related to recent hospitalization for anemia and hematoma in [place] for evaluation, gait training, transfer training, pain/edema management, fall prevention, HEP instruction. Patient agrees with PT plan of care."

## 2022-10-03 NOTE — PROGRESS NOTES
I have openings on October 28.  I could see her then with a CBC, stat ferritin, stat iron panel, reticulate hemoglobin that day.    In the meantime, she could also see a nurse practitioner with a CBC, stat ferritin, stat iron panel, reticulate hemoglobin 2 weeks from now.  If on that visit iron labs are normal and Hb is stable, Eliquis could potentially be restarted.    ===View-only below this line===  ----- Message -----  From: Carol Ramírez RegSched Rep  Sent: 10/3/2022   9:17 AM EDT  To: Lan Villafuerte II, MD, Kings Murray MD    Good morning!  Please see staff message below and advise an appropriate date for pt to follow up and I will schedule accordingly.  Thank you   ----- Message -----  From: Kings Murray MD  Sent: 10/3/2022   9:08 AM EDT  To: Binu Nava    Please check with Dr. Villafuerte when he would be able to see patient.    Thank you    ----- Message -----  From: Carol Ramírez RegSched Rep  Sent: 10/3/2022   9:03 AM EDT  To: Kings Murray MD    Good morning!  Staff message sent to MD  for follow up appt due to MD booked.  Thank you   ----- Message -----  From: Kings Murray MD  Sent: 10/1/2022   2:21 PM EDT  To: [facility]  [facility]    Patient is being discharged from [facility].  Please schedule her to see Dr. Villafuerte in about 2 weeks with CBC ferritin and iron panel.    Thank you

## 2022-10-03 NOTE — OUTREACH NOTE
"Call Center TCM Note    Flowsheet Row Responses   [facility] patient discharged from? [place]   Does the patient have one of the following disease processes/diagnoses(primary or secondary)? Other   TCM attempt successful? Yes   Call start time 0957   Call end time 0959   Discharge diagnosis COLONOSCOPY TO CECUM/TI WITH BIOPSY AND POLYPECTOMY    Meds reviewed with patient/caregiver? Yes   Is the patient having any side effects they believe may be caused by any medication additions or changes? No   Does the patient have all medications ordered at discharge? Yes   Is the patient taking all medications as directed (includes completed medication regime)? Yes   Comments States she needs to look at her other appts and then will make one.   What is the Home health agency?  [facility]   Has home health visited the patient within 72 hours of discharge? Yes   What DME was ordered? [facility]   Has all DME been delivered? No   DME comments States had a walker so did not need a new one.   Psychosocial issues? No   Did the patient receive a copy of their discharge instructions? Yes   Nursing interventions Reviewed instructions with patient   What is the patient's perception of their health status since discharge? Improving   Is the patient/caregiver able to teach back signs and symptoms related to disease process for when to call PCP? Yes   Is the patient/caregiver able to teach back signs and symptoms related to disease process for when to call 911? Yes   Is the patient/caregiver able to teach back the hierarchy of who to call/visit for symptoms/problems? PCP, Specialist, Home health nurse, Urgent Care, ED, 911 Yes   If the patient is a current smoker, are they able to teach back resources for cessation? Not a smoker   Additional teach back comments States \"I'm doing as well as can be expected\"   TCM call completed? Yes   Wrap up additional comments Denies questions or needs at this time          Gwen Milner LPN    10/3/2022, " 10:00 EDT

## 2022-10-04 ENCOUNTER — TELEPHONE (OUTPATIENT)
Dept: FAMILY MEDICINE CLINIC | Facility: CLINIC | Age: 62
End: 2022-10-04

## 2022-10-04 DIAGNOSIS — N18.31 STAGE 3A CHRONIC KIDNEY DISEASE (CKD): Primary | ICD-10-CM

## 2022-10-04 DIAGNOSIS — N30.00 ACUTE CYSTITIS WITHOUT HEMATURIA: Primary | ICD-10-CM

## 2022-10-04 LAB
CCP IGA+IGG SERPL IA-ACNC: 3 UNITS (ref 0–19)
LAB AP CASE REPORT: NORMAL
LAB AP DIAGNOSIS COMMENT: NORMAL
PATH REPORT.FINAL DX SPEC: NORMAL
PATH REPORT.GROSS SPEC: NORMAL

## 2022-10-04 RX ORDER — CEFDINIR 300 MG/1
300 CAPSULE ORAL 2 TIMES DAILY
Qty: 10 CAPSULE | Refills: 0 | Status: SHIPPED | OUTPATIENT
Start: 2022-10-04 | End: 2022-10-09

## 2022-10-04 NOTE — TELEPHONE ENCOUNTER
Caller: Amanda Sherwood    Relationship: Self    Best call back number: [phone]    What medication are you requesting: ANTIBIOTIC     What are your current symptoms: BURNING WHEN URINATING, FREQUENCY, DARK URINE (BOUBACAR COLOR)    How long have you been experiencing symptoms: 10/3/22    If a prescription is needed, what is your preferred pharmacy and phone number:  [facility] [id number] - [facility] [address] [place] [address] AT [facility] [phone] [facility] [phone]   [phone]    Additional notes: PATIENT STATES THAT SHE CANNOT COME IN FOR AN APPOINTMENT DUE TO HER BEING DISCHARGED FROM THE HOSPITAL 10/2/22 AND NOT HAVING A WHEELCHAIR.

## 2022-10-05 ENCOUNTER — HOME CARE VISIT (OUTPATIENT)
Dept: HOME HEALTH SERVICES | Facility: HOME HEALTHCARE | Age: 62
End: 2022-10-05

## 2022-10-05 VITALS
HEART RATE: 87 BPM | DIASTOLIC BLOOD PRESSURE: 81 MMHG | OXYGEN SATURATION: 99 % | RESPIRATION RATE: 18 BRPM | TEMPERATURE: 97.4 F | SYSTOLIC BLOOD PRESSURE: 103 MMHG

## 2022-10-05 PROCEDURE — G0151 HHCP-SERV OF PT,EA 15 MIN: HCPCS

## 2022-10-05 NOTE — HOME HEALTH
Subjective: Patient reports that she continues to feel really weak overall. Patient began to have s/s of a UTI so her MD prescribed an antibiotic.    Assessment: Added antibiotic to her medication list. Patient did well today but fatigued very quickly, she had some signs of swelling in her hands and legs.     Plan for Next Visit:  - gait training  - ther ex

## 2022-10-06 ENCOUNTER — TELEPHONE (OUTPATIENT)
Dept: CARDIOLOGY | Facility: CLINIC | Age: 62
End: 2022-10-06

## 2022-10-06 ENCOUNTER — HOME CARE VISIT (OUTPATIENT)
Dept: HOME HEALTH SERVICES | Facility: HOME HEALTHCARE | Age: 62
End: 2022-10-06

## 2022-10-06 ENCOUNTER — TELEPHONE (OUTPATIENT)
Dept: GASTROENTEROLOGY | Facility: CLINIC | Age: 62
End: 2022-10-06

## 2022-10-06 PROCEDURE — G0152 HHCP-SERV OF OT,EA 15 MIN: HCPCS

## 2022-10-06 RX ORDER — SPIRONOLACTONE 25 MG/1
25 TABLET ORAL DAILY
Qty: 90 TABLET | Refills: 3 | Status: SHIPPED | OUTPATIENT
Start: 2022-10-06 | End: 2022-10-13 | Stop reason: ALTCHOICE

## 2022-10-06 NOTE — TELEPHONE ENCOUNTER
Patient called stating she were  in the hospital and at home now but while in the hospital they adjusted her medications. Patient states she is only on Metoprolol 75 mg at bedtime. Patient states her blood pressure this morning was 156/110 and this afternoon was 137/104. Patient states she feels like her blood pressure medication needs to be adjusted again.     #  [phone]    Please Advise

## 2022-10-06 NOTE — TELEPHONE ENCOUNTER
See if she can see maryam next week - I sent in spironolactone 25mg daily - in addition to metoprolol.

## 2022-10-06 NOTE — TELEPHONE ENCOUNTER
----- Message from Jill Blake MD sent at 10/5/2022 12:21 PM EDT -----  Biopsies from her EGD demonstrate mild gastritis, benign fundic gland polyps.  Normal small bowel    Follow-up in office as scheduled October 31, thanks

## 2022-10-07 ENCOUNTER — TELEPHONE (OUTPATIENT)
Dept: ONCOLOGY | Facility: CLINIC | Age: 62
End: 2022-10-07

## 2022-10-07 ENCOUNTER — HOME CARE VISIT (OUTPATIENT)
Dept: HOME HEALTH SERVICES | Facility: HOME HEALTHCARE | Age: 62
End: 2022-10-07

## 2022-10-07 VITALS
OXYGEN SATURATION: 100 % | SYSTOLIC BLOOD PRESSURE: 118 MMHG | DIASTOLIC BLOOD PRESSURE: 84 MMHG | HEART RATE: 79 BPM | RESPIRATION RATE: 18 BRPM | TEMPERATURE: 98 F

## 2022-10-07 PROCEDURE — G0151 HHCP-SERV OF PT,EA 15 MIN: HCPCS

## 2022-10-07 NOTE — HOME HEALTH
Subjective: Patient reports that she is feeling tired today but was able to stand for longer periods of time this am.     Assessment: Patient was very upset today due to not being able to eat and her overall decline in mobility however today she did better with her endurance and overall exercises.      Plan for Next Visit:  - gait training  - progress standing HEP

## 2022-10-07 NOTE — HOME HEALTH
"Subjective:  Pt. reports that she is doing \"OK\".  She reports that her BP was elevated earlier - she has contacted cardiologist and is awaiting call back.    No falls or medication changes reported.    Reason for referral:  OT home health referral given d/t recent hospitalization for anemia and hematoma in [place]     Past Medical History: stage 3 chronic kidney disease, h/o of pulmonary embolism, GERD, HTN, OA, anxiety disorder     Previous level of function: Pt. was Indep. with ADL, simple IADL tasks, amb without device.  She reports that she was having PT outpatient and has had generalized weakness &nno energy for over a year.    Home environment: :Pt. lives with spouse in 2 story home.  Pt's. bedroom, bathroom is upstairs.  Pt. is sleeping in recliner sofa, utilizing half bath for sponge bathing.  Spouse provides assist as needed and completes all IADL tasks..Pt. reports supportive family.    Plan for next visit:  OT to continue with established POC with focus on UE strengthening, standing and activity tolerance, and improve ADL performance."

## 2022-10-07 NOTE — TELEPHONE ENCOUNTER
Caller: Amanda Sherwood    Relationship to patient: Self    Best call back number: [phone]    Chief complaint: PATIENT TO RESCHEDULE FROM 10/18/22    Type of visit: LAB AND FU W/ APRN    Requested date: CAN NOT DO WEDNESDAYS.        Additional notes: PATIENT SEES MD. CODE ON 10/28/22 . WOULD LIKE TO VERIFY IF ADDITIONAL FU W/ APRN IS NEEDED

## 2022-10-07 NOTE — TELEPHONE ENCOUNTER
Spoke to patient to inform her of spirolactone sent to her pharmacy as well as scheduled patient to be seen on 10/13/22 with BALJIT. Patient gave a verbal understanding.

## 2022-10-08 VITALS
SYSTOLIC BLOOD PRESSURE: 135 MMHG | HEART RATE: 79 BPM | RESPIRATION RATE: 18 BRPM | TEMPERATURE: 97.3 F | OXYGEN SATURATION: 99 % | DIASTOLIC BLOOD PRESSURE: 87 MMHG

## 2022-10-10 ENCOUNTER — OFFICE VISIT (OUTPATIENT)
Dept: FAMILY MEDICINE CLINIC | Facility: CLINIC | Age: 62
End: 2022-10-10

## 2022-10-10 VITALS
WEIGHT: 170 LBS | HEART RATE: 79 BPM | BODY MASS INDEX: 28.32 KG/M2 | OXYGEN SATURATION: 97 % | TEMPERATURE: 96.6 F | HEIGHT: 65 IN | DIASTOLIC BLOOD PRESSURE: 82 MMHG | SYSTOLIC BLOOD PRESSURE: 120 MMHG

## 2022-10-10 DIAGNOSIS — D64.9 SYMPTOMATIC ANEMIA: Primary | ICD-10-CM

## 2022-10-10 DIAGNOSIS — M79.652 PAIN OF LEFT THIGH: ICD-10-CM

## 2022-10-10 PROCEDURE — 99495 TRANSJ CARE MGMT MOD F2F 14D: CPT | Performed by: STUDENT IN AN ORGANIZED HEALTH CARE EDUCATION/TRAINING PROGRAM

## 2022-10-10 PROCEDURE — 1111F DSCHRG MED/CURRENT MED MERGE: CPT | Performed by: STUDENT IN AN ORGANIZED HEALTH CARE EDUCATION/TRAINING PROGRAM

## 2022-10-10 NOTE — ASSESSMENT & PLAN NOTE
Hemoglobin on discharge 9.3. Asymptomatic and remains asymptomatic.  DDx for anemia includes blood loss (left thigh hematoma) or hypoproliferative disorder.   GI consulted inpatient - EGD/cscope negative for signs of bleeding.  Iron studies consistent with anemia of chronic disease. Other vitamins/mineral labs normal with exception of low folate. On supplementation.  Patient follows with Hematology for leukopenia already. Plans to see at the end of the month.   Patient also follows with GI. Plans to see at the end of the month.  Patient also follows with Cardiology. To see later this week. Currently off eliquis. On ASA 81mg alone. Will defer AC plan to cardiology at this time.   Instructed patient to call if returning symptoms so we could obtain CBC and possibly manage outpatient. Patient declines repeat lab work today and is asymptomatic.

## 2022-10-10 NOTE — ASSESSMENT & PLAN NOTE
Continues to have achey-soreness in left thigh. Tender to palpation on exam. She does have palpable tightness over that region. I am concerned for left thigh hematoma, especially given she was on eliquis. She has dependent ecchymoses in left lower extremity that also make me suspect hematoma. No hematoma noted on lower extremity duplex during hospitalization. Pain is not worse with ambulation so I am less concerned for PAD/claudication. Instructed patient to keep legs elevated, can use compression to help as well.

## 2022-10-10 NOTE — PROGRESS NOTES
Transitional Care Follow Up Visit  Subjective     Amanda Sherwood is a 61 y.o. female who presents for a transitional care management visit.    Within 48 business hours after discharge our office contacted her via telephone to coordinate her care and needs.      I reviewed and discussed the details of that call along with the discharge summary, hospital problems, inpatient lab results, inpatient diagnostic studies, and consultation reports with Amanda.     Current outpatient and discharge medications have been reconciled for the patient.  Reviewed by: Elsie Underwood MD      Date of TCM Phone Call 10/1/2022   [facility]   Date of Admission 9/27/2022   Date of Discharge 10/1/2022   Discharge Disposition Home-Health Care [facility]     Risk for Readmission (LACE) Score: 6 (10/1/2022  6:00 AM)      History of Present Illness  61-year-old female with history of pulmonary embolism, hypertension, hyperlipidemia, GERD, chronic nausea and vomiting, CKD, anxiety who presents for follow-up after hospitalization.  See below for course during hospitalization.    Since discharge, patient has been working with physical therapy and has been progressing nicely.  She is still unable to climb stairs but is able to walk short distances on the first level of her home.  Continues to have difficulty with sleep but is curious as if this has to do with her sleeping on the couch currently.  Denies shortness of breath.  She does have achiness in her lower extremities.  It is worse with sitting.  Does not occur with ambulation.  She sits with her legs elevated which improves pain.  She has resolving ecchymoses worse on the left lower extremity in the more dependent regions.  She has tenderness to palpation in the left thigh where suspected hematoma present.    In regards to nausea and vomiting, she states that she is eating a little better.  Able to keep a 3 ensures a day down without vomiting.  She states that it is less  difficulty with abdominal pain or early satiety but more dysgeusia.  She does feel hungry but becomes disgusted with bad taste upon eating.      Course During Hospital Stay: Patient was admitted 9/27/2022-10/1/2022 for weakness and shortness of breath for 1 week.  She also noticed bruising of her legs, L>R.  In the emergency room she was found to have hemoglobin of 8.7 (baseline 13).  GI was consulted and performed EGD on 9/29 and colonoscopy on 10/1 -both showed findings consistent with chronic inflammation but no signs of bleeding.  She was transfused 2 units and her Eliquis was stopped.  Source of bleeding was not found.  Findings on exam concerning for hematoma of the left lower extremity (spontaneous from Eliquis?).  LDH normal which goes against hemolysis.  Reticulocyte index less than 2 which indicates hypoproliferation.    Work-up during hospitalization included CT abdomen pelvis showing diffuse hepatic steatosis, CAD, degenerative disc disease.  Bilateral lower extremity duplex normal.  TTE with normal EF and diastolic function.  Labs showed anemia and leukopenia.  She also had hyponatremia and CKD (at baseline).  Iron studies consistent with anemia of chronic disease.  Folate low.  TSH, B12, B6, zinc wnl.  CRP elevated at 3.8.  ESR, RF, anti-CCP, anti-ds DNA, RNP antibody, Smith antibody, anti scl70, SSA/SSB, anticentromere, Jihan antibody, anti-MPO, anti-WA-3, c-ANCA, p-ANCA negative. Flow cytometry normal.     Patient was discharged on aspirin 81 mg daily, folate, sodium bicarbonate 650 mg 3 times daily.  Her metoprolol xl was decreased to 75 mg daily.  All other medications were kept the same.  Upon discharge blood pressure was slightly elevated on decreased dose of metoprolol.  She was started on spironolactone by cardiology.  Much better controlled since that time.     The following portions of the patient's history were reviewed and updated as appropriate: allergies, current medications, past family  history, past medical history, past social history, past surgical history and problem list.    Review of Systems   Constitutional: Positive for fatigue. Negative for chills and fever.   Respiratory: Negative for shortness of breath.    Cardiovascular: Negative for chest pain, palpitations and leg swelling.   Gastrointestinal: Negative for abdominal pain, constipation, diarrhea, nausea and vomiting.   Musculoskeletal: Positive for arthralgias, gait problem and myalgias.   Neurological: Positive for weakness. Negative for dizziness.   Hematological: Bruises/bleeds easily.       Objective   Physical Exam  Constitutional:       General: She is not in acute distress.  Eyes:      Conjunctiva/sclera: Conjunctivae normal.   Cardiovascular:      Rate and Rhythm: Normal rate and regular rhythm.   Pulmonary:      Effort: Pulmonary effort is normal. No respiratory distress.      Breath sounds: Normal breath sounds.   Skin:     General: Skin is warm and dry.      Comments: Patient does have resolving ecchymoses in the lower extremities bilaterally.  On the right it is just above patella, on the left it is more extensive and travels down the left lower extremity.   Neurological:      Mental Status: She is alert and oriented to person, place, and time.   Psychiatric:         Mood and Affect: Mood normal.         Behavior: Behavior normal.         Assessment & Plan   Problems Addressed this Visit        Hematology and Neoplasia    Symptomatic anemia - Primary     Hemoglobin on discharge 9.3. Asymptomatic and remains asymptomatic.  DDx for anemia includes blood loss (left thigh hematoma) or hypoproliferative disorder.   GI consulted inpatient - EGD/cscope negative for signs of bleeding.  Iron studies consistent with anemia of chronic disease. Other vitamins/mineral labs normal with exception of low folate. On supplementation.  Patient follows with Hematology for leukopenia already. Plans to see at the end of the month.   Patient  also follows with GI. Plans to see at the end of the month.  Patient also follows with Cardiology. To see later this week. Currently off eliquis. On ASA 81mg alone. Will defer AC plan to cardiology at this time.   Instructed patient to call if returning symptoms so we could obtain CBC and possibly manage outpatient. Patient declines repeat lab work today and is asymptomatic.              Musculoskeletal and Injuries    Pain of left thigh     Continues to have achey-soreness in left thigh. Tender to palpation on exam. She does have palpable tightness over that region. I am concerned for left thigh hematoma, especially given she was on eliquis. She has dependent ecchymoses in left lower extremity that also make me suspect hematoma. No hematoma noted on lower extremity duplex during hospitalization. Pain is not worse with ambulation so I am less concerned for PAD/claudication. Instructed patient to keep legs elevated, can use compression to help as well.         Diagnoses       Codes Comments    Symptomatic anemia    -  Primary ICD-10-CM: D64.9  ICD-9-CM: 285.9     Pain of left thigh     ICD-10-CM: M79.652  ICD-9-CM: 729.5

## 2022-10-11 ENCOUNTER — HOME CARE VISIT (OUTPATIENT)
Dept: HOME HEALTH SERVICES | Facility: HOME HEALTHCARE | Age: 62
End: 2022-10-11

## 2022-10-11 VITALS
DIASTOLIC BLOOD PRESSURE: 74 MMHG | SYSTOLIC BLOOD PRESSURE: 112 MMHG | TEMPERATURE: 97.7 F | OXYGEN SATURATION: 100 % | RESPIRATION RATE: 18 BRPM | HEART RATE: 82 BPM

## 2022-10-11 PROCEDURE — G0151 HHCP-SERV OF PT,EA 15 MIN: HCPCS

## 2022-10-11 NOTE — HOME HEALTH
Subjective: Patient reports that she is feeling a little better with more energy but she continues to have a lot difficulty with endurance, she reports that she has been walking some without the walker.     Assessment: Patient is making great progress with her mobility, she greeted me at the door with no AD and had much more energy for her mobility.     Plan for Next Visit:  - gait training  - endurance training

## 2022-10-12 ENCOUNTER — HOME CARE VISIT (OUTPATIENT)
Dept: HOME HEALTH SERVICES | Facility: HOME HEALTHCARE | Age: 62
End: 2022-10-12

## 2022-10-13 ENCOUNTER — OFFICE VISIT (OUTPATIENT)
Dept: CARDIOLOGY | Facility: CLINIC | Age: 62
End: 2022-10-13

## 2022-10-13 VITALS
HEIGHT: 65 IN | HEART RATE: 74 BPM | DIASTOLIC BLOOD PRESSURE: 70 MMHG | BODY MASS INDEX: 28.32 KG/M2 | WEIGHT: 170 LBS | SYSTOLIC BLOOD PRESSURE: 110 MMHG

## 2022-10-13 DIAGNOSIS — I49.3 PVCS (PREMATURE VENTRICULAR CONTRACTIONS): ICD-10-CM

## 2022-10-13 DIAGNOSIS — R26.89 BALANCE PROBLEMS: ICD-10-CM

## 2022-10-13 DIAGNOSIS — I10 PRIMARY HYPERTENSION: ICD-10-CM

## 2022-10-13 DIAGNOSIS — D64.9 SYMPTOMATIC ANEMIA: Primary | ICD-10-CM

## 2022-10-13 DIAGNOSIS — T14.8XXA BRUISING: ICD-10-CM

## 2022-10-13 DIAGNOSIS — E78.2 MIXED HYPERLIPIDEMIA: ICD-10-CM

## 2022-10-13 DIAGNOSIS — Z86.711 HISTORY OF PULMONARY EMBOLISM: ICD-10-CM

## 2022-10-13 DIAGNOSIS — R06.02 SHORTNESS OF BREATH: ICD-10-CM

## 2022-10-13 PROBLEM — F10.939 ALCOHOL WITHDRAWAL SYNDROME WITH COMPLICATION: Status: RESOLVED | Noted: 2019-08-13 | Resolved: 2022-10-13

## 2022-10-13 PROBLEM — R11.2 NAUSEA AND VOMITING: Status: RESOLVED | Noted: 2022-08-08 | Resolved: 2022-10-13

## 2022-10-13 PROBLEM — I26.94 MULTIPLE SUBSEGMENTAL PULMONARY EMBOLI WITHOUT ACUTE COR PULMONALE: Status: RESOLVED | Noted: 2021-08-27 | Resolved: 2022-10-13

## 2022-10-13 PROBLEM — R60.0 LOWER EXTREMITY EDEMA: Status: RESOLVED | Noted: 2019-06-06 | Resolved: 2022-10-13

## 2022-10-13 PROCEDURE — 93000 ELECTROCARDIOGRAM COMPLETE: CPT | Performed by: NURSE PRACTITIONER

## 2022-10-13 PROCEDURE — 99214 OFFICE O/P EST MOD 30 MIN: CPT | Performed by: NURSE PRACTITIONER

## 2022-10-13 RX ORDER — METOPROLOL SUCCINATE 25 MG/1
75 TABLET, EXTENDED RELEASE ORAL NIGHTLY
Qty: 270 TABLET | Refills: 3 | Status: SHIPPED | OUTPATIENT
Start: 2022-10-13

## 2022-10-13 NOTE — PROGRESS NOTES
Date of Office Visit: 10/13/2022  Encounter Provider: OLVIN Perea  Place of Service: [facility] CARDIOLOGY  Patient Name: Amanda Sherwood  :1960  Primary Cardiologist: Dr. Yue Maradiaga    Chief Complaint   Patient presents with   • Hospital Follow Up Visit   • Shortness of Breath   :     HPI: Amanda Sherwood is a pleasant 61 y.o. female who presents today for follow-up on shortness of breath, hypertension, and recent hospitalization.  I have reviewed her medical records.    She has been diagnosed with hypertension and hyperlipidemia.  She has had palpitations in which she has been diagnosed with paroxysmal supraventricular tachycardia and premature ventricular contractions.    In 2021, she had a GI virus with severe vomiting and lost 20 pounds in a week.  A week later she was diagnosed with acute kidney injury and a UTI.  At the end of 2021, she was diagnosed with pulmonary emboli and treated with apixaban.  In 2021, echocardiogram showed normal LVEF, mild aortic valve calcification, trace MR, and trace TR.  Right and left heart catheterization showed normal coronary arteries and normal right and left-sided pressures.    In 2022, she presented to the [facility] ED with weakness and shortness of breath.  She was found to have symptomatic anemia and received blood transfusion.  Her symptoms resolved.  She also had hematoma in the back of her legs from ongoing use of apixaban.  She was recommended to hold the apixaban until she discussed with her cardiologist.    Dr. Maradiaga decided not to restart the apixaban and restart aspirin 81 mg daily in a couple of weeks.  She recently contacted our office with concerns over elevated blood pressure.  Dr. Maradiaga started spironolactone 25 mg daily in addition to the metoprolol.    She presents today for a follow-up visit and her  is accompanying her.  She says her shortness of  "breath has resolved.  She says it appears that she is short of breath when talking, but she has not.  Since the hospitalization, she has felt \"off balance\".  On a brief occasion she may experience a palpitation, but nothing sustained.  Her bruising has improved off apixaban.  She says she stretches really \"hard at nighttime and thinks that the bad bruising of her legs was from that.  Her blood pressure is well controlled.  She never started the spironolactone because her blood pressure went back to normal.  She denies chest pain, edema, syncope, or bleeding.  Blood pressure and heart rate are normal today.      Past Medical History:   Diagnosis Date   • Acute kidney injury (HCC)    • Allergic    • Anxiety 3 years ago   • Depression    • GERD (gastroesophageal reflux disease)    • H/O Leukopenia    • Hernia    • History of anemia    • History of bronchitis    • History of migraine    • Hyperlipidemia     NO MEDS CURRENTLY. WORKING ON   • Hypertension    • Leukopenia     PER HISTORY. HAS SEEN CBC IN THE PAST   • Neck pain    • Obesity (BMI 30.0-34.9) 6/6/2019   • Osteoarthritis    • PVC (premature ventricular contraction)    • Seasonal allergies    • Symptomatic anemia 9/27/2022   • Torn meniscus     LEFT   • Tremor 3 years ago       Past Surgical History:   Procedure Laterality Date   • BREAST BIOPSY Bilateral     BENIGN. HORACE BREAST   • BUNIONECTOMY Left    • BUNIONECTOMY Left     REVISION   • CARDIAC CATHETERIZATION N/A 10/28/2021    Procedure: Coronary angiography;  Surgeon: Vanessa Bennett MD;  Location: [facility] CATH INVASIVE LOCATION;  Service: Cardiovascular;  Laterality: N/A;   • CARDIAC CATHETERIZATION N/A 10/28/2021    Procedure: Left heart cath;  Surgeon: Vanessa Bennett MD;  Location: [facility] CATH INVASIVE LOCATION;  Service: Cardiovascular;  Laterality: N/A;   • CARDIAC CATHETERIZATION N/A 10/28/2021    Procedure: Right heart cath;  Surgeon: Vanessa Bennett MD;  Location: [facility] CATH INVASIVE LOCATION;  " Service: Cardiovascular;  Laterality: N/A;   • COLONOSCOPY N/A 2020    Procedure: COLONOSCOPY TO CECUM AND INTO TERMINAL ILEUM WITH COLD BIOPSY POLYPECTOMY;  Surgeon: Munira Montes MD;  [facility];  Service: Gastroenterology   • COLONOSCOPY N/A 10/1/2022    Procedure: COLONOSCOPY TO CECUM/TI WITH BIOPSY AND POLYPECTOMY ( COLD BX);  Surgeon: Jovanny Simms MD;  [facility];  Service: Gastroenterology;  Laterality: N/A;  ANEMIA  POST: COLON POLYP; TEXTURE MUCOSAL CHANGES IN ASCENDING COLON; MODERATE PREP; INTERNAL HEMORRHOIDS   • DILATATION AND CURETTAGE      AUB no cancer   • ENDOSCOPY N/A 2020    Procedure: ESOPHAGOGASTRODUODENOSCOPY WITH COLD BIOPSIES;  Surgeon: Munira Montes MD;  [facility];  Service: Gastroenterology   • ENDOSCOPY N/A 2022    Procedure: ESOPHAGOGASTRODUODENOSCOPY with cold biopsies;  Surgeon: Jill Blake MD;  Location: [facility] ENDOSCOPY;  Service: Gastroenterology;  Laterality: N/A;  pre: ANEMIA, H/O GASTROPARESIS AND NAUSEA VOMITTING  post: gastritis, hiatal hernia, z-line@38cm, gastric body and fundus polpys, small bowel granularity   • KNEE ARTHROSCOPY Left 2018    Procedure: KNEE ARTHROSCOPY LATERAL MENISECTOMY DEBRIDEMENT OF MEDIAL FEMORAL CONDYLE DEFECT DEBRIDEMENT OF ARTHRITIS;  Surgeon: Graciela Crockett MD;  Location: [facility] OR [facility];  Service: Orthopedics   • MENISCECTOMY Left    • SALPINGO OOPHORECTOMY Left    • TONSILLECTOMY  as a child   • TUBAL ABDOMINAL LIGATION     • UPPER GASTROINTESTINAL ENDOSCOPY     • WISDOM TOOTH EXTRACTION         Social History     Socioeconomic History   • Marital status:    • Number of children: 2   • Years of education: College   Tobacco Use   • Smoking status: Former     Packs/day: 0.50     Years: 4.00     Pack years: 2.00     Types: Cigarettes     Quit date: 1987     Years since quittin.9   • Smokeless tobacco: Never   Vaping Use   • Vaping Use: Never used    Substance and Sexual Activity   • Alcohol use: Yes     Alcohol/week: 2.0 standard drinks     Types: 2 Cans of beer per week     Comment: caffeine - None   • Drug use: Yes     Types: Marijuana     Comment: not anymore   • Sexual activity: Yes     Partners: Male     Birth control/protection: Post-menopausal       Family History   Problem Relation Age of Onset   • Dementia Mother    • Arthritis Mother    • Other Mother         Alzheimer's   • Stroke Father    • Heart disease Father    • Hypertension Father    • Diabetes Father    • Cancer Father    • Colon cancer Father    • Hyperlipidemia Father    • Colon polyps Father    • Breast cancer Sister 65   • Atrial fibrillation Sister    • Alcohol abuse Sister    • Asthma Sister    • Hypertension Sister    • Ovarian cancer Maternal Aunt    • Prostate cancer Brother    • Malig Hyperthermia Neg Hx        The following portion of the patient's history were reviewed and updated as appropriate: past medical history, past surgical history, past social history, past family history, allergies, current medications, and problem list.    Review of Systems   Constitutional: Negative.   Cardiovascular: Negative.    Respiratory: Negative.    Hematologic/Lymphatic: Bruises/bleeds easily.   Neurological: Positive for loss of balance.       Allergies   Allergen Reactions   • Celecoxib Hives and Swelling     hives   • Amlodipine Swelling   • Ace Inhibitors Cough   • Lisinopril Cough         Current Outpatient Medications:   •  albuterol sulfate  (90 Base) MCG/ACT inhaler, Inhale 2 puffs into the lungs every 4 hours as needed., Disp: 18 g, Rfl: 10  •  aspirin (aspirin) 81 MG EC tablet, Take 1 tablet by mouth Daily., Disp: 30 tablet, Rfl: 0  •  buPROPion XL (WELLBUTRIN XL) 300 MG 24 hr tablet, Take 1 tablet by mouth Every Morning., Disp: 90 tablet, Rfl: 0  •  cetirizine (zyrTEC) 10 MG tablet, Take 10 mg by mouth As Needed., Disp: , Rfl:   •  folic acid (FOLVITE) 1 MG tablet, Take 1  "tablet by mouth Daily., Disp: 30 tablet, Rfl: 0  •  LORazepam (ATIVAN) 0.5 MG tablet, Take 0.5 mg by mouth Daily As Needed for Anxiety., Disp: , Rfl:   •  metoclopramide (REGLAN) 10 MG tablet, TAKE ONE TABLET BY MOUTH THREE TIMES A DAY, Disp: 90 tablet, Rfl: 0  •  metoprolol succinate XL (TOPROL-XL) 25 MG 24 hr tablet, Take 3 tablets by mouth Every Night., Disp: 270 tablet, Rfl: 3  •  omeprazole (priLOSEC) 40 MG capsule, TAKE ONE CAPSULE BY MOUTH TWICE A DAY BEFORE MEALS, Disp: 60 capsule, Rfl: 2  •  ondansetron (ZOFRAN) 4 MG tablet, Take 1 tablet by mouth Every 8 (Eight) Hours As Needed for Nausea., Disp: 60 tablet, Rfl: 1  •  sodium bicarbonate 650 MG tablet, Take 1 tablet by mouth 3 (Three) Times a Day., Disp: 90 tablet, Rfl: 0        Objective:     Vitals:    10/13/22 1230   BP: 110/70   BP Location: Left arm   Patient Position: Sitting   Cuff Size: Adult   Pulse: 74   Weight: 77.1 kg (170 lb)   Height: 165.1 cm (65\")     Body mass index is 28.29 kg/m².    PHYSICAL EXAM:    Vitals Reviewed.   General Appearance: No acute distress, well developed and well nourished.    Eyes: Conjunctiva and lids: No erythema, swelling, or discharge. Sclera non-icteric. Glasses.   HENT: Atraumatic, normocephalic. External eyes, ears, and nose normal. No hearing loss noted. Mucous membranes normal. Lips not cyanotic. Neck supple with no tenderness. Wearing mask.   Respiratory: No signs of respiratory distress. Respiration rhythm and depth normal.   Clear to auscultation. No rales, crackles, rhonchi, or wheezing auscultated.   Cardiovascular:  Jugular Venous Pressure: Normal  Heart Rate and Rhythm: Normal, Heart Sounds: Normal S1 and S2. No S3 or S4 noted.  Murmurs: No murmurs noted. No rubs, thrills, or gallops.   Lower Extremities: Bilateral trace lower extremity edema.  Gastrointestinal:  Abdomen soft, non-distended, non-tender.    Musculoskeletal: Normal movement of extremities.  Skin and Nails: General appearance normal. No " pallor, cyanosis, diaphoresis. Skin temperature normal. No clubbing of fingernails.   Psychiatric: Patient alert and oriented to person, place, and time. Speech and behavior appropriate. Normal mood and affect.       ECG 12 Lead    Date/Time: 10/13/2022 12:34 PM  Performed by: Soo Pryor APRN  Authorized by: Soo Pryor APRN   Comparison: compared with previous ECG from 9/27/2022  Similar to previous ECG  Rhythm: sinus rhythm  Rate: normal  BPM: 74  Conduction: conduction normal  ST Segments: ST segments normal  T Waves: T waves normal  QRS axis: normal    Clinical impression: normal ECG              Assessment:       Diagnosis Plan   1. Symptomatic anemia        2. Shortness of breath        3. Primary hypertension        4. History of pulmonary embolism        5. Bruising        6. PVCs (premature ventricular contractions)        7. Mixed hyperlipidemia        8. Balance problems               Plan:       1/2.  She was recently hospitalized for shortness of breath and symptomatic anemia.  Her symptoms have resolved and hemoglobin has normalized.  She will be following up with hematology next week.    3.  Hypertension: BP well controlled.  Her blood pressure normalized so she did not start the spironolactone.  I have taken it off her list.    4/5.  History of Pulmonary Embolism: Occurred in August 2021.  She completed her course of apixaban and was having severe bruising.  Dr. Maradiaga stopped the apixaban and she is now taking aspirin 81 mg daily.  Bruising has improved.    6.  History of PVCs.  Reports rare palpitations.    7.  Hyperlipidemia: On atorvastatin.    8.  She has been feeling off balance, but denies lightheadedness or dizziness.    9.  She would like to follow-up with Dr. Maradiaga in 6 months.    As always, it has been a pleasure to participate in your patient's care. Thank you.       Sincerely,         OLVIN Short  [facility] Cardiology      · Dictated  utilizing Dragon Dictation  · COVID-19 Precautions - Patient was compliant in wearing a mask. When I saw the patient, I used appropriate personal protective equipment (PPE) including mask and eye shield (standard procedure).  Additionally, I used gown and gloves if indicated.  Hand hygiene was completed before and after seeing the patient.  · I spent 32 minutes reviewing her medical records/testing/previous office notes/labs, face-to-face interaction with patient, physical examination, formulating the plan of care, and discussion of plan of care with patient.

## 2022-10-14 ENCOUNTER — HOME CARE VISIT (OUTPATIENT)
Dept: HOME HEALTH SERVICES | Facility: HOME HEALTHCARE | Age: 62
End: 2022-10-14

## 2022-10-14 VITALS
RESPIRATION RATE: 18 BRPM | TEMPERATURE: 96.8 F | OXYGEN SATURATION: 98 % | DIASTOLIC BLOOD PRESSURE: 60 MMHG | HEART RATE: 78 BPM | SYSTOLIC BLOOD PRESSURE: 118 MMHG

## 2022-10-14 PROCEDURE — G0151 HHCP-SERV OF PT,EA 15 MIN: HCPCS

## 2022-10-14 NOTE — HOME HEALTH
"Subjective; \" I slept ion the couch and now my neck is hurting so I cannot do much. But I did go up and down the stairs.\"    Plan for next visit: Review HEP and possibly discharge."

## 2022-10-18 ENCOUNTER — HOME CARE VISIT (OUTPATIENT)
Dept: HOME HEALTH SERVICES | Facility: HOME HEALTHCARE | Age: 62
End: 2022-10-18

## 2022-10-18 VITALS
DIASTOLIC BLOOD PRESSURE: 94 MMHG | TEMPERATURE: 97.9 F | RESPIRATION RATE: 18 BRPM | SYSTOLIC BLOOD PRESSURE: 137 MMHG | HEART RATE: 85 BPM | OXYGEN SATURATION: 100 %

## 2022-10-18 PROCEDURE — G0151 HHCP-SERV OF PT,EA 15 MIN: HCPCS

## 2022-10-18 NOTE — PROGRESS NOTES
.     REASON FOR FOLLOWUP :   Pulmonary embolism    HISTORY OF PRESENT ILLNESS:  The patient is a 61 y.o. year old female  who is here for follow-up with the above-mentioned history.    She returns today for hospital follow-up.  She was hospitalized from 9/27/2022 - 10/1/2022 for symptomatic anemia.  She received blood transfusion with improvement in her symptoms and hemoglobin.  She was evaluated by GI who did upper and lower endoscopy with no evidence of active bleeding.  Patient did have bruising to the lower extremities upon admission, so Eliquis was held.  Bilateral lower extremity Doppler 9/28/2022 was negative.  Patient was discharged home on aspirin 81 mg daily, and told to hold Eliquis until follow-up with hematology outpatient.    Today, she is seen with her  present.  She has been working with physical therapy several times a week, and feels she is starting to regain some strength.  She does report today, that she is feeling much more fatigued than usual.  She continues on aspirin 81 mg daily.  She continues on aspirin without signs or symptoms of bleeding.  Eating and drinking adequately.  Bowels moving regularly.  Denies fever or chills.  Denies nausea or vomiting.  Denies signs or symptoms of bleeding.    She was evaluated by cardiology on 10/13/2022 who recommended continuing aspirin.  They plan to follow-up with her in 6 months.    Past Medical History:   Diagnosis Date   • Acute kidney injury (HCC)    • Allergic    • Anxiety 3 years ago   • Depression    • GERD (gastroesophageal reflux disease)    • H/O Leukopenia    • Hernia    • History of anemia    • History of bronchitis    • History of migraine    • Hyperlipidemia     NO MEDS CURRENTLY. WORKING ON   • Hypertension    • Leukopenia     PER HISTORY. HAS SEEN CBC IN THE PAST   • Neck pain    • Obesity (BMI 30.0-34.9) 6/6/2019   • Osteoarthritis    • PVC (premature ventricular contraction)    • Seasonal allergies    • Symptomatic anemia  9/27/2022   • Torn meniscus     LEFT   • Tremor 3 years ago     Past Surgical History:   Procedure Laterality Date   • BREAST BIOPSY Bilateral     BENIGN. HORACE BREAST   • BUNIONECTOMY Left    • BUNIONECTOMY Left     REVISION   • CARDIAC CATHETERIZATION N/A 10/28/2021    Procedure: Coronary angiography;  Surgeon: Vanessa Bennett MD;  Location: [facility] CATH INVASIVE LOCATION;  Service: Cardiovascular;  Laterality: N/A;   • CARDIAC CATHETERIZATION N/A 10/28/2021    Procedure: Left heart cath;  Surgeon: Vanessa Bennett MD;  Location: [facility] CATH INVASIVE LOCATION;  Service: Cardiovascular;  Laterality: N/A;   • CARDIAC CATHETERIZATION N/A 10/28/2021    Procedure: Right heart cath;  Surgeon: Vanessa Bennett MD;  Location: [facility] CATH INVASIVE LOCATION;  Service: Cardiovascular;  Laterality: N/A;   • COLONOSCOPY N/A 2/1/2020    Procedure: COLONOSCOPY TO CECUM AND INTO TERMINAL ILEUM WITH COLD BIOPSY POLYPECTOMY;  Surgeon: Munira Montes MD;  Location: [facility] ENDOSCOPY;  Service: Gastroenterology   • COLONOSCOPY N/A 10/1/2022    Procedure: COLONOSCOPY TO CECUM/TI WITH BIOPSY AND POLYPECTOMY ( COLD BX);  Surgeon: Jovanny Simms MD;  Location: [facility] ENDOSCOPY;  Service: Gastroenterology;  Laterality: N/A;  ANEMIA  POST: COLON POLYP; TEXTURE MUCOSAL CHANGES IN ASCENDING COLON; MODERATE PREP; INTERNAL HEMORRHOIDS   • DILATATION AND CURETTAGE      AUB no cancer   • ENDOSCOPY N/A 2/1/2020    Procedure: ESOPHAGOGASTRODUODENOSCOPY WITH COLD BIOPSIES;  Surgeon: Munira Montes MD;  Location: [facility] ENDOSCOPY;  Service: Gastroenterology   • ENDOSCOPY N/A 9/29/2022    Procedure: ESOPHAGOGASTRODUODENOSCOPY with cold biopsies;  Surgeon: Jill Blake MD;  Location: [facility] ENDOSCOPY;  Service: Gastroenterology;  Laterality: N/A;  pre: ANEMIA, H/O GASTROPARESIS AND NAUSEA VOMITTING  post: gastritis, hiatal hernia, z-line@38cm, gastric body and fundus polpys, small bowel granularity   • KNEE ARTHROSCOPY Left 8/22/2018     Procedure: KNEE ARTHROSCOPY LATERAL MENISECTOMY DEBRIDEMENT OF MEDIAL FEMORAL CONDYLE DEFECT DEBRIDEMENT OF ARTHRITIS;  Surgeon: Graciela Crockett MD;  [place] [facility] [place] [facility]  Service: Orthopedics   • MENISCECTOMY Left    • SALPINGO OOPHORECTOMY Left    • TONSILLECTOMY  as a child   • TUBAL ABDOMINAL LIGATION     • UPPER GASTROINTESTINAL ENDOSCOPY     • WISDOM TOOTH EXTRACTION         MEDICATIONS    Current Outpatient Medications:   •  albuterol sulfate  (90 Base) MCG/ACT inhaler, Inhale 2 puffs into the lungs every 4 hours as needed., Disp: 18 g, Rfl: 10  •  aspirin (aspirin) 81 MG EC tablet, Take 1 tablet by mouth Daily., Disp: 30 tablet, Rfl: 0  •  buPROPion XL (WELLBUTRIN XL) 300 MG 24 hr tablet, Take 1 tablet by mouth Every Morning., Disp: 90 tablet, Rfl: 0  •  cetirizine (zyrTEC) 10 MG tablet, Take 10 mg by mouth As Needed., Disp: , Rfl:   •  folic acid (FOLVITE) 1 MG tablet, Take 1 tablet by mouth Daily., Disp: 30 tablet, Rfl: 0  •  LORazepam (ATIVAN) 0.5 MG tablet, Take 0.5 mg by mouth Daily As Needed for Anxiety., Disp: , Rfl:   •  metoclopramide (REGLAN) 10 MG tablet, TAKE ONE TABLET BY MOUTH THREE TIMES A DAY, Disp: 90 tablet, Rfl: 0  •  metoprolol succinate XL (TOPROL-XL) 25 MG 24 hr tablet, Take 3 tablets by mouth Every Night., Disp: 270 tablet, Rfl: 3  •  omeprazole (priLOSEC) 40 MG capsule, TAKE ONE CAPSULE BY MOUTH TWICE A DAY BEFORE MEALS, Disp: 60 capsule, Rfl: 2  •  ondansetron (ZOFRAN) 4 MG tablet, Take 1 tablet by mouth Every 8 (Eight) Hours As Needed for Nausea., Disp: 60 tablet, Rfl: 1  •  sodium bicarbonate 650 MG tablet, Take 1 tablet by mouth 3 (Three) Times a Day., Disp: 90 tablet, Rfl: 0    ALLERGIES:     Allergies   Allergen Reactions   • Celecoxib Hives and Swelling     hives   • Amlodipine Swelling   • Ace Inhibitors Cough   • Lisinopril Cough       SOCIAL HISTORY:       Social History     Socioeconomic History   • Marital status:    • Number of children: 2   • Years of  education: College   Tobacco Use   • Smoking status: Former     Packs/day: 0.50     Years: 4.00     Pack years: 2.00     Types: Cigarettes     Quit date: 1987     Years since quittin.9   • Smokeless tobacco: Never   Vaping Use   • Vaping Use: Never used   Substance and Sexual Activity   • Alcohol use: Yes     Alcohol/week: 2.0 standard drinks     Types: 2 Cans of beer per week     Comment: caffeine - None   • Drug use: Yes     Types: Marijuana     Comment: not anymore   • Sexual activity: Yes     Partners: Male     Birth control/protection: Post-menopausal         FAMILY HISTORY:  Family History   Problem Relation Age of Onset   • Dementia Mother    • Arthritis Mother    • Other Mother         Alzheimer's   • Stroke Father    • Heart disease Father    • Hypertension Father    • Diabetes Father    • Cancer Father    • Colon cancer Father    • Hyperlipidemia Father    • Colon polyps Father    • Breast cancer Sister 65   • Atrial fibrillation Sister    • Alcohol abuse Sister    • Asthma Sister    • Hypertension Sister    • Ovarian cancer Maternal Aunt    • Prostate cancer Brother    • Malig Hyperthermia Neg Hx      REVIEW OF SYSTEMS:  Review of Systems   Constitutional: Negative for activity change.   HENT: Negative for nosebleeds and trouble swallowing.    Respiratory: Negative for shortness of breath and wheezing.    Cardiovascular: Negative for palpitations.   Gastrointestinal: Negative for constipation, diarrhea and nausea.   Genitourinary: Negative for dysuria and hematuria.   Musculoskeletal: Negative for arthralgias and myalgias.   Skin: Negative for rash and wound.   Neurological: Negative for seizures and syncope.   Hematological: Negative for adenopathy. Does not bruise/bleed easily.   Psychiatric/Behavioral: Negative for confusion.          Vitals:    10/20/22 1336   BP: 172/96   Pulse: 96   Resp: 16   Temp: 98.4 °F (36.9 °C)   TempSrc: Temporal   SpO2: 97%   Weight: 77.7 kg (171 lb 6.4 oz)  "  Height: 165.4 cm (65.12\")   PainSc: 0-No pain     Current Status 10/20/2022   ECOG score 0      PHYSICAL EXAM:      CONSTITUTIONAL:  Vital signs reviewed.  No distress, looks comfortable.  EYES:  Conjunctiva and lids unremarkable.  PERRLA  EARS,NOSE,MOUTH,THROAT:  Ears and nose appear unremarkable.  Lips, teeth, gums appear unremarkable.  RESPIRATORY:  Normal respiratory effort.  Lungs clear to auscultation bilaterally.  CARDIOVASCULAR:  Normal S1, S2.  No murmurs rubs or gallops.  No significant lower extremity edema.  GASTROINTESTINAL: Abdomen appears unremarkable.  Nontender.  No hepatomegaly.  No splenomegaly.  LYMPHATIC:  No cervical, supraclavicular, axillary lymphadenopathy.  SKIN:  Warm.  No rashes.  PSYCHIATRIC:  Normal judgment and insight.  Normal mood and affect.        RECENT LABS:        WBC   Date Value Ref Range Status   10/20/2022 1.37 (C) 3.40 - 10.80 10*3/mm3 Final   10/01/2022 2.17 (L) 3.40 - 10.80 10*3/mm3 Final   09/30/2022 1.75 (C) 3.40 - 10.80 10*3/mm3 Final   09/29/2022 1.95 (C) 3.40 - 10.80 10*3/mm3 Final   09/28/2022 1.99 (C) 3.40 - 10.80 10*3/mm3 Final   09/27/2022 2.87 (L) 3.40 - 10.80 10*3/mm3 Final   03/07/2022 4.08 3.40 - 10.80 10*3/mm3 Final   10/26/2021 4.58 3.40 - 10.80 10*3/mm3 Final   09/27/2021 3.47 3.40 - 10.80 10*3/mm3 Final   08/29/2021 3.26 (L) 3.40 - 10.80 10*3/mm3 Final   08/28/2021 3.15 (L) 3.40 - 10.80 10*3/mm3 Final   08/27/2021 4.73 3.40 - 10.80 10*3/mm3 Final   08/16/2021 3.23 (L) 3.40 - 10.80 10*3/mm3 Final   08/07/2021 3.81 3.40 - 10.80 10*3/mm3 Final   08/06/2021 4.96 3.40 - 10.80 10*3/mm3 Final   06/20/2019 4.42 3.40 - 10.80 10*3/mm3 Final   06/19/2019 3.96 (L) 4.5 - 11.0 10*3/uL Final   06/18/2019 3.68 3.40 - 10.80 10*3/mm3 Final   09/22/2018 3.86 (L) 4.50 - 10.70 10*3/mm3 Final   08/15/2018 2.74 (L) 4.50 - 10.70 10*3/mm3 Final   10/27/2016 3.65 (L) 4.50 - 10.70 10*3/mm3 Final   09/13/2016 2.42 (L) 4.50 - 10.70 10*3/mm3 Final   04/29/2016 3.05 (L) 4.50 - 10.70 " 10*3/mm3 Final     Hemoglobin   Date Value Ref Range Status   10/20/2022 10.4 (L) 12.0 - 15.9 g/dL Final   10/01/2022 9.3 (L) 12.0 - 15.9 g/dL Final   09/30/2022 9.4 (L) 12.0 - 15.9 g/dL Final   09/30/2022 9.3 (L) 12.0 - 15.9 g/dL Final   09/29/2022 10.2 (L) 12.0 - 15.9 g/dL Final   09/28/2022 10.0 (L) 12.0 - 15.9 g/dL Final   09/27/2022 8.7 (L) 12.0 - 15.9 g/dL Final   03/07/2022 13.1 12.0 - 15.9 g/dL Final   10/28/2021 13.3 12.0 - 17.0 g/dL Final   10/28/2021 13.3 12.0 - 17.0 g/dL Final   10/26/2021 14.6 12.0 - 15.9 g/dL Final   09/27/2021 14.1 12.0 - 15.9 g/dL Final   08/29/2021 14.0 12.0 - 15.9 g/dL Final   08/28/2021 14.1 12.0 - 15.9 g/dL Final   08/27/2021 15.4 12.0 - 15.9 g/dL Final   08/16/2021 13.4 12.0 - 15.9 g/dL Final   08/07/2021 12.9 12.0 - 15.9 g/dL Final   08/06/2021 14.9 12.0 - 15.9 g/dL Final   06/20/2019 13.7 12.0 - 15.9 g/dL Final   06/19/2019 14.6 12.0 - 16.0 g/dL Final   06/18/2019 12.9 12.0 - 15.9 g/dL Final   09/22/2018 14.7 11.9 - 15.5 g/dL Final   08/15/2018 13.8 11.9 - 15.5 g/dL Final   10/27/2016 13.8 11.9 - 15.5 g/dL Final   09/13/2016 13.6 11.9 - 15.5 g/dL Final   04/29/2016 13.5 11.9 - 15.5 g/dL Final     Platelets   Date Value Ref Range Status   10/20/2022 162 140 - 450 10*3/mm3 Final   10/01/2022 176 140 - 450 10*3/mm3 Final   09/30/2022 186 140 - 450 10*3/mm3 Final   09/29/2022 177 140 - 450 10*3/mm3 Final   09/28/2022 167 140 - 450 10*3/mm3 Final   09/27/2022 232 140 - 450 10*3/mm3 Final   03/07/2022 185 140 - 450 10*3/mm3 Final   10/26/2021 194 140 - 450 10*3/mm3 Final   09/27/2021 178 140 - 450 10*3/mm3 Final   08/29/2021 174 140 - 450 10*3/mm3 Final   08/28/2021 186 140 - 450 10*3/mm3 Final   08/27/2021 233 140 - 450 10*3/mm3 Final   08/16/2021 181 140 - 450 10*3/mm3 Final   08/07/2021 259 140 - 450 10*3/mm3 Final   08/06/2021 373 140 - 450 10*3/mm3 Final   06/20/2019 180 140 - 450 10*3/mm3 Final   06/19/2019 184 140 - 440 10*3/uL Final   06/18/2019 160 140 - 450 10*3/mm3 Final    09/22/2018 169 140 - 500 10*3/mm3 Final   08/15/2018 164 140 - 500 10*3/mm3 Final   10/27/2016 161 140 - 500 10*3/mm3 Final   09/13/2016 176 140 - 500 10*3/mm3 Final   04/29/2016 187 140 - 500 10*3/mm3 Final       Assessment & Plan   Neutropenia, unspecified type (HCC)  - CT Guided Biopsy Bone Marrow  - Tissue Pathology Exam  - Bone Marrow Exam  - Flow Cytometry        Amanda Sherwood   *Bilateral lower lobe pulmonary emboli on CT 8/27/2021.  · Doppler left leg 8/17/2021: Normal  · Doppler bilateral legs 8/28/2021: Normal  · CT chest angiogram 8/27/2021: Bilateral lower lobe PEs.   · 3/14/2022: Completed 6 months therapeutic Eliquis.  This was associated with a reversible risk factor: Immobility from GI illness.  However, she states due to ongoing nausea she is not very mobile and does not want to stop Eliquis.  Therefore, decreased Eliquis 2.5 mg twice per day (prophylactic dose).  If she becomes more mobile in the future, it would be reasonable to stop Eliquis and begin aspirin 81 mg daily.  • Patient was hospitalized from 9/27/2022 - 10/1/2022 for symptomatic anemia.  She had bruising to the lower extremities, so Eliquis was held.  GI performed upper and lower endoscopies which were negative for active bleeding.  Patient received 1 unit packed red blood cells with improvement in her hemoglobin and symptoms.  She was discharged on aspirin, told to hold Eliquis.  • 10/20/2022: Returns for hospital follow-up.  Much more active, working with PT several times a week.  Was seen by cardiology last week who recommended continuing aspirin.  Also advised the patient to continue aspirin since she has been more active, will not restart Eliquis at this time.    *Risk factors for clotting:  · Overweight  · Was not on HRT.  (Understands should not take any HRT in the future).  · Only smoked a little in high school and college.  No smoking since.  · No prior personal history of clots  · No family history of  clots  · Immobility.  July 2021 GI virus: Lost 20 pounds.  4-day admission for FANNY and UTI.  Subsequently at home, in bed x5 days late July 2021.  Then, bed to couch x2 weeks.  · Unremarkable: Prothrombin gene mutation, factor V Leiden, Antithrombin, protein C activity, protein S activity, anticardiolipin antibodies, beta-2 glycoprotein antibodies, lupus anticoagulant.  Although protein S free initially low at less than 10% on 9/27/2021, repeat, on 10/25/2021 and 3/7/2022 protein S studies normal.    *Prior Protein S low free level  · Protein S free <10% on 9/27/2021 (protein S activity >150%).  · Typically all types of protein S deficiency have a decreased activity  · Repeat protein S testing of free and activity unremarkable on 10/5/2021 and 3/7/2022.  Therefore, the prior low value is not felt to be significant    *Chronic leukopenia.  • WBC was in the 9421-9727 range dating back to 2016.  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  • CT abdomen pelvis on 8/8/2021 showed normal spleen.  • WBC was 2870 with an ANC of 2020 on admission Setpember 27 2022  • RYAN, ANCA panel, anti-CCP antibody were obtained.  • Peripheral blood flow cytometry from 9/29/2022 revealed no evidence of leukemia or lymphoma.  • Reassured the patient about the result of the flow cytometry showing no evidence of leukemia.  • WBC today 1.3, ANC 0.83.  Discussed with Dr. Villafuerte, who recommends the patient proceed with bone marrow biopsy to rule out blood disorder.  Discussed with the patient and her  today, who are agreeable.  Orders will be placed.     *Iron deficiency anemia.  • This was previously attributed to blood losses from her menstrual cycles.  • Hemoglobin was 8.7 on admission on 9/27/2022.  • Patient had EGD on 9/29/2022.  • Pathology exam revealed inflammation of the stomach, no active bleeding.  • No evidence of malignancy  • Hemoglobin today improved to 10.4.  Ferritin 1197.3, iron saturation 26, TIBC  323.    *Folate deficiency anemia.  • Folate level was 2.55 on 9/28/2022.  • She is on folic acid 1 mg daily.    *Nausea  Present since the GI illness.  Worse since around January 2022.  Following with GI.  Was found to have some gastroparesis.    *Unsteadiness on feet, overall fatigue and weakness.  · On day of initial consult, 9/27/2021: Difficulty getting up and down off the examining table.  Unsteady on feet.  I recommended a cane or wheelchair.  She declined.  · She is planning physical therapy.  States she has been trying to walk but cannot walk more than 1/4 mile.  I recommended she use her stationary bike multiple times per day to try to build up strength.    *Improved, but ongoing SOA and chest discomfort.  · She follows with cardiology and pulmonology and states they are aware.  · She states Dr. Jordon Adams prescribed Symbicort and a rescue inhaler.  · She states cardiology told her an echocardiogram shows pulmonary hypertension.  · After the PE, she followed up with Dr. Jordon Adams.  She states he asked her to do a test to look for asthma.  She is worried about having the test done and therefore has not followed up with him.  She states she will follow up with him.    *Possible pulmonary nodule, LLL on CT 8/27/2021  · Small new 6 mm nodular density LLL, radiologist recommends follow-up.  · PCP did a follow-up CT.  · CT 2/14/2022: Resolution.  However, the radiologist recommended to consider follow-up CT in 1 year.  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  · She no longer works.  States she is unable to work due to the nausea    Plan:   · Continue aspirin 81 mg daily.  We do not plan to resume Eliquis at this time.  · Return in 1-2 weeks for CBC with RN review to monitor counts.  · Orders placed for bone marrow biopsy due to neutropenia and anemia.  Patient and  agreeable with this.  · Return 2 weeks after  bone marrow biopsy for follow-up with Dr. Villafuerte to review results (2 units).  · Continue folic acid 1 mg daily.    The patient was discussed with Dr. Villafuerte, who is in agreement with today's plan of care.    I spent 45 minutes caring for Amanda on this date of service. This time includes time spent by me in the following activities: preparing for the visit, reviewing tests, obtaining and/or reviewing a separately obtained history, performing a medically appropriate examination and/or evaluation, counseling and educating the patient/family/caregiver, ordering medications, tests, or procedures, documenting information in the medical record and care coordination.     Lynn Rivera, APRN  10/20/22

## 2022-10-18 NOTE — HOME HEALTH
Subjective: Patient reports that she has been able to go up and down the stairs with her  and is now sleeping upstairs.  She complains of continued weakness and some balance difficulties.     Assessment: Patient is making great progress with her overall strength and endurance, she is able to ascend and descend full flight of stairs now 3-4 times a day.  She continues to have difficulty with eating due to decreased appetite and altered sense of taste.     Plan for Next Visit:  - gait training

## 2022-10-19 ENCOUNTER — HOME CARE VISIT (OUTPATIENT)
Dept: HOME HEALTH SERVICES | Facility: HOME HEALTHCARE | Age: 62
End: 2022-10-19

## 2022-10-20 ENCOUNTER — OFFICE VISIT (OUTPATIENT)
Dept: ONCOLOGY | Facility: CLINIC | Age: 62
End: 2022-10-20

## 2022-10-20 ENCOUNTER — LAB (OUTPATIENT)
Dept: OTHER | Facility: HOSPITAL | Age: 62
End: 2022-10-20

## 2022-10-20 ENCOUNTER — DOCUMENTATION (OUTPATIENT)
Dept: ONCOLOGY | Facility: CLINIC | Age: 62
End: 2022-10-20

## 2022-10-20 VITALS
RESPIRATION RATE: 16 BRPM | TEMPERATURE: 98.4 F | HEIGHT: 65 IN | OXYGEN SATURATION: 97 % | DIASTOLIC BLOOD PRESSURE: 96 MMHG | SYSTOLIC BLOOD PRESSURE: 172 MMHG | BODY MASS INDEX: 28.56 KG/M2 | HEART RATE: 96 BPM | WEIGHT: 171.4 LBS

## 2022-10-20 DIAGNOSIS — N18.31 STAGE 3A CHRONIC KIDNEY DISEASE (CKD): ICD-10-CM

## 2022-10-20 DIAGNOSIS — D64.9 ANEMIA, UNSPECIFIED TYPE: ICD-10-CM

## 2022-10-20 DIAGNOSIS — I26.94 MULTIPLE SUBSEGMENTAL PULMONARY EMBOLI WITHOUT ACUTE COR PULMONALE: ICD-10-CM

## 2022-10-20 DIAGNOSIS — D70.9 NEUTROPENIA, UNSPECIFIED TYPE: Primary | ICD-10-CM

## 2022-10-20 LAB
BASOPHILS # BLD AUTO: 0 10*3/MM3 (ref 0–0.2)
BASOPHILS NFR BLD AUTO: 0 % (ref 0–1.5)
DEPRECATED RDW RBC AUTO: 52.9 FL (ref 37–54)
EOSINOPHIL # BLD AUTO: 0 10*3/MM3 (ref 0–0.4)
EOSINOPHIL NFR BLD AUTO: 0 % (ref 0.3–6.2)
ERYTHROCYTE [DISTWIDTH] IN BLOOD BY AUTOMATED COUNT: 15.1 % (ref 12.3–15.4)
FERRITIN SERPL-MCNC: 1197.3 NG/ML (ref 13–150)
HCT VFR BLD AUTO: 32.9 % (ref 34–46.6)
HGB BLD-MCNC: 10.4 G/DL (ref 12–15.9)
HGB RETIC QN AUTO: 32.3 PG (ref 29.8–36.1)
IMM GRANULOCYTES # BLD AUTO: 0.01 10*3/MM3 (ref 0–0.05)
IMM GRANULOCYTES NFR BLD AUTO: 0.7 % (ref 0–0.5)
IMM RETICS NFR: 27.9 % (ref 3–15.8)
IRON 24H UR-MRATE: 83 MCG/DL (ref 37–145)
IRON SATN MFR SERPL: 26 % (ref 20–50)
LYMPHOCYTES # BLD AUTO: 0.25 10*3/MM3 (ref 0.7–3.1)
LYMPHOCYTES NFR BLD AUTO: 18.2 % (ref 19.6–45.3)
MCH RBC QN AUTO: 30.2 PG (ref 26.6–33)
MCHC RBC AUTO-ENTMCNC: 31.6 G/DL (ref 31.5–35.7)
MCV RBC AUTO: 95.6 FL (ref 79–97)
MONOCYTES # BLD AUTO: 0.28 10*3/MM3 (ref 0.1–0.9)
MONOCYTES NFR BLD AUTO: 20.4 % (ref 5–12)
NEUTROPHILS NFR BLD AUTO: 0.83 10*3/MM3 (ref 1.7–7)
NEUTROPHILS NFR BLD AUTO: 60.7 % (ref 42.7–76)
NRBC BLD AUTO-RTO: 0 /100 WBC (ref 0–0.2)
PLAT MORPH BLD: NORMAL
PLATELET # BLD AUTO: 162 10*3/MM3 (ref 140–450)
PMV BLD AUTO: 10.7 FL (ref 6–12)
RBC # BLD AUTO: 3.44 10*6/MM3 (ref 3.77–5.28)
RBC MORPH BLD: NORMAL
RETICS # AUTO: 0.12 10*6/MM3 (ref 0.02–0.13)
RETICS/RBC NFR AUTO: 3.35 % (ref 0.7–1.9)
TIBC SERPL-MCNC: 323 MCG/DL (ref 298–536)
TRANSFERRIN SERPL-MCNC: 217 MG/DL (ref 200–360)
WBC MORPH BLD: NORMAL
WBC NRBC COR # BLD: 1.37 10*3/MM3 (ref 3.4–10.8)

## 2022-10-20 PROCEDURE — 85046 RETICYTE/HGB CONCENTRATE: CPT | Performed by: INTERNAL MEDICINE

## 2022-10-20 PROCEDURE — 85025 COMPLETE CBC W/AUTO DIFF WBC: CPT | Performed by: INTERNAL MEDICINE

## 2022-10-20 PROCEDURE — 84466 ASSAY OF TRANSFERRIN: CPT | Performed by: INTERNAL MEDICINE

## 2022-10-20 PROCEDURE — 99215 OFFICE O/P EST HI 40 MIN: CPT | Performed by: NURSE PRACTITIONER

## 2022-10-20 PROCEDURE — 85007 BL SMEAR W/DIFF WBC COUNT: CPT | Performed by: INTERNAL MEDICINE

## 2022-10-20 PROCEDURE — 36415 COLL VENOUS BLD VENIPUNCTURE: CPT

## 2022-10-20 PROCEDURE — 82728 ASSAY OF FERRITIN: CPT | Performed by: INTERNAL MEDICINE

## 2022-10-20 PROCEDURE — 83540 ASSAY OF IRON: CPT | Performed by: INTERNAL MEDICINE

## 2022-10-20 NOTE — PROGRESS NOTES
WBC has been lower recently.  She saw Lynn Rivera today in our office, for hospital follow-up.  She will arrange a CT-guided bone marrow biopsy and I will see her to review the results

## 2022-10-21 ENCOUNTER — HOME CARE VISIT (OUTPATIENT)
Dept: HOME HEALTH SERVICES | Facility: HOME HEALTHCARE | Age: 62
End: 2022-10-21

## 2022-10-21 NOTE — CASE COMMUNICATION
Dear Dr. Rodriguez,    The physical therapy visit on 10/21/22 for the above patient was missed due to patient not feeling well, therefore, the prescribed frequency of visits was not met.    If you have questions or would like further information about this patient, please contact us at [phone].    Regards,   Adina Pat, PT

## 2022-10-21 NOTE — HOME HEALTH
OT spoke with patient to schedule OT visit.  Pt. reports that she is doing much better and declines the need for further OT services.  She reports being able to complete all ADL tasks including bathing in shower which is on 2nd floor.   Therefore, OT services to be discharged this date without visit.

## 2022-10-24 ENCOUNTER — HOME CARE VISIT (OUTPATIENT)
Dept: HOME HEALTH SERVICES | Facility: HOME HEALTHCARE | Age: 62
End: 2022-10-24

## 2022-10-24 ENCOUNTER — TELEPHONE (OUTPATIENT)
Dept: FAMILY MEDICINE CLINIC | Facility: CLINIC | Age: 62
End: 2022-10-24

## 2022-10-24 NOTE — TELEPHONE ENCOUNTER
Patient has requested to cancel all PT appointments for this week. Patient is extremely tired and has a bone marrow treatment scheduled. Verbal orders given to continue PT next week.

## 2022-10-24 NOTE — CASE COMMUNICATION
Dear Dr. Underwood,    The physical therapy visit on 10/24/22 for the above patient was missed due to patient not feeling well, therefore, the prescribed frequency of visits was not met.    If you have questions or would like further information about this patient, please contact us at [phone].    Regards,   Adina Pat, PT

## 2022-10-24 NOTE — CASE COMMUNICATION
Patient called me and said that she is sick due to an upper respiratory infection and is having a bone marrow biopsy later this week.  She wants to hold on therapy this week and then restart next week. She had been making great progress but looks like over the weekend she had a decline is now dependent on her walker again.  I got new orders to start PT again next week to continue to work on improving strength and endurance.

## 2022-10-27 ENCOUNTER — HOME CARE VISIT (OUTPATIENT)
Dept: HOME HEALTH SERVICES | Facility: HOME HEALTHCARE | Age: 62
End: 2022-10-27

## 2022-10-28 ENCOUNTER — APPOINTMENT (OUTPATIENT)
Dept: OTHER | Facility: HOSPITAL | Age: 62
End: 2022-10-28

## 2022-11-01 ENCOUNTER — HOME CARE VISIT (OUTPATIENT)
Dept: HOME HEALTH SERVICES | Facility: HOME HEALTHCARE | Age: 62
End: 2022-11-01

## 2022-11-02 NOTE — CASE COMMUNICATION
Patient missed a physical therapy  visit from [facility] on 11/1/22    Reason: Patient declined further PT / home health agency services..       For your records only.   As per home health protocol, MD must be notified of missed/cancelled visits; therefore the prescribed frequency was not met.

## 2022-11-02 NOTE — PROGRESS NOTES
11/14/22 0001   Pre-Procedure Phone Call   Procedure Time Verified Yes   Arrival Time 0730   Procedure Location Verified Yes   Medical History Reviewed No   NPO Status Reinforced Yes   Ride and Caregiver Arranged Yes   Patient Knows to Bring Current Medications No   Bring Outside Films Requested No

## 2022-11-03 ENCOUNTER — APPOINTMENT (OUTPATIENT)
Dept: OTHER | Facility: HOSPITAL | Age: 62
End: 2022-11-03
Payer: COMMERCIAL

## 2022-11-03 ENCOUNTER — APPOINTMENT (OUTPATIENT)
Dept: ONCOLOGY | Facility: HOSPITAL | Age: 62
End: 2022-11-03
Payer: COMMERCIAL

## 2022-11-08 ENCOUNTER — APPOINTMENT (OUTPATIENT)
Dept: CT IMAGING | Facility: HOSPITAL | Age: 62
End: 2022-11-08

## 2022-11-14 ENCOUNTER — HOSPITAL ENCOUNTER (OUTPATIENT)
Dept: CT IMAGING | Facility: HOSPITAL | Age: 62
Discharge: HOME | End: 2022-11-14
Admitting: NURSE PRACTITIONER

## 2022-11-14 VITALS
TEMPERATURE: 98.6 F | HEIGHT: 65 IN | WEIGHT: 155 LBS | HEART RATE: 73 BPM | BODY MASS INDEX: 25.83 KG/M2 | OXYGEN SATURATION: 97 % | SYSTOLIC BLOOD PRESSURE: 162 MMHG | RESPIRATION RATE: 16 BRPM | DIASTOLIC BLOOD PRESSURE: 100 MMHG

## 2022-11-14 DIAGNOSIS — D70.9 NEUTROPENIA, UNSPECIFIED TYPE: ICD-10-CM

## 2022-11-14 LAB
BASOPHILS # BLD AUTO: 0.01 10*3/MM3 (ref 0–0.2)
BASOPHILS NFR BLD AUTO: 0.6 % (ref 0–1.5)
DEPRECATED RDW RBC AUTO: 45 FL (ref 37–54)
EOSINOPHIL # BLD AUTO: 0.02 10*3/MM3 (ref 0–0.4)
EOSINOPHIL NFR BLD AUTO: 1.2 % (ref 0.3–6.2)
ERYTHROCYTE [DISTWIDTH] IN BLOOD BY AUTOMATED COUNT: 13.2 % (ref 12.3–15.4)
HCT VFR BLD AUTO: 37.8 % (ref 34–46.6)
HGB BLD-MCNC: 12.6 G/DL (ref 12–15.9)
IMM GRANULOCYTES # BLD AUTO: 0 10*3/MM3 (ref 0–0.05)
IMM GRANULOCYTES NFR BLD AUTO: 0 % (ref 0–0.5)
INR PPP: 1.1 (ref 0.8–1.2)
LYMPHOCYTES # BLD AUTO: 0.44 10*3/MM3 (ref 0.7–3.1)
LYMPHOCYTES NFR BLD AUTO: 26.7 % (ref 19.6–45.3)
MCH RBC QN AUTO: 30.9 PG (ref 26.6–33)
MCHC RBC AUTO-ENTMCNC: 33.3 G/DL (ref 31.5–35.7)
MCV RBC AUTO: 92.6 FL (ref 79–97)
MONOCYTES # BLD AUTO: 0.26 10*3/MM3 (ref 0.1–0.9)
MONOCYTES NFR BLD AUTO: 15.8 % (ref 5–12)
NEUTROPHILS NFR BLD AUTO: 0.92 10*3/MM3 (ref 1.7–7)
NEUTROPHILS NFR BLD AUTO: 55.7 % (ref 42.7–76)
NRBC BLD AUTO-RTO: 0 /100 WBC (ref 0–0.2)
PLATELET # BLD AUTO: 171 10*3/MM3 (ref 140–450)
PMV BLD AUTO: 10.3 FL (ref 6–12)
PROTHROMBIN TIME: 13.6 SECONDS (ref 12.8–15.2)
RBC # BLD AUTO: 4.08 10*6/MM3 (ref 3.77–5.28)
WBC NRBC COR # BLD: 1.65 10*3/MM3 (ref 3.4–10.8)

## 2022-11-14 PROCEDURE — 88305 TISSUE EXAM BY PATHOLOGIST: CPT | Performed by: NURSE PRACTITIONER

## 2022-11-14 PROCEDURE — 25010000002 FENTANYL CITRATE (PF) 50 MCG/ML SOLUTION: Performed by: RADIOLOGY

## 2022-11-14 PROCEDURE — 99152 MOD SED SAME PHYS/QHP 5/>YRS: CPT

## 2022-11-14 PROCEDURE — 88184 FLOWCYTOMETRY/ TC 1 MARKER: CPT

## 2022-11-14 PROCEDURE — 88182 CELL MARKER STUDY: CPT

## 2022-11-14 PROCEDURE — 88264 CHROMOSOME ANALYSIS 20-25: CPT

## 2022-11-14 PROCEDURE — 88185 FLOWCYTOMETRY/TC ADD-ON: CPT

## 2022-11-14 PROCEDURE — 85610 PROTHROMBIN TIME: CPT

## 2022-11-14 PROCEDURE — 0 LIDOCAINE 1 % SOLUTION: Performed by: RADIOLOGY

## 2022-11-14 PROCEDURE — 88313 SPECIAL STAINS GROUP 2: CPT

## 2022-11-14 PROCEDURE — 88300 SURGICAL PATH GROSS: CPT | Performed by: NURSE PRACTITIONER

## 2022-11-14 PROCEDURE — 88323 CONSLTJ&REPRT MATRL PREP SLD: CPT

## 2022-11-14 PROCEDURE — 88311 DECALCIFY TISSUE: CPT | Performed by: NURSE PRACTITIONER

## 2022-11-14 PROCEDURE — 85025 COMPLETE CBC W/AUTO DIFF WBC: CPT | Performed by: RADIOLOGY

## 2022-11-14 PROCEDURE — 77012 CT SCAN FOR NEEDLE BIOPSY: CPT

## 2022-11-14 PROCEDURE — 25010000002 MIDAZOLAM PER 1 MG: Performed by: RADIOLOGY

## 2022-11-14 PROCEDURE — 88341 IMHCHEM/IMCYTCHM EA ADD ANTB: CPT | Performed by: NURSE PRACTITIONER

## 2022-11-14 PROCEDURE — 88341 IMHCHEM/IMCYTCHM EA ADD ANTB: CPT

## 2022-11-14 PROCEDURE — 88237 TISSUE CULTURE BONE MARROW: CPT

## 2022-11-14 PROCEDURE — 88342 IMHCHEM/IMCYTCHM 1ST ANTB: CPT | Performed by: NURSE PRACTITIONER

## 2022-11-14 PROCEDURE — 88313 SPECIAL STAINS GROUP 2: CPT | Performed by: NURSE PRACTITIONER

## 2022-11-14 RX ORDER — SODIUM CHLORIDE 0.9 % (FLUSH) 0.9 %
10 SYRINGE (ML) INJECTION AS NEEDED
Status: DISCONTINUED | OUTPATIENT
Start: 2022-11-14 | End: 2022-11-15 | Stop reason: HOSPADM

## 2022-11-14 RX ORDER — SODIUM CHLORIDE 0.9 % (FLUSH) 0.9 %
3 SYRINGE (ML) INJECTION EVERY 12 HOURS SCHEDULED
Status: DISCONTINUED | OUTPATIENT
Start: 2022-11-14 | End: 2022-11-15 | Stop reason: HOSPADM

## 2022-11-14 RX ORDER — SODIUM CHLORIDE 9 MG/ML
25 INJECTION, SOLUTION INTRAVENOUS ONCE
Status: COMPLETED | OUTPATIENT
Start: 2022-11-14 | End: 2022-11-14

## 2022-11-14 RX ORDER — MIDAZOLAM HYDROCHLORIDE 1 MG/ML
INJECTION INTRAMUSCULAR; INTRAVENOUS AS NEEDED
Status: COMPLETED | OUTPATIENT
Start: 2022-11-14 | End: 2022-11-14

## 2022-11-14 RX ORDER — LIDOCAINE HYDROCHLORIDE 10 MG/ML
20 INJECTION, SOLUTION INFILTRATION; PERINEURAL ONCE
Status: COMPLETED | OUTPATIENT
Start: 2022-11-14 | End: 2022-11-14

## 2022-11-14 RX ORDER — MIDAZOLAM HYDROCHLORIDE 1 MG/ML
0.5 INJECTION INTRAMUSCULAR; INTRAVENOUS ONCE
Status: COMPLETED | OUTPATIENT
Start: 2022-11-14 | End: 2022-11-14

## 2022-11-14 RX ORDER — FENTANYL CITRATE 50 UG/ML
INJECTION, SOLUTION INTRAMUSCULAR; INTRAVENOUS AS NEEDED
Status: COMPLETED | OUTPATIENT
Start: 2022-11-14 | End: 2022-11-14

## 2022-11-14 RX ADMIN — MIDAZOLAM 1 MG: 1 INJECTION INTRAMUSCULAR; INTRAVENOUS at 09:44

## 2022-11-14 RX ADMIN — Medication 3 ML: at 09:22

## 2022-11-14 RX ADMIN — MIDAZOLAM 0.5 MG: 1 INJECTION INTRAMUSCULAR; INTRAVENOUS at 09:49

## 2022-11-14 RX ADMIN — FENTANYL CITRATE 50 MCG: 50 INJECTION INTRAMUSCULAR; INTRAVENOUS at 09:45

## 2022-11-14 RX ADMIN — FENTANYL CITRATE 25 MCG: 50 INJECTION INTRAMUSCULAR; INTRAVENOUS at 09:47

## 2022-11-14 RX ADMIN — MIDAZOLAM 0.5 MG: 1 INJECTION INTRAMUSCULAR; INTRAVENOUS at 08:49

## 2022-11-14 RX ADMIN — FENTANYL CITRATE 25 MCG: 50 INJECTION INTRAMUSCULAR; INTRAVENOUS at 09:52

## 2022-11-14 RX ADMIN — LIDOCAINE HYDROCHLORIDE 20 ML: 10 INJECTION, SOLUTION INFILTRATION; PERINEURAL at 09:46

## 2022-11-14 RX ADMIN — SODIUM CHLORIDE 25 ML/HR: 9 INJECTION, SOLUTION INTRAVENOUS at 09:22

## 2022-11-14 NOTE — POST-PROCEDURE NOTE
POST PROCEDURE NOTE    Procedure: BM biopsy    Pre-Procedure Diagnosis: anemia    Post-procedure Diagnosis: same    Findings: successful bx    Complications: none    Blood loss: min    Specimen Removed: aspir.&core    Disposition:   Discharge home

## 2022-11-14 NOTE — DISCHARGE INSTRUCTIONS
Discharge Instructions after receiving Moderate Sedation  These instructions provide you with information about caring for yourself after your procedure. Your health care provider may also give you more specific instructions. Your treatment has been planned according to current medical practices, but problems sometimes occur. Call your physican or the Radiology Nurses ([phone]) if you have any problems or questions after your procedure.    What can I expect after the procedure?  After your procedure, you may:  Feel sleepy or light headed for several hours.  Feel clumsy, dizzy or have poor balance for several hours.  Feel forgetful about what happened after the procedure.  Have poor judgment for several hours.  Feel nauseous or vomit.    For at least 24 hours after the procedure:  Have a responsible adult stay with you or frequently check on you until you are awake and alert.   Rest as needed.    Do not:  Participate in activities in which you could fall or become injured.  Drive or operate heavy machinery  Take sleeping pills or medicines that cause drowsiness.  Make important decisions or sign legal documents.  Take care of children on your own.    Eating and drinking: Clear liquids then progress slowly to a normal diet.  If you vomit, drink water, juice, or soup when you can drink without vomiting.  Make sure you have little or no nausea before eating solid foods.    General instructions: Take over-the-counter and prescription medicines only as told by your health care provider .If you have sleep apnea, surgery and certain medicines can increase your risk for breathing problems. Follow instructions from your health care provider about wearing your sleep device: If you smoke, do not smoke without supervision.  Keep all follow-up visits as told by your health care provider. This is important.    Contact your physician if:  You continue to keep feeling nauseous or you keep vomiting. You continue to feel  light-headed, develop a fever or a rash.  Get help right away if you have trouble breathing    I acknowledge the above instructions:    Patient/ Responsible person _________________________________Date ____________Time ____________    Witnessed by____________________________________________ Date_____________ Time____________         EDUCATION /DISCHARGE INSTRUCTIONS  CT/US guided biopsy:  A biopsy is a procedure done to remove tissue for further analysis.  Before images are taken to locate the target area.  Images can be obtained using ultrasound, CT or MRI.  A physician will clean your skin with antiseptic soap, place a sterile towel around the site and administer a local anesthetic to numb the area.  The physician will then insert a special needle.  Sometimes images are taken of the needle after it is inserted to ensure the needle is in the correct area to be biopsied.   A sample is obtained and sent to the laboratory for study.  Occasionally the laboratory is unable to make a diagnosis from the sample and the procedure may need to be repeated.  Within a week the radiologist will send a report to your physician.  A pathologist will also examine the tissue and send a report.    Risks of the procedure include but are not limited to:   *  Bleeding    *  Infection   *  Puncture of surrounding organs *  Death     *  Lung collapse if the biopsy is near the chest which may require insertion of a      chest tube to re-inflate the lung if severe.    Benefits of the procedure:  Using x-ray helps to locate the area that requires a biopsy. The procedure is less invasive than a surgical procedure, there are no large incisions and it does not require anesthesia.    Alternatives to the procedure:  A biopsy can be performed surgically.  Risks of a surgical biopsy include exposure to anesthesia, infection, excessive bleeding and injury to abdominal organs.  A benefit of surgical biopsy is the ability to see the area to be biopsied  and remove of a larger piece of tissue.    THIS EDUCATION INFORMATION WAS REVIEWED PRIOR TO PROCEDURE AND CONSENT. Patient initials__________________Time___________________    Post Procedure:    *  Expect the biopsy site may be tender up to one week.    *  Rest today (no pushing pulling or straining).   *  Slowly increase activity tomorrow.    *  If you received sedation do not drive for 24 hours.   *  Keep dressing clean and dry.   *  Leave dressing on puncture site for 24 hours.    *  You may shower when dressing removed.  Call your doctor if experiencing:   *  Signs of infection such as redness, swelling, excessive pain and / or foul        smelling drainage from the puncture site.   *  Chills or fever over 101 degrees (by mouth).   *  Unrelieved pain.   *  Any new or severe symptoms.   *  If experiencing sudden / severe shortness of breath or chest pain go to the       nearest emergency room.   Following the procedure:     Follow-up with the ordering physician as directed.    Continue to take other medications as directed by your physician unless    otherwise instructed.   If applicable, resume taking your blood thinners or Aspirin on _11/15/2022_________.    If you have any concerns please call the Radiology Nurses Desk at [phone].  You are the most important factor in your recovery.  Follow the above instructions carefully.

## 2022-11-14 NOTE — NURSING NOTE
Patient arrived to radiology triage for Bone marrow biopsy.    Protective goggles and mask in place with all patient interactions today

## 2022-11-15 ENCOUNTER — TELEPHONE (OUTPATIENT)
Dept: ONCOLOGY | Facility: CLINIC | Age: 62
End: 2022-11-15

## 2022-11-15 NOTE — TELEPHONE ENCOUNTER
Caller: TASHA    Relationship to patient: SELF    Best call back number: [phone]    Patient is needing: TO KNOW IF SHE NEEDS TO BE ON ANTIBIOTIC DUE TO LOW WBC FOR HER TEETH CLEANING.

## 2022-11-16 ENCOUNTER — TELEPHONE (OUTPATIENT)
Dept: CARDIOLOGY | Facility: CLINIC | Age: 62
End: 2022-11-16

## 2022-11-16 DIAGNOSIS — I10 PRIMARY HYPERTENSION: Primary | ICD-10-CM

## 2022-11-16 RX ORDER — SPIRONOLACTONE 25 MG/1
25 TABLET ORAL DAILY
Qty: 30 TABLET | Refills: 1 | Status: SHIPPED | OUTPATIENT
Start: 2022-11-16 | End: 2023-02-14

## 2022-11-16 NOTE — TELEPHONE ENCOUNTER
Start spironolactone 25 mg 1 tablet daily (sent to [facility]).  This is a diuretic, so she may urinate a little bit more initially.  Repeat a BMP in 1 week at the main lab at the hospital in order has been placed.  Make follow-up appointment with me in 1 month.  Thanks

## 2022-11-16 NOTE — TELEPHONE ENCOUNTER
Patient called stating her blood pressure has been running very high and she can't get it under control. Patient states its been running 160/110 to 161/120.      Please Advise

## 2022-11-17 ENCOUNTER — TELEPHONE (OUTPATIENT)
Dept: GASTROENTEROLOGY | Facility: CLINIC | Age: 62
End: 2022-11-17

## 2022-11-17 PROBLEM — R11.2 NAUSEA AND VOMITING: Status: ACTIVE | Noted: 2022-08-08

## 2022-11-17 RX ORDER — HYDRALAZINE HYDROCHLORIDE 25 MG/1
25 TABLET, FILM COATED ORAL 3 TIMES DAILY
Qty: 90 TABLET | Refills: 0 | Status: SHIPPED | OUTPATIENT
Start: 2022-11-17 | End: 2022-12-18

## 2022-11-17 NOTE — TELEPHONE ENCOUNTER
Called and spoke with patient again. Patient would like to try the hydralazine 25 mg TID.  She has doubled the spironolactone on her own the past few days to see if it would help her BP and it has not, so that is why she wants to try the hydralazine.  I have instructed her to go back to spironolactone at 25 mg since she is going to start hydralazine.  She verbalizes understanding.    Pharmacy is in [facility] she uses [facility].    TK: Looks like she would need a BMP since starting the spironolactone a few weeks ago?  I have asked her to try to complete lab work this week.  She is aware of location or lab, I gave her the hours.  TK, I pended the hydralazine to you, just needs quantity and refill.      If there is anything else let me know.    Aleisha Pavon RN  [facility] Triage  11/17/22 12:11 EST

## 2022-11-17 NOTE — TELEPHONE ENCOUNTER
Hub staff attempted to follow warm transfer process and was unsuccessful     Caller: Amanda Sherwood    Relationship to patient: Self    Best call back number: [phone]    Patient is needing: CANCEL PROCEDURE 11.22.22. PT DOES NOT WISH TO RESCHEDULE SHE ADVISED SHE IS COMFORTABLE WHERE SHE IS NOW.

## 2022-11-17 NOTE — TELEPHONE ENCOUNTER
Called and spoke with patient about starting spironolactone. She said she is already taking this spironolactone 25 mg daily.  She is also taking 75 mg metoprolol XL.    In your last office note 10/13, you took spironolactone off her list because patient reported she hadn't started the medication because her BP had normalized.  She said she doesn't remember when she started taking it, but it has been at least a few weeks.    New recs for BP?    Aleisha Pavon RN  [facility] Triage  11/17/22 08:59 EST

## 2022-11-17 NOTE — TELEPHONE ENCOUNTER
Order sent in for hydralazine 25 mg 1 tablet 3 times daily.  Monitor blood pressure closely.  Order for BMP placed.  Low-sodium diet less than 2000 mg daily recommended.    Please arrange a follow-up appointment with me in a few weeks.  Thanks

## 2022-11-17 NOTE — TELEPHONE ENCOUNTER
If she feels comfortable, we could double the spironolactone to 50 mg daily.  She would then need a repeat BMP in 1 week.  If she does not feel comfortable with that, I would start hydralazine 25 mg 3 times daily.  She cannot be on ACE/ARB because she has been intolerant in the past.  Please let me know

## 2022-11-18 RX ORDER — METOCLOPRAMIDE 10 MG/1
TABLET ORAL
Qty: 90 TABLET | Refills: 0 | Status: SHIPPED | OUTPATIENT
Start: 2022-11-18

## 2022-11-18 NOTE — TELEPHONE ENCOUNTER
Called and spoke to the patient.  She has already started the hydralazine and her SBP was 120s after she took it.  She is going to continue to monitor her BP.    She is going to get her labwork on Monday.  I let her know we will call with the results.      Let her know about 2g NA diet.    She did not want to make a follow up at this time because she does not have her calendar.  I have asked her to please call soon to make a follow up appt with TK in the next few weeks.    Aleisha Pavon RN  [facility] Triage  11/18/22 12:09 EST

## 2022-11-21 ENCOUNTER — LAB (OUTPATIENT)
Dept: LAB | Facility: HOSPITAL | Age: 62
End: 2022-11-21

## 2022-11-21 DIAGNOSIS — I10 PRIMARY HYPERTENSION: ICD-10-CM

## 2022-11-21 LAB
ANION GAP SERPL CALCULATED.3IONS-SCNC: 11.9 MMOL/L (ref 5–15)
BUN SERPL-MCNC: 8 MG/DL (ref 6–20)
BUN/CREAT SERPL: 8.2 (ref 7–25)
CALCIUM SPEC-SCNC: 10.6 MG/DL (ref 8.6–10.5)
CHLORIDE SERPL-SCNC: 96 MMOL/L (ref 98–107)
CO2 SERPL-SCNC: 24.1 MMOL/L (ref 22–29)
CREAT SERPL-MCNC: 0.98 MG/DL (ref 0.57–1)
EGFRCR SERPLBLD CKD-EPI 2021: 66.6 ML/MIN/1.73
GLUCOSE SERPL-MCNC: 174 MG/DL (ref 65–99)
POTASSIUM SERPL-SCNC: 4.9 MMOL/L (ref 3.5–5.2)
SODIUM SERPL-SCNC: 132 MMOL/L (ref 136–145)

## 2022-11-21 PROCEDURE — 36415 COLL VENOUS BLD VENIPUNCTURE: CPT

## 2022-11-21 PROCEDURE — 80048 BASIC METABOLIC PNL TOTAL CA: CPT

## 2022-11-21 NOTE — PROGRESS NOTES
Please call patient.  Blood sugar elevated at 174 she may not of been fasting.  Sodium level 132 stable.  Potassium normal at 4.9.  Calcium mildly elevated at 10.6.  All stable.  Continue spironolactone and hydralazine.  Hopefully her blood pressures are better.  Thanks

## 2022-12-01 NOTE — PROGRESS NOTES
.     REASON FOR FOLLOWUP :   Pulmonary embolism    HISTORY OF PRESENT ILLNESS:  The patient is a 61 y.o. year old female  who is here for follow-up with the above-mentioned history.    She is here today for hospital follow-up and to review bone marrow biopsy results.  She was hospitalized late September 2022 for symptomatic anemia.    Patient and  states she had a dry cough since summer 2022 which just recently resolved.  She underwent a bone marrow biopsy which was unremarkable.  Fortunately, her CBC is much better today.    No complaints of fever, chills, weight loss, night sweats    Eliquis was stopped in the hospital and she remains off this.  She states she is much more mobile than previous    Past Medical History:   Diagnosis Date   • Acute kidney injury (HCC)    • Allergic    • Anxiety 3 years ago   • Depression    • GERD (gastroesophageal reflux disease)    • H/O Leukopenia    • Hernia    • History of anemia    • History of bronchitis    • History of migraine    • Hyperlipidemia     NO MEDS CURRENTLY. WORKING ON   • Hypertension    • Leukopenia     PER HISTORY. HAS SEEN CBC IN THE PAST   • Neck pain    • Obesity (BMI 30.0-34.9) 6/6/2019   • Osteoarthritis    • PVC (premature ventricular contraction)    • Seasonal allergies    • Symptomatic anemia 9/27/2022   • Torn meniscus     LEFT   • Tremor 3 years ago     Past Surgical History:   Procedure Laterality Date   • BREAST BIOPSY Bilateral     BENIGN. HORACE BREAST   • BUNIONECTOMY Left    • BUNIONECTOMY Left     REVISION   • CARDIAC CATHETERIZATION N/A 10/28/2021    Procedure: Coronary angiography;  Surgeon: Vanessa Bennett MD;  Location: [facility] INVASIVE LOCATION;  Service: Cardiovascular;  Laterality: N/A;   • CARDIAC CATHETERIZATION N/A 10/28/2021    Procedure: Left heart cath;  Surgeon: Vanessa Bennett MD;  Location:  [facility] CATH INVASIVE LOCATION;  Service: Cardiovascular;  Laterality: N/A;   • CARDIAC CATHETERIZATION N/A 10/28/2021     Procedure: Right heart cath;  Surgeon: Vanessa Bennett MD;  Location: [facility] CATH INVASIVE LOCATION;  Service: Cardiovascular;  Laterality: N/A;   • COLONOSCOPY N/A 2/1/2020    Procedure: COLONOSCOPY TO CECUM AND INTO TERMINAL ILEUM WITH COLD BIOPSY POLYPECTOMY;  Surgeon: Munira Montes MD;  Location: [facility] ENDOSCOPY;  Service: Gastroenterology   • COLONOSCOPY N/A 10/1/2022    Procedure: COLONOSCOPY TO CECUM/TI WITH BIOPSY AND POLYPECTOMY ( COLD BX);  Surgeon: Jovanny Simsm MD;  Location: [facility] ENDOSCOPY;  Service: Gastroenterology;  Laterality: N/A;  ANEMIA  POST: COLON POLYP; TEXTURE MUCOSAL CHANGES IN ASCENDING COLON; MODERATE PREP; INTERNAL HEMORRHOIDS   • DILATATION AND CURETTAGE      AUB no cancer   • ENDOSCOPY N/A 2/1/2020    Procedure: ESOPHAGOGASTRODUODENOSCOPY WITH COLD BIOPSIES;  Surgeon: Munira Montes MD;  Location: [facility] ENDOSCOPY;  Service: Gastroenterology   • ENDOSCOPY N/A 9/29/2022    Procedure: ESOPHAGOGASTRODUODENOSCOPY with cold biopsies;  Surgeon: Jill Blake MD;  Location: [facility] ENDOSCOPY;  Service: Gastroenterology;  Laterality: N/A;  pre: ANEMIA, H/O GASTROPARESIS AND NAUSEA VOMITTING  post: gastritis, hiatal hernia, z-line@38cm, gastric body and fundus polpys, small bowel granularity   • KNEE ARTHROSCOPY Left 8/22/2018    Procedure: KNEE ARTHROSCOPY LATERAL MENISECTOMY DEBRIDEMENT OF MEDIAL FEMORAL CONDYLE DEFECT DEBRIDEMENT OF ARTHRITIS;  Surgeon: Graciela Crockett MD;  Location: [facility] OR [facility] [place];  Service: Orthopedics   • MENISCECTOMY Left    • SALPINGO OOPHORECTOMY Left    • TONSILLECTOMY  as a child   • TUBAL ABDOMINAL LIGATION     • UPPER GASTROINTESTINAL ENDOSCOPY     • WISDOM TOOTH EXTRACTION         MEDICATIONS    Current Outpatient Medications:   •  albuterol sulfate  (90 Base) MCG/ACT inhaler, Inhale 2 puffs into the lungs every 4 hours as needed., Disp: 18 g, Rfl: 10  •  aspirin (aspirin) 81 MG EC tablet, Take 1 tablet by mouth Daily., Disp: 30 tablet,  Rfl: 0  •  buPROPion XL (WELLBUTRIN XL) 300 MG 24 hr tablet, Take 1 tablet by mouth Every Morning., Disp: 90 tablet, Rfl: 0  •  cetirizine (zyrTEC) 10 MG tablet, Take 10 mg by mouth As Needed., Disp: , Rfl:   •  folic acid (FOLVITE) 1 MG tablet, Take 1 tablet by mouth Daily., Disp: 30 tablet, Rfl: 0  •  hydrALAZINE (APRESOLINE) 25 MG tablet, Take 1 tablet by mouth 3 (Three) Times a Day., Disp: 90 tablet, Rfl: 0  •  LORazepam (ATIVAN) 0.5 MG tablet, Take 0.5 mg by mouth Daily As Needed for Anxiety., Disp: , Rfl:   •  metoclopramide (REGLAN) 10 MG tablet, TAKE ONE TABLET BY MOUTH THREE TIMES A DAY, Disp: 90 tablet, Rfl: 0  •  metoprolol succinate XL (TOPROL-XL) 25 MG 24 hr tablet, Take 3 tablets by mouth Every Night., Disp: 270 tablet, Rfl: 3  •  omeprazole (priLOSEC) 40 MG capsule, TAKE ONE CAPSULE BY MOUTH TWICE A DAY BEFORE MEALS, Disp: 60 capsule, Rfl: 2  •  ondansetron (ZOFRAN) 4 MG tablet, Take 1 tablet by mouth Every 8 (Eight) Hours As Needed for Nausea., Disp: 60 tablet, Rfl: 1  •  sodium bicarbonate 650 MG tablet, Take 1 tablet by mouth 3 (Three) Times a Day., Disp: 90 tablet, Rfl: 0  •  spironolactone (ALDACTONE) 25 MG tablet, Take 1 tablet by mouth Daily., Disp: 30 tablet, Rfl: 1    ALLERGIES:     Allergies   Allergen Reactions   • Celecoxib Hives and Swelling     hives   • Amlodipine Swelling   • Ace Inhibitors Cough   • Lisinopril Cough       SOCIAL HISTORY:       Social History     Socioeconomic History   • Marital status:    • Number of children: 2   • Years of education: College   Tobacco Use   • Smoking status: Former     Packs/day: 0.50     Years: 4.00     Pack years: 2.00     Types: Cigarettes     Quit date: 1987     Years since quittin.1   • Smokeless tobacco: Never   Vaping Use   • Vaping Use: Never used   Substance and Sexual Activity   • Alcohol use: Yes     Alcohol/week: 2.0 standard drinks     Types: 2 Cans of beer per week     Comment: caffeine - None   • Drug use: Yes      "Types: Marijuana     Comment: not anymore   • Sexual activity: Yes     Partners: Male     Birth control/protection: Post-menopausal         FAMILY HISTORY:  Family History   Problem Relation Age of Onset   • Dementia Mother    • Arthritis Mother    • Other Mother         Alzheimer's   • Stroke Father    • Heart disease Father    • Hypertension Father    • Diabetes Father    • Cancer Father    • Colon cancer Father    • Hyperlipidemia Father    • Colon polyps Father    • Breast cancer Sister 65   • Atrial fibrillation Sister    • Alcohol abuse Sister    • Asthma Sister    • Hypertension Sister    • Ovarian cancer Maternal Aunt    • Prostate cancer Brother    • Malig Hyperthermia Neg Hx        REVIEW OF SYSTEMS:  Review of Systems   Constitutional: Negative for activity change.   HENT: Negative for nosebleeds and trouble swallowing.    Respiratory: Positive for shortness of breath. Negative for wheezing.    Cardiovascular: Negative for palpitations.   Gastrointestinal: Positive for nausea. Negative for constipation and diarrhea.   Genitourinary: Negative for dysuria and hematuria.   Musculoskeletal: Negative for arthralgias and myalgias.   Skin: Negative for rash and wound.   Neurological: Negative for seizures and syncope.   Hematological: Negative for adenopathy. Does not bruise/bleed easily.   Psychiatric/Behavioral: Negative for confusion.              Vitals:    12/02/22 0942   BP: 149/84   Pulse: 83   Resp: 18   Temp: 98 °F (36.7 °C)   TempSrc: Temporal   SpO2: 97%   Weight: 70.9 kg (156 lb 4.8 oz)   Height: 165.1 cm (65\")   PainSc: 0-No pain     Current Status 12/2/2022   ECOG score 0      PHYSICAL EXAM:          CONSTITUTIONAL:  Vital signs reviewed.  No distress, looks comfortable.  EYES:  Conjunctiva and lids unremarkable.  PERRLA  EARS,NOSE,MOUTH,THROAT:  Ears and nose appear unremarkable.  Lips, teeth, gums appear unremarkable.  RESPIRATORY:  Normal respiratory effort.  Lungs clear to auscultation " bilaterally.  CARDIOVASCULAR:  Normal S1, S2.  No murmurs rubs or gallops.  No significant lower extremity edema.  GASTROINTESTINAL: Abdomen appears unremarkable.  Nontender.  No hepatomegaly.  No splenomegaly.  LYMPHATIC:  No cervical, supraclavicular, axillary lymphadenopathy.  SKIN:  Warm.  No rashes.  PSYCHIATRIC:  Normal judgment and insight.  Normal mood and affect.       RECENT LABS:        WBC   Date Value Ref Range Status   12/02/2022 3.13 (L) 3.40 - 10.80 10*3/mm3 Final   11/14/2022 1.65 (C) 3.40 - 10.80 10*3/mm3 Final   10/20/2022 1.37 (C) 3.40 - 10.80 10*3/mm3 Final   10/01/2022 2.17 (L) 3.40 - 10.80 10*3/mm3 Final   09/30/2022 1.75 (C) 3.40 - 10.80 10*3/mm3 Final   09/29/2022 1.95 (C) 3.40 - 10.80 10*3/mm3 Final   09/28/2022 1.99 (C) 3.40 - 10.80 10*3/mm3 Final   09/27/2022 2.87 (L) 3.40 - 10.80 10*3/mm3 Final   03/07/2022 4.08 3.40 - 10.80 10*3/mm3 Final   10/26/2021 4.58 3.40 - 10.80 10*3/mm3 Final   09/27/2021 3.47 3.40 - 10.80 10*3/mm3 Final   08/29/2021 3.26 (L) 3.40 - 10.80 10*3/mm3 Final   08/28/2021 3.15 (L) 3.40 - 10.80 10*3/mm3 Final   08/27/2021 4.73 3.40 - 10.80 10*3/mm3 Final   08/16/2021 3.23 (L) 3.40 - 10.80 10*3/mm3 Final   08/07/2021 3.81 3.40 - 10.80 10*3/mm3 Final   08/06/2021 4.96 3.40 - 10.80 10*3/mm3 Final   06/20/2019 4.42 3.40 - 10.80 10*3/mm3 Final   06/19/2019 3.96 (L) 4.5 - 11.0 10*3/uL Final   06/18/2019 3.68 3.40 - 10.80 10*3/mm3 Final   09/22/2018 3.86 (L) 4.50 - 10.70 10*3/mm3 Final   08/15/2018 2.74 (L) 4.50 - 10.70 10*3/mm3 Final   10/27/2016 3.65 (L) 4.50 - 10.70 10*3/mm3 Final   09/13/2016 2.42 (L) 4.50 - 10.70 10*3/mm3 Final   04/29/2016 3.05 (L) 4.50 - 10.70 10*3/mm3 Final     Hemoglobin   Date Value Ref Range Status   12/02/2022 15.3 12.0 - 15.9 g/dL Final   11/14/2022 12.6 12.0 - 15.9 g/dL Final   10/20/2022 10.4 (L) 12.0 - 15.9 g/dL Final   10/01/2022 9.3 (L) 12.0 - 15.9 g/dL Final   09/30/2022 9.4 (L) 12.0 - 15.9 g/dL Final   09/30/2022 9.3 (L) 12.0 - 15.9 g/dL  Final   09/29/2022 10.2 (L) 12.0 - 15.9 g/dL Final   09/28/2022 10.0 (L) 12.0 - 15.9 g/dL Final   09/27/2022 8.7 (L) 12.0 - 15.9 g/dL Final   03/07/2022 13.1 12.0 - 15.9 g/dL Final   10/28/2021 13.3 12.0 - 17.0 g/dL Final   10/28/2021 13.3 12.0 - 17.0 g/dL Final   10/26/2021 14.6 12.0 - 15.9 g/dL Final   09/27/2021 14.1 12.0 - 15.9 g/dL Final   08/29/2021 14.0 12.0 - 15.9 g/dL Final   08/28/2021 14.1 12.0 - 15.9 g/dL Final   08/27/2021 15.4 12.0 - 15.9 g/dL Final   08/16/2021 13.4 12.0 - 15.9 g/dL Final   08/07/2021 12.9 12.0 - 15.9 g/dL Final   08/06/2021 14.9 12.0 - 15.9 g/dL Final   06/20/2019 13.7 12.0 - 15.9 g/dL Final   06/19/2019 14.6 12.0 - 16.0 g/dL Final   06/18/2019 12.9 12.0 - 15.9 g/dL Final   09/22/2018 14.7 11.9 - 15.5 g/dL Final   08/15/2018 13.8 11.9 - 15.5 g/dL Final   10/27/2016 13.8 11.9 - 15.5 g/dL Final   09/13/2016 13.6 11.9 - 15.5 g/dL Final   04/29/2016 13.5 11.9 - 15.5 g/dL Final     Platelets   Date Value Ref Range Status   12/02/2022 208 140 - 450 10*3/mm3 Final   11/14/2022 171 140 - 450 10*3/mm3 Final   10/20/2022 162 140 - 450 10*3/mm3 Final   10/01/2022 176 140 - 450 10*3/mm3 Final   09/30/2022 186 140 - 450 10*3/mm3 Final   09/29/2022 177 140 - 450 10*3/mm3 Final   09/28/2022 167 140 - 450 10*3/mm3 Final   09/27/2022 232 140 - 450 10*3/mm3 Final   03/07/2022 185 140 - 450 10*3/mm3 Final   10/26/2021 194 140 - 450 10*3/mm3 Final   09/27/2021 178 140 - 450 10*3/mm3 Final   08/29/2021 174 140 - 450 10*3/mm3 Final   08/28/2021 186 140 - 450 10*3/mm3 Final   08/27/2021 233 140 - 450 10*3/mm3 Final   08/16/2021 181 140 - 450 10*3/mm3 Final   08/07/2021 259 140 - 450 10*3/mm3 Final   08/06/2021 373 140 - 450 10*3/mm3 Final   06/20/2019 180 140 - 450 10*3/mm3 Final   06/19/2019 184 140 - 440 10*3/uL Final   06/18/2019 160 140 - 450 10*3/mm3 Final   09/22/2018 169 140 - 500 10*3/mm3 Final   08/15/2018 164 140 - 500 10*3/mm3 Final   10/27/2016 161 140 - 500 10*3/mm3 Final   09/13/2016 176 140  - 500 10*3/mm3 Final   04/29/2016 187 140 - 500 10*3/mm3 Final       Assessment & Plan   Neutropenia, unspecified type (HCC)  - CBC & Differential        Amanda CHIO Sherwood   *Leukocytopenia  · WBC normal through 3/7/2022.  · 9/28/2022 - 11/14/2022: WBC 1.4-2  · 12/2/2022: WBC up to 3.1    *Neutropenia  · ANC normal through 3/7/2022  · 9/28/2022 - 11/14/2022: -1260  · 12/2/2022: ANC up to 1910    *Anemia  · Hb normal through 3/7/2022  · 9/27/2022- 10/20/2022: Hb 8.7-10.4  · Hb up to 12.6 on 11/14/2022.  · 12/2/2022: Hb up to 15.3    *Etiology of cytopenias:  · Bone marrow biopsy 11/14/2022: Normocellular, 40%.  Trilineage hematopoiesis with <5% blasts.  No evidence of significant dysplastic features or ringed sideroblasts.  Granulopoiesis is decreased and erythropoiesis is increased.  Differential includes drugs, toxins, vitamins, nutritional deficiencies, autoimmune disorders.  Flow cytometry unremarkable including no atypical pattern suggestive of MDS  · 12/1/2022: I requested NGS studies  · 12/2/2022: WBC up to 3.1, Hb up to 15.3, PLT up to 208.    *Bilateral lower lobe pulmonary emboli on CT 8/27/2021.  · Doppler left leg 8/17/2021: Normal  · Doppler bilateral legs 8/28/2021: Normal  · CT chest angiogram 8/27/2021: Bilateral lower lobe PEs.   · 3/14/2022: Completed 6 months therapeutic Eliquis.  This was associated with a reversible risk factor: Immobility from GI illness.  However, she states due to ongoing nausea she is not very mobile and does not want to stop Eliquis.  Therefore, decreased Eliquis 2.5 mg twice per day (prophylactic dose).  If she becomes more mobile in the future, it would be reasonable to stop Eliquis and begin aspirin 81 mg daily.  · Late September 2022 hospitalization for symptomatic anemia requiring transfusion (Hb dropped from normal down to low point of 8.7).  Patient states she is much more mobile.  Eliquis stopped.     *Risk factors for clotting:  · Overweight  · Was not on HRT.   (Understands should not take any HRT in the future).  · Only smoked a little in high school and college.  No smoking since.  · No prior personal history of clots  · No family history of clots  · Immobility.  July 2021 GI virus: Lost 20 pounds.  4-day admission for FANNY and UTI.  Subsequently at home, in bed x5 days late July 2021.  Then, bed to couch x2 weeks.  · Unremarkable: Prothrombin gene mutation, factor V Leiden, Antithrombin, protein C activity, protein S activity, anticardiolipin antibodies, beta-2 glycoprotein antibodies, lupus anticoagulant.  Although protein S free initially low at less than 10% on 9/27/2021, repeat, on 10/25/2021 and 3/7/2022 protein S studies normal.    *Prior Protein S low free level  · Protein S free <10% on 9/27/2021 (protein S activity >150%).  · Typically all types of protein S deficiency have a decreased activity  · Repeat protein S testing of free and activity unremarkable on 10/5/2021 and 3/7/2022.  Therefore, the prior low value is not felt to be significant    *Nausea  Present since the GI illness.  Worse since around January 2022.  Following with GI.  Was found to have some gastroparesis.    *Unsteadiness on feet, overall fatigue and weakness.  · On day of initial consult, 9/27/2021: Difficulty getting up and down off the examining table.  Unsteady on feet.  I recommended a cane or wheelchair.  She declined.  · She is planning physical therapy.  States she has been trying to walk but cannot walk more than 1/4 mile.  I recommended she use her stationary bike multiple times per day to try to build up strength.    *Previously improved, but ongoing SOA and chest discomfort.  · She follows with cardiology and pulmonology and states they are aware.  · She states Dr. Jordon Adams prescribed Symbicort and a rescue inhaler.  · She states cardiology told her an echocardiogram shows pulmonary hypertension.  · After the PE, she followed up with Dr. Jordon Adams.  She states he asked  her to do a test to look for asthma.  She is worried about having the test done and therefore has not followed up with him.  She states she will follow up with him.   · 12/2/2022: No complaints of this today.    *Possible pulmonary nodule, LLL on CT 8/27/2021  · Small new 6 mm nodular density LLL, radiologist recommends follow-up.  · PCP did a follow-up CT.  · CT 2/14/2022: Resolution.  However, the radiologist recommended to consider follow-up CT in 1 year.  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  · She no longer works.  States she is unable to work due to the nausea    *Hospitalized 9/27/2022 - 10/1/2022 for symptomatic anemia.    · Transfused.  EGD and colonoscopy with no evidence of active bleeding.  Bruising lower extremities on admission.  Eliquis was held during the admission and she was told to hold Eliquis until she follows up with us.  Discharged home on aspirin 81 mg daily.    Plan  · MD CBC 3 months  · Radiologist recommended to consider follow-up CT chest (to follow-up previously seen LLL pulmonary nodular density) 1 year from last (February 2023).  Patient was planning on having this through her PCP.  If this is not done by the next visit, we could arrange this as well.  I have asked April from our office to call her and if she is not planning on having this through her PCP, she will have a CT of the chest without contrast 1 week before she sees me     41 minutes.  Total time.  Same day.

## 2022-12-01 NOTE — TELEPHONE ENCOUNTER
2/5/18  Pt left [facility] - states she is returning Porsche's call.   [phone]/philippe  
bp still running high. Increase propanolol to BID per pt requests.   
General

## 2022-12-02 ENCOUNTER — OFFICE VISIT (OUTPATIENT)
Dept: ONCOLOGY | Facility: CLINIC | Age: 62
End: 2022-12-02

## 2022-12-02 ENCOUNTER — LAB (OUTPATIENT)
Dept: OTHER | Facility: HOSPITAL | Age: 62
End: 2022-12-02
Payer: COMMERCIAL

## 2022-12-02 VITALS
HEART RATE: 83 BPM | HEIGHT: 65 IN | WEIGHT: 156.3 LBS | DIASTOLIC BLOOD PRESSURE: 84 MMHG | TEMPERATURE: 98 F | SYSTOLIC BLOOD PRESSURE: 149 MMHG | BODY MASS INDEX: 26.04 KG/M2 | OXYGEN SATURATION: 97 % | RESPIRATION RATE: 18 BRPM

## 2022-12-02 DIAGNOSIS — N18.31 STAGE 3A CHRONIC KIDNEY DISEASE (CKD): Primary | ICD-10-CM

## 2022-12-02 DIAGNOSIS — D70.9 NEUTROPENIA, UNSPECIFIED TYPE: Primary | ICD-10-CM

## 2022-12-02 LAB
BASOPHILS # BLD AUTO: 0.03 10*3/MM3 (ref 0–0.2)
BASOPHILS NFR BLD AUTO: 1 % (ref 0–1.5)
DEPRECATED RDW RBC AUTO: 45.1 FL (ref 37–54)
EOSINOPHIL # BLD AUTO: 0.04 10*3/MM3 (ref 0–0.4)
EOSINOPHIL NFR BLD AUTO: 1.3 % (ref 0.3–6.2)
ERYTHROCYTE [DISTWIDTH] IN BLOOD BY AUTOMATED COUNT: 13.8 % (ref 12.3–15.4)
HCT VFR BLD AUTO: 44.6 % (ref 34–46.6)
HGB BLD-MCNC: 15.3 G/DL (ref 12–15.9)
IMM GRANULOCYTES # BLD AUTO: 0.02 10*3/MM3 (ref 0–0.05)
IMM GRANULOCYTES NFR BLD AUTO: 0.6 % (ref 0–0.5)
LYMPHOCYTES # BLD AUTO: 0.68 10*3/MM3 (ref 0.7–3.1)
LYMPHOCYTES NFR BLD AUTO: 21.7 % (ref 19.6–45.3)
MCH RBC QN AUTO: 30.7 PG (ref 26.6–33)
MCHC RBC AUTO-ENTMCNC: 34.3 G/DL (ref 31.5–35.7)
MCV RBC AUTO: 89.4 FL (ref 79–97)
MONOCYTES # BLD AUTO: 0.45 10*3/MM3 (ref 0.1–0.9)
MONOCYTES NFR BLD AUTO: 14.4 % (ref 5–12)
NEUTROPHILS NFR BLD AUTO: 1.91 10*3/MM3 (ref 1.7–7)
NEUTROPHILS NFR BLD AUTO: 61 % (ref 42.7–76)
NRBC BLD AUTO-RTO: 0 /100 WBC (ref 0–0.2)
PLATELET # BLD AUTO: 208 10*3/MM3 (ref 140–450)
PMV BLD AUTO: 10 FL (ref 6–12)
RBC # BLD AUTO: 4.99 10*6/MM3 (ref 3.77–5.28)
WBC NRBC COR # BLD: 3.13 10*3/MM3 (ref 3.4–10.8)

## 2022-12-02 PROCEDURE — 99215 OFFICE O/P EST HI 40 MIN: CPT | Performed by: INTERNAL MEDICINE

## 2022-12-02 PROCEDURE — 36415 COLL VENOUS BLD VENIPUNCTURE: CPT

## 2022-12-02 PROCEDURE — 85025 COMPLETE CBC W/AUTO DIFF WBC: CPT | Performed by: INTERNAL MEDICINE

## 2022-12-07 ENCOUNTER — TELEPHONE (OUTPATIENT)
Dept: ONCOLOGY | Facility: CLINIC | Age: 62
End: 2022-12-07

## 2022-12-07 NOTE — TELEPHONE ENCOUNTER
Phoned pt last week and she asked if I would call her today and she would let me know what she decided about her CT scan. She was going to reach out to there PCP office. Phoned her today and was unable to leave message.

## 2022-12-07 NOTE — TELEPHONE ENCOUNTER
----- Message from Lan Villafuerte II, MD sent at 12/2/2022 11:02 AM EST -----  Hi April,    Please call her and remind her the radiologist from the CT February 2022 recommended a follow-up CT of the chest 1 year later.  She was going to have this through her PCP.  Tell her now that I am following her if her PCP has not arrange this, she could do this before she sees me in February.    If she wants to do this through us, then arrange a CT of the chest without IV contrast 1 week before she sees me.  Reason for the CT is follow-up of the LLL pulmonary nodular density seen on CT 8/27/2021, with the radiologist from CT 2/14/2022 stating resolution, but recommending a follow-up CT chest 1 year later.    If she has the CT through us, make the next appointment with me a 2 unit.  2 unit.  2 unit.  Thanks    Thanks!

## 2022-12-18 RX ORDER — HYDRALAZINE HYDROCHLORIDE 25 MG/1
TABLET, FILM COATED ORAL
Qty: 270 TABLET | Refills: 1 | Status: SHIPPED | OUTPATIENT
Start: 2022-12-18

## 2022-12-19 ENCOUNTER — DOCUMENTATION (OUTPATIENT)
Dept: ONCOLOGY | Facility: CLINIC | Age: 62
End: 2022-12-19

## 2022-12-23 LAB
DX PRELIMINARY: NORMAL
LAB AP CASE REPORT: NORMAL
LAB AP CLINICAL INFORMATION: NORMAL
LAB AP SPECIAL STAINS: NORMAL
Lab: NORMAL
Lab: NORMAL
PATH REPORT.ADDENDUM SPEC: NORMAL
PATH REPORT.FINAL DX SPEC: NORMAL
PATH REPORT.GROSS SPEC: NORMAL

## 2022-12-29 ENCOUNTER — TELEPHONE (OUTPATIENT)
Dept: GASTROENTEROLOGY | Facility: CLINIC | Age: 62
End: 2022-12-29

## 2022-12-29 RX ORDER — OMEPRAZOLE 40 MG/1
40 CAPSULE, DELAYED RELEASE ORAL
Qty: 60 CAPSULE | Refills: 2 | Status: SHIPPED | OUTPATIENT
Start: 2022-12-29

## 2023-01-31 ENCOUNTER — TRANSCRIBE ORDERS (OUTPATIENT)
Dept: ADMINISTRATIVE | Facility: HOSPITAL | Age: 63
End: 2023-01-31
Payer: COMMERCIAL

## 2023-01-31 DIAGNOSIS — Z12.31 SCREENING MAMMOGRAM FOR BREAST CANCER: Primary | ICD-10-CM

## 2023-02-14 ENCOUNTER — OFFICE VISIT (OUTPATIENT)
Dept: FAMILY MEDICINE CLINIC | Facility: CLINIC | Age: 63
End: 2023-02-14
Payer: COMMERCIAL

## 2023-02-14 VITALS
DIASTOLIC BLOOD PRESSURE: 70 MMHG | BODY MASS INDEX: 27.49 KG/M2 | HEART RATE: 79 BPM | WEIGHT: 165 LBS | TEMPERATURE: 97.1 F | SYSTOLIC BLOOD PRESSURE: 110 MMHG | OXYGEN SATURATION: 98 % | HEIGHT: 65 IN

## 2023-02-14 DIAGNOSIS — R91.1 PULMONARY NODULE: ICD-10-CM

## 2023-02-14 DIAGNOSIS — L65.9 HAIR LOSS: ICD-10-CM

## 2023-02-14 DIAGNOSIS — E78.2 MIXED HYPERLIPIDEMIA: ICD-10-CM

## 2023-02-14 DIAGNOSIS — F06.4 ANXIETY DISORDER DUE TO GENERAL MEDICAL CONDITION WITH PANIC ATTACK: ICD-10-CM

## 2023-02-14 DIAGNOSIS — Z86.711 HISTORY OF PULMONARY EMBOLISM: ICD-10-CM

## 2023-02-14 DIAGNOSIS — E87.1 HYPONATREMIA: ICD-10-CM

## 2023-02-14 DIAGNOSIS — I10 PRIMARY HYPERTENSION: Primary | ICD-10-CM

## 2023-02-14 DIAGNOSIS — F41.0 ANXIETY DISORDER DUE TO GENERAL MEDICAL CONDITION WITH PANIC ATTACK: ICD-10-CM

## 2023-02-14 PROBLEM — N17.9 AKI (ACUTE KIDNEY INJURY) (HCC): Status: RESOLVED | Noted: 2021-08-06 | Resolved: 2023-02-14

## 2023-02-14 PROCEDURE — 99214 OFFICE O/P EST MOD 30 MIN: CPT | Performed by: STUDENT IN AN ORGANIZED HEALTH CARE EDUCATION/TRAINING PROGRAM

## 2023-02-14 RX ORDER — BREXPIPRAZOLE 2 MG/1
TABLET ORAL
COMMUNITY
Start: 2023-01-04

## 2023-02-14 NOTE — ASSESSMENT & PLAN NOTE
Diagnosed with PE in August 2021 after severe GI illness and UTI that required hospitalization.  She experienced symptomatic anemia and was hospitalized in September 2022.  She was also found to have a hematoma of the thigh.  Apixaban was stopped at this time and never restarted.  Currently on ASA 81mgdaily  Continue.    no indicators present

## 2023-02-14 NOTE — PROGRESS NOTES
"Chief Complaint  GI Problem (Gastroparesis follow up) and Alopecia (Pt says hair is falling out in large chunks )    Subjective        Amanda Sherwood presents to [facility]  History of Present Illness  62yoF with gastroparesis, chronic N/V, HTN, h/o PE, hyponatremia, anxiety who presents for follow up.     N/V has improved - she is eating much better than previously. Has gained 10lbs. Continues to have hair loss. Is taking a prenatal vitamin and biotin. She does not have any particular pattern to hair loss but notices it falling out more frequently when she brushes it. No rashes on scalp.    Depression/Anxiety is much improved since her psychiatrist started Rexulti. She is not having any adverse side effects.        Objective   Vital Signs:  /70 (BP Location: Right arm, Patient Position: Sitting, Cuff Size: Adult)   Pulse 79   Temp 97.1 °F (36.2 °C) (Infrared)   Ht 165.1 cm (65\")   Wt 74.8 kg (165 lb)   SpO2 98%   BMI 27.46 kg/m²   Estimated body mass index is 27.46 kg/m² as calculated from the following:    Height as of this encounter: 165.1 cm (65\").    Weight as of this encounter: 74.8 kg (165 lb).       BMI is >= 25 and <30. (Overweight) The following options were offered after discussion;: nutrition counseling/recommendations      Physical Exam  Constitutional:       General: She is not in acute distress.  Eyes:      Conjunctiva/sclera: Conjunctivae normal.   Cardiovascular:      Rate and Rhythm: Normal rate and regular rhythm.   Pulmonary:      Effort: Pulmonary effort is normal. No respiratory distress.      Breath sounds: Normal breath sounds.   Skin:     General: Skin is warm and dry.      Findings: No lesion or rash.   Neurological:      Mental Status: She is alert and oriented to person, place, and time.   Psychiatric:         Mood and Affect: Mood normal.         Behavior: Behavior normal.        Result Review :  The following data was reviewed by: Elsie " MD Kj on 02/14/2023:  Common labs    Common Labs 11/14/22 11/21/22 12/2/22   Glucose  174 (A)    BUN  8    Creatinine  0.98    Sodium  132 (A)    Potassium  4.9    Chloride  96 (A)    Calcium  10.6 (A)    WBC 1.65 (A)  3.13 (A)   Hemoglobin 12.6  15.3   Hematocrit 37.8  44.6   Platelets 171  208   (A) Abnormal value            Data reviewed: none             Assessment and Plan   Diagnoses and all orders for this visit:    1. Primary hypertension (Primary)  Assessment & Plan:  Well controlled.  Managed by Cardiology  Continue current regimen.      2. Hair loss  -     TSH  -     Comprehensive Metabolic Panel    3. Mixed hyperlipidemia    4. Hyponatremia  Assessment & Plan:  Improved on most recent labs - 132  Has been as low as mid 120s. Has had better PO intake recently which is likely reason for improvement.  Repeat CMP today      5. Anxiety disorder due to general medical condition with panic attack  Assessment & Plan:  Much improved since starting Rexulti  Continue care per Psychiatry.      6. Pulmonary nodule  Assessment & Plan:  Incidental nodule initially found on CT Chest in 2021. Repeat performed in 1/2022 showed improvement in area of concern but not complete resolution. Radiologist recommended 1yr follow up.  Will order CT chest without today.       Orders:  -     CT Chest Without Contrast; Future    7. History of pulmonary embolism  Assessment & Plan:  Diagnosed with PE in August 2021 after severe GI illness and UTI that required hospitalization.  She experienced symptomatic anemia and was hospitalized in September 2022.  She was also found to have a hematoma of the thigh.  Apixaban was stopped at this time and never restarted.  Currently on ASA 81mgdaily  Continue.            I spent 35 minutes caring for Amanda on this date of service. This time includes time spent by me in the following activities:reviewing tests, performing a medically appropriate examination and/or evaluation , counseling and  educating the patient/family/caregiver and documenting information in the medical record  Follow Up   No follow-ups on file.  Patient was given instructions and counseling regarding her condition or for health maintenance advice. Please see specific information pulled into the AVS if appropriate.

## 2023-02-14 NOTE — ASSESSMENT & PLAN NOTE
Incidental nodule initially found on CT Chest in 2021. Repeat performed in 1/2022 showed improvement in area of concern but not complete resolution. Radiologist recommended 1yr follow up.  Will order CT chest without today.

## 2023-02-14 NOTE — ASSESSMENT & PLAN NOTE
Improved on most recent labs - 132  Has been as low as mid 120s. Has had better PO intake recently which is likely reason for improvement.  Repeat CMP today

## 2023-02-15 LAB
ALBUMIN SERPL-MCNC: 5.2 G/DL (ref 3.5–5.2)
ALBUMIN/GLOB SERPL: 2.9 G/DL
ALP SERPL-CCNC: 66 U/L (ref 39–117)
ALT SERPL-CCNC: 16 U/L (ref 1–33)
AST SERPL-CCNC: 25 U/L (ref 1–32)
BILIRUB SERPL-MCNC: 0.6 MG/DL (ref 0–1.2)
BUN SERPL-MCNC: 10 MG/DL (ref 8–23)
BUN/CREAT SERPL: 11.2 (ref 7–25)
CALCIUM SERPL-MCNC: 10.4 MG/DL (ref 8.6–10.5)
CHLORIDE SERPL-SCNC: 98 MMOL/L (ref 98–107)
CO2 SERPL-SCNC: 22.1 MMOL/L (ref 22–29)
CREAT SERPL-MCNC: 0.89 MG/DL (ref 0.57–1)
EGFRCR SERPLBLD CKD-EPI 2021: 74.3 ML/MIN/1.73
GLOBULIN SER CALC-MCNC: 1.8 GM/DL
GLUCOSE SERPL-MCNC: 87 MG/DL (ref 65–99)
POTASSIUM SERPL-SCNC: 4.3 MMOL/L (ref 3.5–5.2)
PROT SERPL-MCNC: 7 G/DL (ref 6–8.5)
SODIUM SERPL-SCNC: 135 MMOL/L (ref 136–145)
TSH SERPL DL<=0.005 MIU/L-ACNC: 1.75 UIU/ML (ref 0.27–4.2)

## 2023-02-15 NOTE — PROGRESS NOTES
Please call patient with results:    Labs were normal overall. Your sodium remains slightly low but is much improved from previously. I suspect as your appetite continues to normalize, this will continue to improve.

## 2023-02-15 NOTE — PROGRESS NOTES
.     REASON FOR FOLLOWUP :   Pulmonary embolism    HISTORY OF PRESENT ILLNESS:  The patient is a 62 y.o. year old female  who is here for follow-up with the above-mentioned history.    No new problems.  No fever, chills, weight loss, night sweats.  No bleeding    Past Medical History:   Diagnosis Date   • Acute kidney injury (HCC)    • Allergic    • Anxiety 3 years ago   • Depression    • GERD (gastroesophageal reflux disease)    • H/O Leukopenia    • Hernia    • History of anemia    • History of bronchitis    • History of migraine    • Hyperlipidemia     NO MEDS CURRENTLY. WORKING ON   • Hypertension    • Leukopenia     PER HISTORY. HAS SEEN CBC IN THE PAST   • Neck pain    • Obesity (BMI 30.0-34.9) 6/6/2019   • Osteoarthritis    • PVC (premature ventricular contraction)    • Seasonal allergies    • Symptomatic anemia 9/27/2022   • Torn meniscus     LEFT   • Tremor 3 years ago     Past Surgical History:   Procedure Laterality Date   • BREAST BIOPSY Bilateral     BENIGN. HORACE BREAST   • BUNIONECTOMY Left    • BUNIONECTOMY Left     REVISION   • CARDIAC CATHETERIZATION N/A 10/28/2021    Procedure: Coronary angiography;  Surgeon: Vanessa Bennett MD;  Location: [facility] CATH INVASIVE LOCATION;  Service: Cardiovascular;  Laterality: N/A;   • CARDIAC CATHETERIZATION N/A 10/28/2021    Procedure: Left heart cath;  Surgeon: Vanessa Bennett MD;  Location: [facility] CATH INVASIVE LOCATION;  Service: Cardiovascular;  Laterality: N/A;   • CARDIAC CATHETERIZATION N/A 10/28/2021    Procedure: Right heart cath;  Surgeon: Vanessa Bennett MD;  Location: [facility] CATH INVASIVE LOCATION;  Service: Cardiovascular;  Laterality: N/A;   • COLONOSCOPY N/A 2/1/2020    Procedure: COLONOSCOPY TO CECUM AND INTO TERMINAL ILEUM WITH COLD BIOPSY POLYPECTOMY;  Surgeon: Munira Montes MD;  Location: [facility] ENDOSCOPY;  Service: Gastroenterology   • COLONOSCOPY N/A 10/1/2022    Procedure: COLONOSCOPY TO CECUM/TI WITH BIOPSY AND POLYPECTOMY ( COLD  BX);  Surgeon: Jovanny Simms MD;  Location: [facility] ENDOSCOPY;  Service: Gastroenterology;  Laterality: N/A;  ANEMIA  POST: COLON POLYP; TEXTURE MUCOSAL CHANGES IN ASCENDING COLON; MODERATE PREP; INTERNAL HEMORRHOIDS   • DILATATION AND CURETTAGE      AUB no cancer   • ENDOSCOPY N/A 2/1/2020    Procedure: ESOPHAGOGASTRODUODENOSCOPY WITH COLD BIOPSIES;  Surgeon: Munira Montes MD;  Location:  [facility] ENDOSCOPY;  Service: Gastroenterology   • ENDOSCOPY N/A 9/29/2022    Procedure: ESOPHAGOGASTRODUODENOSCOPY with cold biopsies;  Surgeon: Jill Blake MD;  Location: [facility] ENDOSCOPY;  Service: Gastroenterology;  Laterality: N/A;  pre: ANEMIA, H/O GASTROPARESIS AND NAUSEA VOMITTING  post: gastritis, hiatal hernia, z-line@38cm, gastric body and fundus polpys, small bowel granularity   • KNEE ARTHROSCOPY Left 8/22/2018    Procedure: KNEE ARTHROSCOPY LATERAL MENISECTOMY DEBRIDEMENT OF MEDIAL FEMORAL CONDYLE DEFECT DEBRIDEMENT OF ARTHRITIS;  Surgeon: Graciela Crockett MD;  Location:  [facility];  Service: Orthopedics   • MENISCECTOMY Left    • SALPINGO OOPHORECTOMY Left    • TONSILLECTOMY  as a child   • TUBAL ABDOMINAL LIGATION     • UPPER GASTROINTESTINAL ENDOSCOPY     • WISDOM TOOTH EXTRACTION         MEDICATIONS    Current Outpatient Medications:   •  albuterol sulfate  (90 Base) MCG/ACT inhaler, Inhale 2 puffs into the lungs every 4 hours as needed., Disp: 18 g, Rfl: 10  •  aspirin (aspirin) 81 MG EC tablet, Take 1 tablet by mouth Daily., Disp: 30 tablet, Rfl: 0  •  buPROPion XL (WELLBUTRIN XL) 300 MG 24 hr tablet, Take 1 tablet by mouth Every Morning., Disp: 90 tablet, Rfl: 0  •  cetirizine (zyrTEC) 10 MG tablet, Take 1 tablet by mouth As Needed., Disp: , Rfl:   •  folic acid (FOLVITE) 1 MG tablet, Take 1 tablet by mouth Daily., Disp: 30 tablet, Rfl: 0  •  hydrALAZINE (APRESOLINE) 25 MG tablet, TAKE ONE TABLET BY MOUTH THREE TIMES A DAY, Disp: 270 tablet, Rfl: 1  •  LORazepam (ATIVAN) 0.5 MG tablet,  Take 1 tablet by mouth Daily As Needed for Anxiety., Disp: , Rfl:   •  metoclopramide (REGLAN) 10 MG tablet, TAKE ONE TABLET BY MOUTH THREE TIMES A DAY, Disp: 90 tablet, Rfl: 0  •  metoprolol succinate XL (TOPROL-XL) 25 MG 24 hr tablet, Take 3 tablets by mouth Every Night., Disp: 270 tablet, Rfl: 3  •  omeprazole (priLOSEC) 40 MG capsule, Take 1 capsule by mouth 2 (Two) Times a Day Before Meals., Disp: 60 capsule, Rfl: 2  •  ondansetron (ZOFRAN) 4 MG tablet, Take 1 tablet by mouth Every 8 (Eight) Hours As Needed for Nausea., Disp: 60 tablet, Rfl: 1  •  Rexulti 2 MG tablet, , Disp: , Rfl:   •  sodium bicarbonate 650 MG tablet, Take 1 tablet by mouth 3 (Three) Times a Day., Disp: 90 tablet, Rfl: 0    ALLERGIES:     Allergies   Allergen Reactions   • Celecoxib Hives, Swelling and Rash     hives   • Ace Inhibitors Cough   • Amlodipine Swelling   • Lisinopril Cough       SOCIAL HISTORY:       Social History     Socioeconomic History   • Marital status:    • Number of children: 2   • Years of education: College   Tobacco Use   • Smoking status: Former     Packs/day: 0.50     Years: 4.00     Pack years: 2.00     Types: Cigarettes     Quit date: 1987     Years since quittin.3   • Smokeless tobacco: Never   Vaping Use   • Vaping Use: Never used   Substance and Sexual Activity   • Alcohol use: Yes     Alcohol/week: 2.0 standard drinks     Types: 2 Cans of beer per week     Comment: caffeine - None   • Drug use: Yes     Types: Marijuana     Comment: not anymore   • Sexual activity: Yes     Partners: Male     Birth control/protection: Post-menopausal         FAMILY HISTORY:  Family History   Problem Relation Age of Onset   • Dementia Mother    • Arthritis Mother    • Other Mother         Alzheimer's   • Stroke Father    • Heart disease Father    • Hypertension Father    • Diabetes Father    • Cancer Father    • Colon cancer Father    • Hyperlipidemia Father    • Colon polyps Father    • Breast cancer Sister 65  "  • Atrial fibrillation Sister    • Alcohol abuse Sister    • Asthma Sister    • Hypertension Sister    • Ovarian cancer Maternal Aunt    • Prostate cancer Brother    • Malig Hyperthermia Neg Hx        REVIEW OF SYSTEMS:  Review of Systems   Constitutional: Negative for activity change.   HENT: Negative for nosebleeds and trouble swallowing.    Respiratory: Negative for wheezing.    Cardiovascular: Negative for palpitations.   Gastrointestinal: Negative for constipation and diarrhea.   Genitourinary: Negative for dysuria and hematuria.   Musculoskeletal: Negative for arthralgias and myalgias.   Skin: Negative for rash and wound.   Neurological: Negative for seizures and syncope.   Hematological: Negative for adenopathy. Does not bruise/bleed easily.   Psychiatric/Behavioral: Negative for confusion.              Vitals:    02/28/23 0933   BP: 124/81   Pulse: 68   Resp: 18   Temp: 98.2 °F (36.8 °C)   TempSrc: Temporal   SpO2: 98%   Weight: 77.5 kg (170 lb 14.4 oz)   Height: 165.1 cm (65\")   PainSc: 0-No pain     Current Status 2/28/2023   ECOG score 0      PHYSICAL EXAM:          CONSTITUTIONAL:  Vital signs reviewed.  No distress, looks comfortable.  EYES:  Conjunctiva and lids unremarkable.  PERRLA  EARS,NOSE,MOUTH,THROAT:  Ears and nose appear unremarkable.  Lips, teeth, gums appear unremarkable.  RESPIRATORY:  Normal respiratory effort.  Lungs clear to auscultation bilaterally.  CARDIOVASCULAR:  Normal S1, S2.  No murmurs rubs or gallops.  No significant lower extremity edema.  GASTROINTESTINAL: Abdomen appears unremarkable.  Nontender.  No hepatomegaly.  No splenomegaly.  LYMPHATIC:  No cervical, supraclavicular, axillary lymphadenopathy.  SKIN:  Warm.  No rashes.  PSYCHIATRIC:  Normal judgment and insight.  Normal mood and affect.     RECENT LABS:        WBC   Date Value Ref Range Status   02/28/2023 4.19 3.40 - 10.80 10*3/mm3 Final   12/02/2022 3.13 (L) 3.40 - 10.80 10*3/mm3 Final   11/14/2022 1.65 (C) 3.40 - " 10.80 10*3/mm3 Final   10/20/2022 1.37 (C) 3.40 - 10.80 10*3/mm3 Final   10/01/2022 2.17 (L) 3.40 - 10.80 10*3/mm3 Final   09/30/2022 1.75 (C) 3.40 - 10.80 10*3/mm3 Final   09/29/2022 1.95 (C) 3.40 - 10.80 10*3/mm3 Final   09/28/2022 1.99 (C) 3.40 - 10.80 10*3/mm3 Final   09/27/2022 2.87 (L) 3.40 - 10.80 10*3/mm3 Final   03/07/2022 4.08 3.40 - 10.80 10*3/mm3 Final   10/26/2021 4.58 3.40 - 10.80 10*3/mm3 Final   09/27/2021 3.47 3.40 - 10.80 10*3/mm3 Final   08/29/2021 3.26 (L) 3.40 - 10.80 10*3/mm3 Final   08/28/2021 3.15 (L) 3.40 - 10.80 10*3/mm3 Final   08/27/2021 4.73 3.40 - 10.80 10*3/mm3 Final   08/16/2021 3.23 (L) 3.40 - 10.80 10*3/mm3 Final   08/07/2021 3.81 3.40 - 10.80 10*3/mm3 Final   08/06/2021 4.96 3.40 - 10.80 10*3/mm3 Final   06/20/2019 4.42 3.40 - 10.80 10*3/mm3 Final   06/19/2019 3.96 (L) 4.5 - 11.0 10*3/uL Final   06/18/2019 3.68 3.40 - 10.80 10*3/mm3 Final   09/22/2018 3.86 (L) 4.50 - 10.70 10*3/mm3 Final   08/15/2018 2.74 (L) 4.50 - 10.70 10*3/mm3 Final   10/27/2016 3.65 (L) 4.50 - 10.70 10*3/mm3 Final   09/13/2016 2.42 (L) 4.50 - 10.70 10*3/mm3 Final   04/29/2016 3.05 (L) 4.50 - 10.70 10*3/mm3 Final     Hemoglobin   Date Value Ref Range Status   02/28/2023 12.9 12.0 - 15.9 g/dL Final   12/02/2022 15.3 12.0 - 15.9 g/dL Final   11/14/2022 12.6 12.0 - 15.9 g/dL Final   10/20/2022 10.4 (L) 12.0 - 15.9 g/dL Final   10/01/2022 9.3 (L) 12.0 - 15.9 g/dL Final   09/30/2022 9.4 (L) 12.0 - 15.9 g/dL Final   09/30/2022 9.3 (L) 12.0 - 15.9 g/dL Final   09/29/2022 10.2 (L) 12.0 - 15.9 g/dL Final   09/28/2022 10.0 (L) 12.0 - 15.9 g/dL Final   09/27/2022 8.7 (L) 12.0 - 15.9 g/dL Final   03/07/2022 13.1 12.0 - 15.9 g/dL Final   10/28/2021 13.3 12.0 - 17.0 g/dL Final   10/28/2021 13.3 12.0 - 17.0 g/dL Final   10/26/2021 14.6 12.0 - 15.9 g/dL Final   09/27/2021 14.1 12.0 - 15.9 g/dL Final   08/29/2021 14.0 12.0 - 15.9 g/dL Final   08/28/2021 14.1 12.0 - 15.9 g/dL Final   08/27/2021 15.4 12.0 - 15.9 g/dL Final    08/16/2021 13.4 12.0 - 15.9 g/dL Final   08/07/2021 12.9 12.0 - 15.9 g/dL Final   08/06/2021 14.9 12.0 - 15.9 g/dL Final   06/20/2019 13.7 12.0 - 15.9 g/dL Final   06/19/2019 14.6 12.0 - 16.0 g/dL Final   06/18/2019 12.9 12.0 - 15.9 g/dL Final   09/22/2018 14.7 11.9 - 15.5 g/dL Final   08/15/2018 13.8 11.9 - 15.5 g/dL Final   10/27/2016 13.8 11.9 - 15.5 g/dL Final   09/13/2016 13.6 11.9 - 15.5 g/dL Final   04/29/2016 13.5 11.9 - 15.5 g/dL Final     Platelets   Date Value Ref Range Status   02/28/2023 212 140 - 450 10*3/mm3 Final   12/02/2022 208 140 - 450 10*3/mm3 Final   11/14/2022 171 140 - 450 10*3/mm3 Final   10/20/2022 162 140 - 450 10*3/mm3 Final   10/01/2022 176 140 - 450 10*3/mm3 Final   09/30/2022 186 140 - 450 10*3/mm3 Final   09/29/2022 177 140 - 450 10*3/mm3 Final   09/28/2022 167 140 - 450 10*3/mm3 Final   09/27/2022 232 140 - 450 10*3/mm3 Final   03/07/2022 185 140 - 450 10*3/mm3 Final   10/26/2021 194 140 - 450 10*3/mm3 Final   09/27/2021 178 140 - 450 10*3/mm3 Final   08/29/2021 174 140 - 450 10*3/mm3 Final   08/28/2021 186 140 - 450 10*3/mm3 Final   08/27/2021 233 140 - 450 10*3/mm3 Final   08/16/2021 181 140 - 450 10*3/mm3 Final   08/07/2021 259 140 - 450 10*3/mm3 Final   08/06/2021 373 140 - 450 10*3/mm3 Final   06/20/2019 180 140 - 450 10*3/mm3 Final   06/19/2019 184 140 - 440 10*3/uL Final   06/18/2019 160 140 - 450 10*3/mm3 Final   09/22/2018 169 140 - 500 10*3/mm3 Final   08/15/2018 164 140 - 500 10*3/mm3 Final   10/27/2016 161 140 - 500 10*3/mm3 Final   09/13/2016 176 140 - 500 10*3/mm3 Final   04/29/2016 187 140 - 500 10*3/mm3 Final       Assessment & Plan   Neutropenia, unspecified type (HCC)  - CBC & Differential        Amanda Sherwood   *Leukocytopenia  · WBC normal through 3/7/2022.  · 9/28/2022 - 11/14/2022: WBC 1.4-2  · 12/2/2022: WBC up to 3.1  · 2/28/2022: WBC up to 4.2    *Neutropenia  · ANC normal through 3/7/2022  · 9/28/2022 - 11/14/2022: -1260  · 12/2/2022: ANC up to  1910  · ANC 2330    *Anemia  · Hb normal through 3/7/2022  · 9/27/2022- 10/20/2022: Hb 8.7-10.4  · Hb up to 12.6 on 11/14/2022.  · 12/2/2022: Hb up to 15.3  · Hb 12.9    *Etiology of cytopenias:  · Bone marrow biopsy 11/14/2022: Normocellular, 40%.  Trilineage hematopoiesis with <5% blasts.  No evidence of significant dysplastic features or ringed sideroblasts.  Granulopoiesis is decreased and erythropoiesis is increased.  Differential includes drugs, toxins, vitamins, nutritional deficiencies, autoimmune disorders.  Flow cytometry unremarkable including no atypical pattern suggestive of MDS.  NGS unremarkable  · 12/2/2022: WBC up to 3.1, Hb up to 15.3, PLT up to 208.  · 2/28/2023: WBC, Hb, PLT normal    *Bilateral lower lobe pulmonary emboli on CT 8/27/2021.  · Doppler left leg 8/17/2021: Normal  · Doppler bilateral legs 8/28/2021: Normal  · CT chest angiogram 8/27/2021: Bilateral lower lobe PEs.   · 3/14/2022: Completed 6 months therapeutic Eliquis.  This was associated with a reversible risk factor: Immobility from GI illness.  However, she states due to ongoing nausea she is not very mobile and does not want to stop Eliquis.  Therefore, decreased Eliquis 2.5 mg twice per day (prophylactic dose).  If she becomes more mobile in the future, it would be reasonable to stop Eliquis and begin aspirin 81 mg daily.  · Late September 2022 hospitalization for symptomatic anemia requiring transfusion (Hb dropped from normal down to low point of 8.7).  Patient states she is much more mobile.  Eliquis stopped.     *Risk factors for clotting:  · Overweight  · Was not on HRT.  (Understands should not take any HRT in the future).  · Only smoked a little in high school and college.  No smoking since.  · No prior personal history of clots  · No family history of clots  · Immobility.  July 2021 GI virus: Lost 20 pounds.  4-day admission for FANNY and UTI.  Subsequently at home, in bed x5 days late July 2021.  Then, bed to couch x2  weeks.  · Unremarkable: Prothrombin gene mutation, factor V Leiden, Antithrombin, protein C activity, protein S activity, anticardiolipin antibodies, beta-2 glycoprotein antibodies, lupus anticoagulant.  Although protein S free initially low at less than 10% on 9/27/2021, repeat, on 10/25/2021 and 3/7/2022 protein S studies normal.    *Prior Protein S low free level  · Protein S free <10% on 9/27/2021 (protein S activity >150%).  · Typically all types of protein S deficiency have a decreased activity  · Repeat protein S testing of free and activity unremarkable on 10/5/2021 and 3/7/2022.  Therefore, the prior low value is not felt to be significant    *Nausea  Present since the GI illness.  Worse since around January 2022.  Following with GI.  Was found to have some gastroparesis.    *Unsteadiness on feet, overall fatigue and weakness.  · On day of initial consult, 9/27/2021: Difficulty getting up and down off the examining table.  Unsteady on feet.  I recommended a cane or wheelchair.  She declined.  · She is planning physical therapy.  States she has been trying to walk but cannot walk more than 1/4 mile.  I recommended she use her stationary bike multiple times per day to try to build up strength.    *Previously improved, but ongoing SOA and chest discomfort.  · She follows with cardiology and pulmonology and states they are aware.  · She states Dr. Jordon Adams prescribed Symbicort and a rescue inhaler.  · She states cardiology told her an echocardiogram shows pulmonary hypertension.  · After the PE, she followed up with Dr. Jordon Adams.  She states he asked her to do a test to look for asthma.  She is worried about having the test done and therefore has not followed up with him.  She states she will follow up with him.   · 12/2/2022: No complaints of this today.    *Possible pulmonary nodule, LLL on CT 8/27/2021  · Small new 6 mm nodular density LLL, radiologist recommends follow-up.  · PCP did a follow-up  CT.  · CT 2/14/2022: Resolution.  However, the radiologist recommended to consider follow-up CT in 1 year.  · Patient aware and plans to follow this up through her PCP.  PCP has ordered a CT.  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  · She no longer works.  States she is unable to work due to the nausea    *Hospitalized 9/27/2022 - 10/1/2022 for symptomatic anemia.    · Transfused.  EGD and colonoscopy with no evidence of active bleeding.  Bruising lower extremities on admission.  Eliquis was held during the admission and she was told to hold Eliquis until she follows up with us.  Discharged home on aspirin 81 mg daily.    Plan  · MD CBC 1 year  · (I offered no further follow-up but she prefers 1 year)  · Defer to PCP follow-up CT chest.  · Radiologist recommended to consider follow-up CT chest (to follow-up previously seen LLL pulmonary nodular density) 1 year from last (February 2023).    · CT chest was ordered by PCP on 2/14/2023 (not scheduled as of 2/15/2023).

## 2023-02-23 ENCOUNTER — HOSPITAL ENCOUNTER (OUTPATIENT)
Dept: MAMMOGRAPHY | Facility: HOSPITAL | Age: 63
Discharge: HOME | End: 2023-02-23
Admitting: OBSTETRICS & GYNECOLOGY
Payer: COMMERCIAL

## 2023-02-23 DIAGNOSIS — Z12.31 SCREENING MAMMOGRAM FOR BREAST CANCER: ICD-10-CM

## 2023-02-23 PROCEDURE — 77067 SCR MAMMO BI INCL CAD: CPT

## 2023-02-23 PROCEDURE — 77063 BREAST TOMOSYNTHESIS BI: CPT

## 2023-02-24 NOTE — PROGRESS NOTES
Chief Complaint  gastroparesis, Nausea, Heartburn, and Vomiting    Subjective          History of Present Illness  Amanda Sherwood is a  61 y.o. female here for follow-up for gastroparesis, GERD, persistent nausea and vomiting.  She is accompanied today's visit by her .  She is a patient of Dr. Montes.    She was last seen in the clinic by Dr. Montes on 4/18/2022.  At the time was recommended she undergo an upper GI series.  Patient has not completed upper GI as she states that she is not able to ingest the contrast without vomiting.  She did experience a 27 pound weight loss in the last 4 months.  She is taking 10 mg Reglan 3 times daily but still reports she is unable to eat without nausea and vomiting: Fourth dose of Reglan will give her diarrhea.  She denies any adverse side effects regarding Reglan.  Her bowels are moving normally majority of the time with occasional loose bowel movement.  Her heartburn has improved    In review of her medications, she is noted to be on Wellbutrin 300 mg p.o. daily and 23% of people this can cause nausea and vomiting.  The addition of this medication to her daily regimen also seems to correlate with the timing of her onset of symptoms.  She is currently weaning off her Lexapro which has had no adverse effect on her current symptoms.    Normal right upper quadrant ultrasound 3/18/2022.  KUB from 3/18/2022 with slightly dilated small bowel loop in the left lower quadrant which was nonspecific.    Gastric emptying study from 2/17/2022 with only mildly elevated 4-hour residual but 2-hour and emptying half-time are greater than normal suggestive of late gastric emptying.    EGD and colonoscopy from February 2020 with findings of esophagitis, gastritis, duodenitis, diverticulosis, nonbleeding internal hemorrhoids, hyperplastic polyp.  Recall colonoscopy 5 years.    They are inquiring about repeat endoscopy.     Past Medical History:   Diagnosis Date   • Acute kidney injury  ([facility])    • Allergic    • Anxiety 3 years ago   • Depression    • GERD (gastroesophageal reflux disease)    • H/O Leukopenia    • Hernia    • History of anemia    • History of bronchitis    • History of migraine    • Hyperlipidemia     NO MEDS CURRENTLY. WORKING ON   • Hypertension    • Leukopenia     PER HISTORY. HAS SEEN CBC IN THE PAST   • Neck pain    • Obesity (BMI 30.0-34.9) 6/6/2019   • Osteoarthritis    • PVC (premature ventricular contraction)    • Seasonal allergies    • Torn meniscus     LEFT   • Tremor 3 years ago       Past Surgical History:   Procedure Laterality Date   • BREAST BIOPSY Bilateral     BENIGN. HORACE BREAST   • BUNIONECTOMY Left    • BUNIONECTOMY Left     REVISION   • CARDIAC CATHETERIZATION N/A 10/28/2021    Procedure: Coronary angiography;  Surgeon: Vanessa Bennett MD;  Location: [facility] CATH INVASIVE LOCATION;  Service: Cardiovascular;  Laterality: N/A;   • CARDIAC CATHETERIZATION N/A 10/28/2021    Procedure: Left heart cath;  Surgeon: Vanessa Bennett MD;  Location: [facility] CATH INVASIVE LOCATION;  Service: Cardiovascular;  Laterality: N/A;   • CARDIAC CATHETERIZATION N/A 10/28/2021    Procedure: Right heart cath;  Surgeon: Vanessa Bennett MD;  [facility];  Service: Cardiovascular;  Laterality: N/A;   • COLONOSCOPY N/A 2/1/2020    Procedure: COLONOSCOPY TO CECUM AND INTO TERMINAL ILEUM WITH COLD BIOPSY POLYPECTOMY;  Surgeon: Munira Montes MD;  Location: [facility] ENDOSCOPY;  Service: Gastroenterology   • DILATATION AND CURETTAGE      AUB no cancer   • ENDOSCOPY N/A 2/1/2020    Procedure: ESOPHAGOGASTRODUODENOSCOPY WITH COLD BIOPSIES;  Surgeon: Munira Montes MD;  Location: [facility] ENDOSCOPY;  Service: Gastroenterology   • KNEE ARTHROSCOPY Left 8/22/2018    Procedure: KNEE ARTHROSCOPY LATERAL MENISECTOMY DEBRIDEMENT OF MEDIAL FEMORAL CONDYLE DEFECT DEBRIDEMENT OF ARTHRITIS;  Surgeon: Graciela Crockett MD;  Location: [facility] OR [facility];  Service: Orthopedics   •  MENISCECTOMY Left    • SALPINGO OOPHORECTOMY Left    • TONSILLECTOMY  as a child   • TUBAL ABDOMINAL LIGATION     • UPPER GASTROINTESTINAL ENDOSCOPY     • WISDOM TOOTH EXTRACTION         Family History   Problem Relation Age of Onset   • Dementia Mother    • Arthritis Mother    • Other Mother         Alzheimer's   • Stroke Father    • Heart disease Father    • Hypertension Father    • Diabetes Father    • Cancer Father    • Colon cancer Father    • Hyperlipidemia Father    • Colon polyps Father    • Breast cancer Sister 65   • Atrial fibrillation Sister    • Alcohol abuse Sister    • Asthma Sister    • Hypertension Sister    • Ovarian cancer Maternal Aunt    • Prostate cancer Brother    • Malig Hyperthermia Neg Hx        Social History     Socioeconomic History   • Marital status:    • Number of children: 2   • Years of education: College   Tobacco Use   • Smoking status: Former Smoker     Packs/day: 0.50     Years: 4.00     Pack years: 2.00     Types: Cigarettes     Quit date: 1987     Years since quittin.6   • Smokeless tobacco: Never Used   Vaping Use   • Vaping Use: Never used   Substance and Sexual Activity   • Alcohol use: Yes     Alcohol/week: 2.0 standard drinks     Types: 2 Cans of beer per week     Comment: caffeine - None   • Drug use: Not Currently     Types: Marijuana   • Sexual activity: Yes     Partners: Male     Birth control/protection: Post-menopausal       Allergies   Allergen Reactions   • Celecoxib Hives and Swelling     hives   • Ace Inhibitors Cough   • Lisinopril Cough       Current Outpatient Medications on File Prior to Visit   Medication Sig Dispense Refill   • albuterol sulfate  (90 Base) MCG/ACT inhaler Inhale 2 puffs into the lungs every 4 hours as needed. 18 g 10   • apixaban (ELIQUIS) 5 MG tablet tablet Take 1 tablet by mouth 2 (Two) Times a Day. 180 tablet 3   • atorvastatin (Lipitor) 40 MG tablet Take 1 tablet by mouth Every Night. 30 tablet 3   • benzonatate  "(Tessalon Perles) 100 MG capsule Take 1 capsule by mouth 3 (Three) Times a Day As Needed for Cough. 30 capsule 0   • buPROPion XL (WELLBUTRIN XL) 300 MG 24 hr tablet Take 1 tablet by mouth Every Morning. 90 tablet 0   • cetirizine (zyrTEC) 10 MG tablet Take 10 mg by mouth As Needed.     • escitalopram (LEXAPRO) 20 MG tablet Take 1 tablet by mouth Daily. 90 tablet 0   • LORazepam (ATIVAN) 0.5 MG tablet Take 0.5 mg by mouth Daily As Needed for Anxiety.     • losartan (Cozaar) 50 MG tablet Take 1 tablet by mouth Daily. 90 tablet 3   • metoclopramide (REGLAN) 10 MG tablet Take 1 tablet by mouth 4 (Four) Times a Day. 120 tablet 0   • metoprolol succinate XL (TOPROL-XL) 50 MG 24 hr tablet Take 1 tablet by mouth Every Night. 90 tablet 3   • omeprazole (priLOSEC) 40 MG capsule Take 1 capsule by mouth 2 (Two) Times a Day Before Meals. 60 capsule 3   • ondansetron (ZOFRAN) 4 MG tablet Take 1 tablet by mouth Every 8 (Eight) Hours As Needed for Nausea. 60 tablet 1   • spironolactone (ALDACTONE) 50 MG tablet Take 1 tablet by mouth Daily. 90 tablet 3   • traZODone (DESYREL) 50 MG tablet Take 2 tablets by mouth every night at bedtime as needed 180 tablet 0   • calcium carbonate (TUMS) 500 MG chewable tablet Chew 1 tablet As Needed.       No current facility-administered medications on file prior to visit.       Review of Systems     Objective   Vital Signs:   /76   Pulse 82   Temp 96.8 °F (36 °C)   Ht 165.1 cm (65\")   Wt 83.1 kg (183 lb 3.2 oz)   BMI 30.49 kg/m²       Weight Weight (kg) Weight (lbs)   7/1/2022 83.099 kg 183 lb 3.2 oz   6/9/2022 87.091 kg 192 lb   4/18/2022 92.08 kg 203 lb   3/14/2022 93.577 kg 206 lb 4.8 oz   3/4/2022 95.255 kg 210 lb       Physical Exam  Vitals and nursing note reviewed.   Constitutional:       General: She is not in acute distress.     Appearance: Normal appearance. She is not ill-appearing.   HENT:      Head: Normocephalic and atraumatic.      Right Ear: External ear normal.      " Left Ear: External ear normal.   Eyes:      General: No scleral icterus.     Conjunctiva/sclera: Conjunctivae normal.      Pupils: Pupils are equal, round, and reactive to light.   Cardiovascular:      Rate and Rhythm: Normal rate and regular rhythm.      Heart sounds: Normal heart sounds.   Pulmonary:      Effort: Pulmonary effort is normal.      Breath sounds: Normal breath sounds.   Abdominal:      General: Bowel sounds are normal. There is no distension.      Palpations: Abdomen is soft.      Tenderness: There is no abdominal tenderness. There is no guarding or rebound.   Musculoskeletal:      Cervical back: Normal range of motion and neck supple.   Skin:     General: Skin is warm and dry.   Neurological:      Mental Status: She is alert and oriented to person, place, and time.   Psychiatric:      Comments: Depressed, tearful          Result Review :       Common labs    Common Labsle 11/29/21 3/7/22 6/9/22 6/9/22      1028 1028   Glucose 94   73   BUN 13   9   Creatinine 1.18 (A)   1.35 (A)   eGFR Non  Am 51 (A)      eGFR  Am 59 (A)      Sodium 142   126 (A)   Potassium 4.3   4.9   Chloride 101   89 (A)   Calcium 10.3 (A)   10.2   Albumin    4.90   Total Bilirubin    0.5   Alkaline Phosphatase    76   AST (SGOT)    83 (A)   ALT (SGPT)    94 (A)   WBC  4.08     Hemoglobin  13.1     Hematocrit  38.6     Platelets  185     Total Cholesterol   162    Triglycerides   59    HDL Cholesterol   97 (A)    LDL Cholesterol    53    (A) Abnormal value       Comments are available for some flowsheets but are not being displayed.                               Assessment and Plan    Diagnoses and all orders for this visit:    1. Gastroparesis (Primary)    2. Nausea and vomiting, unspecified vomiting type    3. Gastroesophageal reflux disease with esophagitis without hemorrhage      · Continue 40 mg omeprazole as this seems to be helping with heartburn/reflux.  · Okay to continue metoclopramide 3 times daily for  now.  We did discuss the potential for adverse effects and will likely need to consider domperidone as an alternative.  · In review of her medication's, the bupropion has about a 23% incidence of nausea with vomiting.  The initiation of this seems to coincide with her onset of symptoms.  · Could consider HIDA scan to rule out biliary dyskinesia.  · Plan to proceed with EGD.  The benefits and risks (bleeding, perforation, anesthesia related complications) were discussed with the patient in detail.  Patient understands risks and agrees to proceed.      Follow Up   Return in about 3 months (around 10/1/2022) for Dr. Montes.      I spent 40 minutes caring for Amanda Sherwood on this date of service. This time includes time spent by me in the following activities: preparing for the visit, reviewing tests, obtaining and/or reviewing a separately obtained history, performing a medically appropriate examination and/or evaluation , counseling and educating the patient/family/caregiver, ordering medications, tests, or procedures, documenting information in the medical record, independently interpreting results and communicating that information with the patient/family/caregiver and care coordination.    Dragon dictation used throughout this note.     JED Bradely    normal...

## 2023-02-28 ENCOUNTER — LAB (OUTPATIENT)
Dept: OTHER | Facility: HOSPITAL | Age: 63
End: 2023-02-28
Payer: COMMERCIAL

## 2023-02-28 ENCOUNTER — OFFICE VISIT (OUTPATIENT)
Dept: ONCOLOGY | Facility: CLINIC | Age: 63
End: 2023-02-28
Payer: COMMERCIAL

## 2023-02-28 VITALS
TEMPERATURE: 98.2 F | SYSTOLIC BLOOD PRESSURE: 124 MMHG | RESPIRATION RATE: 18 BRPM | HEIGHT: 65 IN | HEART RATE: 68 BPM | OXYGEN SATURATION: 98 % | DIASTOLIC BLOOD PRESSURE: 81 MMHG | BODY MASS INDEX: 28.47 KG/M2 | WEIGHT: 170.9 LBS

## 2023-02-28 DIAGNOSIS — D70.9 NEUTROPENIA, UNSPECIFIED TYPE: ICD-10-CM

## 2023-02-28 DIAGNOSIS — D70.9 NEUTROPENIA, UNSPECIFIED TYPE: Primary | ICD-10-CM

## 2023-02-28 LAB
BASOPHILS # BLD AUTO: 0.02 10*3/MM3 (ref 0–0.2)
BASOPHILS NFR BLD AUTO: 0.5 % (ref 0–1.5)
DEPRECATED RDW RBC AUTO: 40.4 FL (ref 37–54)
EOSINOPHIL # BLD AUTO: 0.04 10*3/MM3 (ref 0–0.4)
EOSINOPHIL NFR BLD AUTO: 1 % (ref 0.3–6.2)
ERYTHROCYTE [DISTWIDTH] IN BLOOD BY AUTOMATED COUNT: 11.7 % (ref 12.3–15.4)
HCT VFR BLD AUTO: 37.7 % (ref 34–46.6)
HGB BLD-MCNC: 12.9 G/DL (ref 12–15.9)
IMM GRANULOCYTES # BLD AUTO: 0.02 10*3/MM3 (ref 0–0.05)
IMM GRANULOCYTES NFR BLD AUTO: 0.5 % (ref 0–0.5)
LYMPHOCYTES # BLD AUTO: 1.26 10*3/MM3 (ref 0.7–3.1)
LYMPHOCYTES NFR BLD AUTO: 30.1 % (ref 19.6–45.3)
MCH RBC QN AUTO: 32.2 PG (ref 26.6–33)
MCHC RBC AUTO-ENTMCNC: 34.2 G/DL (ref 31.5–35.7)
MCV RBC AUTO: 94 FL (ref 79–97)
MONOCYTES # BLD AUTO: 0.52 10*3/MM3 (ref 0.1–0.9)
MONOCYTES NFR BLD AUTO: 12.4 % (ref 5–12)
NEUTROPHILS NFR BLD AUTO: 2.33 10*3/MM3 (ref 1.7–7)
NEUTROPHILS NFR BLD AUTO: 55.5 % (ref 42.7–76)
NRBC BLD AUTO-RTO: 0 /100 WBC (ref 0–0.2)
PLATELET # BLD AUTO: 212 10*3/MM3 (ref 140–450)
PMV BLD AUTO: 10.1 FL (ref 6–12)
RBC # BLD AUTO: 4.01 10*6/MM3 (ref 3.77–5.28)
WBC NRBC COR # BLD: 4.19 10*3/MM3 (ref 3.4–10.8)

## 2023-02-28 PROCEDURE — 99214 OFFICE O/P EST MOD 30 MIN: CPT | Performed by: INTERNAL MEDICINE

## 2023-02-28 PROCEDURE — 85025 COMPLETE CBC W/AUTO DIFF WBC: CPT | Performed by: INTERNAL MEDICINE

## 2023-02-28 PROCEDURE — 36415 COLL VENOUS BLD VENIPUNCTURE: CPT

## 2023-03-08 ENCOUNTER — TELEPHONE (OUTPATIENT)
Dept: FAMILY MEDICINE CLINIC | Facility: CLINIC | Age: 63
End: 2023-03-08

## 2023-03-08 NOTE — TELEPHONE ENCOUNTER
Now. The last one was in Jan 2022 and it was recommended to repeat in 1year which would have been Jan 2023. Please have her schedule at her earliest convenience.

## 2023-03-08 NOTE — TELEPHONE ENCOUNTER
Caller: Amanda Sherwood    Relationship: Self    Best call back number: [phone]    What is the best time to reach you: ANYTIME    Who are you requesting to speak with (clinical staff, provider,  specific staff member): PCP OR MEDICAL ASSISTANT    Do you know the name of the person who called: SELF    What was the call regarding: THE PATIENT WOULD LIKE TO KNOW IF HER PCP WOULD LIKE FOR HER TO HAVE THE CT SCAN OF HER CHEST NOW OR LATER.     Do you require a callback: YES

## 2023-03-13 ENCOUNTER — TELEPHONE (OUTPATIENT)
Dept: FAMILY MEDICINE CLINIC | Facility: CLINIC | Age: 63
End: 2023-03-13

## 2023-03-13 DIAGNOSIS — R91.1 PULMONARY NODULE: Primary | ICD-10-CM

## 2023-03-13 NOTE — TELEPHONE ENCOUNTER
Caller: Amanda Sherwood    Relationship: Self    Best call back number: [phone]    What orders are you requesting (i.e. lab or imaging): CT CHEST     In what timeframe would the patient need to come in:    Where will you receive your lab/imaging services:     Additional notes: PATIENT STATES SHE CALLED TO SCHEDULE AND WAS TOLD THAT ORDER HAD .   PLEASE ADVISE            2 = assistive person

## 2023-04-10 RX ORDER — OMEPRAZOLE 40 MG/1
CAPSULE, DELAYED RELEASE ORAL
Qty: 60 CAPSULE | Refills: 2 | Status: SHIPPED | OUTPATIENT
Start: 2023-04-10

## 2023-05-16 ENCOUNTER — HOSPITAL ENCOUNTER (OUTPATIENT)
Dept: CT IMAGING | Facility: HOSPITAL | Age: 63
Discharge: HOME | End: 2023-05-16
Admitting: STUDENT IN AN ORGANIZED HEALTH CARE EDUCATION/TRAINING PROGRAM
Payer: COMMERCIAL

## 2023-05-16 DIAGNOSIS — R91.1 PULMONARY NODULE: ICD-10-CM

## 2023-05-16 PROCEDURE — 71250 CT THORAX DX C-: CPT

## 2023-05-17 NOTE — PROGRESS NOTES
Let her know that CT showed nodules are stable and not concerning for cancer. Nothing further needed to be done at this time.

## 2023-05-25 ENCOUNTER — OFFICE VISIT (OUTPATIENT)
Dept: CARDIOLOGY | Facility: CLINIC | Age: 63
End: 2023-05-25
Payer: COMMERCIAL

## 2023-05-25 VITALS
DIASTOLIC BLOOD PRESSURE: 70 MMHG | BODY MASS INDEX: 28.66 KG/M2 | HEART RATE: 72 BPM | WEIGHT: 172 LBS | SYSTOLIC BLOOD PRESSURE: 119 MMHG | HEIGHT: 65 IN | OXYGEN SATURATION: 99 %

## 2023-05-25 DIAGNOSIS — K31.84 GASTROPARESIS: ICD-10-CM

## 2023-05-25 DIAGNOSIS — I49.3 PVCS (PREMATURE VENTRICULAR CONTRACTIONS): Primary | ICD-10-CM

## 2023-05-25 DIAGNOSIS — N18.31 STAGE 3A CHRONIC KIDNEY DISEASE (CKD): ICD-10-CM

## 2023-05-25 DIAGNOSIS — E78.2 MIXED HYPERLIPIDEMIA: ICD-10-CM

## 2023-05-25 DIAGNOSIS — I10 PRIMARY HYPERTENSION: ICD-10-CM

## 2023-05-25 DIAGNOSIS — Z86.711 HISTORY OF PULMONARY EMBOLISM: ICD-10-CM

## 2023-05-25 NOTE — PROGRESS NOTES
"      [facility] CARDIOLOGY  [address] [facility] [address]  [facility] [address]  Phone: [phone]  Fax: [phone]  Patient Name: Amanda Sherwood  :1960  Age: 62 y.o.  Primary Cardiologist: Yue Maradiaga MD  Encounter Provider:  OLVIN Perez    History of Present Illness     Amanda Sherwood is a 62 y.o.  female whose medical history includes hypertension, gastroparesis, GERD, history of low platelets and low WBC on NSAIDs, and history of PE in 2021.  She is followed in our office by Dr. Maradiaga for PVCs and SVT. I have reviewed the past medical records in preparation of today's visit.     23 Follow-up:  She is here for 6-month follow-up and I am seeing her for the first time today.  She is doing well and there have been no changes in her medical history since the last appointment.  She continues to gain weight since December and her appetite and GI symptoms are improving.  Her blood pressure at home is similar to today's reading; she denies any lightheadedness or near syncope.  She denies chest pain, dyspnea with exertion, orthopnea, palpitations, or leg swelling.  She is taking her medications as prescribed.    Data Review     The following data was reviewed by OLVIN Perez on 23:    Vital Signs:   /70 (BP Location: Left arm, Patient Position: Sitting, Cuff Size: Adult)   Pulse 72   Ht 165.1 cm (65\")   Wt 78 kg (172 lb)   SpO2 99%   BMI 28.62 kg/m²       Weight:  Wt Readings from Last 3 Encounters:   23 78 kg (172 lb)   23 77.5 kg (170 lb 14.4 oz)   23 74.8 kg (165 lb)     Body mass index is 28.62 kg/m².    Below is a summary of pertinent cardiology findings:  • Family history includes her father who had MI in his 60s requiring angioplasty and then CABG.  • She has history of SVT and PVCs.  • 2018 she had a normal stress nuclear perfusion study and echocardiogram.  • July " 2021 she lost 20 pounds in a week following severe vomiting with a GI virus; she was hospitalized in August 2021 with FANNY and UTI.  She was bedridden at that time.  She was then readmitted later that month with multi subsegmental PE without acute cor pulmonale.  CTA of the chest showed bilateral lower lobe branch PEs.  Dr. Maradiaga did review the CTA which did not show good imaging of the left main coronary, RCA artery, or left circumflex; the proximal LAD appeared patent and there was no calcification of the aorta.  • August 2021 echocardiogram showed EF 63%, RVSP 36 mmHg, normal RV size and function, and no significant valvular disease.  She also had normal bilateral lower extremity venous Dopplers.  • October 2021 cardiac catheterization for short windedness showed normal coronary arteries, normal left and right-sided pressures.  Echocardiogram at that time showed EF 66 to 70%, normal chamber sizes, and normal valvular structure and function.  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 Maradiaga.  • September 2020 she presented to the ER with weakness and dyspnea and found to have symptomatic anemia requiring blood transfusion.  She also was noted to have hematoma in the back of one of her legs from apixaban.  Her apixaban was not restarted but she was started on 81 mg aspirin daily.    Labs:  • 02/14/2023:  cr 0.9, K 4.3, otherwise unremarkable CMP,, TSH 1.750, Hgb 12.9, Plt 212      ECG 12 Lead    Date/Time: 5/25/2023 10:25 AM  Performed by: Medina Moncada APRN  Authorized by: Medina Moncada APRN   Comparison: compared with previous ECG from 10/13/2022  Similar to previous ECG  Rhythm: sinus rhythm  Rate: normal  BPM: 72  T flattening: aVL  Other findings: low voltage    Clinical impression: non-specific ECG            Medications     Allergies as of 05/25/2023 - Reviewed 05/25/2023   Allergen Reaction Noted   •  Celecoxib Hives, Swelling, and Rash 11/02/2016   • Ace inhibitors Cough 09/23/2018   • Amlodipine Swelling 10/13/2022   • Lisinopril Cough          Current Outpatient Medications:   •  albuterol sulfate  (90 Base) MCG/ACT inhaler, Inhale 2 puffs into the lungs every 4 hours as needed., Disp: 18 g, Rfl: 10  •  aspirin (aspirin) 81 MG EC tablet, Take 1 tablet by mouth Daily., Disp: 30 tablet, Rfl: 0  •  buPROPion XL (WELLBUTRIN XL) 300 MG 24 hr tablet, Take 1 tablet by mouth Every Morning., Disp: 90 tablet, Rfl: 0  •  cetirizine (zyrTEC) 10 MG tablet, Take 1 tablet by mouth As Needed., Disp: , Rfl:   •  folic acid (FOLVITE) 1 MG tablet, Take 1 tablet by mouth Daily., Disp: 30 tablet, Rfl: 0  •  hydrALAZINE (APRESOLINE) 25 MG tablet, TAKE ONE TABLET BY MOUTH THREE TIMES A DAY, Disp: 270 tablet, Rfl: 1  •  LORazepam (ATIVAN) 0.5 MG tablet, Take 1 tablet by mouth Daily As Needed for Anxiety., Disp: , Rfl:   •  metoclopramide (REGLAN) 10 MG tablet, TAKE ONE TABLET BY MOUTH THREE TIMES A DAY, Disp: 90 tablet, Rfl: 0  •  metoprolol succinate XL (TOPROL-XL) 25 MG 24 hr tablet, Take 3 tablets by mouth Every Night., Disp: 270 tablet, Rfl: 3  •  omeprazole (priLOSEC) 40 MG capsule, TAKE ONE CAPSULE BY MOUTH TWICE A DAY BEFORE MEALS, Disp: 60 capsule, Rfl: 2  •  ondansetron (ZOFRAN) 4 MG tablet, Take 1 tablet by mouth Every 8 (Eight) Hours As Needed for Nausea., Disp: 60 tablet, Rfl: 1  •  Rexulti 2 MG tablet, , Disp: , Rfl:   •  sodium bicarbonate 650 MG tablet, Take 1 tablet by mouth 3 (Three) Times a Day., Disp: 90 tablet, Rfl: 0     Past History, Review of Systems, Exam     Past Medical History:   Diagnosis Date   • Acute kidney injury    • Allergic    • Anxiety 3 years ago   • Depression    • GERD (gastroesophageal reflux disease)    • H/O Leukopenia    • Hernia    • History of anemia    • History of bronchitis    • History of migraine    • Hyperlipidemia    • Hypertension    • Leukopenia    • Neck pain    • Obesity  (BMI 30.0-34.9) 2019   • Osteoarthritis    • PVC (premature ventricular contraction)    • Seasonal allergies    • Symptomatic anemia 2022   • Torn meniscus    • Tremor 3 years ago       Past Surgical History:   has a past surgical history that includes Tubal ligation; Bunionectomy (Left); Bunionectomy (Left); Knee Arthroscopy (Left, 2018); Colonoscopy (N/A, 2020); Esophagogastroduodenoscopy (N/A, 2020); Salpingoophorectomy (Left); Tonsillectomy (as a child); [place] tooth extraction; Meniscectomy (Left); Dilation and curettage of uterus; Cardiac catheterization (N/A, 10/28/2021); Cardiac catheterization (N/A, 10/28/2021); Cardiac catheterization (N/A, 10/28/2021); Upper gastrointestinal endoscopy; Breast biopsy (Bilateral); Esophagogastroduodenoscopy (N/A, 2022); and Colonoscopy (N/A, 10/1/2022).     Social History     Socioeconomic History   • Marital status:    • Number of children: 2   • Years of education: College   Tobacco Use   • Smoking status: Former     Packs/day: 0.50     Years: 4.00     Pack years: 2.00     Types: Cigarettes     Quit date: 1987     Years since quittin.5   • Smokeless tobacco: Never   Vaping Use   • Vaping Use: Never used   Substance and Sexual Activity   • Alcohol use: Yes     Alcohol/week: 2.0 standard drinks     Types: 2 Cans of beer per week     Comment: caffeine - None   • Drug use: Yes     Types: Marijuana     Comment: not anymore   • Sexual activity: Yes     Partners: Male     Birth control/protection: Post-menopausal       Review of Systems   Cardiovascular: Negative.        Vitals reviewed.   Constitutional:       Appearance: Not in distress.   Eyes:      Conjunctiva/sclera: Conjunctivae normal.      Pupils: Pupils are equal, round, and reactive to light.   HENT:      Head: Normocephalic.      Nose: Nose normal.    Mouth/Throat:      Pharynx: Oropharynx is clear.   Neck:      Vascular: JVD normal.   Pulmonary:      Effort: Pulmonary  effort is normal.      Breath sounds: Normal breath sounds. No wheezing. No rhonchi. No rales.   Cardiovascular:      Normal rate. Regular rhythm. Normal S1. Normal S2.      Murmurs: There is no murmur.   Pulses:     Intact distal pulses.   Edema:     Peripheral edema absent.   Abdominal:      General: Bowel sounds are normal. There is no distension.      Palpations: Abdomen is soft.      Tenderness: There is no abdominal tenderness.   Musculoskeletal: Normal range of motion.      Cervical back: Normal range of motion and neck supple. Skin:     General: Skin is warm and dry.   Neurological:      Mental Status: Alert and oriented to person, place and time.   Psychiatric:         Attention and Perception: Attention normal.         Mood and Affect: Mood normal.         Speech: Speech normal.         Behavior: Behavior is cooperative.          Assessment and Plan     Assessment:  1. PVCs (premature ventricular contractions)    2. History of pulmonary embolism    3. Primary hypertension    4. Mixed hyperlipidemia    5. Gastroparesis    6. Stage 3a chronic kidney disease (CKD)         1. History of PVCs and SVT: No recent issues.  She remains on 75 mg metoprolol succinate daily.  2. History of pulmonary embolism: This occurred in August 2021 after she was bedridden with significant GI illness.  She was placed on apixaban but then had significant calf bleeding; she is now on 81 mg aspirin daily.  3. Hypertension: Controlled.  4. Hyperlipidemia: No recent lipids to review; she is not on statin.  5. Gastroparesis: This is a newer diagnosis for her and she did have some issues but her GI is symptoms are currently stable and her weight is improving.  She is taking Reglan.  6. CKD 3A: Her renal function was stable when checked in February 2023.    Ms. Segura is a patient of Dr. Fernandez with history of PVCs, SVT, and hypertension.  All of this is well controlled.  She did have a terrible GI illness in July and August 2022  and then had bilateral PE without cor pulmonale due to decreased mobility.  She had calf bleed while on apixaban and this was stopped.  She is currently maintained on aspirin.  She is doing well.  I will make no medication changes and have her see Dr. Maradiaga in 6 months.    Return in about 6 months (around 11/25/2023) for Follow-up with Dr. Maradiaga.  Orders Placed This Encounter   Procedures   • ECG 12 Lead      No orders of the defined types were placed in this encounter.        Thank you for the opportunity to participate in this patient's care.    OLVIN Saba    This office note has been dictated.

## 2023-07-26 NOTE — PROGRESS NOTES
"Chief Complaint  Abdominal Pain (On the right side/-started a week ago)    Subjective        Amanda Sherwood presents to [facility]  History of Present Illness  Amanda Sherwood is a 62 y.o. female who presents to clinic today with concerns of right-sided abdominal pain.  Patient's symptoms started 1 week ago.  Patient does admit to history of alcohol abuse and quit drinking alcohol earlier this month.  She is drinking more water and has been losing weight.  She does feel as if she is not urinating as much as she was previously.  She denies pain with urination, blood in her urine, fever, chills, or odiferous urine.  She denies history of kidney stones.  She would rate her pain a 4 or 5 on a scale of 0-10.  Nothing brings on the pain, it would just occur randomly throughout the day.  She is worried that her problems could be related to her liver.  She denies nausea, vomiting, constipation, or diarrhea.  She is anxious today, but states this is not abnormal for her and she did not take her Ativan.  Looking through her history, she does have a history of chronic kidney disease and gastroparesis.    Review of Systems   Constitutional:  Negative for chills, fatigue and fever.   Gastrointestinal:  Positive for abdominal pain. Negative for diarrhea, nausea and vomiting.   Genitourinary:  Positive for decreased urine volume. Negative for difficulty urinating, dysuria, frequency and hematuria.   Psychiatric/Behavioral:  The patient is nervous/anxious.      Objective   Vital Signs:  /80 (BP Location: Right arm, Patient Position: Sitting, Cuff Size: Adult)   Pulse 70   Temp 97.4 °F (36.3 °C) (Infrared)   Ht 165.1 cm (65\")   Wt 77.1 kg (170 lb)   SpO2 98%   BMI 28.29 kg/m²   Estimated body mass index is 28.29 kg/m² as calculated from the following:    Height as of this encounter: 165.1 cm (65\").    Weight as of this encounter: 77.1 kg (170 lb).           Physical Exam  Vitals " reviewed.   Constitutional:       General: She is not in acute distress.     Appearance: Normal appearance. She is not ill-appearing, toxic-appearing or diaphoretic.   HENT:      Head: Normocephalic and atraumatic.   Eyes:      General: No scleral icterus.        Right eye: No discharge.         Left eye: No discharge.      Extraocular Movements: Extraocular movements intact.   Pulmonary:      Effort: Pulmonary effort is normal. No respiratory distress.   Abdominal:      General: Bowel sounds are normal.      Palpations: Abdomen is soft. There is no hepatomegaly.      Tenderness: There is abdominal tenderness in the right upper quadrant. There is no right CVA tenderness or left CVA tenderness.   Musculoskeletal:      Right lower leg: No edema.      Left lower leg: No edema.   Neurological:      Mental Status: She is alert.      Motor: No weakness.      Gait: Gait normal.      Result Review :    Common labs          12/2/2022    09:30 2/14/2023    09:29 2/28/2023    09:23   Common Labs   Glucose  87     BUN  10     Creatinine  0.89     Sodium  135     Potassium  4.3     Chloride  98     Calcium  10.4     Total Protein  7.0     Albumin  5.2     Total Bilirubin  0.6     Alkaline Phosphatase  66     AST (SGOT)  25     ALT (SGPT)  16     WBC 3.13   4.19    Hemoglobin 15.3   12.9    Hematocrit 44.6   37.7    Platelets 208   212                   Assessment and Plan   Diagnoses and all orders for this visit:    1. Right upper quadrant abdominal pain (Primary)  -     Comprehensive metabolic panel  -     US Abdomen Complete; Future    2. Screening for deficiency anemia  -     CBC w AUTO Differential      Etiology of patient's symptoms today is unclear.  Past colonoscopy does not reveal diverticular disease.  CT abdomen and pelvis performed September 2022 revealed hepatic steatosis.  Heart rate and oxygen within normal limits.  She does not have any CVA tenderness.  She is unable to produce a urine sample today.  We will  check kidney function with CMP as well as liver function.  She does not have hepatomegaly.  Last liver function enzymes were normal.  Ultrasound ordered to evaluate for possible gallbladder etiology.  Patient advised to go to the ED for any worsening pain, fever, chills, nausea, or vomiting.    In terms of her alcohol abuse, she is not currently drinking alcohol.  She denies any adverse reactions to quitting alcohol.  She denies seizures.  She does have a good support system and is in therapy as well as following with a psychiatrist.         Follow Up   Return if symptoms worsen or fail to improve, for Recheck.  Patient was given instructions and counseling regarding her condition or for health maintenance advice. Please see specific information pulled into the AVS if appropriate.     Electronically signed by OLVIN Vazquez, 07/27/23, 10:32 AM EDT.

## 2023-07-27 ENCOUNTER — OFFICE VISIT (OUTPATIENT)
Dept: FAMILY MEDICINE CLINIC | Facility: CLINIC | Age: 63
End: 2023-07-27
Payer: COMMERCIAL

## 2023-07-27 VITALS
DIASTOLIC BLOOD PRESSURE: 80 MMHG | WEIGHT: 170 LBS | HEART RATE: 70 BPM | TEMPERATURE: 97.4 F | HEIGHT: 65 IN | SYSTOLIC BLOOD PRESSURE: 142 MMHG | OXYGEN SATURATION: 98 % | BODY MASS INDEX: 28.32 KG/M2

## 2023-07-27 DIAGNOSIS — Z13.0 SCREENING FOR DEFICIENCY ANEMIA: ICD-10-CM

## 2023-07-27 DIAGNOSIS — R10.11 RIGHT UPPER QUADRANT ABDOMINAL PAIN: Primary | ICD-10-CM

## 2023-07-27 PROCEDURE — 99213 OFFICE O/P EST LOW 20 MIN: CPT | Performed by: NURSE PRACTITIONER

## 2023-07-27 RX ORDER — FINASTERIDE 5 MG/1
TABLET, FILM COATED ORAL
COMMUNITY
Start: 2023-07-02

## 2023-07-28 LAB
ALBUMIN SERPL-MCNC: 4.8 G/DL (ref 3.5–5.2)
ALBUMIN/GLOB SERPL: 2.5 G/DL
ALP SERPL-CCNC: 67 U/L (ref 39–117)
ALT SERPL-CCNC: 24 U/L (ref 1–33)
AST SERPL-CCNC: 19 U/L (ref 1–32)
BASOPHILS # BLD AUTO: 0.02 10*3/MM3 (ref 0–0.2)
BASOPHILS NFR BLD AUTO: 0.5 % (ref 0–1.5)
BILIRUB SERPL-MCNC: 0.6 MG/DL (ref 0–1.2)
BUN SERPL-MCNC: 12 MG/DL (ref 8–23)
BUN/CREAT SERPL: 11.2 (ref 7–25)
CALCIUM SERPL-MCNC: 10.6 MG/DL (ref 8.6–10.5)
CHLORIDE SERPL-SCNC: 99 MMOL/L (ref 98–107)
CO2 SERPL-SCNC: 22.2 MMOL/L (ref 22–29)
CREAT SERPL-MCNC: 1.07 MG/DL (ref 0.57–1)
EGFRCR SERPLBLD CKD-EPI 2021: 59.6 ML/MIN/1.73
EOSINOPHIL # BLD AUTO: 0.09 10*3/MM3 (ref 0–0.4)
EOSINOPHIL NFR BLD AUTO: 2.4 % (ref 0.3–6.2)
ERYTHROCYTE [DISTWIDTH] IN BLOOD BY AUTOMATED COUNT: 11.5 % (ref 12.3–15.4)
GLOBULIN SER CALC-MCNC: 1.9 GM/DL
GLUCOSE SERPL-MCNC: 85 MG/DL (ref 65–99)
HCT VFR BLD AUTO: 43 % (ref 34–46.6)
HGB BLD-MCNC: 14.9 G/DL (ref 12–15.9)
IMM GRANULOCYTES # BLD AUTO: 0.01 10*3/MM3 (ref 0–0.05)
IMM GRANULOCYTES NFR BLD AUTO: 0.3 % (ref 0–0.5)
LYMPHOCYTES # BLD AUTO: 0.9 10*3/MM3 (ref 0.7–3.1)
LYMPHOCYTES NFR BLD AUTO: 24.2 % (ref 19.6–45.3)
MCH RBC QN AUTO: 32.3 PG (ref 26.6–33)
MCHC RBC AUTO-ENTMCNC: 34.7 G/DL (ref 31.5–35.7)
MCV RBC AUTO: 93.3 FL (ref 79–97)
MONOCYTES # BLD AUTO: 0.49 10*3/MM3 (ref 0.1–0.9)
MONOCYTES NFR BLD AUTO: 13.2 % (ref 5–12)
NEUTROPHILS # BLD AUTO: 2.21 10*3/MM3 (ref 1.7–7)
NEUTROPHILS NFR BLD AUTO: 59.4 % (ref 42.7–76)
NRBC BLD AUTO-RTO: 0 /100 WBC (ref 0–0.2)
PLATELET # BLD AUTO: 212 10*3/MM3 (ref 140–450)
POTASSIUM SERPL-SCNC: 4 MMOL/L (ref 3.5–5.2)
PROT SERPL-MCNC: 6.7 G/DL (ref 6–8.5)
RBC # BLD AUTO: 4.61 10*6/MM3 (ref 3.77–5.28)
SODIUM SERPL-SCNC: 137 MMOL/L (ref 136–145)
WBC # BLD AUTO: 3.72 10*3/MM3 (ref 3.4–10.8)

## 2023-08-03 ENCOUNTER — HOSPITAL ENCOUNTER (OUTPATIENT)
Dept: ULTRASOUND IMAGING | Facility: HOSPITAL | Age: 63
Discharge: HOME | End: 2023-08-03
Admitting: NURSE PRACTITIONER
Payer: COMMERCIAL

## 2023-08-03 DIAGNOSIS — R10.11 RIGHT UPPER QUADRANT ABDOMINAL PAIN: ICD-10-CM

## 2023-08-03 PROCEDURE — 76700 US EXAM ABDOM COMPLETE: CPT

## 2023-09-08 RX ORDER — HYDRALAZINE HYDROCHLORIDE 25 MG/1
TABLET, FILM COATED ORAL
Qty: 90 TABLET | Refills: 1 | Status: SHIPPED | OUTPATIENT
Start: 2023-09-08

## 2023-10-04 RX ORDER — SPIRONOLACTONE 25 MG/1
TABLET ORAL
Qty: 90 TABLET | Refills: 0 | Status: SHIPPED | OUTPATIENT
Start: 2023-10-04

## 2023-10-04 NOTE — TELEPHONE ENCOUNTER
Failed protocol    Looks like PCP Dc'd the medication.  Do you want to refill?  Called and spoke with the patient and she stated that she is still taking the medication and would like to continue being it has been helping control her BP.  Please advise.        Justin

## 2023-10-04 NOTE — TELEPHONE ENCOUNTER
If she is currently taking the spironolactone, continue it until she sees Dr. De La O in December.  I sent in a refill.  She needs to discuss the medication with Dr. Maradiaga in December.  Thank you

## 2023-11-13 RX ORDER — HYDRALAZINE HYDROCHLORIDE 25 MG/1
TABLET, FILM COATED ORAL
Qty: 90 TABLET | Refills: 1 | Status: SHIPPED | OUTPATIENT
Start: 2023-11-13

## 2024-01-04 RX ORDER — SPIRONOLACTONE 25 MG/1
25 TABLET ORAL DAILY
Qty: 90 TABLET | Refills: 1 | Status: SHIPPED | OUTPATIENT
Start: 2024-01-04

## 2024-01-08 ENCOUNTER — TRANSCRIBE ORDERS (OUTPATIENT)
Dept: ADMINISTRATIVE | Facility: HOSPITAL | Age: 64
End: 2024-01-08
Payer: COMMERCIAL

## 2024-01-08 DIAGNOSIS — Z12.31 BREAST CANCER SCREENING BY MAMMOGRAM: Primary | ICD-10-CM

## 2024-01-10 RX ORDER — HYDRALAZINE HYDROCHLORIDE 25 MG/1
TABLET, FILM COATED ORAL
Qty: 90 TABLET | Refills: 1 | Status: SHIPPED | OUTPATIENT
Start: 2024-01-10

## 2024-02-08 ENCOUNTER — OFFICE VISIT (OUTPATIENT)
Dept: ORTHOPEDIC SURGERY | Facility: CLINIC | Age: 64
End: 2024-02-08
Payer: MEDICARE

## 2024-02-08 ENCOUNTER — TELEPHONE (OUTPATIENT)
Dept: ORTHOPEDIC SURGERY | Facility: CLINIC | Age: 64
End: 2024-02-08

## 2024-02-08 VITALS — TEMPERATURE: 96.2 F | WEIGHT: 158.8 LBS | HEIGHT: 65 IN | BODY MASS INDEX: 26.46 KG/M2

## 2024-02-08 DIAGNOSIS — M20.11 HALLUX VALGUS OF RIGHT FOOT: Primary | ICD-10-CM

## 2024-02-08 DIAGNOSIS — M19.079 ARTHRITIS OF FOOT: ICD-10-CM

## 2024-02-08 DIAGNOSIS — M25.374 INSTABILITY OF METATARSOPHALANGEAL JOINT OF RIGHT FOOT: ICD-10-CM

## 2024-02-08 DIAGNOSIS — M19.071 ARTHRITIS OF FIRST METATARSOPHALANGEAL (MTP) JOINT OF RIGHT FOOT: ICD-10-CM

## 2024-02-08 DIAGNOSIS — M19.079 ARTHRITIS OF METATARSOPHALANGEAL JOINT: ICD-10-CM

## 2024-02-08 DIAGNOSIS — M20.41 HAMMER TOE OF RIGHT FOOT: ICD-10-CM

## 2024-02-08 RX ORDER — ANTIARTHRITIC COMBINATION NO.2 900 MG
5000 TABLET ORAL DAILY
COMMUNITY

## 2024-02-08 NOTE — PROGRESS NOTES
New Patient Complaint      Patient: Amanda Sherwood  YOB: 1960 63 y.o. female  Medical Record Number: [id number]    Chief Complaints: Foot hurts    History of Present Illness: Patient reports pain around her right first and somewhat second toes for years that has worsened to some degree.  She especially has pain with shoe wear and exercise and occasionally at rest.    She has had previous bunion correction on the contralateral side that she said required 2 surgeries as she said I believe there have been some nonunion.    I know her well from previously taking care of her  William and she also used to work at the hospital.    HPI    Allergies:   Allergies   Allergen Reactions    Celecoxib Hives, Swelling and Rash     hives    Ace Inhibitors Cough    Lisinopril Cough       Medications:   Current Outpatient Medications on File Prior to Visit   Medication Sig    aspirin (aspirin) 81 MG EC tablet Take 1 tablet by mouth Daily.    Biotin 5000 MCG tablet Take 5,000 mg by mouth Daily.    finasteride (PROSCAR) 5 MG tablet     hydrALAZINE (APRESOLINE) 25 MG tablet TAKE ONE TABLET BY MOUTH THREE TIMES A DAY    metoprolol succinate XL (TOPROL-XL) 25 MG 24 hr tablet Take 3 tablets by mouth Every Night.    spironolactone (ALDACTONE) 25 MG tablet Take 1 tablet by mouth Daily.    albuterol sulfate  (90 Base) MCG/ACT inhaler Inhale 2 puffs into the lungs every 4 hours as needed.    buPROPion XL (WELLBUTRIN XL) 300 MG 24 hr tablet Take 1 tablet by mouth Every Morning.    cetirizine (zyrTEC) 10 MG tablet Take 1 tablet by mouth As Needed.    LORazepam (ATIVAN) 0.5 MG tablet Take 1 tablet by mouth Daily As Needed for Anxiety.    omeprazole (priLOSEC) 40 MG capsule TAKE ONE CAPSULE BY MOUTH TWICE A DAY BEFORE MEALS     No current facility-administered medications on file prior to visit.       Past Medical History:   Diagnosis Date    Acute kidney injury     Allergic     Ankle sprain     Anxiety 3 years ago     Cervical disc disorder     Depression     GERD (gastroesophageal reflux disease)     H/O Leukopenia     Health care maintenance 09/08/2016    Hernia     History of anemia     History of bronchitis     History of migraine     Hyperlipidemia     NO MEDS CURRENTLY. WORKING ON    Hypertension     Leukopenia     PER HISTORY. HAS SEEN CBC IN THE PAST    Nausea and vomiting 08/08/2022    Neck pain     Obesity (BMI 30.0-34.9) 06/06/2019    Osteoarthritis     PVC (premature ventricular contraction)     Seasonal allergies     Symptomatic anemia 09/27/2022    Tear of meniscus of knee     Tennis elbow     Torn meniscus     LEFT    Tremor 3 years ago     Past Surgical History:   Procedure Laterality Date    BREAST BIOPSY Bilateral     BENIGN. HORACE BREAST    BUNIONECTOMY Left     BUNIONECTOMY Left     REVISION    CARDIAC CATHETERIZATION N/A 10/28/2021    Procedure: Coronary angiography;  Surgeon: Vanessa Bennett MD;  Location: [facility] CATH INVASIVE LOCATION;  Service: Cardiovascular;  Laterality: N/A;    CARDIAC CATHETERIZATION N/A 10/28/2021    Procedure: Left heart cath;  Surgeon: Vanessa Bennett MD;  Location: [facility] CATH INVASIVE LOCATION;  Service: Cardiovascular;  Laterality: N/A;    CARDIAC CATHETERIZATION N/A 10/28/2021    Procedure: Right heart cath;  Surgeon: Vanessa Bennett MD;  Location: [facility] CATH INVASIVE LOCATION;  Service: Cardiovascular;  Laterality: N/A;    COLONOSCOPY N/A 02/01/2020    Procedure: COLONOSCOPY TO CECUM AND INTO TERMINAL ILEUM WITH COLD BIOPSY POLYPECTOMY;  Surgeon: Munira Montes MD;  Location: [facility] ENDOSCOPY;  Service: Gastroenterology    COLONOSCOPY N/A 10/01/2022    Procedure: COLONOSCOPY TO CECUM/TI WITH BIOPSY AND POLYPECTOMY ( COLD BX);  Surgeon: Jovanny Simms MD;  Location: [facility] ENDOSCOPY;  Service: Gastroenterology;  Laterality: N/A;  ANEMIA  POST: COLON POLYP; TEXTURE MUCOSAL CHANGES IN ASCENDING COLON; MODERATE PREP; INTERNAL HEMORRHOIDS    DILATATION AND CURETTAGE       AUB no cancer    ENDOSCOPY N/A 2020    Procedure: ESOPHAGOGASTRODUODENOSCOPY WITH COLD BIOPSIES;  Surgeon: Munira Montes MD;  Location:  [facility];  Service: Gastroenterology    ENDOSCOPY N/A 2022    Procedure: ESOPHAGOGASTRODUODENOSCOPY with cold biopsies;  Surgeon: Jill Blake MD;  Location:  [facility];  Service: Gastroenterology;  Laterality: N/A;  pre: ANEMIA, H/O GASTROPARESIS AND NAUSEA VOMITTING  post: gastritis, hiatal hernia, z-line@38cm, gastric body and fundus polpys, small bowel granularity    KNEE ARTHROSCOPY Left 2018    Procedure: KNEE ARTHROSCOPY LATERAL MENISECTOMY DEBRIDEMENT OF MEDIAL FEMORAL CONDYLE DEFECT DEBRIDEMENT OF ARTHRITIS;  Surgeon: Graciela Crockett MD;  Location[place]  [facility];  Service: Orthopedics    KNEE SURGERY      MENISCECTOMY Left     SALPINGO OOPHORECTOMY Left     TONSILLECTOMY  as a child    TUBAL ABDOMINAL LIGATION      UPPER GASTROINTESTINAL ENDOSCOPY      WISDOM TOOTH EXTRACTION       Social History     Occupational History    Occupation: RN     Employer: [employer]   Tobacco Use    Smoking status: Former     Packs/day: 0.50     Years: 4.00     Additional pack years: 0.00     Total pack years: 2.00     Types: Cigarettes     Quit date: 1987     Years since quittin.2    Smokeless tobacco: Never   Vaping Use    Vaping Use: Never used   Substance and Sexual Activity    Alcohol use: Not Currently     Alcohol/week: 2.0 standard drinks of alcohol     Comment: caffeine - None    Drug use: Yes     Types: Marijuana     Comment: not anymore    Sexual activity: Yes     Partners: Male     Birth control/protection: Post-menopausal      Social History     Social History Narrative    Not on file     Family History   Problem Relation Age of Onset    Dementia Mother     Arthritis Mother     Other Mother         Alzheimer's    Stroke Father     Heart disease Father     Hypertension Father     Diabetes Father     Cancer Father     Colon  "cancer Father     Hyperlipidemia Father     Colon polyps Father     Breast cancer Sister 65    Atrial fibrillation Sister     Alcohol abuse Sister     Asthma Sister     Hypertension Sister     Cancer Sister     Ovarian cancer Maternal Aunt     Prostate cancer Brother     Cancer Brother     Malig Hyperthermia Neg Hx        Review of Systems: 14 point review of systems performed, positive pertinent findings identified in HPI. All remaining systems negative     Review of Systems      Physical Exam:   Vitals:    02/08/24 0942   Temp: 96.2 °F (35.7 °C)   Weight: 72 kg (158 lb 12.8 oz)   Height: 165.1 cm (65\")   PainSc:   3     Physical Exam   Constitutional: pleasant, well developed, she is with her   Eyes: sclera non icteric  Hearing : adequate for exam  Cardiovascular: palpable pulses in right foot, right calf/ thigh NT without sign of DVT  Respiratoy: breathing unlabored   Neurological: grossly sensate to LT throughout right LE  Psychiatric: oriented with normal mood and affect.   Lymphatic: No palpable popliteal lymphadenopathy right LE  Skin: intact throughout right leg/foot  Musculoskeletal: Right foot shows moderate hallux valgus that does not quite passively correctable.  There was some discomfort with range of motion of the first MTP joint no focal pain of the first TMT joint no gross instability.  There is some mild hammering of the second toe with tenderness around the second MTP joint mostly plantarly.  There is mild flexion at the PIP joint.  Physical Exam  Ortho Exam    Radiology: 3 views of the right foot ordered evaluate pain reviewed and no prior x-rays available for comparison these show significant hallux valgus deformity with lateral subluxation of the first metatarsal phalangeal joint and arthritic change.  There is also significant arthritis of the second metatarsal phalangeal joint with some questionable dorsal subluxation and hammering at the PIP joint.  There is some mild arthritis of the " midfoot at the second TMT joint.    Assessment/Plan: 1.  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  Right second hammertoe with second MTP subluxation arthritis and plantar metatarsalgia with PIP hammering    We discussed treatment going forward and reviewed with her that she may be more definitively treated with a fusion of the right first MTP joint but does play tennis.  The second MTP would need release and likely debridement of the second metatarsal head given the arthritic change as well as possible PIP resection.    She is little reluctant about a fusion and not in a hurry as far as doing surgery as she is helping with her daughter-in-law who is parent has cancer.    Will go ahead and get an MRI of her right foot to evaluate the extent of arthritis in her first MTP joint to help tailor treatment protocol and make recommendations regarding fusion or joint sparing procedure.    Will see her back in about 3 weeks to review MRI and determine treatment course going forward.    I want x-rays of her left foot at follow-up to see what was done there

## 2024-02-15 ENCOUNTER — TELEPHONE (OUTPATIENT)
Dept: FAMILY MEDICINE CLINIC | Facility: CLINIC | Age: 64
End: 2024-02-15
Payer: MEDICARE

## 2024-02-21 ENCOUNTER — OFFICE VISIT (OUTPATIENT)
Dept: CARDIOLOGY | Facility: CLINIC | Age: 64
End: 2024-02-21
Payer: MEDICARE

## 2024-02-21 ENCOUNTER — OFFICE VISIT (OUTPATIENT)
Dept: FAMILY MEDICINE CLINIC | Facility: CLINIC | Age: 64
End: 2024-02-21
Payer: MEDICARE

## 2024-02-21 VITALS
HEART RATE: 72 BPM | DIASTOLIC BLOOD PRESSURE: 70 MMHG | BODY MASS INDEX: 26.16 KG/M2 | SYSTOLIC BLOOD PRESSURE: 120 MMHG | OXYGEN SATURATION: 99 % | WEIGHT: 157 LBS | HEIGHT: 65 IN | TEMPERATURE: 97.1 F

## 2024-02-21 VITALS
BODY MASS INDEX: 26.33 KG/M2 | WEIGHT: 158 LBS | DIASTOLIC BLOOD PRESSURE: 82 MMHG | HEART RATE: 71 BPM | HEIGHT: 65 IN | SYSTOLIC BLOOD PRESSURE: 140 MMHG

## 2024-02-21 DIAGNOSIS — E78.2 MIXED HYPERLIPIDEMIA: ICD-10-CM

## 2024-02-21 DIAGNOSIS — Z86.711 HISTORY OF PULMONARY EMBOLISM: ICD-10-CM

## 2024-02-21 DIAGNOSIS — E55.9 VITAMIN D DEFICIENCY: ICD-10-CM

## 2024-02-21 DIAGNOSIS — Z00.00 ENCOUNTER FOR MEDICARE ANNUAL WELLNESS EXAM: Primary | ICD-10-CM

## 2024-02-21 DIAGNOSIS — R79.9 ABNORMAL FINDING OF BLOOD CHEMISTRY, UNSPECIFIED: ICD-10-CM

## 2024-02-21 DIAGNOSIS — R68.89 OTHER GENERAL SYMPTOMS AND SIGNS: ICD-10-CM

## 2024-02-21 DIAGNOSIS — I49.3 PVCS (PREMATURE VENTRICULAR CONTRACTIONS): ICD-10-CM

## 2024-02-21 DIAGNOSIS — Z79.01 CHRONIC ANTICOAGULATION: ICD-10-CM

## 2024-02-21 DIAGNOSIS — Z13.6 SCREENING FOR ISCHEMIC HEART DISEASE: ICD-10-CM

## 2024-02-21 DIAGNOSIS — Z13.0 SCREENING FOR DEFICIENCY ANEMIA: ICD-10-CM

## 2024-02-21 DIAGNOSIS — E83.52 HYPERCALCEMIA: ICD-10-CM

## 2024-02-21 DIAGNOSIS — I10 PRIMARY HYPERTENSION: Primary | ICD-10-CM

## 2024-02-21 PROCEDURE — 1160F RVW MEDS BY RX/DR IN RCRD: CPT | Performed by: STUDENT IN AN ORGANIZED HEALTH CARE EDUCATION/TRAINING PROGRAM

## 2024-02-21 PROCEDURE — 1159F MED LIST DOCD IN RCRD: CPT | Performed by: INTERNAL MEDICINE

## 2024-02-21 PROCEDURE — 3079F DIAST BP 80-89 MM HG: CPT | Performed by: INTERNAL MEDICINE

## 2024-02-21 PROCEDURE — 1170F FXNL STATUS ASSESSED: CPT | Performed by: STUDENT IN AN ORGANIZED HEALTH CARE EDUCATION/TRAINING PROGRAM

## 2024-02-21 PROCEDURE — 99214 OFFICE O/P EST MOD 30 MIN: CPT | Performed by: INTERNAL MEDICINE

## 2024-02-21 PROCEDURE — 3077F SYST BP >= 140 MM HG: CPT | Performed by: INTERNAL MEDICINE

## 2024-02-21 PROCEDURE — 93000 ELECTROCARDIOGRAM COMPLETE: CPT | Performed by: INTERNAL MEDICINE

## 2024-02-21 PROCEDURE — G0402 INITIAL PREVENTIVE EXAM: HCPCS | Performed by: STUDENT IN AN ORGANIZED HEALTH CARE EDUCATION/TRAINING PROGRAM

## 2024-02-21 PROCEDURE — 1159F MED LIST DOCD IN RCRD: CPT | Performed by: STUDENT IN AN ORGANIZED HEALTH CARE EDUCATION/TRAINING PROGRAM

## 2024-02-21 PROCEDURE — 1160F RVW MEDS BY RX/DR IN RCRD: CPT | Performed by: INTERNAL MEDICINE

## 2024-02-21 PROCEDURE — 3078F DIAST BP <80 MM HG: CPT | Performed by: STUDENT IN AN ORGANIZED HEALTH CARE EDUCATION/TRAINING PROGRAM

## 2024-02-21 PROCEDURE — 3074F SYST BP LT 130 MM HG: CPT | Performed by: STUDENT IN AN ORGANIZED HEALTH CARE EDUCATION/TRAINING PROGRAM

## 2024-02-21 RX ORDER — NICOTINE POLACRILEX 4 MG/1
GUM, CHEWING ORAL
COMMUNITY

## 2024-02-21 RX ORDER — CHOLECALCIFEROL (VITAMIN D3) 125 MCG
CAPSULE ORAL
COMMUNITY

## 2024-02-21 NOTE — PROGRESS NOTES
The ABCs of the Annual Wellness Visit  [place] to Medicare Visit    Subjective     Amanda Sherwood is a 63 y.o. female who presents for a  Welcome to Medicare Visit.    The following portions of the patient's history were reviewed and   updated as appropriate: allergies, current medications, past family history, past medical history, past social history, past surgical history, and problem list.     Compared to one year ago, the patient feels her physical   health is better.    Compared to one year ago, the patient feels her mental   health is better.    Recent Hospitalizations:  She was not admitted to the hospital during the last year.       Current Medical Providers:  Patient Care Team:  Elsie Underwood MD as PCP - General (Family Medicine)  Yue Maradiaga MD as Consulting Physician (Cardiology)  Cori Montes APRN as Nurse Practitioner (Family Medicine)  Cori Montes APRN as Nurse Practitioner (Family Medicine)  Lan Villafuerte II, MD as Consulting Physician (Hematology and Oncology)  Jordon Seth MD as Consulting Physician (Pulmonary Disease)  Cori Montes APRN as Referring Physician (Family Medicine)    Outpatient Medications Prior to Visit   Medication Sig Dispense Refill    aspirin (aspirin) 81 MG EC tablet Take 1 tablet by mouth Daily. 30 tablet 0    Biotin 5000 MCG tablet Take 5,000 mg by mouth Daily.      finasteride (PROSCAR) 5 MG tablet       hydrALAZINE (APRESOLINE) 25 MG tablet TAKE ONE TABLET BY MOUTH THREE TIMES A DAY 90 tablet 1    Lactobacillus (PROBIOTIC ACIDOPHILUS PO)       LORazepam (ATIVAN) 0.5 MG tablet Take 1 tablet by mouth Daily As Needed for Anxiety.      melatonin 5 MG tablet tablet       metoprolol succinate XL (TOPROL-XL) 25 MG 24 hr tablet Take 3 tablets by mouth Every Night. 270 tablet 3    Minoxidil 5 % solution       Omeprazole 20 MG tablet delayed-release       spironolactone (ALDACTONE) 25 MG tablet Take 1 tablet by mouth Daily. 90 tablet 1     albuterol sulfate  (90 Base) MCG/ACT inhaler Inhale 2 puffs into the lungs every 4 hours as needed. 18 g 10    buPROPion XL (WELLBUTRIN XL) 300 MG 24 hr tablet Take 1 tablet by mouth Every Morning. 90 tablet 0    cetirizine (zyrTEC) 10 MG tablet Take 1 tablet by mouth As Needed.      omeprazole (priLOSEC) 40 MG capsule TAKE ONE CAPSULE BY MOUTH TWICE A DAY BEFORE MEALS 60 capsule 2     No facility-administered medications prior to visit.       No opioid medication identified on active medication list. I have reviewed chart for other potential  high risk medication/s and harmful drug interactions in the elderly.        Aspirin is on active medication list. Aspirin use is indicated based on review of current medical condition/s. Pros and cons of this therapy have been discussed today. Benefits of this medication outweigh potential harm.  Patient has been encouraged to continue taking this medication.  .      Patient Active Problem List   Diagnosis    Chronic cough    Anxiety disorder due to general medical condition with panic attack    Pain of left thigh    Encounter for Medicare annual wellness exam    Hypertension    Osteoarthrosis, localized, primary, knee    PVCs (premature ventricular contractions)    Hyperlipidemia    Acute medial meniscus tear of left knee    Obesity (BMI 30.0-34.9)    FH: colon cancer    Heartburn    Right leg numbness    Pulmonary nodule    Dyspnea    Other fatigue    Gastroparesis    Hyponatremia    Gastroesophageal reflux disease with esophagitis without hemorrhage    Symptomatic anemia    History of pulmonary embolism    Chronic anticoagulation    Stage 3a chronic kidney disease (CKD)    Bruising    Nausea and vomiting    Arthritis of first metatarsophalangeal (MTP) joint of right foot    Instability of metatarsophalangeal joint of right foot    Hammer toe of right foot     Advance Care Planning   Advance Care Planning     Advance Directive is not on file.  ACP discussion was  "held with the patient during this visit. Patient does not have an advance directive, information provided.       Objective   Vitals:    24 0805   BP: 120/70   BP Location: Right arm   Patient Position: Sitting   Cuff Size: Adult   Pulse: 72   Temp: 97.1 °F (36.2 °C)   TempSrc: Infrared   SpO2: 99%   Weight: 71.2 kg (157 lb)   Height: 165.1 cm (65\")   PainSc: 0-No pain     Estimated body mass index is 26.13 kg/m² as calculated from the following:    Height as of this encounter: 165.1 cm (65\").    Weight as of this encounter: 71.2 kg (157 lb).    BMI is >= 25 and <30. (Overweight) The following options were offered after discussion;: exercise counseling/recommendations and nutrition counseling/recommendations      Does the patient have evidence of cognitive impairment?   No         Procedures       HEALTH RISK ASSESSMENT    Smoking Status:  Social History     Tobacco Use   Smoking Status Former    Packs/day: 0.50    Years: 4.00    Additional pack years: 0.00    Total pack years: 2.00    Types: Cigarettes    Start date:     Quit date: 1987    Years since quittin.3    Passive exposure: Past   Smokeless Tobacco Never     Alcohol Consumption:  Social History     Substance and Sexual Activity   Alcohol Use Not Currently    Alcohol/week: 2.0 standard drinks of alcohol    Comment: caffeine - None       Fall Risk Screen:    BRYCE Fall Risk Assessment was completed, and patient is at LOW risk for falls.Assessment completed on:2024    Depression Screen:       2024     8:02 AM   PHQ-2/PHQ-9 Depression Screening   Little Interest or Pleasure in Doing Things 0-->not at all   Feeling Down, Depressed or Hopeless 0-->not at all   PHQ-9: Brief Depression Severity Measure Score 0       Health Habits and Functional and Cognitive Screenin/21/2024     8:03 AM   Functional & Cognitive Status   Do you have difficulty preparing food and eating? No   Do you have difficulty bathing yourself, getting " dressed or grooming yourself? No   Do you have difficulty using the toilet? No   Do you have difficulty moving around from place to place? No   Do you have trouble with steps or getting out of a bed or a chair? No   Current Diet Well Balanced Diet   Dental Exam Up to date   Eye Exam Up to date   Exercise (times per week) 6 times per week   Current Exercises Include Walking   Do you need help using the phone?  No   Are you deaf or do you have serious difficulty hearing?  No   Do you need help to go to places out of walking distance? No   Do you need help shopping? No   Do you need help preparing meals?  No   Do you need help with housework?  No   Do you need help with laundry? No   Do you need help taking your medications? No   Do you need help managing money? No   Do you ever drive or ride in a car without wearing a seat belt? No   Have you felt unusual stress, anger or loneliness in the last month? No   Who do you live with? Spouse   If you need help, do you have trouble finding someone available to you? No   Have you been bothered in the last four weeks by sexual problems? No   Do you have difficulty concentrating, remembering or making decisions? No       Visual Acuity:    Vision Screening    Right eye Left eye Both eyes   Without correction      With correction 20/30 20/25        Age-appropriate Screening Schedule:  Refer to the list below for future screening recommendations based on patient's age, sex and/or medical conditions. Orders for these recommended tests are listed in the plan section. The patient has been provided with a written plan.    Health Maintenance   Topic Date Due    TDAP/TD VACCINES (1 - Tdap) Never done    ZOSTER VACCINE (1 of 2) Never done    HEPATITIS C SCREENING  Never done    LIPID PANEL  06/09/2023    COVID-19 Vaccine (3 - 2023-24 season) 09/01/2023    BMI FOLLOWUP  02/14/2024    INFLUENZA VACCINE  03/31/2024 (Originally 8/1/2023)    RSV Vaccine - Adults (1 - 1-dose 60+ series)  02/21/2025 (Originally 12/8/2022)    ANNUAL WELLNESS VISIT  02/21/2025    MAMMOGRAM  02/23/2025    PAP SMEAR  07/01/2026    COLORECTAL CANCER SCREENING  10/01/2027    Pneumococcal Vaccine 0-64  Aged Out        CMS Preventative Services Quick Reference  Risk Factors Identified During Encounter    Immunizations Discussed/Encouraged: Tdap and Shingrix  Dental Screening Recommended  Vision Screening Recommended  The above risks/problems have been discussed with the patient.  Pertinent information has been shared with the patient in the After Visit Summary.  Follow up plans and orders are seen below in the Assessment/Plan Section.    Diagnoses and all orders for this visit:    1. Encounter for Medicare annual wellness exam (Primary)  Assessment & Plan:  Colonoscopy: last 2020, repeat 5years  Cervical Cancer Screening: UTD with GYN  Mammogram: UTD with GYN  LDCT: never smoker  DEXA: indicated at age 65    Immunizations: eligible for Shingrex, Tdap  Labs: ordered today  The 10-year ASCVD risk score (Emeterio GLASER, et al., 2019) is: 2.6%    Values used to calculate the score:      Age: 63 years      Sex: Female      Is Non- : No      Diabetic: No      Tobacco smoker: No      Systolic Blood Pressure: 120 mmHg      Is BP treated: Yes      HDL Cholesterol: 97 mg/dL      Total Cholesterol: 162 mg/dL      Patient was counseled in regards to maintaining a healthy lifestyle, rich in whole grains, fruits and vegetables. Limit high saturated fats and processed sugars. Maintain an active lifestyle to promote overall health and well being.         2. Screening for deficiency anemia  -     Iron and TIBC  -     Ferritin  -     Vitamin B12  -     Folate    3. Screening for ischemic heart disease  -     Comprehensive Metabolic Panel  -     ORDER: Hemoglobin A1c  -     TSH  -     Lipid Panel    4. Vitamin D deficiency  -     Vitamin D 25 hydroxy    5. Hypercalcemia  -     PTH, Intact    6. Abnormal finding of blood  chemistry, unspecified  -     ORDER: Hemoglobin A1c    7. Other general symptoms and signs  -     TSH        Follow Up:   Initial Medicare Visit in one year    An After Visit Summary and PPPS were made available to the patient.

## 2024-02-21 NOTE — ASSESSMENT & PLAN NOTE
Colonoscopy: last 2020, repeat 5years  Cervical Cancer Screening: UTD with GYN  Mammogram: UTD with GYN  LDCT: never smoker  DEXA: indicated at age 65    Immunizations: eligible for Shingrex, Tdap  Labs: ordered today  The 10-year ASCVD risk score (Emeterio GLASER, et al., 2019) is: 2.6%    Values used to calculate the score:      Age: 63 years      Sex: Female      Is Non- : No      Diabetic: No      Tobacco smoker: No      Systolic Blood Pressure: 120 mmHg      Is BP treated: Yes      HDL Cholesterol: 97 mg/dL      Total Cholesterol: 162 mg/dL      Patient was counseled in regards to maintaining a healthy lifestyle, rich in whole grains, fruits and vegetables. Limit high saturated fats and processed sugars. Maintain an active lifestyle to promote overall health and well being.

## 2024-02-21 NOTE — PROGRESS NOTES
Date of Office Visit: 2022  Encounter Provider: Yue Maradiaga MD  Place of Service: [facility] CARDIOLOGY  Patient Name: Amanda Sherwood  :1960      Patient ID:  Amanda Sherwood is a 63 y.o. female is here for  followup for hypertension.        History of Present Illness    She has known hypertension, PVCs, SVT, gastroparesis ([place]) and GERD. She does get low platelets and low white counts when she is using ibuprofen.  She had pulmonary emboli 2021.     Her father had myocardial infarction when he was 60s years old requiring angioplasty and  then went on to have coronary bypass grafting. She has never had vascular screening or  coronary calcium score done.     She had bunion surgery and it failed.  She had to have a second bunion surgery and she is still dealing with the consequences of that, and because she missed so much work she lost her job at [employer].  She has gained about 25 pounds because she has not been able to exercise        She had a normal stress nuclear perfusion study and a normal echocardiogram done 2018.     She began having severe vomiting with a GI virus on 2021.  She lost 20 pounds in a week.  She then came to the emergency department on  with acute kidney injury and found to have urinary tract infection.  She was hydrated and treated for this and was discharged.  She said though she was pretty much almost bedridden for a week.  She was admitted -2021 with multisubsegmental pulmonary emboli without acute cor pulmonale.  She presented with short windedness to the [facility] on 2021.  Her lower extremity venous duplex study showed no evidence of DVT. A D-dimer was done and was elevated at 2.09.  She was sent to the emergency department.  CTA of the chest showed bilateral lower lobe branch pulmonary emboli.  I did review her images on CT.  There is not good imaging of the left main or circumflex.  The RCA is not well  visualized but the proximal LAD appears patent.  There is no calcification noted in the aorta.  She had echocardiogram done 8/29/21 changes fraction 63%, no seen valve disease, RVSP 36 mmHg, normal right ventricular size and function.  She had normal bilateral lower extremity venous duplex studies done.      Cardiac catheterization done 10/28/2021 for short windedness showed normal coronary arteries, normal left and right-sided pressures.  Echo done 10/19/2021 showed ejection fraction 66 to 70%, normal chamber sizes and normal valvular structure and function.     Labs on 3/7/2022 showed elevated free protein S antigen with normal total protein S antigen, greater than 150 protein S functional, otherwise normal CBC.      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  She is seeing a psychiatrist.     Labs in 7/27/2023 showed creatinine 1.07, otherwise normal CMP, normal CBC.  CT chest without contrast on 5/16/2023 showed stable benign-appearing micronodules of the right lung.  She had a calf muscle bleed-Eliquis was stopped.  She is now on aspirin.    She is doing better.  She is walking.  She has no chest pain or pressure.  She does not feel heart racing or skipping.  She still has some anxiety at times but not nearly as bad as she was.  She checks her blood pressures at home and it runs 100-112 over 70s.  She takes her medications as directed without difficulty.  She has no orthopnea or PND.  She has had no syncope or falls.    Past Medical History:   Diagnosis Date    Acute kidney injury     Allergic     Ankle sprain     Anxiety 3 years ago    Cervical disc disorder     Depression     GERD (gastroesophageal reflux disease)     H/O Leukopenia     Health care maintenance 09/08/2016    Hernia     History of anemia     History of bronchitis     History of migraine     Hyperlipidemia     NO MEDS CURRENTLY. WORKING ON    Hypertension     Leukopenia     PER HISTORY. HAS SEEN CBC IN THE PAST    Nausea and  vomiting 08/08/2022    Neck pain     Obesity (BMI 30.0-34.9) 06/06/2019    Osteoarthritis     PVC (premature ventricular contraction)     Seasonal allergies     Symptomatic anemia 09/27/2022    Tear of meniscus of knee     Tennis elbow     Torn meniscus     LEFT    Tremor 3 years ago         Past Surgical History:   Procedure Laterality Date    BREAST BIOPSY Bilateral     BENIGN. HORACE BREAST    BUNIONECTOMY Left     BUNIONECTOMY Left     REVISION    CARDIAC CATHETERIZATION N/A 10/28/2021    Procedure: Coronary angiography;  Surgeon: Vanessa Bennett MD;  Location: [facility] CATH INVASIVE LOCATION;  Service: Cardiovascular;  Laterality: N/A;    CARDIAC CATHETERIZATION N/A 10/28/2021    Procedure: Left heart cath;  Surgeon: Vanessa Bennett MD;  Location: [facility] CATH INVASIVE LOCATION;  Service: Cardiovascular;  Laterality: N/A;    CARDIAC CATHETERIZATION N/A 10/28/2021    Procedure: Right heart cath;  Surgeon: Vanessa Bennett MD;  Location: [facility] CATH INVASIVE LOCATION;  Service: Cardiovascular;  Laterality: N/A;    COLONOSCOPY N/A 02/01/2020    Procedure: COLONOSCOPY TO CECUM AND INTO TERMINAL ILEUM WITH COLD BIOPSY POLYPECTOMY;  Surgeon: Munira Montes MD;  Location: [facility] ENDOSCOPY;  Service: Gastroenterology    COLONOSCOPY N/A 10/01/2022    Procedure: COLONOSCOPY TO CECUM/TI WITH BIOPSY AND POLYPECTOMY ( COLD BX);  Surgeon: Jovanny Simms MD;  Location: [facility] ENDOSCOPY;  Service: Gastroenterology;  Laterality: N/A;  ANEMIA  POST: COLON POLYP; TEXTURE MUCOSAL CHANGES IN ASCENDING COLON; MODERATE PREP; INTERNAL HEMORRHOIDS    DILATATION AND CURETTAGE      AUB no cancer    ENDOSCOPY N/A 02/01/2020    Procedure: ESOPHAGOGASTRODUODENOSCOPY WITH COLD BIOPSIES;  Surgeon: Munira Montes MD;  Location: [facility] ENDOSCOPY;  Service: Gastroenterology    ENDOSCOPY N/A 09/29/2022    Procedure: ESOPHAGOGASTRODUODENOSCOPY with cold biopsies;  Surgeon: Jill Blake MD;  Location: [facility] ENDOSCOPY;  Service:  Gastroenterology;  Laterality: N/A;  pre: ANEMIA, H/O GASTROPARESIS AND NAUSEA VOMITTING  post: gastritis, hiatal hernia, z-line@38cm, gastric body and fundus polpys, small bowel granularity    KNEE ARTHROSCOPY Left 08/22/2018    Procedure: KNEE ARTHROSCOPY LATERAL MENISECTOMY DEBRIDEMENT OF MEDIAL FEMORAL CONDYLE DEFECT DEBRIDEMENT OF ARTHRITIS;  Surgeon: Graciela Crockett MD;  Location: [facility];  Service: Orthopedics    KNEE SURGERY      MENISCECTOMY Left     SALPINGO OOPHORECTOMY Left     TONSILLECTOMY  as a child    TUBAL ABDOMINAL LIGATION      UPPER GASTROINTESTINAL ENDOSCOPY      WISDOM TOOTH EXTRACTION         Current Outpatient Medications on File Prior to Visit   Medication Sig Dispense Refill    aspirin (aspirin) 81 MG EC tablet Take 1 tablet by mouth Daily. 30 tablet 0    Biotin 5000 MCG tablet Take 5,000 mg by mouth Daily.      finasteride (PROSCAR) 5 MG tablet       hydrALAZINE (APRESOLINE) 25 MG tablet TAKE ONE TABLET BY MOUTH THREE TIMES A DAY 90 tablet 1    Lactobacillus (PROBIOTIC ACIDOPHILUS PO)       LORazepam (ATIVAN) 0.5 MG tablet Take 1 tablet by mouth Daily As Needed for Anxiety.      melatonin 5 MG tablet tablet       metoprolol succinate XL (TOPROL-XL) 25 MG 24 hr tablet Take 3 tablets by mouth Every Night. 270 tablet 3    Minoxidil 5 % solution       Omeprazole 20 MG tablet delayed-release       spironolactone (ALDACTONE) 25 MG tablet Take 1 tablet by mouth Daily. 90 tablet 1    [DISCONTINUED] albuterol sulfate  (90 Base) MCG/ACT inhaler Inhale 2 puffs into the lungs every 4 hours as needed. 18 g 10    [DISCONTINUED] buPROPion XL (WELLBUTRIN XL) 300 MG 24 hr tablet Take 1 tablet by mouth Every Morning. 90 tablet 0    [DISCONTINUED] cetirizine (zyrTEC) 10 MG tablet Take 1 tablet by mouth As Needed.      [DISCONTINUED] omeprazole (priLOSEC) 40 MG capsule TAKE ONE CAPSULE BY MOUTH TWICE A DAY BEFORE MEALS 60 capsule 2     No current facility-administered medications on file prior  "to visit.       Social History     Socioeconomic History    Marital status:     Number of children: 2    Years of education: College   Tobacco Use    Smoking status: Former     Packs/day: 0.50     Years: 4.00     Additional pack years: 0.00     Total pack years: 2.00     Types: Cigarettes     Start date:      Quit date: 1987     Years since quittin.3     Passive exposure: Past    Smokeless tobacco: Never   Vaping Use    Vaping Use: Never used   Substance and Sexual Activity    Alcohol use: Not Currently     Alcohol/week: 2.0 standard drinks of alcohol     Comment: caffeine - None    Drug use: Yes     Types: Marijuana     Comment: not anymore    Sexual activity: Yes     Partners: Male     Birth control/protection: Post-menopausal           ROS    Procedures    ECG 12 Lead    Date/Time: 2024 1:19 PM  Performed by: Yue Maradiaga MD    Authorized by: Yue Maradiaga MD  Comparison: compared with previous ECG   Similar to previous ECG  Rhythm: sinus rhythm    Clinical impression: normal ECG              Objective:      Vitals:    24 1313   BP: 140/82   Pulse: 71   Weight: 71.7 kg (158 lb)   Height: 165.1 cm (65\")     Body mass index is 26.29 kg/m².    Vitals reviewed.   Constitutional:       General: Not in acute distress.     Appearance: Well-developed. Obese. Not diaphoretic.   Eyes:      General: No scleral icterus.     Conjunctiva/sclera: Conjunctivae normal.   HENT:      Head: Normocephalic and atraumatic.   Neck:      Thyroid: No thyromegaly.      Vascular: No carotid bruit or JVD.      Lymphadenopathy: No cervical adenopathy.   Pulmonary:      Effort: Pulmonary effort is normal. No respiratory distress.      Breath sounds: Normal breath sounds. No wheezing. No rhonchi. No rales.   Chest:      Chest wall: Not tender to palpatation.   Cardiovascular:      Normal rate. Regular rhythm.      Murmurs: There is no murmur.      No gallop.    Pulses:     Intact distal pulses. "   Edema:     Peripheral edema absent.   Abdominal:      General: Bowel sounds are normal. There is no distension or abdominal bruit.      Palpations: Abdomen is soft. There is no abdominal mass.      Tenderness: There is no abdominal tenderness.   Musculoskeletal:         General: No deformity.      Extremities: No clubbing present.     Cervical back: Neck supple. Skin:     General: Skin is warm and dry. There is no cyanosis.      Coloration: Skin is not pale.      Findings: No rash.   Neurological:      Mental Status: Alert and oriented to person, place, and time.      Cranial Nerves: No cranial nerve deficit.   Psychiatric:         Judgment: Judgment normal.         Lab Review:       Assessment:      Diagnosis Plan   1. Primary hypertension        2. PVCs (premature ventricular contractions)        3. Mixed hyperlipidemia        4. History of pulmonary embolism        5. Chronic anticoagulation          1. Hypertension, goal <120/80.  BP is higher today.   2. Elevated LDL. On atorvastatin.   3. PVCs. Stable.   4. Family history of cardiovascular disease in her father.  5.  Pulmonary embolism 8/2021, treated with apixaban.   6.  Gastroparesis.  7.  CKD, seeing Dr. Nair. Is off of losartan due to this.   8.  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  She still has some anxiety but she is so much better.     Plan:     Stop aspirin due to bruising, see Glo in 1 year, no other testing at this time.  Continue exercising by walking.

## 2024-02-22 ENCOUNTER — HOSPITAL ENCOUNTER (OUTPATIENT)
Dept: MRI IMAGING | Facility: HOSPITAL | Age: 64
Discharge: HOME | End: 2024-02-22
Admitting: ORTHOPAEDIC SURGERY
Payer: MEDICARE

## 2024-02-22 DIAGNOSIS — M20.11 HALLUX VALGUS OF RIGHT FOOT: ICD-10-CM

## 2024-02-22 DIAGNOSIS — M25.374 INSTABILITY OF METATARSOPHALANGEAL JOINT OF RIGHT FOOT: ICD-10-CM

## 2024-02-22 DIAGNOSIS — M20.41 HAMMER TOE OF RIGHT FOOT: ICD-10-CM

## 2024-02-22 DIAGNOSIS — M19.079 ARTHRITIS OF METATARSOPHALANGEAL JOINT: ICD-10-CM

## 2024-02-22 DIAGNOSIS — M19.071 ARTHRITIS OF FIRST METATARSOPHALANGEAL (MTP) JOINT OF RIGHT FOOT: ICD-10-CM

## 2024-02-22 LAB
25(OH)D3+25(OH)D2 SERPL-MCNC: 26.3 NG/ML (ref 30–100)
ALBUMIN SERPL-MCNC: 5.1 G/DL (ref 3.9–4.9)
ALBUMIN/GLOB SERPL: 2.8 {RATIO} (ref 1.2–2.2)
ALP SERPL-CCNC: 62 IU/L (ref 44–121)
ALT SERPL-CCNC: 18 IU/L (ref 0–32)
AST SERPL-CCNC: 21 IU/L (ref 0–40)
BILIRUB SERPL-MCNC: 0.7 MG/DL (ref 0–1.2)
BUN SERPL-MCNC: 11 MG/DL (ref 8–27)
BUN/CREAT SERPL: 10 (ref 12–28)
CALCIUM SERPL-MCNC: 10.5 MG/DL (ref 8.7–10.3)
CHLORIDE SERPL-SCNC: 103 MMOL/L (ref 96–106)
CHOLEST SERPL-MCNC: 237 MG/DL (ref 100–199)
CO2 SERPL-SCNC: 24 MMOL/L (ref 20–29)
CREAT SERPL-MCNC: 1.13 MG/DL (ref 0.57–1)
EGFRCR SERPLBLD CKD-EPI 2021: 55 ML/MIN/1.73
FERRITIN SERPL-MCNC: 329 NG/ML (ref 15–150)
FOLATE SERPL-MCNC: 11.4 NG/ML
GLOBULIN SER CALC-MCNC: 1.8 G/DL (ref 1.5–4.5)
GLUCOSE SERPL-MCNC: 96 MG/DL (ref 70–99)
HBA1C MFR BLD: 5.2 % (ref 4.8–5.6)
HDLC SERPL-MCNC: 60 MG/DL
IRON SATN MFR SERPL: 26 % (ref 15–55)
IRON SERPL-MCNC: 88 UG/DL (ref 27–139)
LDLC SERPL CALC-MCNC: 163 MG/DL (ref 0–99)
POTASSIUM SERPL-SCNC: 4.9 MMOL/L (ref 3.5–5.2)
PROT SERPL-MCNC: 6.9 G/DL (ref 6–8.5)
PTH-INTACT SERPL-MCNC: NORMAL PG/ML
SODIUM SERPL-SCNC: 142 MMOL/L (ref 134–144)
SPECIMEN STATUS: NORMAL
TIBC SERPL-MCNC: 340 UG/DL (ref 250–450)
TRIGL SERPL-MCNC: 80 MG/DL (ref 0–149)
TSH SERPL DL<=0.005 MIU/L-ACNC: 1.47 UIU/ML (ref 0.45–4.5)
UIBC SERPL-MCNC: 252 UG/DL (ref 118–369)
VIT B12 SERPL-MCNC: 458 PG/ML (ref 232–1245)
VLDLC SERPL CALC-MCNC: 14 MG/DL (ref 5–40)

## 2024-02-22 PROCEDURE — 73718 MRI LOWER EXTREMITY W/O DYE: CPT

## 2024-02-23 ENCOUNTER — TELEPHONE (OUTPATIENT)
Dept: CARDIOLOGY | Facility: CLINIC | Age: 64
End: 2024-02-23
Payer: MEDICARE

## 2024-02-23 DIAGNOSIS — R11.0 NAUSEA: Primary | ICD-10-CM

## 2024-02-23 DIAGNOSIS — E78.2 MIXED HYPERLIPIDEMIA: Primary | ICD-10-CM

## 2024-02-23 RX ORDER — ROSUVASTATIN CALCIUM 10 MG/1
10 TABLET, COATED ORAL NIGHTLY
Qty: 30 TABLET | Refills: 11 | Status: SHIPPED | OUTPATIENT
Start: 2024-02-23

## 2024-02-23 RX ORDER — ONDANSETRON 4 MG/1
4 TABLET, FILM COATED ORAL EVERY 8 HOURS PRN
Qty: 30 TABLET | Refills: 0 | Status: SHIPPED | OUTPATIENT
Start: 2024-02-23

## 2024-02-23 NOTE — TELEPHONE ENCOUNTER
Called and left VM. Will continue to try to reach patient. HUB transfer call to triage.     Elisa Reagan RN  [place] [facility]

## 2024-02-23 NOTE — TELEPHONE ENCOUNTER
Really need a statin therapy-lipids are quite high.  It is unlikely that the liver enzymes will go up to this.  Would recommend rosuvastatin 10 mg nightly.  Please call and find out if she would like to proceed with this.    ----- Message from Justin Pavon MA sent at 2/23/2024  1:21 PM EST -----  Regarding: FW: Lipid panel  Contact: [phone]    ----- Message -----  From: Amanda Sherwood  Sent: 2/23/2024  10:01 AM EST  To: [facility]  Subject: Lipid panel                                      Dr. Maradiaga,  My labs came back and I was wondering if you would mind managing my medications. My liver enzymes are WNL but I do have hepatic steatosis. I'm concerned re statins but understand there are alternatives. Let me know if you prefer my PCP to manage this.  Also, I do have an appointment coming up with Dr. Nair and Dr. Villafuerte.  Thank you,  Amanda

## 2024-02-23 NOTE — TELEPHONE ENCOUNTER
Dr. Maradiaga,    Patient is asking when labs will be done to check on her liver function after starting the rosuvastatin?     Please let me know how you would like to proceed.    Thank you,  Karli THOMPSON RN  Triage Nurse [facility] [place] [place]   14:56 EST

## 2024-02-23 NOTE — TELEPHONE ENCOUNTER
Reviewed recommendations with Amanda Sherwood and the patient verbalized understanding of the recommendations.  Patient stated she is going to see if her PCP can draw the lab for her in 6 weeks.  Requested patient notify office if anything further is needed from us in order to have labs drawn.  Patient verbalized understanding.    Thank you,  Karli THOMPSON RN  Triage Nurse [facility] [place]   15:20 EST

## 2024-02-23 NOTE — TELEPHONE ENCOUNTER
Amanda Sherwood returned call.    Reviewed Dr. Maradiaga's message with patient and she verbalized understanding.  Patient stated she is really worried about her liver and her lipid results.  Patient is agreeable to trying rosuvastatin.  Pended order for signature.    Patient is asking when labs will be done to check on her liver function after starting the rosuvastatin?    Patient agreeable to [employer] message or detailed voicemail to be left with the answer to her question.    Please let me know how you would like to proceed.    Thank you,  Karli THOMPSON RN  Triage Nurse [facility]   14:00 EST

## 2024-02-29 ENCOUNTER — TELEPHONE (OUTPATIENT)
Dept: FAMILY MEDICINE CLINIC | Facility: CLINIC | Age: 64
End: 2024-02-29
Payer: MEDICARE

## 2024-02-29 NOTE — TELEPHONE ENCOUNTER
Pt called to schedule a lab appointment but the order if from another drs office and she wants to the labs here and is now requesting Dr. Underwood order the CMP       Please advise

## 2024-03-01 DIAGNOSIS — E66.9 OBESITY (BMI 30.0-34.9): Primary | ICD-10-CM

## 2024-03-01 NOTE — TELEPHONE ENCOUNTER
I have replaced order for CMP that was previously ordered by Dr. Maradiaga so that she can make a lab appointment here at our office.

## 2024-03-04 ENCOUNTER — TELEPHONE (OUTPATIENT)
Dept: ORTHOPEDIC SURGERY | Facility: CLINIC | Age: 64
End: 2024-03-04

## 2024-03-04 NOTE — TELEPHONE ENCOUNTER
Provider: KEVON    Caller: PATIENT    Phone Number: [phone]    Reason for Call: PATIENT STATES THAT DR. LUND TOLD HER SHE WILL NEED AN XRAY AT HER APPT ON 03/06/24, BUT HER INSURANCE IS SAYING THEY NEED A PRIOR AUTH FOR THAT. PLEASE CALL HER WITH ANY QUESTION.

## 2024-03-05 ENCOUNTER — TELEPHONE (OUTPATIENT)
Dept: FAMILY MEDICINE CLINIC | Facility: CLINIC | Age: 64
End: 2024-03-05

## 2024-03-05 NOTE — TELEPHONE ENCOUNTER
Provider: DR LUND     Caller: PATIENT     Phone Number: [phone]    Reason for Call: PATIENT STATES SHE SPOKE TO KEITH YESTERDAY REGARDING AN AUTHORIZATION FOR AN XRAY SHE IS HAVING IN THE OFFICE TOMORROW. SHE STATES HER INSURANCE IS STILL TELLING HER ANY TYPE OF RADIOLOGY TESTING HAS TO HAVE PRE AUTHORIZATION AND SHE WOULD LIKE TO KNOW IF THE OFFICE COULD TAKE CARE OF THIS.

## 2024-03-05 NOTE — TELEPHONE ENCOUNTER
Caller: Amanda Sherwood    Relationship: Self    Best call back number: [phone]     What is the best time to reach you: ANYTIME, PLEASE LEAVE A VOICEMAIL MESSAGE IF UNAVAILABLE    Who are you requesting to speak with (clinical staff, provider,  specific staff member): CLINICAL      What was the call regarding: PATIENT IS REQUESTING TO KNOW WHEN HER LAST TDAP SHOT WAS PREFORMED.    PLEASE CALL TO DISCUSS.

## 2024-03-06 ENCOUNTER — OFFICE VISIT (OUTPATIENT)
Dept: ORTHOPEDIC SURGERY | Facility: CLINIC | Age: 64
End: 2024-03-06
Payer: MEDICARE

## 2024-03-06 VITALS — HEIGHT: 65 IN | WEIGHT: 156.2 LBS | TEMPERATURE: 97.3 F | BODY MASS INDEX: 26.02 KG/M2

## 2024-03-06 DIAGNOSIS — M20.41 HAMMER TOE OF RIGHT FOOT: ICD-10-CM

## 2024-03-06 DIAGNOSIS — M19.079 ARTHRITIS OF METATARSOPHALANGEAL JOINT: ICD-10-CM

## 2024-03-06 DIAGNOSIS — M20.11 HALLUX VALGUS OF RIGHT FOOT: Primary | ICD-10-CM

## 2024-03-06 DIAGNOSIS — R52 PAIN: ICD-10-CM

## 2024-03-06 RX ORDER — HYDRALAZINE HYDROCHLORIDE 25 MG/1
TABLET, FILM COATED ORAL
Qty: 270 TABLET | Refills: 2 | Status: SHIPPED | OUTPATIENT
Start: 2024-03-06

## 2024-03-06 NOTE — PROGRESS NOTES
"Foot Follow Up      Patient: Amanda Sherwood    YOB: 1960 63 y.o. female    Chief Complaints: Foot is sore    History of Present Illness:Patient was seen on 2/8/2024 reporting pain around her right first and somewhat second toes for years that had worsened to some degree.  She especially had pain with shoe wear and exercise and occasionally at rest.     She had had previous bunion correction on the contralateral side that she said required 2 surgeries and I believe she said there had been some nonunion.     I knew her well from previously taking care of her  William and she also used to work at the hospital.    She was sent for MRI of her right foot to evaluate for arthrosis of the first MTP joint to help tailor treatment protocol    Patient is seen back today mainly with report of pain under the second metatarsal head with some hammering of the right second toe as well as intermittent pain around the first MTP joint mostly medially.     HPI    ROS: Foot pain  Past Medical History:   Diagnosis Date    Acute kidney injury     Allergic     Ankle sprain     Anxiety 3 years ago    Arthritis of neck     Cervical disc disorder     Depression     GERD (gastroesophageal reflux disease)     H/O Leukopenia     Health care maintenance 09/08/2016    Hernia     History of anemia     History of bronchitis     History of migraine     Hyperlipidemia     NO MEDS CURRENTLY. WORKING ON    Hypertension     Leukopenia     PER HISTORY. HAS SEEN CBC IN THE PAST    Nausea and vomiting 08/08/2022    Neck pain     Obesity (BMI 30.0-34.9) 06/06/2019    Osteoarthritis     PVC (premature ventricular contraction)     Seasonal allergies     Symptomatic anemia 09/27/2022    Tear of meniscus of knee     Tennis elbow     Torn meniscus     LEFT    Tremor 3 years ago     Physical Exam:   Vitals:    03/06/24 1014   Temp: 97.3 °F (36.3 °C)   Weight: 70.9 kg (156 lb 3.2 oz)   Height: 165.1 cm (65\")   PainSc:   2     Well developed " with normal mood.  She is with her .  She has moderate hallux valgus that is nearly passively correctable.  She does not have pain with range of motion of the first MTP joint but had mild prominence medially.  There was no gross instability at the first TMT joint.  There was some hammering of the second toe with extension of the MTP joint and flexion of the PIP joint.  There is mild discomfort at the second metatarsal head but no callus.  There was some mild discomfort with motion of the second MTP joint but no gross instability.        Radiology: 3 views of the left foot ordered to evaluate previous surgery they are reviewed and no prior x-rays available for comparison does appear that she had previous Lapidus procedure with first TMT joint fusion with plate and screw fixation.  First TMT joint appears fused.      MRI films and report of the right foot dated 2/22/2024 reviewed which shows subluxation of the sesamoids relative to the plantar side of the metatarsal head.  There is small volume of fluid in the first MTP joint and surprising preservation of the articular cartilage of the first MTP joint and between the metatarsal and sesamoids but no significant chondral loss appreciated at those articulations.    Second toe demonstrates exaggerated flexion to about 90 degrees at the level of the PIP joint and on sagittal images there is deformity of the base of the proximal phalanx which may be result of old intra-articular fracture.  There is marginal osteophytes around metatarsal head and around the base of the phalanx with exaggerated dorsiflexion of the MTP joint and irregular chondral loss at the base of the proximal phalanx and along the dorsum of the metatarsal head.  Plantar plate a tendons appear intact with no synovitis at the IP joint.    Third and fourth toes show mild exaggerated flexion at the level of the PIP joints and some arthritis at the IP joints.    There was arthritic change at the  articulation of the navicular with first cuneiform and between the first and second cuneiforms and at the second and to a lesser extent dorsum of the third TMT joints.  First fourth and fifth TMT joints appeared normal.      Assessment/Plan: 1.  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  Right foot arthritic change  of navicular with first cuneiform and between first and second cuneiforms and second and to a lesser extent dorsum of the third TMT joints     we discussed operative and nonoperative treatment options and although no guarantees could be given she would like to proceed with operative treatment.  She had a Lapidus procedure on the contralateral side and eventually has done well with that and would recommend doing as such on this side rather than doing a first MTP fusion given her lack of arthritic change.  Will plan on second MTP release with possible debridement of the second metatarsal head and PIP joint resection/fusion.  Her other lesser toes are not symptomatic.    Patient and her  both voiced clear understanding the operative procedure and postoperative course with limited weightbearing for at least 6 to 8 weeks if not longer and associated risk benefits potential outcomes and complications which can include but are not limited to heart attack stroke death pneumonia infection bleeding damage to blood vessels nerves or tendons blood clots pulmonary embolism persistent or worsening pain stiffness recurrence of deformity instability need for subsequent surgery and.  Return to presurgery and precondition levels of activity as well as nonunion and wound healing problems and could result in long-term wound care or loss of the toes or partial foot.    She is seeing her hematologist soon  and is going to check with him about anticoagulation with having a history of PEs.    She and her  were encouraged to call if she has any questions prior to surgery.

## 2024-03-19 ENCOUNTER — OFFICE VISIT (OUTPATIENT)
Dept: ONCOLOGY | Facility: CLINIC | Age: 64
End: 2024-03-19
Payer: MEDICARE

## 2024-03-19 ENCOUNTER — LAB (OUTPATIENT)
Dept: OTHER | Facility: HOSPITAL | Age: 64
End: 2024-03-19
Payer: MEDICARE

## 2024-03-19 VITALS
SYSTOLIC BLOOD PRESSURE: 146 MMHG | TEMPERATURE: 98 F | WEIGHT: 152.7 LBS | RESPIRATION RATE: 16 BRPM | HEIGHT: 65 IN | DIASTOLIC BLOOD PRESSURE: 87 MMHG | HEART RATE: 74 BPM | BODY MASS INDEX: 25.44 KG/M2 | OXYGEN SATURATION: 99 %

## 2024-03-19 DIAGNOSIS — N18.31 STAGE 3A CHRONIC KIDNEY DISEASE (CKD): ICD-10-CM

## 2024-03-19 DIAGNOSIS — D64.9 ANEMIA, UNSPECIFIED TYPE: ICD-10-CM

## 2024-03-19 DIAGNOSIS — D70.9 NEUTROPENIA, UNSPECIFIED TYPE: ICD-10-CM

## 2024-03-19 DIAGNOSIS — D70.9 NEUTROPENIA, UNSPECIFIED TYPE: Primary | ICD-10-CM

## 2024-03-19 LAB
BASOPHILS # BLD AUTO: 0.02 10*3/MM3 (ref 0–0.2)
BASOPHILS NFR BLD AUTO: 0.4 % (ref 0–1.5)
DEPRECATED RDW RBC AUTO: 38.5 FL (ref 37–54)
EOSINOPHIL # BLD AUTO: 0.1 10*3/MM3 (ref 0–0.4)
EOSINOPHIL NFR BLD AUTO: 2.2 % (ref 0.3–6.2)
ERYTHROCYTE [DISTWIDTH] IN BLOOD BY AUTOMATED COUNT: 11.6 % (ref 12.3–15.4)
HCT VFR BLD AUTO: 42.9 % (ref 34–46.6)
HGB BLD-MCNC: 14.4 G/DL (ref 12–15.9)
IMM GRANULOCYTES # BLD AUTO: 0.02 10*3/MM3 (ref 0–0.05)
IMM GRANULOCYTES NFR BLD AUTO: 0.4 % (ref 0–0.5)
LYMPHOCYTES # BLD AUTO: 1.32 10*3/MM3 (ref 0.7–3.1)
LYMPHOCYTES NFR BLD AUTO: 28.6 % (ref 19.6–45.3)
MCH RBC QN AUTO: 30.7 PG (ref 26.6–33)
MCHC RBC AUTO-ENTMCNC: 33.6 G/DL (ref 31.5–35.7)
MCV RBC AUTO: 91.5 FL (ref 79–97)
MONOCYTES # BLD AUTO: 0.36 10*3/MM3 (ref 0.1–0.9)
MONOCYTES NFR BLD AUTO: 7.8 % (ref 5–12)
NEUTROPHILS NFR BLD AUTO: 2.79 10*3/MM3 (ref 1.7–7)
NEUTROPHILS NFR BLD AUTO: 60.6 % (ref 42.7–76)
NRBC BLD AUTO-RTO: 0 /100 WBC (ref 0–0.2)
PLATELET # BLD AUTO: 211 10*3/MM3 (ref 140–450)
PMV BLD AUTO: 9.9 FL (ref 6–12)
RBC # BLD AUTO: 4.69 10*6/MM3 (ref 3.77–5.28)
WBC NRBC COR # BLD AUTO: 4.61 10*3/MM3 (ref 3.4–10.8)

## 2024-03-19 PROCEDURE — 36415 COLL VENOUS BLD VENIPUNCTURE: CPT

## 2024-03-19 PROCEDURE — 85025 COMPLETE CBC W/AUTO DIFF WBC: CPT | Performed by: INTERNAL MEDICINE

## 2024-03-19 RX ORDER — FAMOTIDINE 20 MG/1
TABLET, FILM COATED ORAL
COMMUNITY

## 2024-03-19 RX ORDER — LORAZEPAM 0.5 MG/1
TABLET ORAL
COMMUNITY

## 2024-03-19 RX ORDER — MELATONIN: COMMUNITY

## 2024-03-19 NOTE — PROGRESS NOTES
.     REASON FOR FOLLOWUP :   Pulmonary embolism    HISTORY OF PRESENT ILLNESS:  The patient is a 63 y.o. year old female  who is here for follow-up with the above-mentioned history.    No new issues.  No bleeding.  No chest pain or SOA.  Past Medical History:   Diagnosis Date    Acute kidney injury     Allergic     Ankle sprain     Anxiety 3 years ago    Arthritis of neck     Cervical disc disorder     Depression     GERD (gastroesophageal reflux disease)     H/O Leukopenia     Health care maintenance 09/08/2016    Hernia     History of anemia     History of bronchitis     History of migraine     Hyperlipidemia     NO MEDS CURRENTLY. WORKING ON    Hypertension     Leukopenia     PER HISTORY. HAS SEEN CBC IN THE PAST    Nausea and vomiting 08/08/2022    Neck pain     Obesity (BMI 30.0-34.9) 06/06/2019    Osteoarthritis     PVC (premature ventricular contraction)     Seasonal allergies     Symptomatic anemia 09/27/2022    Tear of meniscus of knee     Tennis elbow     Torn meniscus     LEFT    Tremor 3 years ago     Past Surgical History:   Procedure Laterality Date    BREAST BIOPSY Bilateral     BENIGN. HORACE BREAST    BUNIONECTOMY Left     BUNIONECTOMY Left     REVISION    CARDIAC CATHETERIZATION N/A 10/28/2021    Procedure: Coronary angiography;  Surgeon: Vanessa Bennett MD;  Location: [facility] CATH INVASIVE LOCATION;  Service: Cardiovascular;  Laterality: N/A;    CARDIAC CATHETERIZATION N/A 10/28/2021    Procedure: Left heart cath;  Surgeon: Vanessa Bennett MD;  Location: [facility] CATH INVASIVE LOCATION;  Service: Cardiovascular;  Laterality: N/A;    CARDIAC CATHETERIZATION N/A 10/28/2021    Procedure: Right heart cath;  Surgeon: Vanessa Bennett MD;  Location: [facility] CATH INVASIVE LOCATION;  Service: Cardiovascular;  Laterality: N/A;    COLONOSCOPY N/A 02/01/2020    Procedure: COLONOSCOPY TO CECUM AND INTO TERMINAL ILEUM WITH COLD BIOPSY POLYPECTOMY;  Surgeon: Munira Montes MD;  Location: [facility] ENDOSCOPY;   Service: Gastroenterology    COLONOSCOPY N/A 10/01/2022    Procedure: COLONOSCOPY TO CECUM/TI WITH BIOPSY AND POLYPECTOMY ( COLD BX);  Surgeon: Jovanny Simms MD;  Location: [facility] ENDOSCOPY;  Service: Gastroenterology;  Laterality: N/A;  ANEMIA  POST: COLON POLYP; TEXTURE MUCOSAL CHANGES IN ASCENDING COLON; MODERATE PREP; INTERNAL HEMORRHOIDS    DILATATION AND CURETTAGE      AUB no cancer    ENDOSCOPY N/A 02/01/2020    Procedure: ESOPHAGOGASTRODUODENOSCOPY WITH COLD BIOPSIES;  Surgeon: Munira Montes MD;  Location:  [facility] ENDOSCOPY;  Service: Gastroenterology    ENDOSCOPY N/A 09/29/2022    Procedure: ESOPHAGOGASTRODUODENOSCOPY with cold biopsies;  Surgeon: Jill Blake MD;  Location: [facility] ENDOSCOPY;  Service: Gastroenterology;  Laterality: N/A;  pre: ANEMIA, H/O GASTROPARESIS AND NAUSEA VOMITTING  post: gastritis, hiatal hernia, z-line@38cm, gastric body and fundus polpys, small bowel granularity    KNEE ARTHROSCOPY Left 08/22/2018    Procedure: KNEE ARTHROSCOPY LATERAL MENISECTOMY DEBRIDEMENT OF MEDIAL FEMORAL CONDYLE DEFECT DEBRIDEMENT OF ARTHRITIS;  Surgeon: Graciela Crockett MD;  Location:  [facility];  Service: Orthopedics    KNEE SURGERY      MENISCECTOMY Left     SALPINGO OOPHORECTOMY Left     TONSILLECTOMY  as a child    TUBAL ABDOMINAL LIGATION      UPPER GASTROINTESTINAL ENDOSCOPY      WISDOM TOOTH EXTRACTION         MEDICATIONS    Current Outpatient Medications:     Biotin 5000 MCG tablet, Take 5,000 mg by mouth Daily., Disp: , Rfl:     cholecalciferol 25 MCG (1000 UT) tablet, , Disp: , Rfl:     famotidine (Pepcid) 20 MG tablet, , Disp: , Rfl:     finasteride (PROSCAR) 5 MG tablet, , Disp: , Rfl:     hydrALAZINE (APRESOLINE) 25 MG tablet, TAKE ONE TABLET BY MOUTH THREE TIMES A DAY, Disp: 270 tablet, Rfl: 2    Lactobacillus (PROBIOTIC ACIDOPHILUS PO), , Disp: , Rfl:     LORazepam (ATIVAN) 0.5 MG tablet, , Disp: , Rfl:     melatonin 5 MG tablet tablet, , Disp: , Rfl:     metoprolol  succinate XL (TOPROL-XL) 25 MG 24 hr tablet, Take 3 tablets by mouth Every Night., Disp: 270 tablet, Rfl: 3    Minoxidil 5 % solution, , Disp: , Rfl:     ondansetron (Zofran) 4 MG tablet, Take 1 tablet by mouth Every 8 (Eight) Hours As Needed for Nausea or Vomiting., Disp: 30 tablet, Rfl: 0    rosuvastatin (CRESTOR) 10 MG tablet, Take 1 tablet by mouth Every Night., Disp: 30 tablet, Rfl: 11    spironolactone (ALDACTONE) 25 MG tablet, Take 1 tablet by mouth Daily., Disp: 90 tablet, Rfl: 1    LORazepam (ATIVAN) 0.5 MG tablet, Take 1 tablet by mouth Daily As Needed for Anxiety., Disp: , Rfl:     Omeprazole 20 MG tablet delayed-release, , Disp: , Rfl:     ALLERGIES:     Allergies   Allergen Reactions    Celecoxib Hives, Swelling and Rash     hives    Ace Inhibitors Cough    Lisinopril Cough       SOCIAL HISTORY:       Social History     Socioeconomic History    Marital status:     Number of children: 2    Years of education: College   Tobacco Use    Smoking status: Former     Current packs/day: 0.00     Average packs/day: 0.5 packs/day for 4.8 years (2.4 ttl pk-yrs)     Types: Cigarettes     Start date:      Quit date: 1987     Years since quittin.4     Passive exposure: Past    Smokeless tobacco: Never   Vaping Use    Vaping status: Never Used   Substance and Sexual Activity    Alcohol use: Not Currently     Alcohol/week: 2.0 standard drinks of alcohol     Comment: caffeine - None    Drug use: Yes     Types: Marijuana     Comment: not anymore    Sexual activity: Yes     Partners: Male     Birth control/protection: Post-menopausal         FAMILY HISTORY:  Family History   Problem Relation Age of Onset    Dementia Mother     Arthritis Mother     Other Mother         Alzheimer's    Stroke Father     Heart disease Father     Hypertension Father     Diabetes Father     Cancer Father         Colon    Colon cancer Father     Hyperlipidemia Father     Colon polyps Father     Breast cancer Sister 65     "Atrial fibrillation Sister     Alcohol abuse Sister     Asthma Sister     Hypertension Sister     Cancer Sister         Breast    Scoliosis Sister     Ovarian cancer Maternal Aunt     Prostate cancer Brother     Cancer Brother         Prostate    Diabetes Brother     Malig Hyperthermia Neg Hx        REVIEW OF SYSTEMS:  Review of Systems   Constitutional:  Negative for activity change.   HENT:  Negative for nosebleeds and trouble swallowing.    Respiratory:  Negative for wheezing.    Cardiovascular:  Negative for palpitations.   Gastrointestinal:  Negative for constipation and diarrhea.   Genitourinary:  Negative for dysuria and hematuria.   Musculoskeletal:  Negative for arthralgias and myalgias.   Skin:  Negative for rash and wound.   Neurological:  Negative for seizures and syncope.   Hematological:  Negative for adenopathy. Does not bruise/bleed easily.   Psychiatric/Behavioral:  Negative for confusion.               Vitals:    03/19/24 1110   BP: 146/87   Pulse: 74   Resp: 16   Temp: 98 °F (36.7 °C)   TempSrc: Temporal   SpO2: 99%   Weight: 69.3 kg (152 lb 11.2 oz)   Height: 165 cm (64.96\")   PainSc: 0-No pain         3/19/2024    11:11 AM   Current Status   ECOG score 0      PHYSICAL EXAM:          CONSTITUTIONAL:  Vital signs reviewed.  No distress, looks comfortable.  EYES:  Conjunctiva and lids unremarkable.  PERRLA  EARS,NOSE,MOUTH,THROAT:  Ears and nose appear unremarkable.  Lips, teeth, gums appear unremarkable.  RESPIRATORY:  Normal respiratory effort.  Lungs clear to auscultation bilaterally.  CARDIOVASCULAR:  Normal S1, S2.  No murmurs rubs or gallops.  No significant lower extremity edema.  GASTROINTESTINAL: Abdomen appears unremarkable.  Nontender.  No hepatomegaly.  No splenomegaly.  LYMPHATIC:  No cervical, supraclavicular, axillary lymphadenopathy.  SKIN:  Warm.  No rashes.  PSYCHIATRIC:  Normal judgment and insight.  Normal mood and affect.        RECENT LABS:        WBC   Date Value Ref Range " Status   03/19/2024 4.61 3.40 - 10.80 10*3/mm3 Final   07/27/2023 3.72 3.40 - 10.80 10*3/mm3 Final   02/28/2023 4.19 3.40 - 10.80 10*3/mm3 Final   12/02/2022 3.13 (L) 3.40 - 10.80 10*3/mm3 Final   11/14/2022 1.65 (C) 3.40 - 10.80 10*3/mm3 Final   10/20/2022 1.37 (C) 3.40 - 10.80 10*3/mm3 Final   10/01/2022 2.17 (L) 3.40 - 10.80 10*3/mm3 Final   09/30/2022 1.75 (C) 3.40 - 10.80 10*3/mm3 Final   09/29/2022 1.95 (C) 3.40 - 10.80 10*3/mm3 Final   09/28/2022 1.99 (C) 3.40 - 10.80 10*3/mm3 Final   09/27/2022 2.87 (L) 3.40 - 10.80 10*3/mm3 Final   03/07/2022 4.08 3.40 - 10.80 10*3/mm3 Final   10/26/2021 4.58 3.40 - 10.80 10*3/mm3 Final   09/27/2021 3.47 3.40 - 10.80 10*3/mm3 Final   08/29/2021 3.26 (L) 3.40 - 10.80 10*3/mm3 Final   08/28/2021 3.15 (L) 3.40 - 10.80 10*3/mm3 Final   08/27/2021 4.73 3.40 - 10.80 10*3/mm3 Final   08/16/2021 3.23 (L) 3.40 - 10.80 10*3/mm3 Final   08/07/2021 3.81 3.40 - 10.80 10*3/mm3 Final   08/06/2021 4.96 3.40 - 10.80 10*3/mm3 Final   06/20/2019 4.42 3.40 - 10.80 10*3/mm3 Final   06/19/2019 3.96 (L) 4.5 - 11.0 10*3/uL Final   06/18/2019 3.68 3.40 - 10.80 10*3/mm3 Final   09/22/2018 3.86 (L) 4.50 - 10.70 10*3/mm3 Final   08/15/2018 2.74 (L) 4.50 - 10.70 10*3/mm3 Final   10/27/2016 3.65 (L) 4.50 - 10.70 10*3/mm3 Final   09/13/2016 2.42 (L) 4.50 - 10.70 10*3/mm3 Final   04/29/2016 3.05 (L) 4.50 - 10.70 10*3/mm3 Final     Hemoglobin   Date Value Ref Range Status   03/19/2024 14.4 12.0 - 15.9 g/dL Final   07/27/2023 14.9 12.0 - 15.9 g/dL Final   02/28/2023 12.9 12.0 - 15.9 g/dL Final   12/02/2022 15.3 12.0 - 15.9 g/dL Final   11/14/2022 12.6 12.0 - 15.9 g/dL Final   10/20/2022 10.4 (L) 12.0 - 15.9 g/dL Final   10/01/2022 9.3 (L) 12.0 - 15.9 g/dL Final   09/30/2022 9.4 (L) 12.0 - 15.9 g/dL Final   09/30/2022 9.3 (L) 12.0 - 15.9 g/dL Final   09/29/2022 10.2 (L) 12.0 - 15.9 g/dL Final   09/28/2022 10.0 (L) 12.0 - 15.9 g/dL Final   09/27/2022 8.7 (L) 12.0 - 15.9 g/dL Final   03/07/2022 13.1 12.0 -  15.9 g/dL Final   10/28/2021 13.3 12.0 - 17.0 g/dL Final   10/28/2021 13.3 12.0 - 17.0 g/dL Final   10/26/2021 14.6 12.0 - 15.9 g/dL Final   09/27/2021 14.1 12.0 - 15.9 g/dL Final   08/29/2021 14.0 12.0 - 15.9 g/dL Final   08/28/2021 14.1 12.0 - 15.9 g/dL Final   08/27/2021 15.4 12.0 - 15.9 g/dL Final   08/16/2021 13.4 12.0 - 15.9 g/dL Final   08/07/2021 12.9 12.0 - 15.9 g/dL Final   08/06/2021 14.9 12.0 - 15.9 g/dL Final   06/20/2019 13.7 12.0 - 15.9 g/dL Final   06/19/2019 14.6 12.0 - 16.0 g/dL Final   06/18/2019 12.9 12.0 - 15.9 g/dL Final   09/22/2018 14.7 11.9 - 15.5 g/dL Final   08/15/2018 13.8 11.9 - 15.5 g/dL Final   10/27/2016 13.8 11.9 - 15.5 g/dL Final   09/13/2016 13.6 11.9 - 15.5 g/dL Final   04/29/2016 13.5 11.9 - 15.5 g/dL Final     Platelets   Date Value Ref Range Status   03/19/2024 211 140 - 450 10*3/mm3 Final   07/27/2023 212 140 - 450 10*3/mm3 Final   02/28/2023 212 140 - 450 10*3/mm3 Final   12/02/2022 208 140 - 450 10*3/mm3 Final   11/14/2022 171 140 - 450 10*3/mm3 Final   10/20/2022 162 140 - 450 10*3/mm3 Final   10/01/2022 176 140 - 450 10*3/mm3 Final   09/30/2022 186 140 - 450 10*3/mm3 Final   09/29/2022 177 140 - 450 10*3/mm3 Final   09/28/2022 167 140 - 450 10*3/mm3 Final   09/27/2022 232 140 - 450 10*3/mm3 Final   03/07/2022 185 140 - 450 10*3/mm3 Final   10/26/2021 194 140 - 450 10*3/mm3 Final   09/27/2021 178 140 - 450 10*3/mm3 Final   08/29/2021 174 140 - 450 10*3/mm3 Final   08/28/2021 186 140 - 450 10*3/mm3 Final   08/27/2021 233 140 - 450 10*3/mm3 Final   08/16/2021 181 140 - 450 10*3/mm3 Final   08/07/2021 259 140 - 450 10*3/mm3 Final   08/06/2021 373 140 - 450 10*3/mm3 Final   06/20/2019 180 140 - 450 10*3/mm3 Final   06/19/2019 184 140 - 440 10*3/uL Final   06/18/2019 160 140 - 450 10*3/mm3 Final   09/22/2018 169 140 - 500 10*3/mm3 Final   08/15/2018 164 140 - 500 10*3/mm3 Final   10/27/2016 161 140 - 500 10*3/mm3 Final   09/13/2016 176 140 - 500 10*3/mm3 Final   04/29/2016 187  140 - 500 10*3/mm3 Final       Assessment & Plan   Neutropenia, unspecified type  - CBC & Differential    Anemia, unspecified type  - CBC & Differential    Stage 3a chronic kidney disease (CKD)  - CBC & Differential        Amanda CHIO Presley   *Leukocytopenia  WBC normal through 3/7/2022.  9/28/2022 - 11/14/2022: WBC 1.4-2  12/2/2022: WBC up to 3.1  2/28/2022: WBC up to 4.2  3/19/2024: WBC 4.6    *Neutropenia  ANC normal through 3/7/2022  9/28/2022 - 11/14/2022: -1260  12/2/2022: ANC up to 1910  ANC 2790, from 2330    *Anemia  Hb normal through 3/7/2022  9/27/2022- 10/20/2022: Hb 8.7-10.4  Hb up to 12.6 on 11/14/2022.  12/2/2022: Hb up to 15.3  Hb 14.4, from 12.9    *Etiology of cytopenias:  Bone marrow biopsy 11/14/2022: Normocellular, 40%.  Trilineage hematopoiesis with <5% blasts.  No evidence of significant dysplastic features or ringed sideroblasts.  Granulopoiesis is decreased and erythropoiesis is increased.  Differential includes drugs, toxins, vitamins, nutritional deficiencies, autoimmune disorders.  Flow cytometry unremarkable including no atypical pattern suggestive of MDS.  NGS unremarkable  12/2/2022: WBC up to 3.1, Hb up to 15.3, PLT up to 208.  2/28/2023: WBC, Hb, PLT normal  3/19/2024: WBC Hb PLT normal    *Bilateral lower lobe pulmonary emboli on CT 8/27/2021.  Doppler left leg 8/17/2021: Normal  Doppler bilateral legs 8/28/2021: Normal  CT chest angiogram 8/27/2021: Bilateral lower lobe PEs.   3/14/2022: Completed 6 months therapeutic Eliquis.  This was associated with a reversible risk factor: Immobility from GI illness.  However, she states due to ongoing nausea she is not very mobile and does not want to stop Eliquis.  Therefore, decreased Eliquis 2.5 mg twice per day (prophylactic dose).  If she becomes more mobile in the future, it would be reasonable to stop Eliquis and begin aspirin 81 mg daily.  Late September 2022 hospitalization for symptomatic anemia requiring transfusion (Hb  dropped from normal down to low point of 8.7).  Patient states she is much more mobile.  Eliquis stopped.     *Risk factors for clotting:  Overweight  Was not on HRT.  (Understands should not take any HRT in the future).  Only smoked a little in high school and college.  No smoking since.  No prior personal history of clots  No family history of clots  Immobility.  July 2021 GI virus: Lost 20 pounds.  4-day admission for FANNY and UTI.  Subsequently at home, in bed x5 days late July 2021.  Then, bed to couch x2 weeks.  Unremarkable: Prothrombin gene mutation, factor V Leiden, Antithrombin, protein C activity, protein S activity, anticardiolipin antibodies, beta-2 glycoprotein antibodies, lupus anticoagulant.  Although protein S free initially low at less than 10% on 9/27/2021, repeat, on 10/25/2021 and 3/7/2022 protein S studies normal.    *Prior Protein S low free level  Protein S free <10% on 9/27/2021 (protein S activity >150%).  Typically all types of protein S deficiency have a decreased activity  Repeat protein S testing of free and activity unremarkable on 10/5/2021 and 3/7/2022.  Therefore, the prior low value is not felt to be significant    *Nausea  Present since the GI illness.  Worse since around January 2022.  Following with GI.  Was found to have some gastroparesis.    *Unsteadiness on feet, overall fatigue and weakness.  On day of initial consult, 9/27/2021: Difficulty getting up and down off the examining table.  Unsteady on feet.  I recommended a cane or wheelchair.  She declined.  She is planning physical therapy.  States she has been trying to walk but cannot walk more than 1/4 mile.  I recommended she use her stationary bike multiple times per day to try to build up strength.    *Previously improved, but ongoing SOA and chest discomfort.  She follows with cardiology and pulmonology and states they are aware.  She states Dr. Jordon Adams prescribed Symbicort and a rescue inhaler.  She states  cardiology told her an echocardiogram shows pulmonary hypertension.  After the PE, she followed up with Dr. Jordon Adams.  She states he asked her to do a test to look for asthma.  She is worried about having the test done and therefore has not followed up with him.  She states she will follow up with him.   12/2/2022: No complaints of this today.  3/19/2024: No complaints of this today    *Possible pulmonary nodule, LLL on CT 8/27/2021  Small new 6 mm nodular density LLL, radiologist recommends follow-up.  PCP did a follow-up CT.  CT 2/14/2022: Resolution.  However, the radiologist recommended to consider follow-up CT in 1 year.  Patient aware and plans to follow this up through her PCP.  PCP has ordered a CT.  Patient has delayed scheduling of this because she has been busy helping out with her grandchild  through PCP: CT chest 5/16/2023: Stable benign-appearing micronodules right lung.    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  She no longer works.  States she is unable to work due to the nausea    *Hospitalized 9/27/2022 - 10/1/2022 for symptomatic anemia.    Transfused.  EGD and colonoscopy with no evidence of active bleeding.  Bruising lower extremities on admission.  Eliquis was held during the admission and she was told to hold Eliquis until she follows up with us.  Discharged home on aspirin 81 mg daily.    *3/19/2024: Planning bunion surgery  3/19/24 office visit: She tells me she will be nonweightbearing for 1 month and then in a boot with limited mobility for at least another month.  Using the Caprini scoring system, she scores at least a 5 which puts her in the high risk category for clotting.  Therefore, I recommend Eliquis 2.5 mg twice per day postoperatively (begin when okay with surgeon).  Continue until fully mobile, including no longer in the boot, moving the ankle   Note previously on prophylactic dose Eliquis, Hb dropped requiring hospitalization and Eliquis  discontinuation.  No bleeding source was found.  She has had no bleeding since.  However, because of this we will check labs every 2 weeks while on Eliquis.    Plan  MD CBC 1 year  (I offered no further follow-up but she prefers 1 year)  She will call and when she knows the date of her bunion surgery.  When she finds out the date, we will prescribe Eliquis 2.5 mg every 12 hours dispense #180 with 1 refill.  (begin after surgery, when okay with surgeon).  Continue until fully mobile and out of the boot.  While on Eliquis needs every 2-week CBC, ferritin, iron panel, reticulate hemoglobin.

## 2024-03-21 PROBLEM — M20.11 HALLUX VALGUS OF RIGHT FOOT: Status: ACTIVE | Noted: 2024-03-06

## 2024-03-28 ENCOUNTER — PREP FOR SURGERY (OUTPATIENT)
Dept: OTHER | Facility: HOSPITAL | Age: 64
End: 2024-03-28
Payer: MEDICARE

## 2024-03-28 DIAGNOSIS — M20.41 HAMMER TOE OF RIGHT FOOT: ICD-10-CM

## 2024-03-28 DIAGNOSIS — M19.071 ARTHRITIS OF FIRST METATARSOPHALANGEAL (MTP) JOINT OF RIGHT FOOT: ICD-10-CM

## 2024-03-28 DIAGNOSIS — M20.11 HALLUX VALGUS OF RIGHT FOOT: Primary | ICD-10-CM

## 2024-03-28 DIAGNOSIS — M25.374 INSTABILITY OF METATARSOPHALANGEAL JOINT OF RIGHT FOOT: ICD-10-CM

## 2024-04-09 RX ORDER — METOPROLOL SUCCINATE 25 MG/1
75 TABLET, EXTENDED RELEASE ORAL NIGHTLY
Qty: 270 TABLET | Refills: 3 | Status: SHIPPED | OUTPATIENT
Start: 2024-04-09

## 2024-04-09 NOTE — TELEPHONE ENCOUNTER
Faxed refill request for metoprolol Succ 25  mg er  3 tablets( 75 mg ) every night.   Qty 270   Dr WOODS pt    Freeman KARIMI

## 2024-04-12 ENCOUNTER — TELEPHONE (OUTPATIENT)
Dept: ORTHOPEDIC SURGERY | Facility: CLINIC | Age: 64
End: 2024-04-12
Payer: MEDICARE

## 2024-04-12 ENCOUNTER — HOSPITAL ENCOUNTER (OUTPATIENT)
Dept: MAMMOGRAPHY | Facility: HOSPITAL | Age: 64
Discharge: HOME | End: 2024-04-12
Payer: MEDICARE

## 2024-04-12 DIAGNOSIS — Z12.31 BREAST CANCER SCREENING BY MAMMOGRAM: ICD-10-CM

## 2024-04-12 NOTE — TELEPHONE ENCOUNTER
I returned patient's message with the phone call.  Reviewed all of her questions.  Reviewed that the surgery would take at least 2 hours and may take at least 6 months or more to recover and how she did and will be nonweightbearing for the first 4 to 6 weeks.  Reviewed that she should be okay to be by herself the day after surgery and can use her scooter immediately.  She has a history of fatty liver and kidney disease so we will prescribe pain medication without Tylenol in it and that her hematologist is recommending that she be on prophylactic Eliquis postoperatively which she will prescribe and recommended that she do that for at least 3 months from insurance standpoint to make sure she has enough.  She had no further questions and appreciated the call and was encouraged to call if she has any further questions since prior to surgery.

## 2024-04-12 NOTE — TELEPHONE ENCOUNTER
----- Message from Melodie Kilpatrick sent at 4/12/2024  3:27 PM EDT -----  Regarding: FW: Surgery   Contact: [phone]    ----- Message -----  From: Amanda Sherwood  Sent: 4/12/2024   2:06 PM EDT  To: [facility] Clinical Pool  Subject: Surgery                                          Dr Mancilla,  I have a few questions about my surgery. I’m sorry because I know you answered a few of these questions in the office.  How long do you think the surgery will take?   About how long do you anticipate recovery time?  Can I be by myself the day after surgery?  Can I use a scooter immediately after surgery?  Can you prescribe pain medication without acetaminophen/ibuprofen? I have fatty liver disease and stage 2 CKD so I try to avoid them as much as possible. Because of my history of bilateral PE’s Dr Villafuerte wants me on Eliquis 2.5 mg twice a day until I’m out of the boot. For insurance reasons I just need to know if I should get a 2 or 3 month supply. Also, he wants you to determine when I can start it. I’ll also be having H/H done every 2 weeks at his office since I needed 2 units of PRBC’s last time I was on it. I apologize for so many questions. Thank you for your time.  Amanda

## 2024-04-12 NOTE — TELEPHONE ENCOUNTER
Hub staff attempted to follow warm transfer process and was unsuccessful     Caller: Amanda Sherwood    Relationship to patient: Self    Best call back number: [phone] (home)     Patient is needing: PATIENT STATES SHE MISSED A CALL FROM DR MARIA ABOUT 5 MINUTES AGO. WANTING TO GET QUESTIONS ANSWERED ABOUT HER SX. PLEASE ADVISE

## 2024-04-29 ENCOUNTER — TRANSCRIBE ORDERS (OUTPATIENT)
Dept: ADMINISTRATIVE | Facility: HOSPITAL | Age: 64
End: 2024-04-29
Payer: MEDICARE

## 2024-04-29 DIAGNOSIS — N63.12 MASS OF UPPER INNER QUADRANT OF RIGHT BREAST: Primary | ICD-10-CM

## 2024-05-08 ENCOUNTER — TELEPHONE (OUTPATIENT)
Dept: ORTHOPEDIC SURGERY | Facility: CLINIC | Age: 64
End: 2024-05-08
Payer: MEDICARE

## 2024-05-29 ENCOUNTER — HOSPITAL ENCOUNTER (OUTPATIENT)
Dept: MAMMOGRAPHY | Facility: HOSPITAL | Age: 64
Discharge: HOME | End: 2024-05-29
Payer: MEDICARE

## 2024-05-29 ENCOUNTER — HOSPITAL ENCOUNTER (OUTPATIENT)
Dept: ULTRASOUND IMAGING | Facility: HOSPITAL | Age: 64
Discharge: HOME | End: 2024-05-29
Payer: MEDICARE

## 2024-05-29 DIAGNOSIS — N63.12 MASS OF UPPER INNER QUADRANT OF RIGHT BREAST: ICD-10-CM

## 2024-05-29 PROCEDURE — 76642 ULTRASOUND BREAST LIMITED: CPT

## 2024-05-29 PROCEDURE — G0279 TOMOSYNTHESIS, MAMMO: HCPCS

## 2024-05-29 PROCEDURE — 77066 DX MAMMO INCL CAD BI: CPT

## 2024-06-11 ENCOUNTER — TELEPHONE (OUTPATIENT)
Dept: ORTHOPEDIC SURGERY | Facility: CLINIC | Age: 64
End: 2024-06-11
Payer: MEDICARE

## 2024-06-11 NOTE — TELEPHONE ENCOUNTER
Patient LVM on my direct line stating she had not received surgery info packet w/ updated dates since changing her surgery to September. I returned her call, but had to [facility]. I prepared her packet and placed it in the mail, but let her know I can also email it to her if she would like. Provided direct line for call back.

## 2024-07-01 ENCOUNTER — TELEPHONE (OUTPATIENT)
Dept: FAMILY MEDICINE CLINIC | Facility: CLINIC | Age: 64
End: 2024-07-01
Payer: MEDICARE

## 2024-07-01 DIAGNOSIS — M79.671 FOOT PAIN, BILATERAL: Primary | ICD-10-CM

## 2024-07-01 DIAGNOSIS — M79.672 FOOT PAIN, BILATERAL: Primary | ICD-10-CM

## 2024-07-01 NOTE — TELEPHONE ENCOUNTER
Patient is experiencing bilateral pain in the balls of her feet. Patient would like to see a podiatrist. Is it possoble for clinical to place a podiatry referral? If so, please place external for [facility]--Dr. Sydnie Lovelace, fax number: [phone].

## 2024-07-05 RX ORDER — SPIRONOLACTONE 25 MG/1
25 TABLET ORAL DAILY
Qty: 90 TABLET | Refills: 1 | Status: SHIPPED | OUTPATIENT
Start: 2024-07-05

## 2024-07-15 ENCOUNTER — OFFICE VISIT (OUTPATIENT)
Dept: FAMILY MEDICINE CLINIC | Facility: CLINIC | Age: 64
End: 2024-07-15
Payer: MEDICARE

## 2024-07-15 VITALS
HEIGHT: 65 IN | BODY MASS INDEX: 26.49 KG/M2 | WEIGHT: 159 LBS | DIASTOLIC BLOOD PRESSURE: 84 MMHG | OXYGEN SATURATION: 97 % | HEART RATE: 76 BPM | TEMPERATURE: 97.1 F | SYSTOLIC BLOOD PRESSURE: 140 MMHG

## 2024-07-15 DIAGNOSIS — M79.673 PAIN OF FOOT, UNSPECIFIED LATERALITY: ICD-10-CM

## 2024-07-15 DIAGNOSIS — R10.11 RIGHT UPPER QUADRANT PAIN: Primary | ICD-10-CM

## 2024-07-15 PROCEDURE — 99214 OFFICE O/P EST MOD 30 MIN: CPT | Performed by: STUDENT IN AN ORGANIZED HEALTH CARE EDUCATION/TRAINING PROGRAM

## 2024-07-15 PROCEDURE — 3079F DIAST BP 80-89 MM HG: CPT | Performed by: STUDENT IN AN ORGANIZED HEALTH CARE EDUCATION/TRAINING PROGRAM

## 2024-07-15 PROCEDURE — 1126F AMNT PAIN NOTED NONE PRSNT: CPT | Performed by: STUDENT IN AN ORGANIZED HEALTH CARE EDUCATION/TRAINING PROGRAM

## 2024-07-15 PROCEDURE — 1159F MED LIST DOCD IN RCRD: CPT | Performed by: STUDENT IN AN ORGANIZED HEALTH CARE EDUCATION/TRAINING PROGRAM

## 2024-07-15 PROCEDURE — 3077F SYST BP >= 140 MM HG: CPT | Performed by: STUDENT IN AN ORGANIZED HEALTH CARE EDUCATION/TRAINING PROGRAM

## 2024-07-15 PROCEDURE — 1160F RVW MEDS BY RX/DR IN RCRD: CPT | Performed by: STUDENT IN AN ORGANIZED HEALTH CARE EDUCATION/TRAINING PROGRAM

## 2024-07-15 RX ORDER — METHYLPREDNISOLONE 4 MG/1
TABLET ORAL
Qty: 21 EACH | Refills: 0 | Status: SHIPPED | OUTPATIENT
Start: 2024-07-15

## 2024-07-15 NOTE — PROGRESS NOTES
"Chief Complaint  Abdominal Pain (RUQ) and Foot Pain    Subjective        Amanda Sherwood presents to [facility]  History of Present Illness  63-year-old female with history of pulmonary embolism, hypertension, hyperlipidemia, GERD, chronic nausea and vomiting, CKD, anxiety who presents for follow up of abd pain.     Chronic right upper quadrant abdominal pain -associated with nausea and vomiting, she states that she is eating a little better.  Weight is very stable.  She had EGD planned with Dr. Montes last fall which was canceled.  Evidence of hepatic steatosis on ultrasound. She did have EGD in fall 2022 which showed small hiatal hernia and patchy mild erythema in the gastric body.  She also had a few sessile polyps that were benign.    Bunions which she has been following with Dr. Mancilla.  Previous repair and revision of left.  Plan will be to have the right surgically repaired.  Per hematology, she will need anticoagulation postop with 2.5 mg apixaban twice daily.  Abdominal Pain  This is a recurrent problem. The current episode started more than 1 month ago. The onset quality is gradual. The problem occurs daily. The pain is located in the RUQ. The pain is at a severity of 2/10. The quality of the pain is aching, dull and a sensation of fullness. The abdominal pain does not radiate. Pertinent negatives include no anorexia, arthralgias, belching, constipation, diarrhea, dysuria, fever, flatus, frequency, hematochezia, hematuria, melena, myalgias, nausea, vomiting or weight loss. Nothing aggravates the pain. The pain is relieved by Nothing.       Objective   Vital Signs:  /84   Pulse 76   Temp 97.1 °F (36.2 °C)   Ht 165.1 cm (65\")   Wt 72.1 kg (159 lb)   SpO2 97%   BMI 26.46 kg/m²   Estimated body mass index is 26.46 kg/m² as calculated from the following:    Height as of this encounter: 165.1 cm (65\").    Weight as of this encounter: 72.1 kg (159 lb).         "       Physical Exam  Constitutional:       General: She is not in acute distress.  Eyes:      Conjunctiva/sclera: Conjunctivae normal.   Cardiovascular:      Rate and Rhythm: Normal rate and regular rhythm.   Pulmonary:      Effort: Pulmonary effort is normal. No respiratory distress.      Breath sounds: Normal breath sounds.   Abdominal:      Palpations: Abdomen is soft. There is no mass.      Tenderness: There is no abdominal tenderness. There is no guarding or rebound.   Musculoskeletal:         General: Tenderness present. No swelling, deformity or signs of injury.   Skin:     General: Skin is warm and dry.   Neurological:      Mental Status: She is alert and oriented to person, place, and time.   Psychiatric:         Mood and Affect: Mood normal.         Behavior: Behavior normal.        Result Review :    The following data was reviewed by: Elsie Underwood MD on 07/15/2024:  Common labs          2/21/2024    09:11 3/19/2024    11:01 5/13/2024    10:28   Common Labs   Glucose 96   111    BUN 11   13    Creatinine 1.13   1.01    Sodium 142   141    Potassium 4.9   4.6    Chloride 103   103    Calcium 10.5   10.3    Total Protein 6.9   6.8    Albumin 5.1   4.8    Total Bilirubin 0.7   0.4    Alkaline Phosphatase 62   66    AST (SGOT) 21   21    ALT (SGPT) 18   16    WBC  4.61     Hemoglobin  14.4     Hematocrit  42.9     Platelets  211     Total Cholesterol 237      Triglycerides 80      HDL Cholesterol 60      LDL Cholesterol  163      Hemoglobin A1C 5.2        Data reviewed : none             Assessment and Plan     Diagnoses and all orders for this visit:    1. Right upper quadrant pain (Primary)  -     CT Abdomen Pelvis With Contrast; Future    2. Pain of foot, unspecified laterality  -     methylPREDNISolone (MEDROL) 4 MG dose pack; Take as directed on package instructions.  Dispense: 21 each; Refill: 0  -     Ambulatory Referral to Physical Therapy    With regards to abd pain, this is overall improved from  when I first started seeing her. She has been regaining weight nicely. Appetite improved. No red flag symptoms. She does still have residual RUQ pain but this is more mild. Previous CT showed hepatic steatosis. I would recommend we repeat CT given persistent pain and hx of liver disease - monitor for progression although we discussed that this usually does not cause pain. Other etiology could be thoracic degenerative disease of the spine, choledocolithiasis, pancreatitis.     With regards to foot pain, she is following with Dr. Mancilla for bunions of right foot. It is her left that is actually more bothersome. Hx of bunion repair and revision. Unclear what brought on pain. Has tried NSAIDs/tylenol. Saw podiatry - prescribed topical Rx pain cream. She was recommended to get custom orthotics but recommended to wait until she undergoes bunion surgery. I recommended medrol dose pack to see if stronger anti-inflammatory provides relief in addition to topical. Also recommended Footsteps/Powersteps in the mean time while waiting for custom orthotics. Trial of PT may also benefit her.          Follow Up     No follow-ups on file.  Patient was given instructions and counseling regarding her condition or for health maintenance advice. Please see specific information pulled into the AVS if appropriate.         Answers submitted by the patient for this visit:  Primary Reason for Visit (Submitted on 7/8/2024)  What is the primary reason for your visit?: Abdominal Pain

## 2024-07-24 DIAGNOSIS — D64.9 ANEMIA, UNSPECIFIED TYPE: ICD-10-CM

## 2024-07-24 DIAGNOSIS — Z79.01 CHRONIC ANTICOAGULATION: Primary | ICD-10-CM

## 2024-07-30 ENCOUNTER — HOSPITAL ENCOUNTER (OUTPATIENT)
Dept: CT IMAGING | Facility: HOSPITAL | Age: 64
Discharge: HOME | End: 2024-07-30
Admitting: STUDENT IN AN ORGANIZED HEALTH CARE EDUCATION/TRAINING PROGRAM
Payer: MEDICARE

## 2024-07-30 DIAGNOSIS — R10.11 RIGHT UPPER QUADRANT PAIN: ICD-10-CM

## 2024-07-30 LAB — CREAT BLDA-MCNC: 0.9 MG/DL (ref 0.6–1.3)

## 2024-07-30 PROCEDURE — 74177 CT ABD & PELVIS W/CONTRAST: CPT

## 2024-07-30 PROCEDURE — 0 DIATRIZOATE MEGLUMINE & SODIUM PER 1 ML: Performed by: STUDENT IN AN ORGANIZED HEALTH CARE EDUCATION/TRAINING PROGRAM

## 2024-07-30 PROCEDURE — 25510000001 IOPAMIDOL 61 % SOLUTION: Performed by: STUDENT IN AN ORGANIZED HEALTH CARE EDUCATION/TRAINING PROGRAM

## 2024-07-30 PROCEDURE — 82565 ASSAY OF CREATININE: CPT

## 2024-07-30 RX ADMIN — DIATRIZOATE MEGLUMINE AND DIATRIZOATE SODIUM 30 ML: 660; 100 LIQUID ORAL; RECTAL at 11:35

## 2024-07-30 RX ADMIN — IOPAMIDOL 85 ML: 612 INJECTION, SOLUTION INTRAVENOUS at 11:35

## 2024-07-31 ENCOUNTER — TELEPHONE (OUTPATIENT)
Dept: ORTHOPEDIC SURGERY | Facility: CLINIC | Age: 64
End: 2024-07-31
Payer: MEDICARE

## 2024-07-31 NOTE — TELEPHONE ENCOUNTER
----- Message from Octaviano Mancilla sent at 7/31/2024  7:50 AM EDT -----  Regarding: FW: Labs  Contact: [phone]  Can you please call pt and see what needs to be done for this and submit whatever form is needed? Thank you  ----- Message -----  From: Glo Dos Santos MA  Sent: 7/31/2024   7:38 AM EDT  To: Octaviano Mancilla MD  Subject: Labs                                             Please review       ----- Message -----  From: Amanda Sherwood  Sent: 7/30/2024   5:42 PM EDT  To: [facility]  Subject: Labs                                             My bunionectomy is scheduled for September 10th. Dr. Villafuerte wants me to have labs drawn every 2 weeks after I start on Eliquis. I won’t be able to drive myself and my  just started a new job and won’t have any time off accumulated. I don’t have any family in town and my friends all work. My insurance has a home health plan that covers a nurse to do phlebotomy. They said that you would need to send them a form stating that I am house bound.   Thank you,  Amanda

## 2024-07-31 NOTE — TELEPHONE ENCOUNTER
Call returned to the patient.  I discussed with her that most insurance companies especially Medicare and Medicare replacement policy is due have benefits for nursing and phlebotomy however they usually require a skilled need such as physical therapy in order to have nursing be able to do the labs.  We also discussed that it would be better for her to have Dr. Villafuerte to order her home health so he can be specific on what particular labs she needs and how often and also have the correct diagnosis.  Have advised her that Dr. Mancilla be more than happy to order home PT however it would be limited since the patient is going to be nonweightbearing.  She is going to discuss it with her insurance in more detail and will call back if she has any other questions or concerns

## 2024-08-20 ENCOUNTER — TELEPHONE (OUTPATIENT)
Dept: ORTHOPEDIC SURGERY | Facility: CLINIC | Age: 64
End: 2024-08-20

## 2024-08-26 ENCOUNTER — PRE-ADMISSION TESTING (OUTPATIENT)
Dept: PREADMISSION TESTING | Facility: HOSPITAL | Age: 64
End: 2024-08-26
Payer: MEDICARE

## 2024-08-26 ENCOUNTER — OFFICE VISIT (OUTPATIENT)
Dept: ORTHOPEDIC SURGERY | Facility: CLINIC | Age: 64
End: 2024-08-26
Payer: MEDICARE

## 2024-08-26 VITALS — TEMPERATURE: 98.2 F | WEIGHT: 160.6 LBS | HEIGHT: 65 IN | BODY MASS INDEX: 26.76 KG/M2

## 2024-08-26 VITALS
BODY MASS INDEX: 26.66 KG/M2 | DIASTOLIC BLOOD PRESSURE: 83 MMHG | SYSTOLIC BLOOD PRESSURE: 150 MMHG | OXYGEN SATURATION: 95 % | WEIGHT: 160 LBS | HEIGHT: 65 IN | TEMPERATURE: 98.3 F | RESPIRATION RATE: 16 BRPM | HEART RATE: 79 BPM

## 2024-08-26 DIAGNOSIS — M19.079 ARTHRITIS OF FOOT: ICD-10-CM

## 2024-08-26 DIAGNOSIS — M20.11 HALLUX VALGUS OF RIGHT FOOT: Primary | ICD-10-CM

## 2024-08-26 DIAGNOSIS — M20.41 HAMMER TOE OF RIGHT FOOT: ICD-10-CM

## 2024-08-26 DIAGNOSIS — M19.079 ARTHRITIS OF METATARSOPHALANGEAL JOINT: ICD-10-CM

## 2024-08-26 DIAGNOSIS — D64.9 SYMPTOMATIC ANEMIA: Primary | ICD-10-CM

## 2024-08-26 DIAGNOSIS — M25.374 INSTABILITY OF METATARSOPHALANGEAL JOINT OF RIGHT FOOT: ICD-10-CM

## 2024-08-26 LAB
25(OH)D3 SERPL-MCNC: 64.3 NG/ML (ref 30–100)
ALBUMIN SERPL-MCNC: 4.6 G/DL (ref 3.5–5.2)
ANION GAP SERPL CALCULATED.3IONS-SCNC: 12.6 MMOL/L (ref 5–15)
BILIRUB UR QL STRIP: NEGATIVE
BUN SERPL-MCNC: 13 MG/DL (ref 8–23)
BUN/CREAT SERPL: 12.9 (ref 7–25)
CALCIUM SPEC-SCNC: 9.9 MG/DL (ref 8.6–10.5)
CHLORIDE SERPL-SCNC: 105 MMOL/L (ref 98–107)
CLARITY UR: CLEAR
CO2 SERPL-SCNC: 23.4 MMOL/L (ref 22–29)
COLOR UR: YELLOW
CREAT SERPL-MCNC: 1.01 MG/DL (ref 0.57–1)
DEPRECATED RDW RBC AUTO: 43.6 FL (ref 37–54)
EGFRCR SERPLBLD CKD-EPI 2021: 63.5 ML/MIN/1.73
ERYTHROCYTE [DISTWIDTH] IN BLOOD BY AUTOMATED COUNT: 12.5 % (ref 12.3–15.4)
GLUCOSE SERPL-MCNC: 83 MG/DL (ref 65–99)
GLUCOSE UR STRIP-MCNC: NEGATIVE MG/DL
HCT VFR BLD AUTO: 41.9 % (ref 34–46.6)
HGB BLD-MCNC: 13.5 G/DL (ref 12–15.9)
HGB UR QL STRIP.AUTO: NEGATIVE
KETONES UR QL STRIP: NEGATIVE
LEUKOCYTE ESTERASE UR QL STRIP.AUTO: NEGATIVE
MCH RBC QN AUTO: 30.5 PG (ref 26.6–33)
MCHC RBC AUTO-ENTMCNC: 32.2 G/DL (ref 31.5–35.7)
MCV RBC AUTO: 94.8 FL (ref 79–97)
NITRITE UR QL STRIP: NEGATIVE
PH UR STRIP.AUTO: 5.5 [PH] (ref 5–8)
PHOSPHATE SERPL-MCNC: 3.9 MG/DL (ref 2.5–4.5)
PLATELET # BLD AUTO: 190 10*3/MM3 (ref 140–450)
PMV BLD AUTO: 10.1 FL (ref 6–12)
POTASSIUM SERPL-SCNC: 4.5 MMOL/L (ref 3.5–5.2)
PROT UR QL STRIP: NEGATIVE
PTH-INTACT SERPL-MCNC: 22 PG/ML (ref 15–65)
QT INTERVAL: 390 MS
QTC INTERVAL: 424 MS
RBC # BLD AUTO: 4.42 10*6/MM3 (ref 3.77–5.28)
SODIUM SERPL-SCNC: 141 MMOL/L (ref 136–145)
SP GR UR STRIP: 1.01 (ref 1–1.03)
UROBILINOGEN UR QL STRIP: NORMAL
WBC NRBC COR # BLD AUTO: 3.53 10*3/MM3 (ref 3.4–10.8)

## 2024-08-26 PROCEDURE — 81003 URINALYSIS AUTO W/O SCOPE: CPT

## 2024-08-26 PROCEDURE — 93005 ELECTROCARDIOGRAM TRACING: CPT

## 2024-08-26 PROCEDURE — 85027 COMPLETE CBC AUTOMATED: CPT

## 2024-08-26 PROCEDURE — 80069 RENAL FUNCTION PANEL: CPT

## 2024-08-26 PROCEDURE — 82306 VITAMIN D 25 HYDROXY: CPT

## 2024-08-26 PROCEDURE — 36415 COLL VENOUS BLD VENIPUNCTURE: CPT

## 2024-08-26 PROCEDURE — 99214 OFFICE O/P EST MOD 30 MIN: CPT | Performed by: ORTHOPAEDIC SURGERY

## 2024-08-26 PROCEDURE — 83970 ASSAY OF PARATHORMONE: CPT

## 2024-08-26 PROCEDURE — 93010 ELECTROCARDIOGRAM REPORT: CPT | Performed by: INTERNAL MEDICINE

## 2024-08-26 RX ORDER — NAPROXEN SODIUM 220 MG
220 TABLET ORAL 2 TIMES DAILY PRN
COMMUNITY

## 2024-08-26 NOTE — DISCHARGE INSTRUCTIONS
Take the following medications the morning of surgery:  FAMOTIDINE    MAY TAKE LORAZEPAM    If you are on prescription narcotic pain medication to control your pain you may also take that medication the morning of surgery.      General Instructions:     Do not eat solid food after midnight the night before surgery.  Clear liquids day of surgery are allowed but must be stopped at least two hours before your hospital arrival time.       Allowed clear liquids      Water, sodas, and tea or coffee with no cream or milk added.       12 to 20 ounces of a clear liquid that contains carbohydrates is recommended.  If non-diabetic, have Gatorade or Powerade.  If diabetic, have G2 or Powerade Zero.     Do not have liquids red in color.  Do not consume chicken, beef, pork or vegetable broth or bouillon cubes of any variety as they are not considered clear liquids and are not allowed.      Infants may have breast milk up to four hours before surgery.  Infants drinking formula may drink formula up to six hours before surgery.   Patients who avoid smoking, chewing tobacco and alcohol for 4 weeks prior to surgery have a reduced risk of post-operative complications.  Quit smoking as many days before surgery as you can.  Do not smoke, use chewing tobacco or drink alcohol the day of surgery.   If applicable bring your C-PAP/ BI-PAP machine in with you to preop day of surgery.  Bring any papers given to you in the doctor’s office.  Wear clean comfortable clothes.  Do not wear contact lenses, false eyelashes or make-up.  Bring a case for your glasses.   Bring crutches or walker if applicable.  Remove all piercings.  Leave jewelry and any other valuables at home.  Hair extensions with metal clips must be removed prior to surgery.  The Pre-Admission Testing nurse will instruct you to bring medications if unable to obtain an accurate list in Pre-Admission Testing.        If you were given a blood bank ID arm band remember to bring it with  you the day of surgery.    Preventing a Surgical Site Infection:  For 2 to 3 days before surgery, avoid shaving with a razor because the razor can irritate skin and make it easier to develop an infection.    Any areas of open skin can increase the risk of a post-operative wound infection by allowing bacteria to enter and travel throughout the body.  Notify your surgeon if you have any skin wounds / rashes even if it is not near the expected surgical site.  The area will need assessed to determine if surgery should be delayed until it is healed.  The night prior to surgery shower using a fresh bar of anti-bacterial soap (such as Dial) and clean washcloth.  Sleep in a clean bed with clean clothing.  Do not allow pets to sleep with you.  Shower on the morning of surgery using a fresh bar of anti-bacterial soap (such as Dial) and clean washcloth.  Dry with a clean towel and dress in clean clothing.  Ask your surgeon if you will be receiving antibiotics prior to surgery.  Make sure you, your family, and all healthcare providers clean their hands with soap and water or an alcohol based hand  before caring for you or your wound.    Day of surgery:  Your arrival time is approximately two hours before your scheduled surgery time.  Please note if you have an early arrival time the surgery doors do not open before 5:00 AM.  Upon arrival, a Pre-op nurse and Anesthesiologist will review your health history, obtain vital signs, and answer questions you may have.  The only belongings needed at this time will be a list of your home medications and if applicable your C-PAP/BI-PAP machine.  A Pre-op nurse will start an IV and you may receive medication in preparation for surgery, including something to help you relax.     Please be aware that surgery does come with discomfort.  We want to make every effort to control your discomfort so please discuss any uncontrolled symptoms with your nurse.   Your doctor will most likely  have prescribed pain medications.      If you are going home after surgery you will receive individualized written care instructions before being discharged.  A responsible adult must drive you to and from the hospital on the day of your surgery and ideally stay with you through the night.   .  Discharge prescriptions can be filled by the hospital pharmacy during regular pharmacy hours.  If you are having surgery late in the day/evening your prescription may be e-prescribed to your pharmacy.  Please verify your pharmacy hours or chose a 24 hour pharmacy to avoid not having access to your prescription because your pharmacy has closed for the day.    If you are staying overnight following surgery, you will be transported to your hospital room following the recovery period.  [facility] has all private rooms.    If you have any questions please call Pre-Admission Testing at [phone].  Deductibles and co-payments are collected on the day of service. Please be prepared to pay the required co-pay, deductible or deposit on the day of service as defined by your plan.    Call your surgeon immediately if you experience any of the following symptoms:  Sore Throat  Shortness of Breath or difficulty breathing  Cough  Chills  Body soreness or muscle pain  Headache  Fever  New loss of taste or smell  Do not arrive for your surgery ill.  Your procedure will need to be rescheduled to another time.  You will need to call your physician before the day of surgery to avoid any unnecessary exposure to hospital staff as well as other patients.

## 2024-08-26 NOTE — PROGRESS NOTES
Foot Follow Up      Patient: Amanda Sherwood    YOB: 1960 63 y.o. female    Chief Complaints: Foot is sore    History of Present Illness::Patient was seen on 2/8/2024 reporting pain around her right first and somewhat second toes for years that had worsened to some degree.  She especially had pain with shoe wear and exercise and occasionally at rest.     She had had previous bunion correction on the contralateral side that she said required 2 surgeries and I believe she said there had been some nonunion.     I knew her well from previously taking care of her  William and she also used to work at the hospital.     She was sent for MRI of her right foot to evaluate for arthrosis of the first MTP joint to help tailor treatment protocol     Patient was seen on 3/6/2024 mainly with report of pain under the second metatarsal head with some hammering of the right second toe as well as intermittent pain around the first MTP joint mostly medially.    At that time we discussed treatment and although no guarantees can be given she was to proceed with operative treatment reviewed that we would do a Lapidus is similar to what she had done on the contralateral side rather than an MTP fusion given her lack of arthritic change.  Also plan on second MTP release with possible debridement of the second metatarsal head with PIP joint resection or fusion but nothing with her other lesser toes as they are not symptomatic.  She was going to be seeing her hematologist soon check about anticoagulation with history of PEs.  Surgery was originally scheduled in May but was postponed due to other issues that she had going on.  She evidently saw Dr. Sydnie Lovelace in  July and discussed custom orthotics after surgery.      She is seen back today with mostly pain around the second MTP joint and plantar aspect of the second metatarsal and burning in her toes more so the medial bunion pain although does have some.  She did report  "that she has seen Dr. Lovelace who is going to see about getting some custom orthotics after her surgery and prescribed compounding cream for the burning in her toes which has not really been that effective.  She has been in contact with her hematologist recommends prophylactic Eliquis starting the day after surgery.     HPI    ROS: Foot pain  Past Medical History:   Diagnosis Date    Acute kidney injury     Ankle sprain     Anxiety 3 years ago    Arthritis of back     Arthritis of neck     Cervical disc disorder     Depression     GERD (gastroesophageal reflux disease)     H/O Leukopenia     Hernia     HIATAL    History of anemia     History of bronchitis     History of migraine     Hyperlipidemia     NO MEDS CURRENTLY. WORKING ON    Hypertension     Neck pain     Obesity (BMI 30.0-34.9) 06/06/2019    Osteoarthritis     Pulmonary embolism 8-    PVC (premature ventricular contraction)     Renal insufficiency     FANNY    Right foot pain     Seasonal allergies     Tear of meniscus of knee     Visual impairment     Wear bifocals     Physical Exam:   Vitals:    08/26/24 1017   Temp: 98.2 °F (36.8 °C)   Weight: 72.8 kg (160 lb 9.6 oz)   Height: 165.1 cm (65\")   PainSc:   5   PainLoc: Foot     Well developed with normal mood  She is with her .  Right foot shows moderate to severe bunion deformity nearly passively correctable.  She was tender over the medial aspect of the first MTP joint but did not have pain with range of motion to the first MTP joint.  There was hammering of the second toe with prominence of the second metatarsal head but no ulceration and tenderness at the base of the second toe with rigid hammering of the second PIP joint.  She was nontender over the third toe.      Radiology:    MRI films and report of the right foot dated 2/22/2024 reviewed which shows subluxation of the sesamoids relative to the plantar side of the metatarsal head.  There is small volume of fluid in the first MTP joint " and surprising preservation of the articular cartilage of the first MTP joint and between the metatarsal and sesamoids but no significant chondral loss appreciated at those articulations.     Second toe demonstrates exaggerated flexion to about 90 degrees at the level of the PIP joint and on sagittal images there is deformity of the base of the proximal phalanx which may be result of old intra-articular fracture.  There is marginal osteophytes around metatarsal head and around the base of the phalanx with exaggerated dorsiflexion of the MTP joint and irregular chondral loss at the base of the proximal phalanx and along the dorsum of the metatarsal head.  Plantar plate a tendons appear intact with no synovitis at the IP joint.     Third and fourth toes show mild exaggerated flexion at the level of the PIP joints and some arthritis at the IP joints.     There was arthritic change at the articulation of the navicular with first cuneiform and between the first and second cuneiforms and at the second and to a lesser extent dorsum of the third TMT joints.  First fourth and fifth TMT joints appeared normal.        Assessment/Plan: 1.  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  Right foot arthritic change  of navicular with first cuneiform and between first and second cuneiforms and second and to a lesser extent dorsum of the third TMT joints    We again discussed operative and nonoperative treatment options and although no guarantees could be given she would like to proceed with operative treatment.  She had a Lapidus procedure on the contralateral side and eventually has done well with that and would recommend doing as such on this side rather than doing a first MTP fusion given her lack of arthritic change.   Will plan on second MTP release with possible debridement of the second metatarsal head and PIP joint resection/fusion.  Her other lesser toes are not symptomatic.    Reviewed with her that this will not necessarily relieve all of her pain especially the neuritic symptoms in her toes but hopefully will help to some degree and there is not a great answer for the arthritis in the second MTP joint but will debride this and decompress it hopefully this will help.  She was again was fairly adamant she did not want to have fusion of her first MTP joint which I feel is reasonable understanding that she could eventually require as such.     Patient and her  both voiced clear understanding the operative procedure and postoperative course with limited weightbearing for at least 6 to 8 weeks if not longer and associated risk benefits potential outcomes and complications which can include but are not limited to heart attack stroke death pneumonia infection bleeding damage to blood vessels nerves or tendons blood clots pulmonary embolism persistent or worsening pain stiffness recurrence of deformity instability need for subsequent surgery and.  Return to presurgery and precondition levels of activity as well as nonunion and wound healing problems and could result in long-term wound care or loss of the toes or partial foot.      She will continue with the metatarsal gel cushion and is encouraged to call if she has any questions prior to surgery.  Will plan on Eliquis prophylactic dosing postoperatively.

## 2024-09-05 ENCOUNTER — TELEPHONE (OUTPATIENT)
Dept: FAMILY MEDICINE CLINIC | Facility: CLINIC | Age: 64
End: 2024-09-05
Payer: MEDICARE

## 2024-09-05 NOTE — TELEPHONE ENCOUNTER
Dr. Underwood,    Patient states that she spoke with you and you have agreed to place lab orders every 2 weeks for patient for the next 3 months to have drawn in the office? Patient is currently scheduled for her first lab draw on 9/25.

## 2024-09-06 NOTE — H&P
Patient: Amanda Sherwood     YOB: 1960 63 y.o. female     Chief Complaints: Foot is sore     History of Present Illness:Patient was seen on 2/8/2024 reporting pain around her right first and somewhat second toes for years that had worsened to some degree.  She especially had pain with shoe wear and exercise and occasionally at rest.     She had had previous bunion correction on the contralateral side that she said required 2 surgeries and I believe she said there had been some nonunion.     I knew her well from previously taking care of her  William and she also used to work at the hospital.     She was sent for MRI of her right foot to evaluate for arthrosis of the first MTP joint to help tailor treatment protocol     Patient was seen on 3/6/2024 mainly with report of pain under the second metatarsal head with some hammering of the right second toe as well as intermittent pain around the first MTP joint mostly medially.     At that time we discussed treatment and although no guarantees can be given she was to proceed with operative treatment reviewed that we would do a Lapidus is similar to what she had done on the contralateral side rather than an MTP fusion given her lack of arthritic change.  Also plan on second MTP release with possible debridement of the second metatarsal head with PIP joint resection or fusion but nothing with her other lesser toes as they are not symptomatic.  She was going to be seeing her hematologist soon check about anticoagulation with history of PEs.  Surgery was originally scheduled in May but was postponed due to other issues that she had going on.  She evidently saw Dr. Sydnie Lovelace in  July and discussed custom orthotics after surgery.       Patient was seen on 8/26/2024 y with mostly pain around the second MTP joint and plantar aspect of the second metatarsal and burning in her toes more so the medial bunion pain although does have some.  She did report that  "she had seen Dr. Lovelace who was going to see about getting some custom orthotics after her surgery and prescribed compounding cream for the burning in her toes which had not really been that effective.  She had been in contact with her hematologist who had recommended prophylactic Eliquis starting the day after surgery.       ROS: Foot pain  Medical History        Past Medical History:   Diagnosis Date    Acute kidney injury      Ankle sprain      Anxiety 3 years ago    Arthritis of back      Arthritis of neck      Cervical disc disorder      Depression      GERD (gastroesophageal reflux disease)      H/O Leukopenia      Hernia       HIATAL    History of anemia      History of bronchitis      History of migraine      Hyperlipidemia       NO MEDS CURRENTLY. WORKING ON    Hypertension      Neck pain      Obesity (BMI 30.0-34.9) 06/06/2019    Osteoarthritis      Pulmonary embolism 8-    PVC (premature ventricular contraction)      Renal insufficiency       FANNY    Right foot pain      Seasonal allergies      Tear of meniscus of knee      Visual impairment       Wear bifocals         Physical Exam:   Vitals       Vitals:     08/26/24 1017   Temp: 98.2 °F (36.8 °C)   Weight: 72.8 kg (160 lb 9.6 oz)   Height: 165.1 cm (65\")   PainSc:   5   PainLoc: Foot      Performed 8/26/2024  Well developed with normal mood  She is with her .  Right foot shows moderate to severe bunion deformity nearly passively correctable.  She was tender over the medial aspect of the first MTP joint but did not have pain with range of motion to the first MTP joint.  There was hammering of the second toe with prominence of the second metatarsal head but no ulceration and tenderness at the base of the second toe with rigid hammering of the second PIP joint.  She was nontender over the third toe.        Radiology:     MRI films and report of the right foot dated 2/22/2024 reviewed which shows subluxation of the sesamoids relative to the " plantar side of the metatarsal head.  There is small volume of fluid in the first MTP joint and surprising preservation of the articular cartilage of the first MTP joint and between the metatarsal and sesamoids but no significant chondral loss appreciated at those articulations.     Second toe demonstrates exaggerated flexion to about 90 degrees at the level of the PIP joint and on sagittal images there is deformity of the base of the proximal phalanx which may be result of old intra-articular fracture.  There is marginal osteophytes around metatarsal head and around the base of the phalanx with exaggerated dorsiflexion of the MTP joint and irregular chondral loss at the base of the proximal phalanx and along the dorsum of the metatarsal head.  Plantar plate a tendons appear intact with no synovitis at the IP joint.     Third and fourth toes show mild exaggerated flexion at the level of the PIP joints and some arthritis at the IP joints.     There was arthritic change at the articulation of the navicular with first cuneiform and between the first and second cuneiforms and at the second and to a lesser extent dorsum of the third TMT joints.  First fourth and fifth TMT joints appeared normal.        Assessment/Plan: 1.  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  Right foot arthritic change  of navicular with first cuneiform and between first and second cuneiforms and second and to a lesser extent dorsum of the third TMT joints     On 8/26/2024 again discussed operative and nonoperative treatment options and although no guarantees could be given she would like to proceed with operative treatment.  She had a Lapidus procedure on the contralateral side and eventually has done well with that and would recommend  doing as such on this side rather than doing a first MTP fusion given her lack of arthritic change.  Will plan on second MTP release with possible debridement of the second metatarsal head and PIP joint resection/fusion.  Her other lesser toes were not symptomatic.     Reviewed with her that this will not necessarily relieve all of her pain especially the neuritic symptoms in her toes but hopefully will help to some degree and there is not a great answer for the arthritis in the second MTP joint but will debride this and decompress it hopefully this will help.  She was again was fairly adamant she did not want to have fusion of her first MTP joint which I feel is reasonable understanding that she could eventually require as such.     Patient and her  both voiced clear understanding the operative procedure and postoperative course with limited weightbearing for at least 6 to 8 weeks if not longer and associated risk benefits potential outcomes and complications which can include but are not limited to heart attack stroke death pneumonia infection bleeding damage to blood vessels nerves or tendons blood clots pulmonary embolism persistent or worsening pain stiffness recurrence of deformity instability need for subsequent surgery and.  Return to presurgery and precondition levels of activity as well as nonunion and wound healing problems and could result in long-term wound care or loss of the toes or partial foot.        She was going to continue with the metatarsal gel cushion and is encouraged to call if she has any questions prior to surgery.  Reviewed that we would plan on Eliquis prophylactic dosing postoperatively.     Copied text in this note has been reviewed by me and is accurate as of  09/06/24 .    Surgery was initially going to be 9/10/2024.  However patient contacted me today before and had a cut on her contralateral shin that was only 2 days old and I felt it was best to delay surgery until this  was healed.  Will plan to proceed with surgery as outlined previously.     Copied text in this note has been reviewed by me and is accurate as of  09/30/24 .

## 2024-09-07 ENCOUNTER — ANESTHESIA EVENT (OUTPATIENT)
Dept: PERIOP | Facility: HOSPITAL | Age: 64
End: 2024-09-07
Payer: MEDICARE

## 2024-09-09 ENCOUNTER — TELEPHONE (OUTPATIENT)
Dept: ORTHOPEDIC SURGERY | Facility: CLINIC | Age: 64
End: 2024-09-09
Payer: MEDICARE

## 2024-09-09 NOTE — TELEPHONE ENCOUNTER
I spoke at length with patient earlier today and she is having fairly extensive surgery on her foot tomorrow.  2 days ago she sustained an injury to her contralateral leg with a small cut on her shin.  Reviewed with her that given the extensive nature of her surgery that I felt it was best to wait until this was healed in order to minimize potential complications which are possible even in most ideal situations.  She understood the rationale for this and was in agreement with this.  Her  had had previous postoperative infection problems.  She understands it may be several weeks or longer if we can get her back on the schedule and she will continue with local wound care and anything worsens she will let me know otherwise we will get this scheduled at a mutually convenient time.  She was encouraged to call if she has any questions or problems.

## 2024-09-09 NOTE — TELEPHONE ENCOUNTER
----- Message from Lluvia DRUMMOND sent at 9/9/2024  9:54 AM EDT -----  Regarding: FW: Surgery   Contact: [phone]    ----- Message -----  From: Amanda Sherwood  Sent: 9/9/2024   9:16 AM EDT  To: [facility]  Subject: Surgery                                          Dr Mancilla,  I have a small cut on my left shin. I bumped it on the edge of my grandson’s plastic toy Saturday. I’ve kept it clean and covered. I just want to make sure it won’t be a problem with my surgery. They stressed no shaving, no cuts in PAT.   Thanks,   Amanda

## 2024-09-10 ENCOUNTER — ANESTHESIA (OUTPATIENT)
Dept: PERIOP | Facility: HOSPITAL | Age: 64
End: 2024-09-10
Payer: MEDICARE

## 2024-09-11 ENCOUNTER — OFFICE VISIT (OUTPATIENT)
Dept: FAMILY MEDICINE CLINIC | Facility: CLINIC | Age: 64
End: 2024-09-11
Payer: MEDICARE

## 2024-09-11 VITALS
DIASTOLIC BLOOD PRESSURE: 90 MMHG | SYSTOLIC BLOOD PRESSURE: 142 MMHG | BODY MASS INDEX: 26.49 KG/M2 | OXYGEN SATURATION: 97 % | WEIGHT: 159 LBS | TEMPERATURE: 96.9 F | HEIGHT: 65 IN | HEART RATE: 70 BPM

## 2024-09-11 DIAGNOSIS — M20.41 HAMMER TOE OF RIGHT FOOT: ICD-10-CM

## 2024-09-11 DIAGNOSIS — M21.611 BUNION OF GREAT TOE OF RIGHT FOOT: Primary | ICD-10-CM

## 2024-09-11 PROCEDURE — 3077F SYST BP >= 140 MM HG: CPT | Performed by: STUDENT IN AN ORGANIZED HEALTH CARE EDUCATION/TRAINING PROGRAM

## 2024-09-11 PROCEDURE — 1159F MED LIST DOCD IN RCRD: CPT | Performed by: STUDENT IN AN ORGANIZED HEALTH CARE EDUCATION/TRAINING PROGRAM

## 2024-09-11 PROCEDURE — 3080F DIAST BP >= 90 MM HG: CPT | Performed by: STUDENT IN AN ORGANIZED HEALTH CARE EDUCATION/TRAINING PROGRAM

## 2024-09-11 PROCEDURE — 99214 OFFICE O/P EST MOD 30 MIN: CPT | Performed by: STUDENT IN AN ORGANIZED HEALTH CARE EDUCATION/TRAINING PROGRAM

## 2024-09-11 PROCEDURE — 1160F RVW MEDS BY RX/DR IN RCRD: CPT | Performed by: STUDENT IN AN ORGANIZED HEALTH CARE EDUCATION/TRAINING PROGRAM

## 2024-09-11 PROCEDURE — 1125F AMNT PAIN NOTED PAIN PRSNT: CPT | Performed by: STUDENT IN AN ORGANIZED HEALTH CARE EDUCATION/TRAINING PROGRAM

## 2024-09-11 RX ORDER — TRAMADOL HYDROCHLORIDE 50 MG/1
50 TABLET ORAL EVERY 8 HOURS PRN
Qty: 20 TABLET | Refills: 0 | Status: SHIPPED | OUTPATIENT
Start: 2024-09-11

## 2024-09-11 NOTE — PROGRESS NOTES
"Chief Complaint  Foot Pain (Pain  is located in both feet but is worse in her right foot.)    Subjective        Amanda Sherwood presents to [facility]  History of Present Illness  63-year-old female with history of pulmonary embolism, hypertension, hyperlipidemia, GERD, chronic nausea and vomiting, CKD, anxiety who presents for foot pain.    Bunions which she has been following with Dr. Mancilla.  Previous repair and revision of left.  Plan will be to have the right surgically repaired. Patient was originally supposed to have surgery on 9/23 but was delayed until 10/1 due to a left shin abrasion.  She is also following with podiatrist Dr. Sydnie Lovelace.  They are planning to fit her for custom orthotics of the right foot following surgery.  She is in significant pain to the point that she does not leave her house as she does not have shoes that will fit her comfortably.  She wears a supportive house shoe which is the only shoe that is comfortable.      Of note per hematology, she will need anticoagulation postop with 2.5 mg apixaban twice daily.  We will also be monitoring labs every 2 weeks postop.  Extremity Pain   The pain is present in the left foot, right foot and right toes. The problem has been worse. The quality of the pain is described as aching, stabbing and other. The pain is at a severity of 10/10. Associated symptoms include an inability to bear weight and tingling.       Objective   Vital Signs:  /90   Pulse 70   Temp 96.9 °F (36.1 °C)   Ht 165.1 cm (65\")   Wt 72.1 kg (159 lb)   SpO2 97%   BMI 26.46 kg/m²   Estimated body mass index is 26.46 kg/m² as calculated from the following:    Height as of this encounter: 165.1 cm (65\").    Weight as of this encounter: 72.1 kg (159 lb).            Physical Exam  Constitutional:       Appearance: Normal appearance.   Musculoskeletal:         General: Tenderness present. No swelling.      Comments: Severe bunion " deformity of right foot.  Right second phalanx hammertoe.  Tenderness to palpation over ball of foot.   Skin:     Findings: No erythema or rash.      Comments: Well-healing abrasion of left shin.   Neurological:      Mental Status: She is alert.      Gait: Gait abnormal.   Psychiatric:         Mood and Affect: Mood normal.         Behavior: Behavior normal.        Result Review :  The following data was reviewed by: Elsie Underwood MD on 09/11/2024:  Common labs          5/13/2024    10:28 7/30/2024    11:30 8/26/2024    08:41   Common Labs   Glucose 111   83    BUN 13   13    Creatinine 1.01  0.90  1.01    Sodium 141   141    Potassium 4.6   4.5    Chloride 103   105    Calcium 10.3   9.9    Total Protein 6.8      Albumin 4.8   4.6    Total Bilirubin 0.4      Alkaline Phosphatase 66      AST (SGOT) 21      ALT (SGPT) 16      WBC   3.53    Hemoglobin   13.5    Hematocrit   41.9    Platelets   190      Data reviewed : None           Assessment and Plan   Diagnoses and all orders for this visit:    1. Bunion of great toe of right foot (Primary)  -     traMADol (ULTRAM) 50 MG tablet; Take 1 tablet by mouth Every 8 (Eight) Hours As Needed for Severe Pain.  Dispense: 20 tablet; Refill: 0    2. Hammer toe of right foot  -     traMADol (ULTRAM) 50 MG tablet; Take 1 tablet by mouth Every 8 (Eight) Hours As Needed for Severe Pain.  Dispense: 20 tablet; Refill: 0    Pain is severe.  She is scheduled for surgical intervention of right bunion on 10/1.  Her pain is limiting her from daily function.  Over-the-counter options are only mildly helping.  I discussed with her that I am okay with a short course of tramadol as needed for severe pain.  I instructed her to take no more than 3 times per day.  She should not take with lorazepam.  We also discussed that I will not be refilling this medication or prescribing it long-term.         Follow Up   No follow-ups on file.  Patient was given instructions and counseling regarding her  condition or for health maintenance advice. Please see specific information pulled into the AVS if appropriate.

## 2024-09-12 DIAGNOSIS — D64.9 ANEMIA, UNSPECIFIED TYPE: ICD-10-CM

## 2024-09-12 DIAGNOSIS — Z79.01 CHRONIC ANTICOAGULATION: ICD-10-CM

## 2024-09-16 ENCOUNTER — TELEPHONE (OUTPATIENT)
Dept: CARDIOLOGY | Facility: CLINIC | Age: 64
End: 2024-09-16
Payer: MEDICARE

## 2024-09-16 ENCOUNTER — PATIENT MESSAGE (OUTPATIENT)
Dept: CARDIOLOGY | Facility: CLINIC | Age: 64
End: 2024-09-16
Payer: MEDICARE

## 2024-09-16 DIAGNOSIS — E78.2 MIXED HYPERLIPIDEMIA: Primary | ICD-10-CM

## 2024-09-23 ENCOUNTER — PREP FOR SURGERY (OUTPATIENT)
Dept: OTHER | Facility: HOSPITAL | Age: 64
End: 2024-09-23
Payer: MEDICARE

## 2024-09-27 ENCOUNTER — PRE-ADMISSION TESTING (OUTPATIENT)
Dept: PREADMISSION TESTING | Facility: HOSPITAL | Age: 64
End: 2024-09-27
Payer: MEDICARE

## 2024-09-27 VITALS
RESPIRATION RATE: 18 BRPM | OXYGEN SATURATION: 95 % | HEART RATE: 72 BPM | WEIGHT: 154 LBS | DIASTOLIC BLOOD PRESSURE: 81 MMHG | TEMPERATURE: 98.4 F | SYSTOLIC BLOOD PRESSURE: 152 MMHG | BODY MASS INDEX: 25.66 KG/M2 | HEIGHT: 65 IN

## 2024-09-27 DIAGNOSIS — E78.2 MIXED HYPERLIPIDEMIA: ICD-10-CM

## 2024-09-27 LAB
ANION GAP SERPL CALCULATED.3IONS-SCNC: 11.9 MMOL/L (ref 5–15)
BUN SERPL-MCNC: 11 MG/DL (ref 8–23)
BUN/CREAT SERPL: 11.1 (ref 7–25)
CALCIUM SPEC-SCNC: 10.4 MG/DL (ref 8.6–10.5)
CHLORIDE SERPL-SCNC: 101 MMOL/L (ref 98–107)
CHOLEST SERPL-MCNC: 227 MG/DL (ref 0–200)
CO2 SERPL-SCNC: 24.1 MMOL/L (ref 22–29)
CREAT SERPL-MCNC: 0.99 MG/DL (ref 0.57–1)
DEPRECATED RDW RBC AUTO: 42.8 FL (ref 37–54)
EGFRCR SERPLBLD CKD-EPI 2021: 65 ML/MIN/1.73
ERYTHROCYTE [DISTWIDTH] IN BLOOD BY AUTOMATED COUNT: 12.2 % (ref 12.3–15.4)
GLUCOSE SERPL-MCNC: 85 MG/DL (ref 65–99)
HCT VFR BLD AUTO: 45.1 % (ref 34–46.6)
HDLC SERPL-MCNC: 140 MG/DL (ref 40–60)
HGB BLD-MCNC: 14.8 G/DL (ref 12–15.9)
LDLC SERPL CALC-MCNC: 76 MG/DL (ref 0–100)
LDLC/HDLC SERPL: 0.53 {RATIO}
MCH RBC QN AUTO: 31 PG (ref 26.6–33)
MCHC RBC AUTO-ENTMCNC: 32.8 G/DL (ref 31.5–35.7)
MCV RBC AUTO: 94.5 FL (ref 79–97)
PLATELET # BLD AUTO: 199 10*3/MM3 (ref 140–450)
PMV BLD AUTO: 10 FL (ref 6–12)
POTASSIUM SERPL-SCNC: 5.2 MMOL/L (ref 3.5–5.2)
RBC # BLD AUTO: 4.77 10*6/MM3 (ref 3.77–5.28)
SODIUM SERPL-SCNC: 137 MMOL/L (ref 136–145)
TRIGL SERPL-MCNC: 65 MG/DL (ref 0–150)
VLDLC SERPL-MCNC: 11 MG/DL (ref 5–40)
WBC NRBC COR # BLD AUTO: 2.96 10*3/MM3 (ref 3.4–10.8)

## 2024-09-27 PROCEDURE — 36415 COLL VENOUS BLD VENIPUNCTURE: CPT

## 2024-09-27 PROCEDURE — 80048 BASIC METABOLIC PNL TOTAL CA: CPT

## 2024-09-27 PROCEDURE — 85027 COMPLETE CBC AUTOMATED: CPT

## 2024-09-27 PROCEDURE — 80061 LIPID PANEL: CPT

## 2024-09-27 NOTE — DISCHARGE INSTRUCTIONS
Take the following medications the morning of surgery: NONE      If you are on prescription narcotic pain medication to control your pain you may also take that medication the morning of surgery.      General Instructions:     Do not eat solid food after midnight the night before surgery.  Clear liquids day of surgery are allowed but must be stopped at least two hours before your hospital arrival time.       Allowed clear liquids      Water, sodas, and tea or coffee with no cream or milk added.       12 to 20 ounces of a clear liquid that contains carbohydrates is recommended.  If non-diabetic, have Gatorade or Powerade.  If diabetic, have G2 or Powerade Zero.     Do not have liquids red in color.  Do not consume chicken, beef, pork or vegetable broth or bouillon cubes of any variety as they are not considered clear liquids and are not allowed.      Infants may have breast milk up to four hours before surgery.  Infants drinking formula may drink formula up to six hours before surgery.   Patients who avoid smoking, chewing tobacco and alcohol for 4 weeks prior to surgery have a reduced risk of post-operative complications.  Quit smoking as many days before surgery as you can.  Do not smoke, use chewing tobacco or drink alcohol the day of surgery.   If applicable bring your C-PAP/ BI-PAP machine in with you to preop day of surgery.  Bring any papers given to you in the doctor’s office.  Wear clean comfortable clothes.  Do not wear contact lenses, false eyelashes or make-up.  Bring a case for your glasses.   Bring crutches or walker if applicable.  Remove all piercings.  Leave jewelry and any other valuables at home.  Hair extensions with metal clips must be removed prior to surgery.  The Pre-Admission Testing nurse will instruct you to bring medications if unable to obtain an accurate list in Pre-Admission Testing.        If you were given a blood bank ID arm band remember to bring it with you the day of  surgery.    Preventing a Surgical Site Infection:  For 2 to 3 days before surgery, avoid shaving with a razor because the razor can irritate skin and make it easier to develop an infection.    Any areas of open skin can increase the risk of a post-operative wound infection by allowing bacteria to enter and travel throughout the body.  Notify your surgeon if you have any skin wounds / rashes even if it is not near the expected surgical site.  The area will need assessed to determine if surgery should be delayed until it is healed.  The night prior to surgery shower using a fresh bar of anti-bacterial soap (such as Dial) and clean washcloth.  Sleep in a clean bed with clean clothing.  Do not allow pets to sleep with you.  Shower on the morning of surgery using a fresh bar of anti-bacterial soap (such as Dial) and clean washcloth.  Dry with a clean towel and dress in clean clothing.  Ask your surgeon if you will be receiving antibiotics prior to surgery.  Make sure you, your family, and all healthcare providers clean their hands with soap and water or an alcohol based hand  before caring for you or your wound.    Day of surgery:  Your arrival time is approximately two hours before your scheduled surgery time.  Please note if you have an early arrival time the surgery doors do not open before 5:00 AM.  Upon arrival, a Pre-op nurse and Anesthesiologist will review your health history, obtain vital signs, and answer questions you may have.  The only belongings needed at this time will be a list of your home medications and if applicable your C-PAP/BI-PAP machine.  A Pre-op nurse will start an IV and you may receive medication in preparation for surgery, including something to help you relax.     Please be aware that surgery does come with discomfort.  We want to make every effort to control your discomfort so please discuss any uncontrolled symptoms with your nurse.   Your doctor will most likely have prescribed  pain medications.      If you are going home after surgery you will receive individualized written care instructions before being discharged.  A responsible adult must drive you to and from the hospital on the day of your surgery and ideally stay with you through the night.   .  Discharge prescriptions can be filled by the hospital pharmacy during regular pharmacy hours.  If you are having surgery late in the day/evening your prescription may be e-prescribed to your pharmacy.  Please verify your pharmacy hours or chose a 24 hour pharmacy to avoid not having access to your prescription because your pharmacy has closed for the day.    If you are staying overnight following surgery, you will be transported to your hospital room following the recovery period.  [facility] has all private rooms.    If you have any questions please call Pre-Admission Testing at [phone].  Deductibles and co-payments are collected on the day of service. Please be prepared to pay the required co-pay, deductible or deposit on the day of service as defined by your plan.    Call your surgeon immediately if you experience any of the following symptoms:  Sore Throat  Shortness of Breath or difficulty breathing  Cough  Chills  Body soreness or muscle pain  Headache  Fever  New loss of taste or smell  Do not arrive for your surgery ill.  Your procedure will need to be rescheduled to another time.  You will need to call your physician before the day of surgery to avoid any unnecessary exposure to hospital staff as well as other patients.

## 2024-10-01 ENCOUNTER — APPOINTMENT (OUTPATIENT)
Dept: GENERAL RADIOLOGY | Facility: HOSPITAL | Age: 64
End: 2024-10-01
Payer: MEDICARE

## 2024-10-01 ENCOUNTER — HOSPITAL ENCOUNTER (OUTPATIENT)
Facility: HOSPITAL | Age: 64
Setting detail: HOSPITAL OUTPATIENT SURGERY
Discharge: HOME | End: 2024-10-01
Attending: ORTHOPAEDIC SURGERY | Admitting: ORTHOPAEDIC SURGERY
Payer: MEDICARE

## 2024-10-01 VITALS
HEART RATE: 73 BPM | DIASTOLIC BLOOD PRESSURE: 85 MMHG | SYSTOLIC BLOOD PRESSURE: 151 MMHG | TEMPERATURE: 97.5 F | RESPIRATION RATE: 18 BRPM | OXYGEN SATURATION: 97 %

## 2024-10-01 DIAGNOSIS — M20.41 HAMMER TOE OF RIGHT FOOT: Primary | ICD-10-CM

## 2024-10-01 DIAGNOSIS — M25.374 INSTABILITY OF METATARSOPHALANGEAL JOINT OF RIGHT FOOT: ICD-10-CM

## 2024-10-01 DIAGNOSIS — M20.11 HALLUX VALGUS OF RIGHT FOOT: ICD-10-CM

## 2024-10-01 PROCEDURE — 25010000002 LIDOCAINE PF 2% 2 % SOLUTION: Performed by: NURSE ANESTHETIST, CERTIFIED REGISTERED

## 2024-10-01 PROCEDURE — 76000 FLUOROSCOPY <1 HR PHYS/QHP: CPT | Performed by: ORTHOPAEDIC SURGERY

## 2024-10-01 PROCEDURE — 25010000002 MAGNESIUM SULFATE PER 500 MG OF MAGNESIUM: Performed by: NURSE ANESTHETIST, CERTIFIED REGISTERED

## 2024-10-01 PROCEDURE — C1713 ANCHOR/SCREW BN/BN,TIS/BN: HCPCS | Performed by: ORTHOPAEDIC SURGERY

## 2024-10-01 PROCEDURE — 25010000002 FENTANYL CITRATE (PF) 50 MCG/ML SOLUTION: Performed by: STUDENT IN AN ORGANIZED HEALTH CARE EDUCATION/TRAINING PROGRAM

## 2024-10-01 PROCEDURE — 20902 REMOVAL OF BONE FOR GRAFT: CPT | Performed by: ORTHOPAEDIC SURGERY

## 2024-10-01 PROCEDURE — 28285 REPAIR OF HAMMERTOE: CPT | Performed by: ORTHOPAEDIC SURGERY

## 2024-10-01 PROCEDURE — 25010000002 MIDAZOLAM PER 1 MG: Performed by: STUDENT IN AN ORGANIZED HEALTH CARE EDUCATION/TRAINING PROGRAM

## 2024-10-01 PROCEDURE — 28270 RELEASE OF FOOT CONTRACTURE: CPT | Performed by: ORTHOPAEDIC SURGERY

## 2024-10-01 PROCEDURE — 28288 PARTIAL REMOVAL OF FOOT BONE: CPT | Performed by: ORTHOPAEDIC SURGERY

## 2024-10-01 PROCEDURE — 28740 FUSION OF FOOT BONES: CPT | Performed by: ORTHOPAEDIC SURGERY

## 2024-10-01 PROCEDURE — 25010000002 ONDANSETRON PER 1 MG: Performed by: NURSE ANESTHETIST, CERTIFIED REGISTERED

## 2024-10-01 PROCEDURE — 76000 FLUOROSCOPY <1 HR PHYS/QHP: CPT

## 2024-10-01 PROCEDURE — 25810000003 LACTATED RINGERS PER 1000 ML: Performed by: STUDENT IN AN ORGANIZED HEALTH CARE EDUCATION/TRAINING PROGRAM

## 2024-10-01 PROCEDURE — C1769 GUIDE WIRE: HCPCS | Performed by: ORTHOPAEDIC SURGERY

## 2024-10-01 PROCEDURE — 25010000002 CEFAZOLIN PER 500 MG: Performed by: ORTHOPAEDIC SURGERY

## 2024-10-01 PROCEDURE — 25010000002 DEXAMETHASONE PER 1 MG: Performed by: STUDENT IN AN ORGANIZED HEALTH CARE EDUCATION/TRAINING PROGRAM

## 2024-10-01 PROCEDURE — 25010000002 FENTANYL CITRATE (PF) 50 MCG/ML SOLUTION: Performed by: NURSE ANESTHETIST, CERTIFIED REGISTERED

## 2024-10-01 PROCEDURE — 28292 COR HLX VLGS RSC PRX PHLX BS: CPT | Performed by: ORTHOPAEDIC SURGERY

## 2024-10-01 PROCEDURE — 25010000002 PROPOFOL 200 MG/20ML EMULSION: Performed by: NURSE ANESTHETIST, CERTIFIED REGISTERED

## 2024-10-01 PROCEDURE — 25010000002 ROPIVACAINE PER 1 MG: Performed by: STUDENT IN AN ORGANIZED HEALTH CARE EDUCATION/TRAINING PROGRAM

## 2024-10-01 DEVICE — 2-0 FW BLUE W/ TPR 3/8CIR NEEDLE
Type: IMPLANTABLE DEVICE | Site: FOOT | Status: FUNCTIONAL
Brand: ARTHREX®

## 2024-10-01 DEVICE — IMPLANTABLE DEVICE
Type: IMPLANTABLE DEVICE | Site: FOOT | Status: FUNCTIONAL
Brand: SURGIFOAM® ABSORBABLE GELATIN SPONGE, U.S.P.

## 2024-10-01 DEVICE — IMPLANTABLE DEVICE: Type: IMPLANTABLE DEVICE | Site: FOOT | Status: FUNCTIONAL

## 2024-10-01 DEVICE — SUPERMX NITI STAPLE W/ INSTRS, 25W X 20L
Type: IMPLANTABLE DEVICE | Site: FOOT | Status: FUNCTIONAL
Brand: ARTHREX®

## 2024-10-01 RX ORDER — ONDANSETRON 2 MG/ML
INJECTION INTRAMUSCULAR; INTRAVENOUS AS NEEDED
Status: DISCONTINUED | OUTPATIENT
Start: 2024-10-01 | End: 2024-10-01 | Stop reason: SURG

## 2024-10-01 RX ORDER — FENTANYL CITRATE 50 UG/ML
50 INJECTION, SOLUTION INTRAMUSCULAR; INTRAVENOUS
Status: DISCONTINUED | OUTPATIENT
Start: 2024-10-01 | End: 2024-10-01 | Stop reason: HOSPADM

## 2024-10-01 RX ORDER — NALOXONE HCL 0.4 MG/ML
0.2 VIAL (ML) INJECTION AS NEEDED
Status: DISCONTINUED | OUTPATIENT
Start: 2024-10-01 | End: 2024-10-01 | Stop reason: HOSPADM

## 2024-10-01 RX ORDER — PROMETHAZINE HYDROCHLORIDE 25 MG/1
25 SUPPOSITORY RECTAL ONCE AS NEEDED
Status: DISCONTINUED | OUTPATIENT
Start: 2024-10-01 | End: 2024-10-01 | Stop reason: HOSPADM

## 2024-10-01 RX ORDER — DEXAMETHASONE SODIUM PHOSPHATE 4 MG/ML
INJECTION, SOLUTION INTRA-ARTICULAR; INTRALESIONAL; INTRAMUSCULAR; INTRAVENOUS; SOFT TISSUE
Status: COMPLETED | OUTPATIENT
Start: 2024-10-01 | End: 2024-10-01

## 2024-10-01 RX ORDER — DIPHENHYDRAMINE HYDROCHLORIDE 50 MG/ML
12.5 INJECTION INTRAMUSCULAR; INTRAVENOUS
Status: DISCONTINUED | OUTPATIENT
Start: 2024-10-01 | End: 2024-10-01 | Stop reason: HOSPADM

## 2024-10-01 RX ORDER — LIDOCAINE HYDROCHLORIDE 10 MG/ML
0.5 INJECTION, SOLUTION INFILTRATION; PERINEURAL ONCE AS NEEDED
Status: DISCONTINUED | OUTPATIENT
Start: 2024-10-01 | End: 2024-10-01 | Stop reason: HOSPADM

## 2024-10-01 RX ORDER — HYDROCODONE BITARTRATE AND ACETAMINOPHEN 5; 325 MG/1; MG/1
1 TABLET ORAL ONCE AS NEEDED
Status: DISCONTINUED | OUTPATIENT
Start: 2024-10-01 | End: 2024-10-01 | Stop reason: HOSPADM

## 2024-10-01 RX ORDER — EPHEDRINE SULFATE 50 MG/ML
5 INJECTION, SOLUTION INTRAVENOUS ONCE AS NEEDED
Status: DISCONTINUED | OUTPATIENT
Start: 2024-10-01 | End: 2024-10-01 | Stop reason: HOSPADM

## 2024-10-01 RX ORDER — ONDANSETRON 2 MG/ML
4 INJECTION INTRAMUSCULAR; INTRAVENOUS ONCE AS NEEDED
Status: DISCONTINUED | OUTPATIENT
Start: 2024-10-01 | End: 2024-10-01 | Stop reason: HOSPADM

## 2024-10-01 RX ORDER — CEPHALEXIN 500 MG/1
500 CAPSULE ORAL EVERY 6 HOURS
Qty: 8 CAPSULE | Refills: 0 | Status: SHIPPED | OUTPATIENT
Start: 2024-10-01 | End: 2024-10-03

## 2024-10-01 RX ORDER — HYDRALAZINE HYDROCHLORIDE 20 MG/ML
5 INJECTION INTRAMUSCULAR; INTRAVENOUS
Status: DISCONTINUED | OUTPATIENT
Start: 2024-10-01 | End: 2024-10-01 | Stop reason: HOSPADM

## 2024-10-01 RX ORDER — MIDAZOLAM HYDROCHLORIDE 1 MG/ML
1 INJECTION INTRAMUSCULAR; INTRAVENOUS
Status: DISCONTINUED | OUTPATIENT
Start: 2024-10-01 | End: 2024-10-01 | Stop reason: HOSPADM

## 2024-10-01 RX ORDER — PROMETHAZINE HYDROCHLORIDE 25 MG/1
25 TABLET ORAL ONCE AS NEEDED
Status: DISCONTINUED | OUTPATIENT
Start: 2024-10-01 | End: 2024-10-01 | Stop reason: HOSPADM

## 2024-10-01 RX ORDER — FLUMAZENIL 0.1 MG/ML
0.2 INJECTION INTRAVENOUS AS NEEDED
Status: DISCONTINUED | OUTPATIENT
Start: 2024-10-01 | End: 2024-10-01 | Stop reason: HOSPADM

## 2024-10-01 RX ORDER — OXYCODONE AND ACETAMINOPHEN 5; 325 MG/1; MG/1
1 TABLET ORAL ONCE AS NEEDED
Status: DISCONTINUED | OUTPATIENT
Start: 2024-10-01 | End: 2024-10-01 | Stop reason: HOSPADM

## 2024-10-01 RX ORDER — ROPIVACAINE HYDROCHLORIDE 5 MG/ML
INJECTION, SOLUTION EPIDURAL; INFILTRATION; PERINEURAL
Status: COMPLETED | OUTPATIENT
Start: 2024-10-01 | End: 2024-10-01

## 2024-10-01 RX ORDER — LABETALOL HYDROCHLORIDE 5 MG/ML
5 INJECTION, SOLUTION INTRAVENOUS
Status: DISCONTINUED | OUTPATIENT
Start: 2024-10-01 | End: 2024-10-01 | Stop reason: HOSPADM

## 2024-10-01 RX ORDER — LIDOCAINE HYDROCHLORIDE 20 MG/ML
INJECTION, SOLUTION EPIDURAL; INFILTRATION; INTRACAUDAL; PERINEURAL AS NEEDED
Status: DISCONTINUED | OUTPATIENT
Start: 2024-10-01 | End: 2024-10-01 | Stop reason: SURG

## 2024-10-01 RX ORDER — MAGNESIUM SULFATE HEPTAHYDRATE 500 MG/ML
INJECTION, SOLUTION INTRAMUSCULAR; INTRAVENOUS AS NEEDED
Status: DISCONTINUED | OUTPATIENT
Start: 2024-10-01 | End: 2024-10-01 | Stop reason: SURG

## 2024-10-01 RX ORDER — SODIUM CHLORIDE 0.9 % (FLUSH) 0.9 %
3 SYRINGE (ML) INJECTION EVERY 12 HOURS SCHEDULED
Status: DISCONTINUED | OUTPATIENT
Start: 2024-10-01 | End: 2024-10-01 | Stop reason: HOSPADM

## 2024-10-01 RX ORDER — DEXAMETHASONE SODIUM PHOSPHATE 4 MG/ML
INJECTION, SOLUTION INTRA-ARTICULAR; INTRALESIONAL; INTRAMUSCULAR; INTRAVENOUS; SOFT TISSUE AS NEEDED
Status: DISCONTINUED | OUTPATIENT
Start: 2024-10-01 | End: 2024-10-01 | Stop reason: SURG

## 2024-10-01 RX ORDER — FENTANYL CITRATE 50 UG/ML
50 INJECTION, SOLUTION INTRAMUSCULAR; INTRAVENOUS ONCE AS NEEDED
Status: COMPLETED | OUTPATIENT
Start: 2024-10-01 | End: 2024-10-01

## 2024-10-01 RX ORDER — IPRATROPIUM BROMIDE AND ALBUTEROL SULFATE 2.5; .5 MG/3ML; MG/3ML
3 SOLUTION RESPIRATORY (INHALATION) ONCE AS NEEDED
Status: DISCONTINUED | OUTPATIENT
Start: 2024-10-01 | End: 2024-10-01 | Stop reason: HOSPADM

## 2024-10-01 RX ORDER — SODIUM CHLORIDE, SODIUM LACTATE, POTASSIUM CHLORIDE, CALCIUM CHLORIDE 600; 310; 30; 20 MG/100ML; MG/100ML; MG/100ML; MG/100ML
9 INJECTION, SOLUTION INTRAVENOUS CONTINUOUS
Status: DISCONTINUED | OUTPATIENT
Start: 2024-10-01 | End: 2024-10-01 | Stop reason: HOSPADM

## 2024-10-01 RX ORDER — PROPOFOL 10 MG/ML
INJECTION, EMULSION INTRAVENOUS AS NEEDED
Status: DISCONTINUED | OUTPATIENT
Start: 2024-10-01 | End: 2024-10-01 | Stop reason: SURG

## 2024-10-01 RX ORDER — FAMOTIDINE 10 MG/ML
20 INJECTION, SOLUTION INTRAVENOUS ONCE
Status: COMPLETED | OUTPATIENT
Start: 2024-10-01 | End: 2024-10-01

## 2024-10-01 RX ORDER — DROPERIDOL 2.5 MG/ML
0.62 INJECTION, SOLUTION INTRAMUSCULAR; INTRAVENOUS
Status: DISCONTINUED | OUTPATIENT
Start: 2024-10-01 | End: 2024-10-01 | Stop reason: HOSPADM

## 2024-10-01 RX ORDER — OXYCODONE AND ACETAMINOPHEN 5; 325 MG/1; MG/1
1-2 TABLET ORAL EVERY 4 HOURS PRN
Qty: 42 TABLET | Refills: 0 | Status: SHIPPED | OUTPATIENT
Start: 2024-10-01 | End: 2024-10-03 | Stop reason: SDUPTHER

## 2024-10-01 RX ORDER — HYDROMORPHONE HYDROCHLORIDE 1 MG/ML
0.5 INJECTION, SOLUTION INTRAMUSCULAR; INTRAVENOUS; SUBCUTANEOUS
Status: DISCONTINUED | OUTPATIENT
Start: 2024-10-01 | End: 2024-10-01 | Stop reason: HOSPADM

## 2024-10-01 RX ORDER — OXYCODONE AND ACETAMINOPHEN 7.5; 325 MG/1; MG/1
1 TABLET ORAL EVERY 4 HOURS PRN
Status: DISCONTINUED | OUTPATIENT
Start: 2024-10-01 | End: 2024-10-01 | Stop reason: HOSPADM

## 2024-10-01 RX ORDER — SODIUM CHLORIDE 0.9 % (FLUSH) 0.9 %
3-10 SYRINGE (ML) INJECTION AS NEEDED
Status: DISCONTINUED | OUTPATIENT
Start: 2024-10-01 | End: 2024-10-01 | Stop reason: HOSPADM

## 2024-10-01 RX ORDER — FENTANYL CITRATE 50 UG/ML
INJECTION, SOLUTION INTRAMUSCULAR; INTRAVENOUS AS NEEDED
Status: DISCONTINUED | OUTPATIENT
Start: 2024-10-01 | End: 2024-10-01 | Stop reason: SURG

## 2024-10-01 RX ADMIN — ROPIVACAINE HYDROCHLORIDE 10 ML: 5 INJECTION, SOLUTION EPIDURAL; INFILTRATION; PERINEURAL at 06:40

## 2024-10-01 RX ADMIN — DEXAMETHASONE SODIUM PHOSPHATE 10 MG: 4 INJECTION, SOLUTION INTRA-ARTICULAR; INTRALESIONAL; INTRAMUSCULAR; INTRAVENOUS; SOFT TISSUE at 07:23

## 2024-10-01 RX ADMIN — PROPOFOL 200 MG: 10 INJECTION, EMULSION INTRAVENOUS at 07:10

## 2024-10-01 RX ADMIN — FAMOTIDINE 20 MG: 10 INJECTION INTRAVENOUS at 05:59

## 2024-10-01 RX ADMIN — LIDOCAINE HYDROCHLORIDE 100 MG: 20 INJECTION, SOLUTION EPIDURAL; INFILTRATION; INTRACAUDAL; PERINEURAL at 07:10

## 2024-10-01 RX ADMIN — FENTANYL CITRATE 50 MCG: 50 INJECTION, SOLUTION INTRAMUSCULAR; INTRAVENOUS at 08:44

## 2024-10-01 RX ADMIN — DEXAMETHASONE SODIUM PHOSPHATE 4 MG: 4 INJECTION, SOLUTION INTRA-ARTICULAR; INTRALESIONAL; INTRAMUSCULAR; INTRAVENOUS; SOFT TISSUE at 06:34

## 2024-10-01 RX ADMIN — OXYCODONE HYDROCHLORIDE AND ACETAMINOPHEN 1 TABLET: 5; 325 TABLET ORAL at 11:48

## 2024-10-01 RX ADMIN — ONDANSETRON 4 MG: 2 INJECTION INTRAMUSCULAR; INTRAVENOUS at 10:28

## 2024-10-01 RX ADMIN — FENTANYL CITRATE 50 MCG: 50 INJECTION, SOLUTION INTRAMUSCULAR; INTRAVENOUS at 06:27

## 2024-10-01 RX ADMIN — MIDAZOLAM 2 MG: 1 INJECTION INTRAMUSCULAR; INTRAVENOUS at 06:27

## 2024-10-01 RX ADMIN — SODIUM CHLORIDE 2000 MG: 900 INJECTION INTRAVENOUS at 06:51

## 2024-10-01 RX ADMIN — SODIUM CHLORIDE, POTASSIUM CHLORIDE, SODIUM LACTATE AND CALCIUM CHLORIDE: 600; 310; 30; 20 INJECTION, SOLUTION INTRAVENOUS at 08:52

## 2024-10-01 RX ADMIN — SODIUM CHLORIDE, POTASSIUM CHLORIDE, SODIUM LACTATE AND CALCIUM CHLORIDE 9 ML/HR: 600; 310; 30; 20 INJECTION, SOLUTION INTRAVENOUS at 05:59

## 2024-10-01 RX ADMIN — MAGNESIUM SULFATE HEPTAHYDRATE 2 G: 500 INJECTION, SOLUTION INTRAMUSCULAR; INTRAVENOUS at 07:28

## 2024-10-01 RX ADMIN — ROPIVACAINE HYDROCHLORIDE 25 ML: 5 INJECTION EPIDURAL; INFILTRATION; PERINEURAL at 06:34

## 2024-10-01 RX ADMIN — PROPOFOL 50 MG: 10 INJECTION, EMULSION INTRAVENOUS at 10:28

## 2024-10-01 NOTE — ANESTHESIA PROCEDURE NOTES
Airway  Urgency: elective    Date/Time: 10/1/2024 7:12 AM    General Information and Staff    Patient location during procedure: OR  CRNA/CAA: Jill Fontenot CRNA    Indications and Patient Condition  Indications for airway management: airway protection    Preoxygenated: yes  MILS maintained throughout  Mask difficulty assessment: 1 - vent by mask    Final Airway Details  Final airway type: supraglottic airway      Successful airway: LMA  Size 3     Number of attempts at approach: 1  Assessment: lips, teeth, and gum same as pre-op and atraumatic intubation    Additional Comments  Teeth, tongue, lips, and gums in preop condition. VSS throughout. Easy mask/smooth easy insertion.Inline neck stabilization throughout.

## 2024-10-01 NOTE — ANESTHESIA POSTPROCEDURE EVALUATION
Patient: Amanda Sherwood    Procedure Summary       Date: 10/01/24 Room / Location: [facility] OR [address] [place] [address] [facility]    Anesthesia Start: 0659 Anesthesia Stop: 1053    Procedure: right foot bunion correction with first tarsometatarsal joint fusion and distal soft tissue release and bone resection.  Right second metatarsal phalangeal joint release and bone debridement as needed with second proximal interphalangeal joint resection/fusion.  Bone graft from calcaneus. (Foot) Diagnosis:       Hallux valgus of right foot      Hammer toe of right foot      Arthritis of first metatarsophalangeal (MTP) joint of right foot      Instability of metatarsophalangeal joint of right foot      (Hallux valgus of right foot [M20.11])      (Hammer toe of right foot [M20.41])      (Arthritis of first metatarsophalangeal (MTP) joint of right foot [M19.071])      (Instability of metatarsophalangeal joint of right foot [M25.374])    Surgeons: Octaviano Mancilla MD Provider: Rocio Banks MD    Anesthesia Type: general ASA Status: 3            Anesthesia Type: general    Vitals  Vitals Value Taken Time   /79 10/01/24 1130   Temp 36.4 °C (97.5 °F) 10/01/24 1130   Pulse 70 10/01/24 1138   Resp 14 10/01/24 1130   SpO2 98 % 10/01/24 1138   Vitals shown include unfiled device data.        Post Anesthesia Care and Evaluation    Patient location during evaluation: bedside  Patient participation: complete - patient participated  Level of consciousness: awake and alert  Pain management: adequate    Airway patency: patent  Anesthetic complications: No anesthetic complications  PONV Status: controlled  Cardiovascular status: acceptable and hemodynamically stable  Respiratory status: acceptable  Hydration status: acceptable    Comments: /85 (BP Location: Left arm, Patient Position: Sitting)   Pulse 73   Temp 36.4 °C (97.5 °F) (Temporal)   Resp 18   LMP 06/20/2016 (Approximate) Comment: 52  SpO2 97%     POPC supplied by  [facility] with continued effect in expected distribution

## 2024-10-01 NOTE — ANESTHESIA PROCEDURE NOTES
Peripheral Block      Patient reassessed immediately prior to procedure    Patient location during procedure: pre-op  Start time: 10/1/2024 6:27 AM  Stop time: 10/1/2024 6:34 AM  Reason for block: at surgeon's request and post-op pain management  Performed by  Anesthesiologist: Rocio Banks MD  Preanesthetic Checklist  Completed: patient identified, IV checked, site marked, risks and benefits discussed, surgical consent, monitors and equipment checked, pre-op evaluation and timeout performed  Prep:  Pt Position: supine  Sterile barriers:cap, gloves, mask and washed/disinfected hands  Prep: ChloraPrep  Patient monitoring: blood pressure monitoring, continuous pulse oximetry and EKG  Procedure    Sedation: yes  Performed under: local infiltration  Guidance:ultrasound guided    ULTRASOUND INTERPRETATION.  Using ultrasound guidance a 21 G gauge needle was placed in close proximity to the sciatic nerve, at which point, under ultrasound guidance anesthetic was injected in the area of the nerve and spread of the anesthesia was seen on ultrasound in close proximity thereto.  There were no abnormalities seen on ultrasound; a digital image was taken; and the patient tolerated the procedure with no complications. Images:still images obtained, printed/placed on chart    Laterality:right  Block Type:popliteal  Injection Technique:single-shot  Needle Type:echogenic  Resistance on Injection: none    Medications Used: dexamethasone (DECADRON) injection - Injection   4 mg - 10/1/2024 6:34:00 AM  ropivacaine (NAROPIN) 0.5 % injection - Injection   25 mL - 10/1/2024 6:34:00 AM      Post Assessment  Injection Assessment: negative aspiration for heme, no paresthesia on injection and incremental injection  Patient Tolerance:comfortable throughout block  Complications:no  Additional Notes  Ultrasound guidance used for verification of needle location and appropriate medication delivery.    Diagnosis: Acute post-surgical pain of  the right foot  Performed by: Rocio Banks MD

## 2024-10-01 NOTE — BRIEF OP NOTE
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  Right second metatarsal phalangeal joint release and bone debridement as needed with second proximal interphalangeal joint resection/fusion.  Possible bone graft from calcaneus and/or cadaver and/or synthetic graft, possible first toe proximal phalangeal osteotomy              Surgeon(s):  Octaviano Mancilla MD    Anesthesia: General with Block    Staff:   Cell Saver : Delks, Rylee  Circulator: Tami Varma RN; Walter Casey RN  Radiology Technologist: Dyllan Davies RRT  Scrub Person: Trace Mir  Vendor Representative: Sunny Beck; Octaviano Gomez         Estimated Blood Loss:  20 ml    Urine Voided: * No values recorded between 10/1/2024  6:59 AM and 10/1/2024  9:51 AM *    Specimens:                None     min      Drains: * No LDAs found *    Findings: see dict        Complications: none          Octaviano Mancilla MD     Date: 10/1/2024  Time: 10:43 EDT

## 2024-10-01 NOTE — ANESTHESIA PREPROCEDURE EVALUATION
Anesthesia Evaluation     Patient summary reviewed and Nursing notes reviewed   no history of anesthetic complications:   NPO Solid Status: > 8 hours  NPO Liquid Status: > 2 hours           Airway   Mallampati: II  No difficulty expected  Dental - normal exam     Pulmonary - normal exam   (+) pulmonary embolism,  (-) COPD, asthma  Cardiovascular   Exercise tolerance: good (4-7 METS)    ECG reviewed  PT is on anticoagulation therapy  Patient on routine beta blocker and Beta blocker given within 24 hours of surgery  Rhythm: regular    (+) hypertension, dysrhythmias PVC, hyperlipidemia  (-) past MI, angina, cardiac stents      Neuro/Psych  (+) psychiatric history Anxiety  (-) seizures, CVA  GI/Hepatic/Renal/Endo    (+) GERD well controlled, liver disease fatty liver disease, renal disease (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 I have explained the risks of anesthesia including but not limited to dental or airway injury, nausea, and cardio-pulmonary complications, such as aspiration, MI, stroke, or death. Patient has agreed to proceed.     Risks of peripheral nerve block were discussed with patient including but not limited to: inadequate block, bleeding, infection, nerve injury, and need to protect limb while numb.    VITALS:  /84 (BP Location: Left arm, Patient Position: Sitting)   Pulse 68   Temp 36.7 °C (98 °F) (Oral)   Resp 18   LMP 06/20/2016 (Approximate) Comment: 52  SpO2 99% )  intravenous induction     Anesthetic plan, risks, benefits, and alternatives have been provided, discussed and informed consent has been obtained with: patient.  Pre-procedure education  provided    CODE STATUS:

## 2024-10-01 NOTE — INTERVAL H&P NOTE
H&P updated. The patient was examined and the scratches on her left leg have now healed.  We reviewed the operative procedure and postoperative course in detail with her  present.  She voiced clear understanding the operative procedure and postoperative course with associated risk benefits potential outcomes and complications with mutual decision made to proceed as discussed.

## 2024-10-01 NOTE — ANESTHESIA PROCEDURE NOTES
Peripheral Block      Patient reassessed immediately prior to procedure    Patient location during procedure: pre-op  Start time: 10/1/2024 6:35 AM  Stop time: 10/1/2024 6:40 AM  Reason for block: at surgeon's request and post-op pain management  Performed by  Anesthesiologist: Rocio Banks MD  Preanesthetic Checklist  Completed: patient identified, IV checked, site marked, risks and benefits discussed, surgical consent, monitors and equipment checked, pre-op evaluation and timeout performed  Prep:  Pt Position: sitting  Sterile barriers:cap, gloves, mask and washed/disinfected hands  Prep: ChloraPrep  Patient monitoring: blood pressure monitoring, continuous pulse oximetry and EKG  Procedure    Sedation: yes    Guidance:ultrasound guided    ULTRASOUND INTERPRETATION.  Using ultrasound guidance a 21 G gauge needle was placed in close proximity to the femoral nerve, at which point, under ultrasound guidance anesthetic was injected in the area of the nerve and spread of the anesthesia was seen on ultrasound in close proximity thereto.  There were no abnormalities seen on ultrasound; a digital image was taken; and the patient tolerated the procedure with no complications. Images:still images obtained, printed/placed on chart    Laterality:right  Block Type:adductor canal block  Injection Technique:single-shot  Needle Type:echogenic  Resistance on Injection: none    Medications Used: ropivacaine (NAROPIN) 0.5 % injection - Injection   10 mL - 10/1/2024 6:40:00 AM      Post Assessment  Injection Assessment: negative aspiration for heme, no paresthesia on injection and incremental injection  Patient Tolerance:comfortable throughout block  Complications:no  Additional Notes  Ultrasound used for safe guidance of needle placement and distribution of local anesthetic.    Diagnosis: Acute post-surgical pain of the right anterior medial lower extremity in the distribution not covered by the sciatic nerve  Performed  by: Rocio Banks MD

## 2024-10-02 NOTE — OP NOTE
Operative Note      Facility: [facility]  Patient Name: Amanda Sherwood  YOB: 1960  Date: 10 /1/24  Medical Record Number: [id number]      Pre-op Diagnosis: 1.  Right metatarsal primus varus  2.  Right hallux valgus  3.  Right second MTP contracture  4.  Right second metatarsal phalangeal joint arthritis  5.  Right second PIP hammertoe      Post-op Diagnosis:  1.  Right metatarsal primus varus  2.  Right hallux valgus  3.  Right second MTP contracture  4.  Right second metatarsal phalangeal joint arthritis  5.  Right second PIP hammertoe        Procedure(s):  1.  Right foot first TMT fusion  2.  Right first MTP modified Munroe bunion correction with lateral soft tissue release and medial eminence resection  3.  Right foot second MTP release  4.  Right second metatarsal head partial resection with dorsal cheilectomy  5.  Right second PIP resection/fusion  6.  Right large calcaneal bone graft    Surgeon(s):  Octaviano Mancilla MD    Anesthesia: General with Block  Anesthesiologist: Rivera Butt MD; Rocio Banks MD  CRNA: Jill Fontenot CRNA    Staff:   Cell Saver : Delks, Rylee  Circulator: Tami Varma RN; Walter Casey RN  Radiology Technologist: Dyllan Davies RRT  Scrub Person: Trace Mir  Vendor Representative: Sunny Beck; Octaviano Gomez  Assistants : none      Tourniquet time: 121 minutes        Estimated Blood Loss:  20 mL  Drains: None  Specimens: * No orders in the log *  Findings: See Dictation    Complications: None      Indications for Procedure: Patient has had a history of progressive painful deformity of the second MTP joint with hammering and dorsal contracture with pain around the second MTP joint with arthritic change as well as metatarsals primus varus with hallux valgus deformity.  Preoperative MRI did not show significant arthritis of the first MTP joint.  Patient also had previous Lapidus procedure on  the left and decision made to proceed with Lapidus procedure on the right as well as second MTP release.  Also discussed with her that given the arthritis of the second metatarsal head that did not feel would be amenable to Weil osteotomy but would correct the contracture and debride metatarsal head as needed as well as resect the second PIP joint.  She voiced clear understanding the operative procedure and postoperative course with associated risk benefits potential outcomes and complications.          Description of Procedure: Preoperative informed consent and anesthesia evaluation were obtained.  IV antibiotics were administered and the surgical site was marked.  Block was administered by anesthesia.  Patient was brought to the operating room placed in supine position.  Anesthesia was induced and LMA was positioned.  Well-padded tourniquet was placed right proximal thigh.    Right leg was prepped and draped in a sterile fashion and surgical timeout was performed.    Bony landmarks were delineated at the right midfoot and forefoot clinically and radiographically.  Right leg was exsanguinated and pneumatic tourniquet inflated to 250 mmHg.  Incision was made over the first TMT joint.  Full-thickness flaps were carefully elevated and interval was identified between the EHL and EHB these tendons were retracted and protected as well as the neurovascular bundle laterally.  Capsulotomy was then performed and I exposed the first TMT joint dorsally medially and laterally with good exposure thereof.    I then gently distracted the joint such that I could prepare the joint surfaces and did this by using an oscillating saw to create a laterally based wedge removing only about 2 mm of bone resecting this off the distal aspect of the medial cuneiform and base of the first metatarsal.  Intermittent retractor was then used to distract the joint and ensured that the joint was fully prepared and exposed to the depth of this which  was verified clinically by visualization and palpation with care taken to protect the underlying structures.  A minimum of bone was removed.  I was then able to provisionally reduce the joint closing this down laterally with good alignment.  Wound was thoroughly irrigated    Attention was then turned to the first webspace where short incision was made.  Full-thickness flaps were carefully elevated with care taken to avoid superficial veins and nerves.  Fascia was divided I was able to identify and inspect the first webspace.  The adductor tendon was released off of the base of the proximal phalanx and lateral sesamoid.  The metatarsal sesamoid ligament was then divided such that I could mobilize the sesamoid complex.  The lateral capsule was then carefully fenestrated such that was able to bring the toe into about 10 to 20 degrees of varus without disruption laterally.  Wound was irrigated    Attention was then turned medially where incision was made over the medial aspect of the first MTP joint.  Full-thickness flaps were carefully elevated with care taken to mobilize protect the dorsal and plantar cutaneous nerves.  Inverted L-shaped capsulotomy was performed and was able to palpate the sesamoid complex and make sure that this was mobile.  Wound was irrigated.    I then obtained bone graft from the posterior lateral calcaneus for first TMT fusion.  The central lateral aspect of the calcaneal tuberosity was identified clinically and radiographically.  1.5 cm incision was created and spread bluntly down onto the cortex.  An 8 mm Acumed reamer was then used to harvest a large amount of bone graft from the calcaneus without penetrating medially.  Appropriate containment was confirmed radiographically.  Wound was thoroughly irrigated and packed with Gelfoam.    Guidewires were then passed into the first metatarsal head and second metatarsal neck for the Arthrex proprietary reduction clamp.  Attention was then turned  back to the first TMT joint.  Hintermanman retractor was used to distract the joint.  Joint was then thoroughly prepared with multiple fenestrations across the entire fusion surface and heavily fishtailed with an osteotome.  Fusion surfaces were then sprayed with PRP and packed with the bone graft.    The joint was then reduced and clamped using the proprietary clamp across the distal metatarsals and this allowed rotational realignment of the first metatarsal.  The clamp was then locked in the place.  There was excellent compression and alignment across the fusion surfaces and the first and second metatarsals were collinear.    This was then secured with an Arthrex 25 mm length with 20 mm depth legs across the medial aspect of the first TMT joint with excellent compression across the fusion site.  I then secured the first metatarsal to the second with a 4 -0 cancellous solid screw after placement of the guidewire and confirmation of alignment and over reaming through a small stab incision that had been spread down to bone.  There was excellent fixation there of.  A second 20 mm staple was then placed slightly medial to the first for additional fixation across the first TMT joint which was rigidly and remained in good alignment with good compression after removal of the distal clamp.  The guidewires were removed distally.  X-rays showed good alignment and containment of the hardware in the first and second metatarsals were collinear.  Wounds were thoroughly irrigated    Attention was then turned to the second MTP joint.  A medially based chevron incision was made over the second MTP joint.  Full-thickness flaps were carefully elevated.  The extensor tendons were identified and mobilized.  The brevis was resected proximally and longus was lengthened in a Z fashion.  T-shaped capsulotomy was performed and this allowed reduction and soft tissue correction of the dorsal contracture of the second MTP joint.  I did not  appreciate palpable prominence plantarly and did not feel she needed a plantar condylectomy.  There was noted to be arthritis of the dorsal half of the second metatarsal head with dorsal spurring slightly proximal to this.  I did not feel that Weil osteotomy was indicated.  I did use a sagittal saw to resect the dorsal cheilectomy in line with the metatarsal cortex and contoured this dorsal laterally and medially.  The second MTP joint aligned well.  There was however noted to be deformity and arthritis at the base of the second proximal phalanx.    Skin and capsular ellipses were then removed from the second PIP joint.  Collaterals were released and excised a small area of thickened callosity medially.  The distal aspect of the proximal phalanx was exposed and resected below the condylar flare..  The base of the middle phalanx was exposed and was quite arthritic.  This was resected with a saw over an area about 1-1/2 mm.  This created a nice cancellous surface.  Wound was thoroughly irrigated.  I was able to then reduce the toe at the PIP joint without tension.  This was then cross pinned this by passing up into the base of the middle phalanx at the tip of the toe.  PIP joint was reduced and found be well compressed and pin was driven across the joint.  It was found to be well-contained.  I then aligned the second MTP joint in all planes and cross pinned this in neutral alignment aligning the joint as well as possible given the deformity thereof and there was improved alignment to the toe in all planes.  This was done with the foot in assimilated standing position.    A bolster was then placed from the first and second toes and the first MTP joint aligned nicely.  I then resected the medial eminence with a sagittal saw just medial to the sulcus and contoured this well.  Sesamoids remained well reduced on x-ray.  I ensured there was no clinical palpable calcification within the remaining capsule.  I then repaired  the capsule using 2-0 FiberWire through the proximal hole in the dorsal medial cortex.  Many capsule was then imbricated using a corner locking mattress suture and the great 2-0 FiberWire suture along the longitudinal limb.  The bolster was removed and the first MTP joint remained well aligned.  The remaining capsulotomy was repaired with 3-0 Vicryl suture.    Tourniquet was then released with prompt return of capillary refill to the toes.  Wounds were all thoroughly irrigated.  The medial wound was closed with 4-0 Vicryl and 3-0 nylon.  The medial stab incision was closed with 3-0 nylon.  The first webspace and first TMT wounds were irrigated and closed with 4-0 Vicryl and 3-0 nylon.  The second MTP wound was irrigated and the extensor tendon was repaired with the foot in assimilated standing position with 3-0 Vicryl.  Wound was closed with 4-0 Vicryl and 3-0 nylon.  The PIP wound was closed with a central roll stitch incorporating the extensor tendon with 3-0 nylon as well as medial and lateral interrupted sutures.    The lateral calcaneal wound was irrigated and closed with 4-0 Vicryl and 3-0 nylon.  Sterile dressings were applied and patient placed into a very well-padded forefoot and ankle bunion hammertoe spica dressing and postoperative shoe.  She was awakened transferred to stretcher and taken recovery in stable condition

## 2024-10-03 DIAGNOSIS — M20.11 HALLUX VALGUS OF RIGHT FOOT: ICD-10-CM

## 2024-10-03 DIAGNOSIS — M25.374 INSTABILITY OF METATARSOPHALANGEAL JOINT OF RIGHT FOOT: ICD-10-CM

## 2024-10-03 DIAGNOSIS — M20.41 HAMMER TOE OF RIGHT FOOT: ICD-10-CM

## 2024-10-03 RX ORDER — OXYCODONE AND ACETAMINOPHEN 5; 325 MG/1; MG/1
1-2 TABLET ORAL EVERY 4 HOURS PRN
Qty: 42 TABLET | Refills: 0 | Status: SHIPPED | OUTPATIENT
Start: 2024-10-03 | End: 2024-10-07 | Stop reason: SDUPTHER

## 2024-10-03 NOTE — TELEPHONE ENCOUNTER
----- Message from [facility] sent at 10/3/2024  2:51 PM EDT -----  Regarding: RX refill  Contact: [phone]  Dr Mancilla,  I’m having a fair amount of pain and have pretty consistently been taking the Percocet 2 tablets every 4 to 5 hours. I will likely be out on Saturday or Sunday. Would it be possible to get a refill prescription that William could  tomorrow?   Thanks you   Amanda

## 2024-10-04 ENCOUNTER — TELEPHONE (OUTPATIENT)
Dept: ORTHOPEDIC SURGERY | Facility: CLINIC | Age: 64
End: 2024-10-04
Payer: MEDICARE

## 2024-10-04 NOTE — TELEPHONE ENCOUNTER
I called and spoke with patient and she is going to run out of Percocet later today and cannot have it refilled until Sunday according to her pharmacy.  We discussed treatment and options and told her I would try to call the pharmacist and she said that it was okay that she would manage until Sunday and augment with Tylenol.  She said she was weaning down on them regardless    We had a pleasant conversation to limit about anything further for her.

## 2024-10-04 NOTE — TELEPHONE ENCOUNTER
Patient called and states she only has 5 percocet left and the pharmacy can not fill it before Sunday. She is asking if there is anything else that can be done so that she does not have to go all weekend with pain medication    LATANYA/Omar

## 2024-10-07 DIAGNOSIS — M20.11 HALLUX VALGUS OF RIGHT FOOT: ICD-10-CM

## 2024-10-07 DIAGNOSIS — M20.41 HAMMER TOE OF RIGHT FOOT: ICD-10-CM

## 2024-10-07 DIAGNOSIS — M25.374 INSTABILITY OF METATARSOPHALANGEAL JOINT OF RIGHT FOOT: ICD-10-CM

## 2024-10-07 RX ORDER — OXYCODONE AND ACETAMINOPHEN 5; 325 MG/1; MG/1
1-2 TABLET ORAL EVERY 4 HOURS PRN
Qty: 42 TABLET | Refills: 0 | Status: SHIPPED | OUTPATIENT
Start: 2024-10-07

## 2024-10-07 NOTE — TELEPHONE ENCOUNTER
----- Message from [facility] sent at 10/7/2024 10:41 AM EDT -----  Regarding: Rx  Contact: [phone]  I just spoke with our old pharmacist and she said she would refill it for me. She said I need to have the other pharmacy cancel the refill sent to them (which I will do right now) and have Dr Mancilla send in a new refill to the [facility] pharmacy on [address]. Their phone number is [phone]. But I will go into [place] and change it back to our old pharmacy. I hope this is okay with you.   Thank you,  Amanda

## 2024-10-07 NOTE — TELEPHONE ENCOUNTER
ATTEMPTED TO WARM TRANSFER    Caller: [facility]   Relationship to Patient: [facility]   Phone Number: [phone]   Reason for Call:CALLING WANTING TO SPEAK TO SOMEONE ABOUT THE PATIENTS SCRIPT

## 2024-10-07 NOTE — TELEPHONE ENCOUNTER
Spoke with pharmacy and was told that the rx has not been filled due to the amount of daily tylenol that patient would be taking. Pharmacy was wanting to discuss increasing the dose to that patient is not taking medication as frequently. Pharmacy is aware that patient is now wanting to get medication filled at a different [facility].   Rx pending to new pharmacy.   Please resend

## 2024-10-16 ENCOUNTER — OFFICE VISIT (OUTPATIENT)
Dept: ORTHOPEDIC SURGERY | Facility: CLINIC | Age: 64
End: 2024-10-16
Payer: MEDICARE

## 2024-10-16 VITALS — WEIGHT: 156 LBS | TEMPERATURE: 97.7 F | BODY MASS INDEX: 25.99 KG/M2 | HEIGHT: 65 IN

## 2024-10-16 DIAGNOSIS — M19.071 ARTHRITIS OF RIGHT FOOT: ICD-10-CM

## 2024-10-16 DIAGNOSIS — M25.374 INSTABILITY OF METATARSOPHALANGEAL JOINT OF RIGHT FOOT: ICD-10-CM

## 2024-10-16 DIAGNOSIS — R52 PAIN: Primary | ICD-10-CM

## 2024-10-16 DIAGNOSIS — M19.079 ARTHRITIS OF METATARSOPHALANGEAL JOINT: ICD-10-CM

## 2024-10-16 DIAGNOSIS — M20.11 HALLUX VALGUS OF RIGHT FOOT: ICD-10-CM

## 2024-10-16 DIAGNOSIS — M20.41 HAMMER TOE OF RIGHT FOOT: ICD-10-CM

## 2024-10-16 LAB
BASOPHILS # BLD AUTO: 0 X10E3/UL (ref 0–0.2)
BASOPHILS NFR BLD AUTO: 0 %
EOSINOPHIL # BLD AUTO: 0.1 X10E3/UL (ref 0–0.4)
EOSINOPHIL NFR BLD AUTO: 3 %
ERYTHROCYTE [DISTWIDTH] IN BLOOD BY AUTOMATED COUNT: 11.5 % (ref 11.7–15.4)
FERRITIN SERPL-MCNC: 299 NG/ML (ref 15–150)
HCT VFR BLD AUTO: 42.7 % (ref 34–46.6)
HGB BLD-MCNC: 14.2 G/DL (ref 11.1–15.9)
IMM GRANULOCYTES # BLD AUTO: 0 X10E3/UL (ref 0–0.1)
IMM GRANULOCYTES NFR BLD AUTO: 0 %
IRON SATN MFR SERPL: 12 % (ref 15–55)
IRON SERPL-MCNC: 41 UG/DL (ref 27–139)
LYMPHOCYTES # BLD AUTO: 1.4 X10E3/UL (ref 0.7–3.1)
LYMPHOCYTES NFR BLD AUTO: 35 %
MCH RBC QN AUTO: 31.3 PG (ref 26.6–33)
MCHC RBC AUTO-ENTMCNC: 33.3 G/DL (ref 31.5–35.7)
MCV RBC AUTO: 94 FL (ref 79–97)
MONOCYTES # BLD AUTO: 0.3 X10E3/UL (ref 0.1–0.9)
MONOCYTES NFR BLD AUTO: 7 %
NEUTROPHILS # BLD AUTO: 2.2 X10E3/UL (ref 1.4–7)
NEUTROPHILS NFR BLD AUTO: 55 %
PLATELET # BLD AUTO: 226 X10E3/UL (ref 150–450)
RBC # BLD AUTO: 4.53 X10E6/UL (ref 3.77–5.28)
RETICS/RBC NFR AUTO: 1.3 % (ref 0.6–2.6)
TIBC SERPL-MCNC: 350 UG/DL (ref 250–450)
UIBC SERPL-MCNC: 309 UG/DL (ref 118–369)
WBC # BLD AUTO: 4.1 X10E3/UL (ref 3.4–10.8)

## 2024-10-16 RX ORDER — OXYCODONE AND ACETAMINOPHEN 5; 325 MG/1; MG/1
1 TABLET ORAL EVERY 6 HOURS PRN
Qty: 40 TABLET | Refills: 0 | Status: SHIPPED | OUTPATIENT
Start: 2024-10-16 | End: 2024-10-16 | Stop reason: SDUPTHER

## 2024-10-16 RX ORDER — OXYCODONE AND ACETAMINOPHEN 5; 325 MG/1; MG/1
1 TABLET ORAL EVERY 6 HOURS PRN
Qty: 40 TABLET | Refills: 0 | Status: SHIPPED | OUTPATIENT
Start: 2024-10-16

## 2024-10-16 NOTE — PROGRESS NOTES
"Foot Follow Up      Patient: Amanda Sherwood    YOB: 1960 63 y.o. female    Chief Complaints: Foot sore    History of Present Illness: Patient underwent right Lapidus realignment fusion of the first TMT joint including calcaneal bone graft as well as modified Munroe bunion correction of the first MTP joint.  Also had second metatarsal phalangeal joint debridement and PIP fusion surgery was on 10/1/2022.    Patient has been weaning down on pain medication but still using 1 pill about every 6 hours.  She reports some tingling and burning in her toes.  HPI    ROS: Foot pain  Past Medical History:   Diagnosis Date    Acute kidney injury     Ankle sprain     Anxiety 3 years ago    Arthritis of back     Arthritis of neck     Cervical disc disorder     Depression     Fatty liver     GERD (gastroesophageal reflux disease)     H/O Leukopenia     Hernia     HIATAL    History of anemia     History of bronchitis     History of migraine     Hyperlipidemia     NO MEDS CURRENTLY. WORKING ON    Hypertension     Neck pain     Obesity (BMI 30.0-34.9) 06/06/2019    Osteoarthritis     Pulmonary embolism 8-    PVC (premature ventricular contraction)     Renal insufficiency     FANNY    Right foot pain     Seasonal allergies     Tear of meniscus of knee     Visual impairment     Wear bifocals     Physical Exam:   Vitals:    10/16/24 0849   Temp: 97.7 °F (36.5 °C)   Weight: 70.8 kg (156 lb)   Height: 165.1 cm (65\")   PainSc:   5   PainLoc: Foot     Well developed with normal mood.  She is with her .  There is neutral alignment of the first and second toes.  Wounds are healing without sign of infection and toes were grossly sensate light touch.  No sign of infection at the pin site.  Calf was nontender without sign of DVT      Radiology: 3 views of the right foot ordered evaluate postoperative alignment reviewed with patient and her  and compared to previous x-rays.  There is good alignment of the first " TMT fusion.  The first and second metatarsals are collinear sesamoids are well reduced and hallux valgus is well corrected.  Second MTP joint is well aligned and appears to be adequately decompressed there is good alignment of the second PIP.  There is good positioning of the calcaneal bone graft harvest site.    MRI films and report of the right foot dated 2/22/2024 reviewed which shows subluxation of the sesamoids relative to the plantar side of the metatarsal head.  There is small volume of fluid in the first MTP joint and surprising preservation of the articular cartilage of the first MTP joint and between the metatarsal and sesamoids but no significant chondral loss appreciated at those articulations.     Second toe demonstrates exaggerated flexion to about 90 degrees at the level of the PIP joint and on sagittal images there is deformity of the base of the proximal phalanx which may be result of old intra-articular fracture.  There is marginal osteophytes around metatarsal head and around the base of the phalanx with exaggerated dorsiflexion of the MTP joint and irregular chondral loss at the base of the proximal phalanx and along the dorsum of the metatarsal head.  Plantar plate a tendons appear intact with no synovitis at the IP joint.     Third and fourth toes show mild exaggerated flexion at the level of the PIP joints and some arthritis at the IP joints.     There was arthritic change at the articulation of the navicular with first cuneiform and between the first and second cuneiforms and at the second and to a lesser extent dorsum of the third TMT joints.  First fourth and fifth TMT joints appeared normal.        Assessment/Plan: Status post right foot surgery as outlined above  1.  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  Right foot arthritic change  of navicular with first cuneiform and between first and second cuneiforms and second and to a lesser extent dorsum of the third TMT joints    Reviewed the operative procedure in detail with patient and overall alignment is markedly improved.  I would expect the pain and neuritic symptoms to improve over time and thankfully do not see any sign of infection.    Sutures removed and Steri-Strips were applied.  Sterile dressings were placed and patient was placed into a well-padded forefoot and ankle bunion hammertoe spica dressing.  She will continue with elevation and nonweightbearing.    Did refill her pain medication and discussed careful use of narcotics and she will continue weaning down on this.    Will see her back in about 2 weeks with x-rays of her right foot.

## 2024-10-30 ENCOUNTER — OFFICE VISIT (OUTPATIENT)
Dept: ORTHOPEDIC SURGERY | Facility: CLINIC | Age: 64
End: 2024-10-30
Payer: MEDICARE

## 2024-10-30 ENCOUNTER — TELEPHONE (OUTPATIENT)
Dept: FAMILY MEDICINE CLINIC | Facility: CLINIC | Age: 64
End: 2024-10-30
Payer: MEDICARE

## 2024-10-30 VITALS — TEMPERATURE: 96.8 F | BODY MASS INDEX: 25.99 KG/M2 | WEIGHT: 156 LBS | HEIGHT: 65 IN

## 2024-10-30 DIAGNOSIS — R52 PAIN: ICD-10-CM

## 2024-10-30 DIAGNOSIS — M19.079 ARTHRITIS OF METATARSOPHALANGEAL JOINT: ICD-10-CM

## 2024-10-30 DIAGNOSIS — M19.071 ARTHRITIS OF RIGHT FOOT: ICD-10-CM

## 2024-10-30 DIAGNOSIS — M25.374 INSTABILITY OF METATARSOPHALANGEAL JOINT OF RIGHT FOOT: ICD-10-CM

## 2024-10-30 DIAGNOSIS — M20.11 HALLUX VALGUS OF RIGHT FOOT: Primary | ICD-10-CM

## 2024-10-30 DIAGNOSIS — M20.41 HAMMER TOE OF RIGHT FOOT: ICD-10-CM

## 2024-10-30 RX ORDER — HYDROCODONE BITARTRATE AND ACETAMINOPHEN 5; 325 MG/1; MG/1
TABLET ORAL
Qty: 80 TABLET | Refills: 0 | Status: SHIPPED | OUTPATIENT
Start: 2024-10-30

## 2024-10-30 RX ORDER — HYDROCODONE BITARTRATE AND ACETAMINOPHEN 5; 325 MG/1; MG/1
TABLET ORAL
Qty: 40 TABLET | Refills: 0 | Status: SHIPPED | OUTPATIENT
Start: 2024-10-30

## 2024-10-30 NOTE — TELEPHONE ENCOUNTER
Pt is wanting to know if there is a way to draw her labs from home. She stated that Mr. Sherwood is having to take off work a lot and her girl friends are not strong enough to take her to her appointments.     Please advise.

## 2024-10-30 NOTE — PROGRESS NOTES
Foot Follow Up      Patient: Amanda Sherwood    YOB: 1960 63 y.o. female    Chief Complaints: Foot doing okay but sore    History of Present Illness:Patient underwent right Lapidus realignment fusion of the first TMT joint including calcaneal bone graft as well as modified Munroe bunion correction of the first MTP joint.  Also had second metatarsal phalangeal joint debridement and PIP fusion surgery was on 10/1/2022.     Patient was seen on 10/16/2024 and had been weaning down on pain medication but still using 1 pill about every 6 hours.  She reported some tingling and burning in her toes.    At that time there was no sign of infection and reviewed that I would expect the pain and neuritic symptoms to improve over time.  Sutures removed and Steri-Strips were applied.  She was placed into a well-padded bunion hammertoe spica dressing and instructed continue elevation and nonweightbearing.  refilled her pain medication.    Patient is seen back today reporting she is doing okay but still sore.  She was still been taking Percocet about every 8 hours but it makes her very sleepy.  She reports some burning in her foot but some pressure around her great toe.  Overall pain has improved though.  She has remained nonweightbearing.  Pain is rated 4 out of 10  HPI    ROS: Foot pain  Past Medical History:   Diagnosis Date    Acute kidney injury     Ankle sprain     Anxiety 3 years ago    Arthritis of back     Arthritis of neck     Cervical disc disorder     Depression     Fatty liver     GERD (gastroesophageal reflux disease)     H/O Leukopenia     Hernia     HIATAL    History of anemia     History of bronchitis     History of migraine     Hyperlipidemia     NO MEDS CURRENTLY. WORKING ON    Hypertension     Neck pain     Obesity (BMI 30.0-34.9) 06/06/2019    Osteoarthritis     Pulmonary embolism 8-    PVC (premature ventricular contraction)     Renal insufficiency     FANNY    Right foot pain     Seasonal  "allergies     Tear of meniscus of knee     Visual impairment     Wear bifocals     Physical Exam:   Vitals:    10/30/24 0931   Temp: 96.8 °F (36 °C)   Weight: 70.8 kg (156 lb)   Height: 165.1 cm (65\")   PainSc:   4     Well developed with normal mood.  She is with her .  Right foot shows diminished swelling incisions are healing without sign of infection.  There is neutral alignment to the first and second toes.  Toes were grossly sensate to light touch without sign of RSD.  Pin site showed no sign of infection and pin was intact.      Radiology: 3 views of the right foot ordered evaluate postoperative alignment reviewed and compared to previous x-rays.  There is good alignment to the first MTP joint as well as across the fusion site at the first TMT joint.  There is good alignment of the second PIP and MTP joints.  Hardware is intact.    MRI films and report of the right foot dated 2/22/2024 reviewed which shows subluxation of the sesamoids relative to the plantar side of the metatarsal head.  There is small volume of fluid in the first MTP joint and surprising preservation of the articular cartilage of the first MTP joint and between the metatarsal and sesamoids but no significant chondral loss appreciated at those articulations.     Second toe demonstrates exaggerated flexion to about 90 degrees at the level of the PIP joint and on sagittal images there is deformity of the base of the proximal phalanx which may be result of old intra-articular fracture.  There is marginal osteophytes around metatarsal head and around the base of the phalanx with exaggerated dorsiflexion of the MTP joint and irregular chondral loss at the base of the proximal phalanx and along the dorsum of the metatarsal head.  Plantar plate a tendons appear intact with no synovitis at the IP joint.     Third and fourth toes show mild exaggerated flexion at the level of the PIP joints and some arthritis at the IP joints.     There was " arthritic change at the articulation of the navicular with first cuneiform and between the first and second cuneiforms and at the second and to a lesser extent dorsum of the third TMT joints.  First fourth and fifth TMT joints appeared normal.        Assessment/Plan: Status post right foot surgery as outlined above  1.  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  Right foot arthritic change  of navicular with first cuneiform and between first and second cuneiforms and second and to a lesser extent dorsum of the third TMT joints    We discussed treatment going forward overall seems to be doing well.  Reviewed to her that the pressure and burning sensation should improve over time as swelling diminishes but nothing different to do at this time.    Will switch her to hydrocodone 5 mg every 8 hours as needed for pain and she understands careful use of narcotics and will continue trying to wean down on this.    Her pin was removed without difficulty and second toe pin site hemostasis was obtained.    The second MTP joint was gently mobilized and sterile dressing was applied.  She was placed in a forefoot and ankle bunion hammertoe spica dressing.  She may do some partial foot flat weightbearing with walker or crutches and we will see her back in about 2 weeks with x-rays of her right foot.

## 2024-11-04 DIAGNOSIS — Z79.01 CHRONIC ANTICOAGULATION: ICD-10-CM

## 2024-11-04 DIAGNOSIS — D64.9 ANEMIA, UNSPECIFIED TYPE: ICD-10-CM

## 2024-11-06 LAB
BASOPHILS # BLD AUTO: NORMAL 10*3/UL
EOSINOPHIL # BLD AUTO: NORMAL 10*3/UL
EOSINOPHIL NFR BLD AUTO: NORMAL %
FERRITIN SERPL-MCNC: 249 NG/ML (ref 15–150)
HCT VFR BLD AUTO: NORMAL %
HGB BLD-MCNC: NORMAL G/DL
IRON SATN MFR SERPL: 30 % (ref 15–55)
IRON SERPL-MCNC: 94 UG/DL (ref 27–139)
LYMPHOCYTES # BLD AUTO: NORMAL 10*3/UL
LYMPHOCYTES NFR BLD AUTO: NORMAL %
MONOCYTES NFR BLD AUTO: NORMAL %
NEUTROPHILS NFR BLD AUTO: NORMAL %
PLATELET # BLD AUTO: NORMAL 10*3/UL
RBC # BLD AUTO: NORMAL 10*6/UL
RETICS/RBC NFR AUTO: NORMAL %
SPECIMEN STATUS: NORMAL
TIBC SERPL-MCNC: 314 UG/DL (ref 250–450)
UIBC SERPL-MCNC: 220 UG/DL (ref 118–369)
WBC # BLD AUTO: NORMAL X10E3/UL

## 2024-11-06 NOTE — PROGRESS NOTES
Do you mind to call patient and let her know there was a lab error and her CBC and reticulocyte count were unable to be processed? She will need to come in for repeat.    Her iron studies are stable/improved from 3 weeks ago which makes it less likely that she his having bleeding postop.

## 2024-11-07 DIAGNOSIS — D64.9 ANEMIA, UNSPECIFIED TYPE: Primary | ICD-10-CM

## 2024-11-11 DIAGNOSIS — M25.374 INSTABILITY OF METATARSOPHALANGEAL JOINT OF RIGHT FOOT: ICD-10-CM

## 2024-11-11 DIAGNOSIS — M20.41 HAMMER TOE OF RIGHT FOOT: ICD-10-CM

## 2024-11-11 DIAGNOSIS — M20.11 HALLUX VALGUS OF RIGHT FOOT: ICD-10-CM

## 2024-11-11 DIAGNOSIS — M19.071 ARTHRITIS OF RIGHT FOOT: ICD-10-CM

## 2024-11-11 DIAGNOSIS — M19.079 ARTHRITIS OF METATARSOPHALANGEAL JOINT: ICD-10-CM

## 2024-11-11 RX ORDER — HYDROCODONE BITARTRATE AND ACETAMINOPHEN 5; 325 MG/1; MG/1
TABLET ORAL
Qty: 30 TABLET | Refills: 0 | Status: SHIPPED | OUTPATIENT
Start: 2024-11-11

## 2024-11-11 RX ORDER — HYDROCODONE BITARTRATE AND ACETAMINOPHEN 5; 325 MG/1; MG/1
TABLET ORAL
Qty: 30 TABLET | Refills: 0 | Status: SHIPPED | OUTPATIENT
Start: 2024-11-11 | End: 2024-11-11 | Stop reason: SDUPTHER

## 2024-11-13 ENCOUNTER — PATIENT MESSAGE (OUTPATIENT)
Dept: FAMILY MEDICINE CLINIC | Facility: CLINIC | Age: 64
End: 2024-11-13
Payer: MEDICARE

## 2024-11-13 ENCOUNTER — OFFICE VISIT (OUTPATIENT)
Dept: ORTHOPEDIC SURGERY | Facility: CLINIC | Age: 64
End: 2024-11-13
Payer: MEDICARE

## 2024-11-13 VITALS — BODY MASS INDEX: 25.99 KG/M2 | TEMPERATURE: 97.1 F | WEIGHT: 156 LBS | HEIGHT: 65 IN

## 2024-11-13 DIAGNOSIS — M20.41 HAMMER TOE OF RIGHT FOOT: Primary | ICD-10-CM

## 2024-11-13 DIAGNOSIS — M19.079 ARTHRITIS OF METATARSOPHALANGEAL JOINT: ICD-10-CM

## 2024-11-13 DIAGNOSIS — M20.11 HALLUX VALGUS OF RIGHT FOOT: ICD-10-CM

## 2024-11-13 DIAGNOSIS — M25.374 INSTABILITY OF METATARSOPHALANGEAL JOINT OF RIGHT FOOT: ICD-10-CM

## 2024-11-13 DIAGNOSIS — M19.071 ARTHRITIS OF RIGHT FOOT: ICD-10-CM

## 2024-11-13 NOTE — PROGRESS NOTES
Foot Follow Up      Patient: Amanda Sherwood    YOB: 1960 63 y.o. female    Chief Complaints: Foot is sore but better    History of Present Illness:Patient underwent right Lapidus realignment fusion of the first TMT joint including calcaneal bone graft as well as modified Munroe bunion correction of the first MTP joint.  Also had second metatarsal phalangeal joint debridement and PIP fusion surgery was on 10/1/2022.     Patient was seen on 10/16/2024 and had been weaning down on pain medication but still using 1 pill about every 6 hours.  She reported some tingling and burning in her toes.     At that time there was no sign of infection and reviewed that I would expect the pain and neuritic symptoms to improve over time.  Sutures removed and Steri-Strips were applied.  She was placed into a well-padded bunion hammertoe spica dressing and instructed continue elevation and nonweightbearing.  refilled her pain medication.     Patient was seen on 10/30/2024 reporting she was doing okay but still sore.  She has still been taking Percocet about every 8 hours but it made her very sleepy.  She reported some burning in her foot and some feeling of pressure around her great toe.  Overall pain had improved though.  She had remained nonweightbearing.  Pain was rated 4 out of 10.    At that time there showed no sign of infection.  Reviewed that the pressure and burning sensation should improve over time his swelling diminished but nothing different to do for it.  She was switched to hydrocodone 5 mg every 8 hours and discussed continuing weaning down on her pain medication.  Her pin was removed without difficulty and second MTP joint was gently mobilized.  She was placed into a forefoot and ankle bunion hammertoe spica dressing and allowed partial foot flat weightbearing with walker or crutches.    Patient is seen back today reporting that she is still sore but getting better.  She is down to taking pain  "medication about twice a day.  She reports she did fall off her scooter on 11/10/2024.  She was doing some painting with her grandchildren and had a sheet down the got caught in her scooter.  She reports mild pain with some feeling of pressure in burning and tingling in her toes.  Pain is rated at 3 out of 10  HPI    ROS: Foot pain  Past Medical History:   Diagnosis Date    Acute kidney injury     Ankle sprain     Anxiety 3 years ago    Arthritis of back     Arthritis of neck     Cervical disc disorder     Depression     Fatty liver     GERD (gastroesophageal reflux disease)     H/O Leukopenia     Hernia     HIATAL    History of anemia     History of bronchitis     History of migraine     Hyperlipidemia     NO MEDS CURRENTLY. WORKING ON    Hypertension     Neck pain     Obesity (BMI 30.0-34.9) 06/06/2019    Osteoarthritis     Pulmonary embolism 8-    PVC (premature ventricular contraction)     Renal insufficiency     FANNY    Right foot pain     Seasonal allergies     Tear of meniscus of knee     Visual impairment     Wear bifocals     Physical Exam:   Vitals:    11/13/24 0928   Temp: 97.1 °F (36.2 °C)   Weight: 70.8 kg (156 lb)   Height: 165.1 cm (65\")   PainSc:   3     Well developed with normal mood.  She is with her .  There is neutral alignment of her right first and second toes.  There was limited motion of the first and second MTP joints with moderate discomfort.  Toes were grossly sensate to light touch and wounds appear to be healing without sign of infection.      Radiology: 3 views right foot ordered evaluate postoperative alignment reviewed and compared to previous x-rays show good alignment to the first TMT fusion.  The first and second metatarsals are collinear and first MTP joint remains reduced.  There is no change in alignment to the second MTP joint and PIP joint appears to be fusing.      Assessment/Plan:  MRI films and report of the right foot dated 2/22/2024 reviewed which shows " subluxation of the sesamoids relative to the plantar side of the metatarsal head.  There is small volume of fluid in the first MTP joint and surprising preservation of the articular cartilage of the first MTP joint and between the metatarsal and sesamoids but no significant chondral loss appreciated at those articulations.     Second toe demonstrates exaggerated flexion to about 90 degrees at the level of the PIP joint and on sagittal images there is deformity of the base of the proximal phalanx which may be result of old intra-articular fracture.  There is marginal osteophytes around metatarsal head and around the base of the phalanx with exaggerated dorsiflexion of the MTP joint and irregular chondral loss at the base of the proximal phalanx and along the dorsum of the metatarsal head.  Plantar plate a tendons appear intact with no synovitis at the IP joint.     Third and fourth toes show mild exaggerated flexion at the level of the PIP joints and some arthritis at the IP joints.     There was arthritic change at the articulation of the navicular with first cuneiform and between the first and second cuneiforms and at the second and to a lesser extent dorsum of the third TMT joints.  First fourth and fifth TMT joints appeared normal.        Assessment/Plan: Status post right foot surgery as outlined above  1.  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  Right foot arthritic change  of navicular with first cuneiform and between first and second cuneiforms and second and to a lesser extent dorsum of the third TMT joints    We discussed treatment going forward and reviewed that the feeling of pressure should improve as swelling continues to improve.  She was counseled on gentle range of motion exercises to  the toes.  She may let this get wet in the shower but not to immerse.    She will do foot flat weightbearing with walker and postoperative shoe for the next 2 weeks and then start doing a little bit more normal gait with walker and postoperative shoe.    She will continue weaning down on pain medication.  We discussed physical therapy and decided to hold off on that for now.    Will see her back in 3 weeks with x-rays of her right foot.

## 2024-11-14 ENCOUNTER — TELEPHONE (OUTPATIENT)
Dept: FAMILY MEDICINE CLINIC | Facility: CLINIC | Age: 64
End: 2024-11-14
Payer: MEDICARE

## 2024-11-14 NOTE — TELEPHONE ENCOUNTER
Caller: Amanda Sherwood    Relationship to patient: Self    Best call back number:[phone]      Patient is needing: SHE LEFT A MY CHART MESSAGE AND WOULD LIKE TO KNOW IF YOU HAVE HAD A CHANCE TO LOOK AT IT.  SHE IS NEEDING AN ANSWER.

## 2024-11-27 DIAGNOSIS — M19.079 ARTHRITIS OF METATARSOPHALANGEAL JOINT: ICD-10-CM

## 2024-11-27 DIAGNOSIS — D64.9 ANEMIA, UNSPECIFIED TYPE: ICD-10-CM

## 2024-11-27 DIAGNOSIS — M19.071 ARTHRITIS OF RIGHT FOOT: ICD-10-CM

## 2024-11-27 DIAGNOSIS — M25.374 INSTABILITY OF METATARSOPHALANGEAL JOINT OF RIGHT FOOT: ICD-10-CM

## 2024-11-27 DIAGNOSIS — M20.11 HALLUX VALGUS OF RIGHT FOOT: ICD-10-CM

## 2024-11-27 DIAGNOSIS — D64.9 ANEMIA, UNSPECIFIED TYPE: Primary | ICD-10-CM

## 2024-11-27 DIAGNOSIS — M20.41 HAMMER TOE OF RIGHT FOOT: ICD-10-CM

## 2024-11-27 RX ORDER — HYDROCODONE BITARTRATE AND ACETAMINOPHEN 5; 325 MG/1; MG/1
TABLET ORAL
Qty: 30 TABLET | Refills: 0 | Status: SHIPPED | OUTPATIENT
Start: 2024-11-27

## 2024-11-28 ENCOUNTER — HOSPITAL ENCOUNTER (OUTPATIENT)
Facility: HOSPITAL | Age: 64
Discharge: HOME | End: 2024-11-28
Attending: EMERGENCY MEDICINE | Admitting: EMERGENCY MEDICINE
Payer: MEDICARE

## 2024-11-28 VITALS
BODY MASS INDEX: 26.66 KG/M2 | SYSTOLIC BLOOD PRESSURE: 183 MMHG | DIASTOLIC BLOOD PRESSURE: 86 MMHG | HEART RATE: 99 BPM | WEIGHT: 160 LBS | TEMPERATURE: 99.9 F | OXYGEN SATURATION: 98 % | RESPIRATION RATE: 18 BRPM | HEIGHT: 65 IN

## 2024-11-28 DIAGNOSIS — J02.9 PHARYNGITIS, UNSPECIFIED ETIOLOGY: Primary | ICD-10-CM

## 2024-11-28 LAB
BASOPHILS # BLD AUTO: 0 X10E3/UL (ref 0–0.2)
BASOPHILS NFR BLD AUTO: 0 %
EOSINOPHIL # BLD AUTO: 0.1 X10E3/UL (ref 0–0.4)
EOSINOPHIL NFR BLD AUTO: 2 %
ERYTHROCYTE [DISTWIDTH] IN BLOOD BY AUTOMATED COUNT: 11.5 % (ref 11.7–15.4)
FERRITIN SERPL-MCNC: 204 NG/ML (ref 15–150)
FLUAV SUBTYP SPEC NAA+PROBE: NOT DETECTED
FLUBV RNA ISLT QL NAA+PROBE: NOT DETECTED
HCT VFR BLD AUTO: 44.6 % (ref 34–46.6)
HGB BLD-MCNC: 14.7 G/DL (ref 11.1–15.9)
IMM GRANULOCYTES # BLD AUTO: 0 X10E3/UL (ref 0–0.1)
IMM GRANULOCYTES NFR BLD AUTO: 0 %
IRON SATN MFR SERPL: 21 % (ref 15–55)
IRON SERPL-MCNC: 66 UG/DL (ref 27–139)
LYMPHOCYTES # BLD AUTO: 1.1 X10E3/UL (ref 0.7–3.1)
LYMPHOCYTES NFR BLD AUTO: 26 %
MCH RBC QN AUTO: 31.1 PG (ref 26.6–33)
MCHC RBC AUTO-ENTMCNC: 33 G/DL (ref 31.5–35.7)
MCV RBC AUTO: 94 FL (ref 79–97)
MONOCYTES # BLD AUTO: 0.4 X10E3/UL (ref 0.1–0.9)
MONOCYTES NFR BLD AUTO: 10 %
NEUTROPHILS # BLD AUTO: 2.6 X10E3/UL (ref 1.4–7)
NEUTROPHILS NFR BLD AUTO: 62 %
PLATELET # BLD AUTO: 181 X10E3/UL (ref 150–450)
RBC # BLD AUTO: 4.73 X10E6/UL (ref 3.77–5.28)
RETICS/RBC NFR AUTO: 1.3 % (ref 0.6–2.6)
RSV RNA NPH QL NAA+NON-PROBE: NOT DETECTED
SARS-COV-2 RNA RESP QL NAA+PROBE: NOT DETECTED
STREP A PCR: NOT DETECTED
TIBC SERPL-MCNC: 313 UG/DL (ref 250–450)
UIBC SERPL-MCNC: 247 UG/DL (ref 118–369)
WBC # BLD AUTO: 4.2 X10E3/UL (ref 3.4–10.8)

## 2024-11-28 PROCEDURE — 87651 STREP A DNA AMP PROBE: CPT | Performed by: EMERGENCY MEDICINE

## 2024-11-28 PROCEDURE — G0463 HOSPITAL OUTPT CLINIC VISIT: HCPCS | Performed by: PHYSICIAN ASSISTANT

## 2024-11-28 PROCEDURE — 87637 SARSCOV2&INF A&B&RSV AMP PRB: CPT | Performed by: EMERGENCY MEDICINE

## 2024-11-28 RX ORDER — AZITHROMYCIN 250 MG/1
TABLET, FILM COATED ORAL
Qty: 6 TABLET | Refills: 0 | Status: SHIPPED | OUTPATIENT
Start: 2024-11-28

## 2024-11-28 NOTE — DISCHARGE INSTRUCTIONS
Mac from [facility] called in and stated she has to stop nursing for the time being due to patient home being infested with bed bugs  She is working closely with her manager to get the patient other home care treatment and bug control   If you have any other questions please contact her at [phone] Return to the ER with any further concerns, should your condition change/worsen, or should you develop a fever.

## 2024-11-28 NOTE — [facility] PROVIDER NOTE
EMERGENCY DEPARTMENT ENCOUNTER    Room Number:  08/08  Date seen:  11/28/2024  Time seen: 12:50 EST  PCP: Elsie Underwood MD  Historian: Patient    Discussed/obtained information from independent historians: N/A    HPI:  Chief complaint: N/A  A complete HPI/ROS/PMH/PSH/SH/FH are unobtainable due to: N/A  Context:Amanda Sherwood is a 63 y.o. female who presents to the ED with c/o sore throat.  Patient reports she was exposed to some grandchildren likely and strep over the weekend.  Since Monday she has sore throat this become aggressively worse.  No cough.  She has had low-grade fever.  No nausea vomiting diarrhea.  No ear pain or sinus pain.  Patient is here for further evaluation.        Chronic or social conditions impacting care:    ALLERGIES  Celecoxib, Ace inhibitors, and Lisinopril    PAST MEDICAL HISTORY  Active Ambulatory Problems     Diagnosis Date Noted    Chronic cough 09/08/2016    Anxiety disorder due to general medical condition with panic attack 09/08/2016    Pain of left thigh 09/08/2016    Encounter for Medicare annual wellness exam 09/08/2016    Hypertension 09/08/2016    Osteoarthrosis, localized, primary, knee 08/21/2017    PVCs (premature ventricular contractions)     Hyperlipidemia     Acute medial meniscus tear of left knee 08/09/2018    Obesity (BMI 30.0-34.9) 06/06/2019    FH: colon cancer 01/23/2020    Heartburn 01/23/2020    Right leg numbness 08/16/2021    Pulmonary nodule 08/29/2021    Dyspnea 09/14/2021    Other fatigue 09/14/2021    Gastroparesis 08/08/2022    Hyponatremia 08/08/2022    Gastroesophageal reflux disease with esophagitis without hemorrhage 08/08/2022    Symptomatic anemia 09/27/2022    History of pulmonary embolism 09/27/2022    Chronic anticoagulation 09/27/2022    Stage 3a chronic kidney disease (CKD) 09/28/2022    Bruising 10/13/2022    Nausea and vomiting 08/08/2022    Arthritis of right foot 02/08/2024    Instability of metatarsophalangeal joint of right  foot 02/08/2024    Hammer toe of right foot 02/08/2024    Hallux valgus of right foot 03/06/2024    Arthritis of metatarsophalangeal joint 11/13/2024     Resolved Ambulatory Problems     Diagnosis Date Noted    Headache 03/18/2016    Neck pain 03/18/2016    Temporomandibular joint (TMJ) pain 03/18/2016    Bone pain 09/08/2016    Vitamin D deficiency disease 09/08/2016    Screening for osteoporosis 09/08/2016    Thinning hair 09/08/2016    S/P left knee arthroscopy 09/04/2018    Chest pain 09/22/2018    Lower extremity edema 06/06/2019    Alcohol withdrawal syndrome with complication 08/13/2019    FANNY (acute kidney injury) 08/06/2021    Acute UTI (urinary tract infection) 08/06/2021    Multiple subsegmental pulmonary emboli without acute cor pulmonale 08/27/2021    Nausea and vomiting 08/08/2022     Past Medical History:   Diagnosis Date    Acute kidney injury     Ankle sprain     Anxiety 3 years ago    Arthritis of back     Arthritis of neck     Cervical disc disorder     Depression     Fatty liver     GERD (gastroesophageal reflux disease)     H/O Leukopenia     Hernia     History of anemia     History of bronchitis     History of migraine     Osteoarthritis     Pulmonary embolism 8-    PVC (premature ventricular contraction)     Renal insufficiency     Right foot pain     Seasonal allergies     Tear of meniscus of knee     Visual impairment        PAST SURGICAL HISTORY  Past Surgical History:   Procedure Laterality Date    BREAST BIOPSY Bilateral     BENIGN. HORACE BREAST    BUNIONECTOMY Left     BUNIONECTOMY Left     REVISION    BUNIONECTOMY N/A 10/1/2024    Procedure: right foot bunion correction with first tarsometatarsal joint fusion and distal soft tissue release and bone resection.  Right second metatarsal phalangeal joint release and bone debridement as needed with second proximal interphalangeal joint resection/fusion.  Bone graft from calcaneus.;  Surgeon: Octaviano Mancilla MD;  Location:   [facility];  Service: Orthopedics;  Lateralit    CARDIAC CATHETERIZATION N/A 10/28/2021    Procedure: Coronary angiography;  Surgeon: Vanessa Bennett MD;  Location:  [facility];  Service: Cardiovascular;  Laterality: N/A;    CARDIAC CATHETERIZATION N/A 10/28/2021    Procedure: Left heart cath;  Surgeon: Vanessa Bennett MD;  Location:  [facility];  Service: Cardiovascular;  Laterality: N/A;    CARDIAC CATHETERIZATION N/A 10/28/2021    Procedure: Right heart cath;  Surgeon: Vanessa Bennett MD;  Location:  [facility];  Service: Cardiovascular;  Laterality: N/A;    COLONOSCOPY N/A 02/01/2020    Procedure: COLONOSCOPY TO CECUM AND INTO TERMINAL ILEUM WITH COLD BIOPSY POLYPECTOMY;  Surgeon: Munira Montes MD;  Location: [facility];  Service: Gastroenterology    COLONOSCOPY N/A 10/01/2022    Procedure: COLONOSCOPY TO CECUM/TI WITH BIOPSY AND POLYPECTOMY ( COLD BX);  Surgeon: Jovanny Simms MD;  Location:  [facility];  Service: Gastroenterology;  Laterality: N/A;  ANEMIA  POST: COLON POLYP; TEXTURE MUCOSAL CHANGES IN ASCENDING COLON; MODERATE PREP; INTERNAL HEMORRHOIDS    COLONOSCOPY  10/01/2022    DILATATION AND CURETTAGE      AUB no cancer    ENDOSCOPY N/A 02/01/2020    Procedure: ESOPHAGOGASTRODUODENOSCOPY WITH COLD BIOPSIES;  Surgeon: Munira Montes MD;  Location:  [facility];  Service: Gastroenterology    ENDOSCOPY N/A 09/29/2022    Procedure: ESOPHAGOGASTRODUODENOSCOPY with cold biopsies;  Surgeon: Jill Blake MD;  Location: [facility];  Service: Gastroenterology;  Laterality: N/A;  pre: ANEMIA, H/O GASTROPARESIS AND NAUSEA VOMITTING  post: gastritis, hiatal hernia, z-line@38cm, gastric body and fundus polpys, small bowel granularity    KNEE ARTHROSCOPY Left 08/22/2018    Procedure: KNEE ARTHROSCOPY LATERAL MENISECTOMY DEBRIDEMENT OF MEDIAL FEMORAL CONDYLE DEFECT DEBRIDEMENT OF ARTHRITIS;  Surgeon: Graciela Crockett MD;   [facility]  Service: Orthopedics    KNEE SURGERY      SALPINGO OOPHORECTOMY Left     TONSILLECTOMY  as a child    TUBAL ABDOMINAL LIGATION      UPPER GASTROINTESTINAL ENDOSCOPY      WISDOM TOOTH EXTRACTION         FAMILY HISTORY  Family History   Problem Relation Age of Onset    Dementia Mother     Arthritis Mother     Stroke Father     Heart disease Father     Hypertension Father     Diabetes Father     Cancer Father         Colon    Colon cancer Father     Hyperlipidemia Father     Colon polyps Father     Hearing loss Father     Breast cancer Sister 65    Atrial fibrillation Sister     Alcohol abuse Sister     Asthma Sister     Hypertension Sister     Cancer Sister         Breast    Scoliosis Sister     Ovarian cancer Maternal Aunt     Prostate cancer Brother     Cancer Brother         Prostate    Diabetes Brother     Asthma Sister     Thyroid disease Sister     Malig Hyperthermia Neg Hx        SOCIAL HISTORY  Social History     Socioeconomic History    Marital status:     Number of children: 2    Years of education: College   Tobacco Use    Smoking status: Former     Current packs/day: 0.00     Average packs/day: 0.5 packs/day for 4.8 years (2.4 ttl pk-yrs)     Types: Cigarettes     Start date: 1983     Quit date: 1987     Years since quittin.1     Passive exposure: Past    Smokeless tobacco: Never   Vaping Use    Vaping status: Never Used   Substance and Sexual Activity    Alcohol use: Yes     Alcohol/week: 2.0 standard drinks of alcohol     Types: 2 Standard drinks or equivalent per week     Comment: beers daily    Drug use: Yes     Types: Marijuana    Sexual activity: Yes     Partners: Male     Birth control/protection: Post-menopausal       REVIEW OF SYSTEMS  Review of Systems    All systems reviewed and negative except for those discussed in HPI.     PHYSICAL EXAM    I have reviewed the triage vital signs and nursing notes.  Vitals:    24 1202   BP: (!) 183/86    Pulse: 99   Resp: 18   Temp: 99.9 °F (37.7 °C)   SpO2: 98%     Physical Exam    GENERAL: WDWN female, not distressed  HENT: nares patent.  Pharyngeal erythema, tonsillar exudate, anterior cervical adenopathy.  EYES: no scleral icterus  NECK: no ROM limitations  CV: regular rhythm, regular rate  RESPIRATORY: normal effort  ABDOMEN: soft  : deferred  MUSCULOSKELETAL: no deformity  NEURO: alert, moves all extremities, follows commands  SKIN: warm, dry    LAB RESULTS  Recent Results (from the past 24 hours)   Reticulocytes    Collection Time: 11/27/24  1:53 PM    Specimen: Blood   Result Value Ref Range    Reticulocyte Absolute 1.3 0.6 - 2.6 %   Ferritin    Collection Time: 11/27/24  1:53 PM    Specimen: Blood   Result Value Ref Range    Ferritin 204 (H) 15 - 150 ng/mL   Iron and TIBC    Collection Time: 11/27/24  1:53 PM    Specimen: Blood   Result Value Ref Range    TIBC 313 250 - 450 ug/dL    UIBC 247 118 - 369 ug/dL    Iron 66 27 - 139 ug/dL    Iron Saturation 21 15 - 55 %   CBC & Differential    Collection Time: 11/27/24  1:53 PM    Specimen: Blood   Result Value Ref Range    WBC 4.2 3.4 - 10.8 x10E3/uL    RBC 4.73 3.77 - 5.28 x10E6/uL    Hemoglobin 14.7 11.1 - 15.9 g/dL    Hematocrit 44.6 34.0 - 46.6 %    MCV 94 79 - 97 fL    MCH 31.1 26.6 - 33.0 pg    MCHC 33.0 31.5 - 35.7 g/dL    RDW 11.5 (L) 11.7 - 15.4 %    Platelets 181 150 - 450 x10E3/uL    Neutrophil Rel % 62 Not Estab. %    Lymphocyte Rel % 26 Not Estab. %    Monocyte Rel % 10 Not Estab. %    Eosinophil Rel % 2 Not Estab. %    Basophil Rel % 0 Not Estab. %    Neutrophils Absolute 2.6 1.4 - 7.0 x10E3/uL    Lymphocytes Absolute 1.1 0.7 - 3.1 x10E3/uL    Monocytes Absolute 0.4 0.1 - 0.9 x10E3/uL    Eosinophils Absolute 0.1 0.0 - 0.4 x10E3/uL    Basophils Absolute 0.0 0.0 - 0.2 x10E3/uL    Immature Granulocyte Rel % 0 Not Estab. %    Immature Grans Absolute 0.0 0.0 - 0.1 x10E3/uL   Rapid Strep A Screen - Swab, Throat    Collection Time: 11/28/24 12:11 PM     Specimen: Throat; Swab   Result Value Ref Range    STREP A PCR Not Detected Not Detected   COVID-19 and FLU A/B PCR, 1 HR TAT - Swab, Nasopharynx    Collection Time: 11/28/24 12:11 PM    Specimen: Nasopharynx; Swab   Result Value Ref Range    COVID19 Not Detected Not Detected - Ref. Range    Influenza A PCR Not Detected Not Detected    Influenza B PCR Not Detected Not Detected   RSV PCR - Swab, Nasopharynx    Collection Time: 11/28/24 12:14 PM    Specimen: Nasopharynx; Swab   Result Value Ref Range    RSV, PCR Not Detected Not Detected       Ordered the above labs and independently interpreted results.  My findings will be discussed in the ED course or medical decision making section below    RADIOLOGY RESULTS  No Radiology Exams Resulted Within Past 24 Hours     Ordered the above noted radiological studies.  Independently interpreted by me.  My findings will be discussed in the medical decision section below.     PROGRESS, DATA ANALYSIS, CONSULTS AND MEDICAL DECISION MAKING    Please note that this section constitutes my independent interpretation of clinical data including lab results, radiology, EKG's.  This constitutes my independent professional opinion regarding differential diagnosis and management of this patient.  It may include any factors such as history from outside sources, review of external records, social determinants of health, management of medications, response to those treatments, and discussions with other providers.    ED Course as of 11/28/24 1256   Thu Nov 28, 2024   1249 STREP A PCR: Not Detected [RC]   1249 Influenza B PCR: Not Detected [RC]   1249 Influenza A PCR: Not Detected [RC]   1249 COVID19: Not Detected [RC]   1249 RSV, PCR: Not Detected [RC]      ED Course User Index  [RC] Marcial Schaefer III, PA     Orders placed during this visit:  Orders Placed This Encounter   Procedures    Rapid Strep A Screen - Swab, Throat    COVID-19 and FLU A/B PCR, 1 HR TAT - Swab, Nasopharynx     RSV PCR - Swab, Nasopharynx            Medical Decision Making    Patient believes be exposed to strep over the weekend.  She is 6 for [employer]'s criteria.  Although are rapid strep test is negative I am going to go ahead and treat clinically.      DIAGNOSIS  Final diagnoses:   Pharyngitis, unspecified etiology          Medication List      No changes were made to your prescriptions during this visit.         FOLLOW-UP  Elsie Underwood MD  [address]  [place] [address]  [phone]    Schedule an appointment as soon as possible for a visit   For further evaluation and treatment        Latest Documented Vital Signs:  As of 12:56 EST  BP- (!) 183/86 HR- 99 Temp- 99.9 °F (37.7 °C) (Tympanic) O2 sat- 98%    Appropriate PPE utilized throughout this patient encounter to include mask, if indicated, per current protocol. Hand hygiene was performed before donning PPE and after removal when leaving the room.    Please note that portions of this were completed with a voice recognition program.     Note Disclaimer: At [facility], we believe that sharing information builds trust and better relationships. You are receiving this note because you are receiving care at [facility] or recently visited. It is possible you will see health information before a provider has talked with you about it. This kind of information can be easy to misunderstand. To help you fully understand what it means for your health, we urge you to discuss this note with your provider.

## 2024-12-04 DIAGNOSIS — Z79.01 CHRONIC ANTICOAGULATION: Primary | ICD-10-CM

## 2024-12-04 DIAGNOSIS — E61.1 IRON DEFICIENCY: ICD-10-CM

## 2024-12-09 DIAGNOSIS — M19.079 ARTHRITIS OF METATARSOPHALANGEAL JOINT: ICD-10-CM

## 2024-12-09 DIAGNOSIS — M25.374 INSTABILITY OF METATARSOPHALANGEAL JOINT OF RIGHT FOOT: ICD-10-CM

## 2024-12-09 DIAGNOSIS — M20.41 HAMMER TOE OF RIGHT FOOT: ICD-10-CM

## 2024-12-09 DIAGNOSIS — M19.071 ARTHRITIS OF RIGHT FOOT: ICD-10-CM

## 2024-12-09 DIAGNOSIS — M20.11 HALLUX VALGUS OF RIGHT FOOT: ICD-10-CM

## 2024-12-09 RX ORDER — HYDROCODONE BITARTRATE AND ACETAMINOPHEN 5; 325 MG/1; MG/1
TABLET ORAL
Qty: 30 TABLET | Refills: 0 | Status: SHIPPED | OUTPATIENT
Start: 2024-12-09 | End: 2024-12-11 | Stop reason: SDUPTHER

## 2024-12-11 ENCOUNTER — OFFICE VISIT (OUTPATIENT)
Dept: ORTHOPEDIC SURGERY | Facility: CLINIC | Age: 64
End: 2024-12-11
Payer: MEDICARE

## 2024-12-11 ENCOUNTER — HOSPITAL ENCOUNTER (OUTPATIENT)
Dept: CARDIOLOGY | Facility: HOSPITAL | Age: 64
Discharge: HOME | End: 2024-12-11
Admitting: ORTHOPAEDIC SURGERY
Payer: MEDICARE

## 2024-12-11 VITALS — BODY MASS INDEX: 27.12 KG/M2 | TEMPERATURE: 97.1 F | HEIGHT: 65 IN | WEIGHT: 162.8 LBS

## 2024-12-11 DIAGNOSIS — M20.41 HAMMER TOE OF RIGHT FOOT: ICD-10-CM

## 2024-12-11 DIAGNOSIS — M19.079 ARTHRITIS OF METATARSOPHALANGEAL JOINT: ICD-10-CM

## 2024-12-11 DIAGNOSIS — M79.89 RIGHT LEG SWELLING: ICD-10-CM

## 2024-12-11 DIAGNOSIS — M25.374 INSTABILITY OF METATARSOPHALANGEAL JOINT OF RIGHT FOOT: ICD-10-CM

## 2024-12-11 DIAGNOSIS — M20.11 HALLUX VALGUS OF RIGHT FOOT: Primary | ICD-10-CM

## 2024-12-11 DIAGNOSIS — R52 PAIN: ICD-10-CM

## 2024-12-11 LAB
BH CV LOWER VASCULAR LEFT COMMON FEMORAL AUGMENT: NORMAL
BH CV LOWER VASCULAR LEFT COMMON FEMORAL COMPETENT: NORMAL
BH CV LOWER VASCULAR LEFT COMMON FEMORAL COMPRESS: NORMAL
BH CV LOWER VASCULAR LEFT COMMON FEMORAL PHASIC: NORMAL
BH CV LOWER VASCULAR LEFT COMMON FEMORAL SPONT: NORMAL
BH CV LOWER VASCULAR RIGHT COMMON FEMORAL AUGMENT: NORMAL
BH CV LOWER VASCULAR RIGHT COMMON FEMORAL COMPETENT: NORMAL
BH CV LOWER VASCULAR RIGHT COMMON FEMORAL COMPRESS: NORMAL
BH CV LOWER VASCULAR RIGHT COMMON FEMORAL PHASIC: NORMAL
BH CV LOWER VASCULAR RIGHT COMMON FEMORAL SPONT: NORMAL
BH CV LOWER VASCULAR RIGHT DISTAL FEMORAL COMPRESS: NORMAL
BH CV LOWER VASCULAR RIGHT GASTRONEMIUS COMPRESS: NORMAL
BH CV LOWER VASCULAR RIGHT GREATER SAPH AK COMPRESS: NORMAL
BH CV LOWER VASCULAR RIGHT GREATER SAPH BK COMPRESS: NORMAL
BH CV LOWER VASCULAR RIGHT LESSER SAPH COMPRESS: NORMAL
BH CV LOWER VASCULAR RIGHT MID FEMORAL AUGMENT: NORMAL
BH CV LOWER VASCULAR RIGHT MID FEMORAL COMPETENT: NORMAL
BH CV LOWER VASCULAR RIGHT MID FEMORAL COMPRESS: NORMAL
BH CV LOWER VASCULAR RIGHT MID FEMORAL PHASIC: NORMAL
BH CV LOWER VASCULAR RIGHT MID FEMORAL SPONT: NORMAL
BH CV LOWER VASCULAR RIGHT PERONEAL COMPRESS: NORMAL
BH CV LOWER VASCULAR RIGHT POPLITEAL AUGMENT: NORMAL
BH CV LOWER VASCULAR RIGHT POPLITEAL COMPETENT: NORMAL
BH CV LOWER VASCULAR RIGHT POPLITEAL COMPRESS: NORMAL
BH CV LOWER VASCULAR RIGHT POPLITEAL PHASIC: NORMAL
BH CV LOWER VASCULAR RIGHT POPLITEAL SPONT: NORMAL
BH CV LOWER VASCULAR RIGHT POSTERIOR TIBIAL COMPRESS: NORMAL
BH CV LOWER VASCULAR RIGHT PROFUNDA FEMORAL COMPRESS: NORMAL
BH CV LOWER VASCULAR RIGHT PROXIMAL FEMORAL COMPRESS: NORMAL
BH CV LOWER VASCULAR RIGHT SAPHENOFEMORAL JUNCTION COMPRESS: NORMAL

## 2024-12-11 PROCEDURE — 93971 EXTREMITY STUDY: CPT

## 2024-12-11 NOTE — PROGRESS NOTES
"Foot Follow Up      Patient: Amanda Sherwood    YOB: 1960 64 y.o. female    Chief Complaints: Foot \"is not good\"    History of Present Illness:Patient underwent right Lapidus realignment fusion of the first TMT joint including calcaneal bone graft as well as modified Munroe bunion correction of the first MTP joint.  Also had second metatarsal phalangeal joint debridement and PIP fusion surgery was on 10/1/2022.     Patient was seen on 10/16/2024 and had been weaning down on pain medication but still using 1 pill about every 6 hours.  She reported some tingling and burning in her toes.     At that time there was no sign of infection and reviewed that I would expect the pain and neuritic symptoms to improve over time.  Sutures removed and Steri-Strips were applied.  She was placed into a well-padded bunion hammertoe spica dressing and instructed continue elevation and nonweightbearing.  refilled her pain medication.     Patient was seen on 10/30/2024 reporting she was doing okay but still sore.  She has still been taking Percocet about every 8 hours but it made her very sleepy.  She reported some burning in her foot and some feeling of pressure around her great toe.  Overall pain had improved though.  She had remained nonweightbearing.  Pain was rated 4 out of 10.     At that time there showed no sign of infection.  Reviewed that the pressure and burning sensation should improve over time his swelling diminished but nothing different to do for it.  She was switched to hydrocodone 5 mg every 8 hours and discussed continuing weaning down on her pain medication.  Her pin was removed without difficulty and second MTP joint was gently mobilized.  She was placed into a forefoot and ankle bunion hammertoe spica dressing and allowed partial foot flat weightbearing with walker or crutches.     Patient was seen on 11/13/2024 reporting that she was still sore but getting better.  She was down to taking pain " "medication about twice a day.  She forted that she did fall off her scooter on 11/10/2024.  She was doing some painting with her grandchildren and had a sheet down the got caught in her scooter.  She wanted mild pain with some feeling of pressure in burning and tingling in her toes.  Pain was rated at 3 out of 10    At that time I did not see any sign of infection and seem to be overall improving but still with some swelling.  She was counseled on gentle range of motion exercises but toes not get wet in the shower.  She was left foot flat weightbearing with postoperative shoe and walker for 2 weeks and then could progress to a more normal gait with walker and postoperative shoe and advised to continue weaning down on pain medication.  We discussed therapy decided to hold off on at that point.    Patient is seen back today reporting that she is \"not good\".  Still has some pain and swelling with some sharp stabbing pains in her foot.  She is still using about 1-1/2 pain pills every 12 hours.  She reports she had some similar findings with her other foot until they found that she had a nonunion with a CT scan.  Pain is rated at 4 out of 10  HPI    ROS: Foot pain  Past Medical History:   Diagnosis Date    Acute kidney injury     Ankle sprain     Anxiety 3 years ago    Arthritis of back     Arthritis of neck     Cervical disc disorder     Depression     Fatty liver     GERD (gastroesophageal reflux disease)     H/O Leukopenia     Hernia     HIATAL    History of anemia     History of bronchitis     History of migraine     Hyperlipidemia     NO MEDS CURRENTLY. WORKING ON    Hypertension     Neck pain     Obesity (BMI 30.0-34.9) 06/06/2019    Osteoarthritis     Pulmonary embolism 8-    PVC (premature ventricular contraction)     Renal insufficiency     FANNY    Right foot pain     Seasonal allergies     Tear of meniscus of knee     Visual impairment     Wear bifocals     Physical Exam:   Vitals:    12/11/24 0922 " "  Temp: 97.1 °F (36.2 °C)   Weight: 73.8 kg (162 lb 12.8 oz)   Height: 165.1 cm (65\")   PainSc:   4   PainLoc: Foot     Well developed with normal mood.  She is with her .  There is mild to moderate swelling to the right foot with limited motion to the first and second MTP joints with some tenderness thereafter.  There was some crusting over her wounds that are removed and these were healed beneath with no sign of infection.  Toes were grossly sensate to light touch and a little bit hypersensitive.      Radiology: 3 views of the right foot ordered evaluate postoperative alignment reviewed and compared to previous x-rays.  There remains good alignment to the first MTP joint which remains congruent and sesamoids appear to be reduced.  First and second metatarsals are collinear.  The remains arthritic change at the second MTP joint without evidence of gross subluxation.  There is good alignment across the first TMT fusion and hardware remains intact.      MRI films and report of the right foot dated 2/22/2024 reviewed which shows subluxation of the sesamoids relative to the plantar side of the metatarsal head.  There is small volume of fluid in the first MTP joint and surprising preservation of the articular cartilage of the first MTP joint and between the metatarsal and sesamoids but no significant chondral loss appreciated at those articulations.     Second toe demonstrates exaggerated flexion to about 90 degrees at the level of the PIP joint and on sagittal images there is deformity of the base of the proximal phalanx which may be result of old intra-articular fracture.  There is marginal osteophytes around metatarsal head and around the base of the phalanx with exaggerated dorsiflexion of the MTP joint and irregular chondral loss at the base of the proximal phalanx and along the dorsum of the metatarsal head.  Plantar plate a tendons appear intact with no synovitis at the IP joint.     Third and fourth toes " show mild exaggerated flexion at the level of the PIP joints and some arthritis at the IP joints.     There was arthritic change at the articulation of the navicular with first cuneiform and between the first and second cuneiforms and at the second and to a lesser extent dorsum of the third TMT joints.  First fourth and fifth TMT joints appeared normal.        Assessment/Plan: Status post right foot surgery as outlined above  1.  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  Right foot arthritic change  of navicular with first cuneiform and between first and second cuneiforms and second and to a lesser extent dorsum of the third TMT joints    We discussed treatment going forward and thankfully I do not see any sign of infection.  Does still have more swelling than I would like and we will send her for a Doppler to make sure no DVT understanding that may require anticoagulation if there is.    Encouraged her to continue weaning down on pain medication and may start doing more weightbearing with her postoperative shoe and walker and even transition to a cane when comfortable in 2 weeks or so.    I am getting her started physical therapy to work on range of motion swelling and desensitization    Also going to order a CT scan of her right foot to evaluate the extent of healing of her first TMT joint fusion.    Will see her back in about 3 weeks to review MRI assess progress and determine treatment course going forward.

## 2024-12-23 DIAGNOSIS — M25.374 INSTABILITY OF METATARSOPHALANGEAL JOINT OF RIGHT FOOT: ICD-10-CM

## 2024-12-23 DIAGNOSIS — M19.079 ARTHRITIS OF METATARSOPHALANGEAL JOINT: ICD-10-CM

## 2024-12-23 DIAGNOSIS — M20.11 HALLUX VALGUS OF RIGHT FOOT: ICD-10-CM

## 2024-12-23 DIAGNOSIS — M20.41 HAMMER TOE OF RIGHT FOOT: ICD-10-CM

## 2024-12-23 RX ORDER — HYDROCODONE BITARTRATE AND ACETAMINOPHEN 5; 325 MG/1; MG/1
TABLET ORAL
Qty: 40 TABLET | Refills: 0 | Status: CANCELLED | OUTPATIENT
Start: 2024-12-23

## 2024-12-23 NOTE — TELEPHONE ENCOUNTER
Patient Comment: Dr Mancilla, I’m sorry to be requesting a refill at this point. But I’m taking 2 ES Tylenol every 6 hours with mild relief. I worry about my fatty liver. I alternate cold compresses with warm epson soaks several times a day. It’s still red and edematous. It just feels like a knife sticking through my foot even at rest. I m having difficulty sleeping. My CT scan is now scheduled for 12/30 and I have an appointment with you on 1/2. Thank you.           CRISTINO is out please advise.  Surgery was 10/1/24.   right foot bunion correction with first tarsometatarsal joint fusion and distal soft tissue release and bone resection.  Right second metatarsal phalangeal joint release and bone debridement as needed with second proximal interphalangeal joint resection/fusion.  Bone graft from calcaneus.    Last fill: 10/30/24.

## 2024-12-23 NOTE — TELEPHONE ENCOUNTER
Patient called stated that her right foot that was operated on back on 10-1-2024 is still causing pain. She is requesting hydrocodone. I let her know that Dr. Mancilla is out of the office and wont be back in till after the holiday. I informed the patient that her message and pain medication request has been sent to Dr. Graciela Crockett, I let her know that it maybe until the end of the day that her refill gets sent to the pharmacy.

## 2024-12-24 DIAGNOSIS — Z09 SURGERY FOLLOW-UP: Primary | ICD-10-CM

## 2024-12-24 RX ORDER — HYDROCODONE BITARTRATE AND ACETAMINOPHEN 5; 325 MG/1; MG/1
1 TABLET ORAL EVERY 6 HOURS PRN
Qty: 30 TABLET | Refills: 0 | Status: SHIPPED | OUTPATIENT
Start: 2024-12-24

## 2024-12-24 NOTE — PROGRESS NOTES
Patient had bunion surgery by Dr. Mancilla in October she still having pain I will refill her pain medication and she will follow-up with Dr. Mancilla after the holidays

## 2024-12-26 RX ORDER — SPIRONOLACTONE 25 MG/1
25 TABLET ORAL DAILY
Qty: 90 TABLET | Refills: 1 | Status: SHIPPED | OUTPATIENT
Start: 2024-12-26

## 2024-12-30 ENCOUNTER — HOSPITAL ENCOUNTER (OUTPATIENT)
Dept: CT IMAGING | Facility: HOSPITAL | Age: 64
Discharge: HOME | End: 2024-12-30
Admitting: ORTHOPAEDIC SURGERY
Payer: MEDICARE

## 2024-12-30 DIAGNOSIS — M20.11 HALLUX VALGUS OF RIGHT FOOT: ICD-10-CM

## 2024-12-30 DIAGNOSIS — M19.079 ARTHRITIS OF METATARSOPHALANGEAL JOINT: ICD-10-CM

## 2024-12-30 PROCEDURE — 76377 3D RENDER W/INTRP POSTPROCES: CPT

## 2024-12-30 PROCEDURE — 73700 CT LOWER EXTREMITY W/O DYE: CPT

## 2025-01-02 ENCOUNTER — OFFICE VISIT (OUTPATIENT)
Dept: ORTHOPEDIC SURGERY | Facility: CLINIC | Age: 65
End: 2025-01-02
Payer: MEDICARE

## 2025-01-02 VITALS — BODY MASS INDEX: 26.99 KG/M2 | TEMPERATURE: 94.8 F | HEIGHT: 65 IN | WEIGHT: 162 LBS

## 2025-01-02 DIAGNOSIS — Z09 SURGERY FOLLOW-UP: ICD-10-CM

## 2025-01-02 DIAGNOSIS — M19.071 ARTHRITIS OF RIGHT FOOT: ICD-10-CM

## 2025-01-02 DIAGNOSIS — M20.11 HALLUX VALGUS OF RIGHT FOOT: ICD-10-CM

## 2025-01-02 DIAGNOSIS — M19.079 ARTHRITIS OF METATARSOPHALANGEAL JOINT: ICD-10-CM

## 2025-01-02 DIAGNOSIS — M25.374 INSTABILITY OF METATARSOPHALANGEAL JOINT OF RIGHT FOOT: ICD-10-CM

## 2025-01-02 DIAGNOSIS — M20.41 HAMMER TOE OF RIGHT FOOT: Primary | ICD-10-CM

## 2025-01-02 RX ORDER — DICLOFENAC SODIUM 75 MG/1
75 TABLET, DELAYED RELEASE ORAL 2 TIMES DAILY
Qty: 60 TABLET | Refills: 1 | Status: SHIPPED | OUTPATIENT
Start: 2025-01-02

## 2025-01-02 RX ORDER — HYDROCODONE BITARTRATE AND ACETAMINOPHEN 5; 325 MG/1; MG/1
1 TABLET ORAL EVERY 12 HOURS PRN
Qty: 60 TABLET | Refills: 0 | Status: SHIPPED | OUTPATIENT
Start: 2025-01-02 | End: 2025-01-02 | Stop reason: SDUPTHER

## 2025-01-02 RX ORDER — HYDROCODONE BITARTRATE AND ACETAMINOPHEN 5; 325 MG/1; MG/1
1 TABLET ORAL EVERY 12 HOURS PRN
Qty: 60 TABLET | Refills: 0 | Status: SHIPPED | OUTPATIENT
Start: 2025-01-02

## 2025-01-02 NOTE — PROGRESS NOTES
Foot Follow Up      Patient: Amanda Sherwood    YOB: 1960 64 y.o. female    Chief Complaints: Foot hurts    History of Present Illness:Patient underwent right Lapidus realignment fusion of the first TMT joint including calcaneal bone graft as well as modified Munroe bunion correction of the first MTP joint.  Also had second metatarsal phalangeal joint debridement and PIP fusion surgery was on 10/1/2022.     Patient was seen on 10/16/2024 and had been weaning down on pain medication but still using 1 pill about every 6 hours.  She reported some tingling and burning in her toes.     At that time there was no sign of infection and reviewed that I would expect the pain and neuritic symptoms to improve over time.  Sutures removed and Steri-Strips were applied.  She was placed into a well-padded bunion hammertoe spica dressing and instructed continue elevation and nonweightbearing.  refilled her pain medication.     Patient was seen on 10/30/2024 reporting she was doing okay but still sore.  She has still been taking Percocet about every 8 hours but it made her very sleepy.  She reported some burning in her foot and some feeling of pressure around her great toe.  Overall pain had improved though.  She had remained nonweightbearing.  Pain was rated 4 out of 10.     At that time there showed no sign of infection.  Reviewed that the pressure and burning sensation should improve over time his swelling diminished but nothing different to do for it.  She was switched to hydrocodone 5 mg every 8 hours and discussed continuing weaning down on her pain medication.  Her pin was removed without difficulty and second MTP joint was gently mobilized.  She was placed into a forefoot and ankle bunion hammertoe spica dressing and allowed partial foot flat weightbearing with walker or crutches.     Patient was seen on 11/13/2024 reporting that she was still sore but getting better.  She was down to taking pain medication  "about twice a day.  She forted that she did fall off her scooter on 11/10/2024.  She was doing some painting with her grandchildren and had a sheet down the got caught in her scooter.  She wanted mild pain with some feeling of pressure in burning and tingling in her toes.  Pain was rated at 3 out of 10     At that time I did not see any sign of infection and seem to be overall improving but still with some swelling.  She was counseled on gentle range of motion exercises but toes not get wet in the shower.  She was left foot flat weightbearing with postoperative shoe and walker for 2 weeks and then could progress to a more normal gait with walker and postoperative shoe and advised to continue weaning down on pain medication.  We discussed therapy decided to hold off on at that point.     Patient was seen on 12/11/2024 reporting that she was \"not good\".  Still reported pain and swelling with some sharp stabbing pains in her foot.  She was still using about 1-1/2 pain pills every 12 hours.  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  Pain was rated at 4 out of 10.    At that time there was no sign of infection and did have more swelling than I would have expected.  She was sent for Doppler.  She was encouraged to continue weaning down on pain medication and to start progressive weightbearing with her postoperative shoe and can transition to a cane when comfortable in 2 weeks or so.  Also referred her to work on physical therapy and sent for CT scan for further evaluation of healing of her first TMT fusion    Patient is seen back today stating that she still has a lot of pain in her foot she believes there is a nail going between her first and second toes and pain feeling like a knife sticking to the top of her foot 3 to the bottom.  She been using a postoperative shoe and at least a cane and has persistent swelling which is a little " "bit improved.  She tried using a large GLADIS hose but had trouble getting them on it was painful.  She is just now started physical therapy.  HPI    ROS: Foot pain  Past Medical History:   Diagnosis Date    Acute kidney injury     Ankle sprain     Anxiety 3 years ago    Arthritis of back     Arthritis of neck     Cervical disc disorder     Depression     Fatty liver     GERD (gastroesophageal reflux disease)     H/O Leukopenia     Hernia     HIATAL    History of anemia     History of bronchitis     History of migraine     Hyperlipidemia     NO MEDS CURRENTLY. WORKING ON    Hypertension     Neck pain     Obesity (BMI 30.0-34.9) 06/06/2019    Osteoarthritis     Pulmonary embolism 8-    PVC (premature ventricular contraction)     Renal insufficiency     FANNY    Right foot pain     Seasonal allergies     Tear of meniscus of knee     Visual impairment     Wear bifocals     Physical Exam:   Vitals:    01/02/25 0826   Temp: 94.8 °F (34.9 °C)   Weight: 73.5 kg (162 lb)   Height: 165.1 cm (65\")   PainSc:   6     Well developed with normal mood.  She is with her .  Her incisions remain well-healed with moderate swelling to the foot.  She was tender about the first TMT joint dorsally more so than plantarly and had limited motion of the first MTP joint.  There is no gross instability of the second MTP joint.      Radiology: CT scan films and report of the left foot dated 12/30/2024 reviewed which shows fusion of the first TMT joint with 2 dorsal staples and a screw from the 1st-2nd metatarsal.  There is solid bony fusion of the first TMT joint.    Moderate chronic arthritis of the second third and fourth TMT joints    Mild arthritis of the first MTP joint.  There is dorsal subluxation of the second MTP joint with arthritis and remodeling of the articular surfaces and spur formation with chronic arthritis of the IP joints.      Assessment/Plan:  MRI films and report of the right foot dated 2/22/2024 reviewed which " shows subluxation of the sesamoids relative to the plantar side of the metatarsal head.  There is small volume of fluid in the first MTP joint and surprising preservation of the articular cartilage of the first MTP joint and between the metatarsal and sesamoids but no significant chondral loss appreciated at those articulations.     Second toe demonstrates exaggerated flexion to about 90 degrees at the level of the PIP joint and on sagittal images there is deformity of the base of the proximal phalanx which may be result of old intra-articular fracture.  There is marginal osteophytes around metatarsal head and around the base of the phalanx with exaggerated dorsiflexion of the MTP joint and irregular chondral loss at the base of the proximal phalanx and along the dorsum of the metatarsal head.  Plantar plate a tendons appear intact with no synovitis at the IP joint.     Third and fourth toes show mild exaggerated flexion at the level of the PIP joints and some arthritis at the IP joints.     There was arthritic change at the articulation of the navicular with first cuneiform and between the first and second cuneiforms and at the second and to a lesser extent dorsum of the third TMT joints.  First fourth and fifth TMT joints appeared normal.        Assessment/Plan: Status post right foot surgery as outlined above  1.  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  Right foot arthritic change  of navicular with first cuneiform and between first and second cuneiforms and second and to a lesser extent dorsum of the third TMT joints    We discussed treatment going forward and I do not see any sign of infection.    I did refill her Lortab 5 mg 1 p.o. every 12 hours.  She can start with progressive weightbearing and  thankfully the fusion appears healed.  She can discontinue her Eliquis now and we will start her on Voltaren-XR 75 mg with breakfast and dinner with GI precautions and also prescribe neuritic compounding cream to apply several times daily.    Will send her for radiographic guided injection of the right second MTP joint see if that helps to have her continue with therapy.    I will see her back in 3 weeks with x-rays of her right foot.

## 2025-01-23 ENCOUNTER — OFFICE VISIT (OUTPATIENT)
Dept: ORTHOPEDIC SURGERY | Facility: CLINIC | Age: 65
End: 2025-01-23
Payer: MEDICARE

## 2025-01-23 VITALS — TEMPERATURE: 96.6 F | HEIGHT: 65 IN | BODY MASS INDEX: 27.31 KG/M2 | WEIGHT: 163.9 LBS

## 2025-01-23 DIAGNOSIS — M20.11 HALLUX VALGUS OF RIGHT FOOT: ICD-10-CM

## 2025-01-23 DIAGNOSIS — M19.079 ARTHRITIS OF METATARSOPHALANGEAL JOINT: ICD-10-CM

## 2025-01-23 DIAGNOSIS — M20.41 HAMMER TOE OF RIGHT FOOT: Primary | ICD-10-CM

## 2025-01-23 DIAGNOSIS — M25.374 INSTABILITY OF METATARSOPHALANGEAL JOINT OF RIGHT FOOT: ICD-10-CM

## 2025-01-23 DIAGNOSIS — Z09 SURGERY FOLLOW-UP: ICD-10-CM

## 2025-01-23 DIAGNOSIS — M72.2 PLANTAR FASCIITIS: ICD-10-CM

## 2025-01-23 DIAGNOSIS — M19.071 ARTHRITIS OF RIGHT FOOT: ICD-10-CM

## 2025-01-23 RX ORDER — HYDROCODONE BITARTRATE AND ACETAMINOPHEN 5; 325 MG/1; MG/1
1 TABLET ORAL EVERY 8 HOURS PRN
Qty: 30 TABLET | Refills: 0 | Status: SHIPPED | OUTPATIENT
Start: 2025-01-23

## 2025-01-23 RX ORDER — HYDROCODONE BITARTRATE AND ACETAMINOPHEN 5; 325 MG/1; MG/1
1 TABLET ORAL EVERY 8 HOURS PRN
Qty: 30 TABLET | Refills: 0 | Status: SHIPPED | OUTPATIENT
Start: 2025-01-23 | End: 2025-01-23 | Stop reason: SDUPTHER

## 2025-01-23 NOTE — PROGRESS NOTES
Foot Follow Up      Patient: Amanda Sherwood    YOB: 1960 64 y.o. female    Chief Complaints: Foot is sore    History of Present Illness:Patient underwent right Lapidus realignment fusion of the first TMT joint including calcaneal bone graft as well as modified Munroe bunion correction of the first MTP joint.  Also had second metatarsal phalangeal joint debridement and PIP fusion surgery was on 10/1/2022.     Patient was seen on 10/16/2024 and had been weaning down on pain medication but still using 1 pill about every 6 hours.  She reported some tingling and burning in her toes.     At that time there was no sign of infection and reviewed that I would expect the pain and neuritic symptoms to improve over time.  Sutures removed and Steri-Strips were applied.  She was placed into a well-padded bunion hammertoe spica dressing and instructed continue elevation and nonweightbearing.  refilled her pain medication.     Patient was seen on 10/30/2024 reporting she was doing okay but still sore.  She has still been taking Percocet about every 8 hours but it made her very sleepy.  She reported some burning in her foot and some feeling of pressure around her great toe.  Overall pain had improved though.  She had remained nonweightbearing.  Pain was rated 4 out of 10.     At that time there showed no sign of infection.  Reviewed that the pressure and burning sensation should improve over time his swelling diminished but nothing different to do for it.  She was switched to hydrocodone 5 mg every 8 hours and discussed continuing weaning down on her pain medication.  Her pin was removed without difficulty and second MTP joint was gently mobilized.  She was placed into a forefoot and ankle bunion hammertoe spica dressing and allowed partial foot flat weightbearing with walker or crutches.     Patient was seen on 11/13/2024 reporting that she was still sore but getting better.  She was down to taking pain medication  "about twice a day.  She forted that she did fall off her scooter on 11/10/2024.  She was doing some painting with her grandchildren and had a sheet down the got caught in her scooter.  She wanted mild pain with some feeling of pressure in burning and tingling in her toes.  Pain was rated at 3 out of 10     At that time I did not see any sign of infection and seem to be overall improving but still with some swelling.  She was counseled on gentle range of motion exercises but toes not get wet in the shower.  She was left foot flat weightbearing with postoperative shoe and walker for 2 weeks and then could progress to a more normal gait with walker and postoperative shoe and advised to continue weaning down on pain medication.  We discussed therapy decided to hold off on at that point.     Patient was seen on 12/11/2024 reporting that she was \"not good\".  Still reported pain and swelling with some sharp stabbing pains in her foot.  She was still using about 1-1/2 pain pills every 12 hours.  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  Pain was rated at 4 out of 10.     At that time there was no sign of infection and did have more swelling than I would have expected.  She was sent for Doppler.  She was encouraged to continue weaning down on pain medication and to start progressive weightbearing with her postoperative shoe and can transition to a cane when comfortable in 2 weeks or so.  Also referred her to work on physical therapy and sent for CT scan for further evaluation of healing of her first TMT fusion     Patient was seen on 1/2/2025 stating that she still has a lot of pain in her foot.  She felt as if there was a nail going between her first and second toes and pain feeling like a knife sticking to the top of her foot through to the bottom.  She had been using a postoperative shoe and at least a cane and has not had persistent " "swelling which was a little bit improved.  She had tried using a large GLADIS hose but had trouble getting them on as it was painful.  She was just starting physical therapy.    We reviewed her CT at that time and thankfully the fusion appears to be healed and denies any sign of infection.  I refilled her Lortab 5 mg 1 p.o. every 12 hours and reviewed that she can start with progressive weightbearing and thankfully fusion appears healed.  She was to discontinue Eliquis and start her on Voltaren SR 75 mg twice daily GI precautions and prescribed compounding cream to apply several times daily.  She was referred for radiographic guided injection of the right second MTP joint to see if that helped and have her continue with therapy.  Patient is seen back today reporting that her foot is sore.  She has back in it and she is not using crutches.  She decided to hold off on doing the injection her second toe because it really did not hurt in that area.  She also elected not to take the Voltaren SR as she was concerned about the side effect profile.  She has been using Aleve several times daily which \"takes the edge off\".  She is still using pain medication intermittently.  Main area she reports symptoms is that it feels like there is a nail going through the middle of her foot around the first TMT joint and pain plantarly with some areas of questionable fullness.  She has been doing physical therapy.  Pain is rated at 5 out of 10  HPI    ROS: Foot pain  Past Medical History:   Diagnosis Date    Acute kidney injury     Ankle sprain     Anxiety 3 years ago    Arthritis of back     Arthritis of neck     Cervical disc disorder     Depression     Fatty liver     GERD (gastroesophageal reflux disease)     H/O Leukopenia     Hernia     HIATAL    History of anemia     History of bronchitis     History of migraine     Hyperlipidemia     NO MEDS CURRENTLY. WORKING ON    Hypertension     Neck pain     Obesity (BMI 30.0-34.9) 06/06/2019 " "   Osteoarthritis     Pulmonary embolism 8-    PVC (premature ventricular contraction)     Renal insufficiency     FANNY    Right foot pain     Seasonal allergies     Tear of meniscus of knee     Visual impairment     Wear bifocals     Physical Exam:   Vitals:    01/23/25 0821   Temp: 96.6 °F (35.9 °C)   Weight: 74.3 kg (163 lb 14.4 oz)   Height: 165.1 cm (65\")   PainSc:   5     Well developed with normal mood.  She is with her .  She had neutral dorsiflexion of the heel cord.  Her incisions are well-healed and swelling has diminished.  She had somewhat limited motion of the second MTP joint but limited pain thereafter as well as at the first MTP joint.  She had mild tenderness of the dorsum of the midfoot over the area of her fusion and some in the plantar aspect of the foot with some area of fullness plantar to this and some tightness through the plantar fascial cords.  Some this comfort at the medial foot medial to the medial screw.      Radiology: 3 views of the right foot ordered evaluate postoperative alignment reviewed and compared to previous x-rays      CT scan films and report of the left foot dated 12/30/2024 reviewed which shows fusion of the first TMT joint with 2 dorsal staples and a screw from the 1st-2nd metatarsal.  There is solid bony fusion of the first TMT joint.     Moderate chronic arthritis of the second third and fourth TMT joints     Mild arthritis of the first MTP joint.  There is dorsal subluxation of the second MTP joint with arthritis and remodeling of the articular surfaces and spur formation with chronic arthritis of the IP joints.      MRI films and report of the right foot dated 2/22/2024 reviewed which shows subluxation of the sesamoids relative to the plantar side of the metatarsal head.  There is small volume of fluid in the first MTP joint and surprising preservation of the articular cartilage of the first MTP joint and between the metatarsal and sesamoids but no " significant chondral loss appreciated at those articulations.     Second toe demonstrates exaggerated flexion to about 90 degrees at the level of the PIP joint and on sagittal images there is deformity of the base of the proximal phalanx which may be result of old intra-articular fracture.  There is marginal osteophytes around metatarsal head and around the base of the phalanx with exaggerated dorsiflexion of the MTP joint and irregular chondral loss at the base of the proximal phalanx and along the dorsum of the metatarsal head.  Plantar plate a tendons appear intact with no synovitis at the IP joint.     Third and fourth toes show mild exaggerated flexion at the level of the PIP joints and some arthritis at the IP joints.     There was arthritic change at the articulation of the navicular with first cuneiform and between the first and second cuneiforms and at the second and to a lesser extent dorsum of the third TMT joints.  First fourth and fifth TMT joints appeared normal.        Assessment/Plan: Status post right foot surgery as outlined above  1.  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  Right foot arthritic change  of navicular with first cuneiform and between first and second cuneiforms and second and to a lesser extent dorsum of the third TMT joints    We discussed treatment going forward and thankfully do not see any sign of infection and CT scan showed that the fusion was healed.    Nothing further I would recommend from a surgical standpoint at this time.  We discussed possible eventual removal of the hardware but would certainly want to wait until she is further postop for that and could not guarantee that would provide pain relief.    I recommend heel cord stretching exercises to do at  least 4 times a day.  Instruction sheet was provided and demonstrated for the patient. We discussed etiology of heel cord tightness to midfoot and forefoot overload.  She will also continue with physical therapy    I do feel it is best given her persistent symptoms to go ahead and get an MRI of her foot to make sure no soft tissue lesion or tissue disruption in the plantar midfoot in the area of her pain.    She is going to reconsider the injection her second MTP joint as this may help relieve some of the symptoms and stiffness thereof.    Given her persistent need for pain medication and pain control we discussed treatment going forward and she mentioned eye movement desensitization and reprocessing with which I am not familiar.  Will go ahead and refer her to see Dr. Catalan for further pain management going forward for her foot.  I did refill her Lortab 5 mg 1 p.o. q.8 hours as needed we discussed careful use of narcotics of which she voiced clear understanding.    Will see her back in about 3 weeks with x-rays of her right foot.

## 2025-02-03 DIAGNOSIS — Z09 SURGERY FOLLOW-UP: ICD-10-CM

## 2025-02-03 RX ORDER — HYDROCODONE BITARTRATE AND ACETAMINOPHEN 5; 325 MG/1; MG/1
1 TABLET ORAL EVERY 8 HOURS PRN
Qty: 30 TABLET | Refills: 0 | Status: SHIPPED | OUTPATIENT
Start: 2025-02-03

## 2025-02-07 NOTE — PROGRESS NOTES
"The patient has a pain history of the following:  Arthritis of metatarsophalangeal joint  Arthritis of right foot     Previous interventions that the patient has received include:   Foot injection    Pain medications include:  Hydrocodone-acetaminophen 5-325mg TID   Compounded pain cream - causing skin peeling  Naproxen   Ibuprofen     Previously: Diclofenac PO (NSAIDs cause leukopenia)     Other conservative modalities which the patient reports using include:  Physical Therapy: yes  Chiropractor: no  Massage Therapy: yes and no  TENS: no  Neck or back surgery: no  Past pain management: yes  Ice  Epson salt     Past Significant Surgical History:  10/1/24 right foot bunionectomy    HPI:       CHIEF COMPLAINT: Foot Pain    Amanda Sherwood is a 64 y.o. female referred here by Octaviano Griffin MD. Amanda Sherwood presents to the office for evaluation and treatment of Foot Pain      History of Present Illness  Onset:  October 2024  Inciting Event:  Bunion   Location:  Right foot  Pain: Pain described as stabbing and throbbing. \"it feels like theres a nail going through my foot every time I walk.\" Located in the right great toe and in the mid-foot  Severity:  Pain rated as a 4 /10.  Symptoms have been constant.  Exacerbation:  Walking.   Alleviation:  Hydrocodone.  Associated Symptoms:   Has intermittent swelling in her right foot.  The color gets slightly darker if she stands for a while, but that's been improving over the past month.   Ambulates: Without assistive device.   Limitations:  Unable to play tennis or walk a few miles a day like she used to.      She was previously on a blood thinner for bilateral PE s after having a severe stomach bug, causing her to lose 20 lbs and to be bed-ridden for a few weeks.     She's been taking Hydrocodone-acetaminohpen 5-325 mg bid but if pain is severe she does tid.  She still takes naproxen 220 mg bid and sometimes takes ibuprofen, which she shouldn't take due to NSAID " indused leukopenia.  She also uses pain cream, however this caused her skin to peel so she's cut it down to once daily.  She doesn't feel that the pain cream actually helps manage her pain.  She also takes Ativan for anxiety.  She states that sometimes she'll go days without taking this prescription.  This medication is managed by her psychiatrist.      Dr. Mancilla's plan is to obtain an MRI of her right foot.  He has offered a steroid injection for her 2nd toe pain and has mentioned possible surgery in April for hardware removal.        Opioid Risk Tool:       Opioid Risk Tool:  Family history of substance abuse: Alcohol - Sister  Illegal drug - None  Prescription drugs - None   Personal history of substance abuse: Alcohol - No  Illegal drugs - No  Prescription drugs - No   Patient is between 16 and 45 years of age: None   History of preadolescent sexual abuse: No   Psychological disease: ADD/OCD/Bipolar/Schizophrenia - None  Depression - Yes           Opioid Risk: 2       Scorin - 3 = Low Risk    4 - 7 = Moderate Risk    8+ = High Risk         PEG Assessment   What number best describes your pain on average in the past week?8  What number best describes how, during the past week, pain has interfered with your enjoyment of life?8  What number best describes how, during the past week, pain has interfered with your general activity?  8        Current Outpatient Medications:     Biotin 5000 MCG tablet, Take 5,000 mg by mouth Daily. HOLD PRIOR TO SURG, Disp: , Rfl:     cholecalciferol 25 MCG (1000 UT) tablet, HOLD PRIOR TO SURG, Disp: , Rfl:     finasteride (PROSCAR) 5 MG tablet, Take 1 tablet by mouth Daily., Disp: , Rfl:     HYDROcodone-acetaminophen (NORCO) 5-325 MG per tablet, Take 1 tablet by mouth Every 8 (Eight) Hours As Needed for Severe Pain., Disp: 90 tablet, Rfl: 0    Ibuprofen 3 %, Gabapentin 10 %, Baclofen 2 %, lidocaine 4 %, Ketamine HCl 4 %, Apply 1-2 g topically to the appropriate area as  directed 3 (Three) to 4 (Four) times daily., Disp: 90 g, Rfl: 1    Lactobacillus (PROBIOTIC ACIDOPHILUS PO), Take 1 tablet by mouth Daily. HOLD PRIOR TO SURG, Disp: , Rfl:     LORazepam (ATIVAN) 0.5 MG tablet, Take 1 tablet by mouth 3 (Three) Times a Day As Needed for Anxiety., Disp: , Rfl:     melatonin 5 MG tablet tablet, Take 1 tablet by mouth Every Night., Disp: , Rfl:     metoprolol succinate XL (TOPROL-XL) 25 MG 24 hr tablet, Take 3 tablets by mouth Every Night., Disp: 270 tablet, Rfl: 3    Minoxidil 5 % solution, Apply  topically to the appropriate area as directed 2 (Two) Times a Day., Disp: , Rfl:     Naproxen Sodium 220 MG capsule, Take 220 mg by mouth 2 (Two) Times a Day., Disp: , Rfl:     Omeprazole 20 MG tablet delayed-release, Take 20 mg by mouth Daily., Disp: , Rfl:     ondansetron (Zofran) 4 MG tablet, Take 1 tablet by mouth Every 8 (Eight) Hours As Needed for Nausea or Vomiting., Disp: 30 tablet, Rfl: 0    rosuvastatin (CRESTOR) 10 MG tablet, Take 1 tablet by mouth Every Night., Disp: 30 tablet, Rfl: 11    spironolactone (ALDACTONE) 25 MG tablet, TAKE 1 TABLET BY MOUTH DAILY, Disp: 90 tablet, Rfl: 1    naloxone (Narcan) 4 MG/0.1ML nasal spray, Administer 1 spray into the nostril(s) as directed by provider As Needed (Respiratory depression)., Disp: 1 each, Rfl: 1    The following portions of the patient's history were reviewed and updated as appropriate: allergies, current medications, past family history, past medical history, past social history, past surgical history, and problem list.      REVIEW OF PERTINENT MEDICAL DATA    12/30/24 CT RIGHT FOOT WITHOUT CONTRAST     HISTORY: Right TMT fusion. Pain and swelling.     TECHNIQUE:  CT includes axial imaging through the foot and data  reconstructed in coronal and sagittal planes.  Radiation dose techniques  were utilized, including automated exposure control and exposure  modulation based on body size.     COMPARISON: None.     FINDINGS: There has  been 1st TMT fusion with placement of 2 dorsal  staples and there is solid bony fusion at the 1st TMT joint. There has  also been placement of a screw extending in a coronal plane from a  medial approach bridging the bases of the 1st and 2nd metatarsals. There  is no bony fusion at Lisfranc joint or between the 1st and 2nd  metatarsals.     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     Mild arthritis changes of the 1st MTP joint. There is dorsal subluxation  of the 2nd MTP joint where there is arthritis and remodeling of the  articular surfaces with spur formation. Chronic arthritic changes at the  interphalangeal joints.  No fracture is evident. There is calcaneal  donor site.     Generalized edema in the midfoot and forefoot subcutaneous fat, greatest  dorsally. Small degenerative plantar calcaneal spur. Demineralization of  bone. Mild tibiotalar arthritis with anterior spur formation. Small os  trigonum.     IMPRESSION:  1. Solid bony fusion at the 1st TMT joint which has undergone  arthrodesis with placement of 2 dorsal staples. Screw transfixes the  bases of the 1st and 2nd metatarsals without bony fusion at Lisfranc  joint or between the bases of the 1st and 2nd metatarsals.  2. Moderate arthritic changes at the 2nd, 3rd, and 4th TMT joints.  3. Arthritis at the 2nd MTP joint where there is dorsal subluxation.  Mild arthritic changes of the 1st MTP joint and at the interphalangeal  joints.  4. Calcaneal donor site. Small os trigonum. Mild tibiotalar arthritis.            This report was finalized on 12/31/2024 8:09 AM by José Ramos M.D  on Workstation: [id number]    9/27/24 Creatinine 0.99    12/24/24 Platelets 170 (10*3)    Review of Systems   Constitutional:  Negative for activity change (less), fatigue and fever.   Respiratory:  Negative for cough, chest tightness and shortness of breath.    Cardiovascular:  Negative for chest pain.  "  Gastrointestinal:  Negative for abdominal pain, constipation and diarrhea.   Neurological:  Negative for dizziness, weakness, light-headedness, numbness and headaches.   Psychiatric/Behavioral:  Positive for sleep disturbance (pain). Negative for agitation and suicidal ideas. The patient is not nervous/anxious.      I have reviewed and confirmed the accuracy of the ROS as documented by the MA/LPN/RN Luann Catalan MD      Vitals:    02/11/25 0753   BP: 161/98  Comment: has not taken BP med   BP Location: Left arm   Patient Position: Sitting   Cuff Size: Adult   Pulse: 79   Resp: 12   Temp: 97.6 °F (36.4 °C)   TempSrc: Temporal   SpO2: 100%   Weight: 76.5 kg (168 lb 9.6 oz)   Height: 165.1 cm (65\")   PainSc:   4         Objective   Physical Exam  Constitutional:       General: She is not in acute distress.  Pulmonary:      Effort: Pulmonary effort is normal. No respiratory distress.   Skin:     General: Skin is warm and dry.      Comments: Normal color of the right foot.  Well-healed incisions present.  Minimal swelling during exam.   Neurological:      Mental Status: She is alert.      Comments: Negative tenderness to light touch of the right foot.   Psychiatric:         Mood and Affect: Mood normal.         Thought Content: Thought content normal.         Assessment & Plan   Diagnoses and all orders for this visit:    1. Great toe pain, right (Primary)    2. Chronic foot pain, right  -     HYDROcodone-acetaminophen (NORCO) 5-325 MG per tablet; Take 1 tablet by mouth Every 8 (Eight) Hours As Needed for Severe Pain.  Dispense: 90 tablet; Refill: 0    3. Toe pain, chronic, right    4. High risk medication use    Other orders  -     naloxone (Narcan) 4 MG/0.1ML nasal spray; Administer 1 spray into the nostril(s) as directed by provider As Needed (Respiratory depression).  Dispense: 1 each; Refill: 1        - Baseline urine drug screen was obtained.  Routine UDS in office today as part of monitoring requirements for " controlled substances.  The specimen was viewed and the immunoassay result reviewed and is positive for benzodiazepine.  This specimen will be sent to [facility] for confirmation.      - Pertinent labs reviewed.   - Pertinent imaging reviewed.   - Controlled substance agreement explained and signed today for hydrocodone-acetaminophen 5-325mg #90 for foot pain.  The goal is to stop this medication once her pain improves/after she recovers from a second surgery (if that's needed).   - Opioid risk assessment performed showing low risk.   - Educated on black box warning with benzodiazepine and opioid medications.  She understands.   - Narcan prescribed.  Explained instructions for use and storage with her  informed on this subject.  She understands.   - Amanda Sherwood reports a pain score of 4.  Given her pain assessment as noted, treatment options were discussed and the following options were decided upon as a follow-up plan to address the patient's pain: prescription for opiod analgesics.  - Patient screened positive for depression based on a PHQ-9 score of  on . Follow-up recommendations include:  she see's a psychiatrist .  - Amanda Sherwood  reports that she quit smoking about 37 years ago. Her smoking use included cigarettes. She started smoking about 42 years ago. She has a 2.4 pack-year smoking history. She has been exposed to tobacco smoke. She has never used smokeless tobacco.     --- Follow-up 1 month           ROCCO REPORT    As part of the patient's treatment plan, I am prescribing controlled substances. The patient has been made aware of appropriate use of such medications, including potential risk of somnolence, limited ability to drive and/or work safely, and the potential for dependence or overdose. It has also bee made clear that these medications are for use by this patient only, without concomitant use of alcohol or other substances unless prescribed.     Patient has completed  prescribing agreement detailing terms of continued prescribing of controlled substances, including monitoring ROCCO reports, urine drug screening, and pill counts if necessary. The patient is aware that inappropriate use will results in cessation of prescribing such medications.    As the clinician, I personally reviewed the ROCCO from 2/11/25 while the patient was in the office today.    History and physical exam exhibit continued safe and appropriate use of controlled substances.        Luann Catalan MD  Pain Management    EMR Dragon/Transcription disclaimer:   Much of this encounter note is an electronic transcription/translation of spoken language to printed text. The electronic translation of spoken language may permit erroneous, or at times, nonsensical words or phrases to be inadvertently transcribed; Although I have reviewed the note for such errors, some may still exist.

## 2025-02-11 ENCOUNTER — OFFICE VISIT (OUTPATIENT)
Dept: PAIN MEDICINE | Facility: CLINIC | Age: 65
End: 2025-02-11
Payer: MEDICARE

## 2025-02-11 VITALS
OXYGEN SATURATION: 100 % | HEIGHT: 65 IN | SYSTOLIC BLOOD PRESSURE: 161 MMHG | RESPIRATION RATE: 12 BRPM | BODY MASS INDEX: 28.09 KG/M2 | WEIGHT: 168.6 LBS | HEART RATE: 79 BPM | TEMPERATURE: 97.6 F | DIASTOLIC BLOOD PRESSURE: 98 MMHG

## 2025-02-11 DIAGNOSIS — Z79.899 HIGH RISK MEDICATION USE: ICD-10-CM

## 2025-02-11 DIAGNOSIS — G89.29 TOE PAIN, CHRONIC, RIGHT: ICD-10-CM

## 2025-02-11 DIAGNOSIS — M79.674 TOE PAIN, CHRONIC, RIGHT: ICD-10-CM

## 2025-02-11 DIAGNOSIS — M79.671 CHRONIC FOOT PAIN, RIGHT: ICD-10-CM

## 2025-02-11 DIAGNOSIS — G89.29 CHRONIC FOOT PAIN, RIGHT: ICD-10-CM

## 2025-02-11 DIAGNOSIS — M79.674 GREAT TOE PAIN, RIGHT: Primary | ICD-10-CM

## 2025-02-11 LAB
POC AMPHETAMINES: NEGATIVE
POC BARBITURATES: NEGATIVE
POC BENZODIAZEPHINES: POSITIVE
POC COCAINE: NEGATIVE
POC METHADONE: NEGATIVE
POC METHAMPHETAMINE SCREEN URINE: NEGATIVE
POC OPIATES: NEGATIVE
POC OXYCODONE: NEGATIVE
POC PHENCYCLIDINE: NEGATIVE
POC PROPOXYPHENE: NEGATIVE
POC THC: NEGATIVE

## 2025-02-11 PROCEDURE — 1125F AMNT PAIN NOTED PAIN PRSNT: CPT | Performed by: ANESTHESIOLOGY

## 2025-02-11 PROCEDURE — 3080F DIAST BP >= 90 MM HG: CPT | Performed by: ANESTHESIOLOGY

## 2025-02-11 PROCEDURE — 1160F RVW MEDS BY RX/DR IN RCRD: CPT | Performed by: ANESTHESIOLOGY

## 2025-02-11 PROCEDURE — 3077F SYST BP >= 140 MM HG: CPT | Performed by: ANESTHESIOLOGY

## 2025-02-11 PROCEDURE — 99204 OFFICE O/P NEW MOD 45 MIN: CPT | Performed by: ANESTHESIOLOGY

## 2025-02-11 PROCEDURE — 1159F MED LIST DOCD IN RCRD: CPT | Performed by: ANESTHESIOLOGY

## 2025-02-11 PROCEDURE — 80305 DRUG TEST PRSMV DIR OPT OBS: CPT | Performed by: ANESTHESIOLOGY

## 2025-02-11 RX ORDER — HYDROCODONE BITARTRATE AND ACETAMINOPHEN 5; 325 MG/1; MG/1
1 TABLET ORAL EVERY 8 HOURS PRN
Qty: 90 TABLET | Refills: 0 | Status: SHIPPED | OUTPATIENT
Start: 2025-02-11

## 2025-02-11 RX ORDER — NICOTINE POLACRILEX 4 MG/1
20 GUM, CHEWING ORAL DAILY
COMMUNITY

## 2025-02-11 RX ORDER — COVID-19 ANTIGEN TEST
220 KIT MISCELLANEOUS 2 TIMES DAILY
COMMUNITY

## 2025-02-17 RX ORDER — ROSUVASTATIN CALCIUM 10 MG/1
10 TABLET, COATED ORAL NIGHTLY
Qty: 90 TABLET | Refills: 0 | Status: SHIPPED | OUTPATIENT
Start: 2025-02-17

## 2025-02-19 ENCOUNTER — OFFICE VISIT (OUTPATIENT)
Dept: FAMILY MEDICINE CLINIC | Facility: CLINIC | Age: 65
End: 2025-02-19
Payer: MEDICARE

## 2025-02-19 VITALS
HEIGHT: 65 IN | BODY MASS INDEX: 28.52 KG/M2 | HEART RATE: 75 BPM | DIASTOLIC BLOOD PRESSURE: 90 MMHG | WEIGHT: 171.2 LBS | OXYGEN SATURATION: 99 % | SYSTOLIC BLOOD PRESSURE: 150 MMHG | TEMPERATURE: 97.1 F

## 2025-02-19 DIAGNOSIS — L08.9 SKIN INFECTION: Primary | ICD-10-CM

## 2025-02-19 PROCEDURE — 3080F DIAST BP >= 90 MM HG: CPT | Performed by: NURSE PRACTITIONER

## 2025-02-19 PROCEDURE — 99213 OFFICE O/P EST LOW 20 MIN: CPT | Performed by: NURSE PRACTITIONER

## 2025-02-19 PROCEDURE — 1160F RVW MEDS BY RX/DR IN RCRD: CPT | Performed by: NURSE PRACTITIONER

## 2025-02-19 PROCEDURE — 3077F SYST BP >= 140 MM HG: CPT | Performed by: NURSE PRACTITIONER

## 2025-02-19 PROCEDURE — 1125F AMNT PAIN NOTED PAIN PRSNT: CPT | Performed by: NURSE PRACTITIONER

## 2025-02-19 PROCEDURE — 1159F MED LIST DOCD IN RCRD: CPT | Performed by: NURSE PRACTITIONER

## 2025-02-19 RX ORDER — CEPHALEXIN 500 MG/1
500 CAPSULE ORAL 2 TIMES DAILY
Qty: 14 CAPSULE | Refills: 0 | Status: SHIPPED | OUTPATIENT
Start: 2025-02-19 | End: 2025-02-26

## 2025-02-19 NOTE — PROGRESS NOTES
"Chief Complaint  Cyst (Pt complains of nodules in the groin area that are now leaking puss. )    Subjective        Amanda Sherwood presents to [facility]  History of Present Illness  Amanda Sherwood is a 64 y.o. female who presents to the clinic today with concerns of cyst to right groin. She has had bumps in this area for years, but always associated it with abrasion related to clothes worn during tennis.  However, she has not played tennis in years.  2 days ago, she noticed the area started weeping.  She has been using peroxide. No fever.     Objective   Vital Signs:  /90   Pulse 75   Temp 97.1 °F (36.2 °C)   Ht 165.1 cm (65\")   Wt 77.7 kg (171 lb 3.2 oz)   SpO2 99%   BMI 28.49 kg/m²   Estimated body mass index is 28.49 kg/m² as calculated from the following:    Height as of this encounter: 165.1 cm (65\").    Weight as of this encounter: 77.7 kg (171 lb 3.2 oz).            Physical Exam  Vitals reviewed.   Constitutional:       General: She is not in acute distress.     Appearance: Normal appearance. She is not ill-appearing, toxic-appearing or diaphoretic.   HENT:      Head: Normocephalic and atraumatic.   Eyes:      General: No scleral icterus.        Right eye: No discharge.         Left eye: No discharge.      Extraocular Movements: Extraocular movements intact.   Pulmonary:      Effort: Pulmonary effort is normal. No respiratory distress.   Genitourinary:         Comments: Erythema as indicated above with central pustule, thick white drainage.  Open comedone anterior to this.  Neurological:      General: No focal deficit present.      Mental Status: She is alert and oriented to person, place, and time.      Motor: No weakness.      Gait: Gait normal.   Psychiatric:         Mood and Affect: Mood normal.         Behavior: Behavior normal.        Result Review :                Assessment and Plan   Diagnoses and all orders for this visit:    1. Skin infection " (Primary)  -     cephalexin (KEFLEX) 500 MG capsule; Take 1 capsule by mouth 2 (Two) Times a Day for 7 days.  Dispense: 14 capsule; Refill: 0  -     Culture, Routine - Swab, Groin, right      Culture obtained today.  Concern for possible cellulitis.  Cephalexin prescribed.  Follow-up if not improving or worsening.         Follow Up   Return if symptoms worsen or fail to improve.  Patient was given instructions and counseling regarding her condition or for health maintenance advice. Please see specific information pulled into the AVS if appropriate.         Electronically signed by OLVIN Vazquez, 02/19/25, 2:31 PM EST.

## 2025-02-20 ENCOUNTER — HOSPITAL ENCOUNTER (OUTPATIENT)
Dept: MRI IMAGING | Facility: HOSPITAL | Age: 65
Discharge: HOME | End: 2025-02-20
Admitting: ORTHOPAEDIC SURGERY
Payer: MEDICARE

## 2025-02-20 DIAGNOSIS — M72.2 PLANTAR FASCIITIS: ICD-10-CM

## 2025-02-20 PROCEDURE — 73718 MRI LOWER EXTREMITY W/O DYE: CPT

## 2025-02-22 LAB
BACTERIA SPEC CULT: NORMAL
MICROORGANISM/AGENT SPEC: NORMAL

## 2025-02-25 PROBLEM — M76.811 ANTERIOR TIBIAL TENDONITIS, RIGHT: Status: ACTIVE | Noted: 2025-02-25

## 2025-02-25 PROBLEM — R93.7 BONE MARROW EDEMA: Status: ACTIVE | Noted: 2025-02-25

## 2025-02-25 NOTE — PROGRESS NOTES
Foot Follow Up      Patient: Amanda Sherwood    YOB: 1960 64 y.o. female    Chief Complaints: Foot in some ways better and in some ways worse    History of Present Illness:Patient underwent right Lapidus realignment fusion of the first TMT joint including calcaneal bone graft as well as modified Munroe bunion correction of the first MTP joint.  Also had second metatarsal phalangeal joint debridement and PIP fusion surgery was on 10/1/2022.     Patient was seen on 10/16/2024 and had been weaning down on pain medication but still using 1 pill about every 6 hours.  She reported some tingling and burning in her toes.     At that time there was no sign of infection and reviewed that I would expect the pain and neuritic symptoms to improve over time.  Sutures removed and Steri-Strips were applied.  She was placed into a well-padded bunion hammertoe spica dressing and instructed continue elevation and nonweightbearing.  refilled her pain medication.     Patient was seen on 10/30/2024 reporting she was doing okay but still sore.  She has still been taking Percocet about every 8 hours but it made her very sleepy.  She reported some burning in her foot and some feeling of pressure around her great toe.  Overall pain had improved though.  She had remained nonweightbearing.  Pain was rated 4 out of 10.     At that time there showed no sign of infection.  Reviewed that the pressure and burning sensation should improve over time his swelling diminished but nothing different to do for it.  She was switched to hydrocodone 5 mg every 8 hours and discussed continuing weaning down on her pain medication.  Her pin was removed without difficulty and second MTP joint was gently mobilized.  She was placed into a forefoot and ankle bunion hammertoe spica dressing and allowed partial foot flat weightbearing with walker or crutches.     Patient was seen on 11/13/2024 reporting that she was still sore but getting better.  She  "was down to taking pain medication about twice a day.  She forted that she did fall off her scooter on 11/10/2024.  She was doing some painting with her grandchildren and had a sheet down the got caught in her scooter.  She wanted mild pain with some feeling of pressure in burning and tingling in her toes.  Pain was rated at 3 out of 10     At that time I did not see any sign of infection and seem to be overall improving but still with some swelling.  She was counseled on gentle range of motion exercises but toes not get wet in the shower.  She was left foot flat weightbearing with postoperative shoe and walker for 2 weeks and then could progress to a more normal gait with walker and postoperative shoe and advised to continue weaning down on pain medication.  We discussed therapy decided to hold off on at that point.     Patient was seen on 12/11/2024 reporting that she was \"not good\".  Still reported pain and swelling with some sharp stabbing pains in her foot.  She was still using about 1-1/2 pain pills every 12 hours.  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  Pain was rated at 4 out of 10.     At that time there was no sign of infection and did have more swelling than I would have expected.  She was sent for Doppler.  She was encouraged to continue weaning down on pain medication and to start progressive weightbearing with her postoperative shoe and can transition to a cane when comfortable in 2 weeks or so.  Also referred her to work on physical therapy and sent for CT scan for further evaluation of healing of her first TMT fusion     Patient was seen on 1/2/2025 stating that she still has a lot of pain in her foot.  She felt as if there was a nail going between her first and second toes and pain feeling like a knife sticking to the top of her foot through to the bottom.  She had been using a postoperative shoe and at least " "a cane and has not had persistent swelling which was a little bit improved.  She had tried using a large GLADIS hose but had trouble getting them on as it was painful.  She was just starting physical therapy.     We reviewed her CT at that time and thankfully the fusion appears to be healed and denies any sign of infection.  I refilled her Lortab 5 mg 1 p.o. every 12 hours and reviewed that she can start with progressive weightbearing and thankfully fusion appears healed.  She was to discontinue Eliquis and start her on Voltaren SR 75 mg twice daily GI precautions and prescribed compounding cream to apply several times daily.  She was referred for radiographic guided injection of the right second MTP joint to see if that helped and have her continue with therapy.        Patient was seen on 1/23/2025 reporting that her foot was sore.  She back into tennis shoes and she was not using crutches.  She had decided to hold off on doing the injection her second toe because it really did not hurt in that area.  She also elected not to take the Voltaren SR as she was concerned about the side effect profile.  She had been using Aleve several times daily which \"took the edge off\".  She  was still using pain medication intermittently.  Main area she reported symptoms was that it felt like there is a nail going through the middle of her foot around the first TMT joint and pain plantarly with some areas of questionable fullness.  She had been doing physical therapy.  Pain was rated at 5 out of 10  HPI.    At that time thankfully did not see any sign of infection and fusion was healed.  I did not recommend any further surgical standpoint at that time we discussed possible eventual hardware removal but wanted to wait till she was started postoperative could not guarantee that would provide pain relief.  She was instructed on heel cord stretching exercises recommended continue therapy.  She was sent for MRI to make sure is no soft " tissue lesion or plantar disruption of the plantar midfoot in the area of her pain.  She was also going to reconsider the injection of her second MTP joint as this may help relieve some of the symptoms and stiffness thereof.  She also mentioned eye-movement desensitization reprocessing with which I was not familiar.  She was also referred to see Dr. Catalan for further pain management.    Patient saw Dr. Catalan on 2/11/2025 and I have reviewed those notes and she was continued on hydrocodone 5 mg every 8 hours as needed.  Patient is seen back today reporting that her foot is in some ways better in some ways worse.  She is not having as much pain approximately as she had had previously of those does still have pain thereof and is noticed some prominence over the medial aspect of the midfoot where her screw head is.  She does still report some achiness in her toes around the first MTP joint and to the second.  Also some discomfort of the anterior ankle.  She is due to get orthotics soon.    ROS: Foot pain  Past Medical History:   Diagnosis Date    Acute kidney injury     Ankle sprain     Anxiety 3 years ago    Arthritis of back     Arthritis of neck     Cancer     Squamous cell    Cervical disc disorder     Chronic pain disorder     Depression     Extremity pain     Feet    Fatty liver     Fractures     2nd toe right foot    GERD (gastroesophageal reflux disease)     H/O Leukopenia     Hernia     HIATAL    History of anemia     History of bronchitis     History of migraine     Hyperlipidemia     NO MEDS CURRENTLY. WORKING ON    Hypertension     Joint pain     Neck pain     Obesity (BMI 30.0-34.9) 06/06/2019    Osteoarthritis     Pulmonary embolism 8-    PVC (premature ventricular contraction)     Renal insufficiency     FANNY    Right foot pain     Seasonal allergies     Tear of meniscus of knee     Visual impairment     Wear bifocals     Physical Exam:   Vitals:    02/26/25 0827   Temp: 97.1 °F (36.2 °C)   TempSrc:  "Temporal   Weight: 80.8 kg (178 lb 3.2 oz)   Height: 165.1 cm (65\")   PainSc: 8    PainLoc: Foot     Well developed with normal mood.  On exam she is with her .  She is in athletic shoes with no other assistive device.  There is diminished swelling to the foot.  Her incisions remain well-healed with mild tenderness around the first TMT joint and plantar to this as well as some prominence of the screw medially.  There is limited motion of the first and second MTP joints some discomfort      Radiology: 3 views right foot ordered evaluate postoperative alignment reviewed.  Previous x-rays    MRI films report of the right foot dated 2/20/2025 reviewed which shows a dorsal staple transversing the first TMT joint and medial approach screw extends to the bases of the first and second metatarsals.  There is bony fusion of the first TMT joint.  There is chronic arthritis of the second and third TMT joints with surrounding marrow edema and marrow edema into the proximal shaft of the second and third metatarsals with chronic arthritic change to a lesser extent at the fourth and fifth TMT joints.  There is marrow edema within the distal aspect of the lateral cuneiform and cuboid.  Moderate arthritis of the first MTP joint    Chronic arthritis of the second MTP joint with chronic deformity and broadening of the articular base of the second proximal phalanx.  Marrow edema throughout the second proximal phalanx without evidence of fracture and chronic arthritis of the IP joints.  There is bony fusion of the second PIP joint.  There was no evidence of interdigital neuroma.  There is tendinopathy of the distal tibialis anterior tendon extending to its insertion      Assessment/Plan:  CT scan films and report of the left foot dated 12/30/2024 reviewed which shows fusion of the first TMT joint with 2 dorsal staples and a screw from the 1st-2nd metatarsal.  There is solid bony fusion of the first TMT joint.     Moderate chronic " arthritis of the second third and fourth TMT joints     Mild arthritis of the first MTP joint.  There is dorsal subluxation of the second MTP joint with arthritis and remodeling of the articular surfaces and spur formation with chronic arthritis of the IP joints.        MRI films and report of the right foot dated 2/22/2024 reviewed which shows subluxation of the sesamoids relative to the plantar side of the metatarsal head.  There is small volume of fluid in the first MTP joint and surprising preservation of the articular cartilage of the first MTP joint and between the metatarsal and sesamoids but no significant chondral loss appreciated at those articulations.     Second toe demonstrates exaggerated flexion to about 90 degrees at the level of the PIP joint and on sagittal images there is deformity of the base of the proximal phalanx which may be result of old intra-articular fracture.  There is marginal osteophytes around metatarsal head and around the base of the phalanx with exaggerated dorsiflexion of the MTP joint and irregular chondral loss at the base of the proximal phalanx and along the dorsum of the metatarsal head.  Plantar plate a tendons appear intact with no synovitis at the IP joint.     Third and fourth toes show mild exaggerated flexion at the level of the PIP joints and some arthritis at the IP joints.     There was arthritic change at the articulation of the navicular with first cuneiform and between the first and second cuneiforms and at the second and to a lesser extent dorsum of the third TMT joints.  First fourth and fifth TMT joints appeared normal.        Assessment/Plan: Status post right foot surgery as outlined above  1.  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  Right second MTP arthritis with extension deformity and PIP hammering  3. asymptomatic right third and fourth toes with mild flexion at the PIP joints  4.  Right foot  arthritic change  of navicular with first cuneiform and between first and second cuneiforms and second and to a lesser extent dorsum of the third TMT joints  5.  Right second and third TMT arthritis with lesser arthritis of the fourth and fifth TMT joint joints  6.  Bone marrow edema within the distal aspect of the lateral cuneiform and cuboid as well as second proximal phalanx  7.  Right anterior tib tendinitis without tear.  We discussed treatment going forward and she does feel like she has come along way from where she had been but obviously is still symptomatic.    We discussed proceeding with her hardware removal and she was to hold off on that for now which I think is reasonable.  She is can see about getting her orthotics and see how she does with those in a couple weeks and gradually wean into them.  She will continue with stretching and strengthening exercises use of pain medication as needed and feeling worsens she will let me know otherwise we will see her back in about 6 weeks with x-rays of her left foot.

## 2025-02-26 ENCOUNTER — OFFICE VISIT (OUTPATIENT)
Dept: ORTHOPEDIC SURGERY | Facility: CLINIC | Age: 65
End: 2025-02-26
Payer: MEDICARE

## 2025-02-26 VITALS — TEMPERATURE: 97.1 F | HEIGHT: 65 IN | WEIGHT: 178.2 LBS | BODY MASS INDEX: 29.69 KG/M2

## 2025-02-26 DIAGNOSIS — R93.7 BONE MARROW EDEMA: ICD-10-CM

## 2025-02-26 DIAGNOSIS — M20.41 HAMMER TOE OF RIGHT FOOT: Primary | ICD-10-CM

## 2025-02-26 DIAGNOSIS — M19.079 ARTHRITIS OF METATARSOPHALANGEAL JOINT: ICD-10-CM

## 2025-02-26 DIAGNOSIS — M25.374 INSTABILITY OF METATARSOPHALANGEAL JOINT OF RIGHT FOOT: ICD-10-CM

## 2025-02-26 DIAGNOSIS — M19.071 ARTHRITIS OF RIGHT FOOT: ICD-10-CM

## 2025-02-26 DIAGNOSIS — M76.811 ANTERIOR TIBIAL TENDONITIS, RIGHT: ICD-10-CM

## 2025-02-26 DIAGNOSIS — M20.11 HALLUX VALGUS OF RIGHT FOOT: ICD-10-CM

## 2025-03-10 ENCOUNTER — TELEPHONE (OUTPATIENT)
Dept: ONCOLOGY | Facility: CLINIC | Age: 65
End: 2025-03-10
Payer: MEDICARE

## 2025-03-10 NOTE — TELEPHONE ENCOUNTER
Caller: Amanda Sherwood    Relationship to patient: Self    Best call back number: [phone]    Chief complaint: RE-SCHEDULING     Type of visit: LAB, FOLLOW UP 1    Requested date: 4-8 OR NEXT AVLIABLE IN [facility]     If rescheduling, when is the original appointment: 3-18

## 2025-03-11 ENCOUNTER — OFFICE VISIT (OUTPATIENT)
Dept: PAIN MEDICINE | Facility: CLINIC | Age: 65
End: 2025-03-11
Payer: MEDICARE

## 2025-03-11 VITALS
HEIGHT: 65 IN | OXYGEN SATURATION: 97 % | SYSTOLIC BLOOD PRESSURE: 168 MMHG | WEIGHT: 173.8 LBS | HEART RATE: 72 BPM | DIASTOLIC BLOOD PRESSURE: 92 MMHG | TEMPERATURE: 96 F | BODY MASS INDEX: 28.96 KG/M2

## 2025-03-11 DIAGNOSIS — M79.674 GREAT TOE PAIN, RIGHT: ICD-10-CM

## 2025-03-11 DIAGNOSIS — Z79.899 HIGH RISK MEDICATION USE: ICD-10-CM

## 2025-03-11 DIAGNOSIS — G89.29 CHRONIC FOOT PAIN, RIGHT: Primary | ICD-10-CM

## 2025-03-11 DIAGNOSIS — G89.29 CHRONIC FOOT PAIN, RIGHT: ICD-10-CM

## 2025-03-11 DIAGNOSIS — M79.671 CHRONIC FOOT PAIN, RIGHT: ICD-10-CM

## 2025-03-11 DIAGNOSIS — M79.671 CHRONIC FOOT PAIN, RIGHT: Primary | ICD-10-CM

## 2025-03-11 PROCEDURE — 3080F DIAST BP >= 90 MM HG: CPT | Performed by: PHYSICIAN ASSISTANT

## 2025-03-11 PROCEDURE — 99214 OFFICE O/P EST MOD 30 MIN: CPT | Performed by: PHYSICIAN ASSISTANT

## 2025-03-11 PROCEDURE — 1160F RVW MEDS BY RX/DR IN RCRD: CPT | Performed by: PHYSICIAN ASSISTANT

## 2025-03-11 PROCEDURE — 1159F MED LIST DOCD IN RCRD: CPT | Performed by: PHYSICIAN ASSISTANT

## 2025-03-11 PROCEDURE — 3077F SYST BP >= 140 MM HG: CPT | Performed by: PHYSICIAN ASSISTANT

## 2025-03-11 PROCEDURE — 1125F AMNT PAIN NOTED PAIN PRSNT: CPT | Performed by: PHYSICIAN ASSISTANT

## 2025-03-11 RX ORDER — HYDROCODONE BITARTRATE AND ACETAMINOPHEN 5; 325 MG/1; MG/1
1 TABLET ORAL EVERY 8 HOURS PRN
Qty: 90 TABLET | Refills: 0 | Status: SHIPPED | OUTPATIENT
Start: 2025-03-13

## 2025-03-11 NOTE — PROGRESS NOTES
CHIEF COMPLAINT    Right foot. The patient states the pain is basically unchanged from last visit. She feels some areas of the foot have worsened, as she thinks she is overcompensating.     Subjective   Amanda Sherwood is a 64 y.o. female  who presents for follow-up.  She has a history of left foot pain.  She continues to have a stabbing and throbbing sensation located within the left great toe as well as into the midfoot region.  She does have intermittent swelling within the foot as well.  Patient is s/p left foot bunionectomy performed 10/1/2024.  She is unable to enjoy her normal physical activities such as playing tennis or walking few miles daily.  Patient is waiting to receive bilateral orthotics from the [facility].    Current medication regimen consists of hydrocodone 5/325 mg p.o. 3 times daily and naproxen 220 mg as needed.  The patient also takes Ativan for anxiety however she does not always take this on a daily basis (managed by psychiatry).    Pain today 4/10 VAS in severity.      Foot Pain  Symptoms are new.   Episode onset: October 2024.   Symptoms occur constantly.   Symptoms have been unchanged since onset.   Symptoms include joint pain and joint swelling.    Pertinent negative symptoms include no chills, no cough, no fever, no numbness and no weakness.   Aggravating factors include walking (Activity).   Treatments tried include ice, heat and oral narcotics.   Improvement on treatment was mild.        PEG Assessment   What number best describes your pain on average in the past week?4  What number best describes how, during the past week, pain has interfered with your enjoyment of life?7  What number best describes how, during the past week, pain has interfered with your general activity?  3    Review of Pertinent Medical Data ---  Review of Dr. Octaviano Mancilla's orthopedic note dated 2/26/2025: On 10/1/2024 patient underwent right Lapidus realignment fusion of the first TMT joint  "including calcaneal bone graft as well as modified Munroe bunion correction of the first MTP joint.  Also had second metatarsophalangeal joint debridement and PIP fusion surgery on 10/1/2022.  Discussion was had regarding hardware removal however the patient wants to hold off on that for now which is reasonable.  Patient is planning to receive orthotics and we will see how this goes in a couple of weeks and gradually wean into them.  She is to continue with stretching and strengthening exercises and use of pain medications as needed.  If feeling worse she is to notify Dr. Mancilla however she will follow back up in 6 weeks for further x-rays of the left foot.    The following portions of the patient's history were reviewed and updated as appropriate: allergies, current medications, past family history, past medical history, past social history, past surgical history, and problem list.    Review of Systems   Constitutional:  Negative for chills and fever.   Respiratory:  Negative for cough and shortness of breath.    Gastrointestinal:  Negative for constipation and diarrhea.   Genitourinary:  Negative for difficulty urinating.   Musculoskeletal:  Positive for joint pain.   Neurological:  Negative for dizziness, weakness, light-headedness and numbness.   Psychiatric/Behavioral:  Negative for agitation.      I have reviewed and confirmed the accuracy of the ROS as documented by the MA/LPN/RN JED Evans  Vitals:    03/11/25 1100   BP: 168/92   BP Location: Left arm   Patient Position: Sitting   Pulse: 72   Temp: 96 °F (35.6 °C)   TempSrc: Temporal   SpO2: 97%   Weight: 78.8 kg (173 lb 12.8 oz)   Height: 165.1 cm (65\")   PainSc: 4    PainLoc: Foot         Objective   Physical Exam  Vitals and nursing note reviewed.   Constitutional:       Appearance: Normal appearance.   HENT:      Head: Normocephalic.   Neurological:      Mental Status: She is alert and oriented to person, place, and time.      Cranial Nerves: " Cranial nerves 2-12 are intact.      Sensory: Sensation is intact.      Motor: Motor function is intact.      Gait: Gait abnormal (Ambulates with slight limp favoring left foot).   Psychiatric:         Mood and Affect: Mood normal.         Behavior: Behavior normal.         Thought Content: Thought content normal.         Judgment: Judgment normal.       Tobacco Use: Medium Risk (2/26/2025)    Patient History     Smoking Tobacco Use: Former     Smokeless Tobacco Use: Never     Passive Exposure: Past         Assessment & Plan   Diagnoses and all orders for this visit:    1. Chronic foot pain, right (Primary)    2. Great toe pain, right    3. High risk medication use        Amanda Sherwood reports a pain score of 4.  Given her pain assessment as noted, treatment options were discussed and the following options were decided upon as a follow-up plan to address the patient's pain: continuation of current treatment plan for pain, prescription for opiod analgesics, and use of non-medical modalities (ice, heat, stretching and/or behavior modifications).      --- Follow-up 1 month or sooner for further evaluation and treat recommendation  --- Refill Norco 5 mg. Patient appears stable with current regimen. No adverse effects. Regarding continuation of opioids, there is no evidence of aberrant behavior or any red flags.  The patient continues with appropriate response to opioid therapy. ADL's remain intact by self.   --- Opiate risk assessment performed shows low risk  --- The patient has been counseled today regarding the increased risk of respiratory depression with concurrent use of benzodiazepines with opiates.  These risk include but are not limited to, CNS depression, respiratory depression, and death.  The patient verbalizes understanding is willing to accept these risks.  Patient understands benzodiazepines and opiate medications should be spaced apart at least 6 hours.  --- Patient stated that she bought the Narcan  that is available over-the-counter as the prescription strength was very expensive  --- Patient understands that the goal is to stop the opiate medication once her pain improves after she recovers from second surgery if that is warranted  --- Patient was previously counseled regarding her initial drug screen regarding discontinuation of alcohol.  She states that she is not a big drinker and that her visit was right after [employer] Bowl as well as going out with a girlfriend.    The urine drug screen confirmation from 2/11/2025 has been reviewed and the result is inappropriately positive for high levels of EtOH based on patient history and ROCCO report         The patient signed an updated copy of the controlled substance agreement on 2/11/2025      ROCCO REPORT  As part of the patient's treatment plan, I am prescribing controlled substances. The patient has been made aware of appropriate use of such medications, including potential risk of somnolence, limited ability to drive and/or work safely, and the potential for dependence or overdose. It has also been made clear that these medications are for use by this patient only, without concomitant use of alcohol or other substances unless prescribed.     Patient has completed prescribing agreement detailing terms of continued prescribing of controlled substances, including monitoring ROCCO reports, urine drug screening, and pill counts if necessary. The patient is aware that inappropriate use will results in cessation of prescribing such medications.    As the clinician, I personally reviewed the ROCCO from 3/11/2025 while the patient was in the office today.    History and physical exam exhibit continued safe and appropriate use of controlled substances.     Dictated utilizing Dragon dictation.

## 2025-04-14 ENCOUNTER — OFFICE VISIT (OUTPATIENT)
Dept: PAIN MEDICINE | Facility: CLINIC | Age: 65
End: 2025-04-14
Payer: MEDICARE

## 2025-04-14 VITALS
HEIGHT: 65 IN | BODY MASS INDEX: 29.32 KG/M2 | TEMPERATURE: 97.9 F | HEART RATE: 86 BPM | OXYGEN SATURATION: 98 % | RESPIRATION RATE: 16 BRPM | SYSTOLIC BLOOD PRESSURE: 145 MMHG | DIASTOLIC BLOOD PRESSURE: 91 MMHG | WEIGHT: 176 LBS

## 2025-04-14 DIAGNOSIS — Z79.899 HIGH RISK MEDICATION USE: ICD-10-CM

## 2025-04-14 DIAGNOSIS — M79.674 GREAT TOE PAIN, RIGHT: ICD-10-CM

## 2025-04-14 DIAGNOSIS — G89.29 CHRONIC FOOT PAIN, RIGHT: Primary | ICD-10-CM

## 2025-04-14 DIAGNOSIS — G89.29 CHRONIC FOOT PAIN, RIGHT: ICD-10-CM

## 2025-04-14 DIAGNOSIS — M79.671 CHRONIC FOOT PAIN, RIGHT: Primary | ICD-10-CM

## 2025-04-14 DIAGNOSIS — M79.671 CHRONIC FOOT PAIN, RIGHT: ICD-10-CM

## 2025-04-14 PROCEDURE — 1160F RVW MEDS BY RX/DR IN RCRD: CPT | Performed by: PHYSICIAN ASSISTANT

## 2025-04-14 PROCEDURE — 3080F DIAST BP >= 90 MM HG: CPT | Performed by: PHYSICIAN ASSISTANT

## 2025-04-14 PROCEDURE — 3077F SYST BP >= 140 MM HG: CPT | Performed by: PHYSICIAN ASSISTANT

## 2025-04-14 PROCEDURE — 1159F MED LIST DOCD IN RCRD: CPT | Performed by: PHYSICIAN ASSISTANT

## 2025-04-14 PROCEDURE — 99214 OFFICE O/P EST MOD 30 MIN: CPT | Performed by: PHYSICIAN ASSISTANT

## 2025-04-14 PROCEDURE — 1125F AMNT PAIN NOTED PAIN PRSNT: CPT | Performed by: PHYSICIAN ASSISTANT

## 2025-04-14 RX ORDER — GABAPENTIN 100 MG/1
CAPSULE ORAL
Qty: 90 CAPSULE | Refills: 0 | Status: SHIPPED | OUTPATIENT
Start: 2025-04-14

## 2025-04-14 RX ORDER — HYDROCODONE BITARTRATE AND ACETAMINOPHEN 5; 325 MG/1; MG/1
1 TABLET ORAL EVERY 8 HOURS PRN
Qty: 90 TABLET | Refills: 0 | Status: SHIPPED | OUTPATIENT
Start: 2025-04-14

## 2025-04-14 NOTE — TELEPHONE ENCOUNTER
ROCCO reviewed and appropriate.  Last drug screen 2/11/25 appropriate for prescribed medications.

## 2025-04-14 NOTE — PROGRESS NOTES
CHIEF COMPLAINT  Right foot pain  Patient reports her foot pain has worsened since her last ov.      Subjective   Amanda Sherwood is a 64 y.o. female  who presents for follow-up.  She has a history of chronic right foot pain.  Since her last office visit the patient has noted progressive worsening of pain within the right midfoot region as well as affecting the right great toe.  She is s/p right foot bunionectomy that was performed 10/1/2024.  She has recently received new inserts from the [facility] and is unsure if this is what has increased the pain however she was told to give it 6 weeks in order to determine its effect.  She is having significant increased pain throughout the day and is also has difficulty getting to sleep at night.    She is currently medically managed on hydrocodone 5 mg p.o. 3 times daily and naproxen 220 mg as needed.  She is not to use NSAIDs on a continuous basis as it tends to affect her white blood cell count.  She also takes Ativan for anxiety though she does not use it on a daily basis (managed by psychiatry).    Pain today 7/10 VAS in severity.      Foot Pain  Symptoms are chronic.   Onset was 6 to12 months (October 2024).   Symptoms occur constantly.   Symptoms have been unchanged since onset.   Symptoms include joint pain and joint swelling.    Pertinent negative symptoms include no chills, no cough, no fever, no numbness and no weakness.   Aggravating factors include walking (Activity).   Treatments tried include ice, heat and oral narcotics.   Improvement on treatment was no relief.        PEG Assessment   What number best describes your pain on average in the past week?5  What number best describes how, during the past week, pain has interfered with your enjoyment of life?8  What number best describes how, during the past week, pain has interfered with your general activity?  4    Review of Pertinent Medical Data ---  No new imaging for review on today    The following  "portions of the patient's history were reviewed and updated as appropriate: allergies, current medications, past family history, past medical history, past social history, past surgical history, and problem list.    Review of Systems   Constitutional:  Negative for activity change, chills and fever.   Respiratory:  Negative for cough.    Gastrointestinal:  Negative for constipation and diarrhea.   Genitourinary:  Negative for difficulty urinating.   Musculoskeletal:  Positive for joint pain.   Neurological:  Negative for weakness and numbness.   Psychiatric/Behavioral:  Positive for sleep disturbance. Negative for self-injury and suicidal ideas.      I have reviewed and confirmed the accuracy of the ROS as documented by the MA/LPN/RN JED Evans  Vitals:    04/14/25 1000   BP: 145/91   Pulse: 86   Resp: 16   Temp: 97.9 °F (36.6 °C)   SpO2: 98%   Weight: 79.8 kg (176 lb)   Height: 165.1 cm (65\")   PainSc: 7          Objective   Physical Exam  Vitals and nursing note reviewed.   Constitutional:       Appearance: Normal appearance.   HENT:      Head: Normocephalic.   Neurological:      Mental Status: She is alert and oriented to person, place, and time.      Cranial Nerves: Cranial nerves 2-12 are intact.      Sensory: Sensation is intact.      Motor: Motor function is intact.      Gait: Gait abnormal (Ambulates with slight limp favoring right foot).   Psychiatric:         Mood and Affect: Mood normal.         Behavior: Behavior normal.         Thought Content: Thought content normal.         Judgment: Judgment normal.             Assessment & Plan   Diagnoses and all orders for this visit:    1. Chronic foot pain, right (Primary)  -     gabapentin (NEURONTIN) 100 MG capsule; Start 1 capsule PO QHS x 5 days; if tolerated may increase to 1 capsule PO BID x 5 days; if tolerated may increase to 1 capsule PO TID  Dispense: 90 capsule; Refill: 0    2. Great toe pain, right  -     gabapentin (NEURONTIN) 100 MG " capsule; Start 1 capsule PO QHS x 5 days; if tolerated may increase to 1 capsule PO BID x 5 days; if tolerated may increase to 1 capsule PO TID  Dispense: 90 capsule; Refill: 0    3. High risk medication use        Amanda Sherwood reports a pain score of 7.  Given her pain assessment as noted, treatment options were discussed and the following options were decided upon as a follow-up plan to address the patient's pain: continuation of current treatment plan for pain, prescription for non-opiod analgesics, prescription for opiod analgesics, and use of non-medical modalities (ice, heat, stretching and/or behavior modifications).      PHQ-2 Depression Screening  Little interest or pleasure in doing things? Not at all   Feeling down, depressed, or hopeless? Not at all   PHQ-2 Total Score 0         --- Follow-up 1 month sooner for medication management  --- Refill Norco 5 mg. Patient appears stable with current regimen. No adverse effects. Regarding continuation of opioids, there is no evidence of aberrant behavior or any red flags.  The patient continues with appropriate response to opioid therapy. ADL's remain intact by self.   --- ORT indicates low risk for opiate abuse/diversion however due to the concomitant use of benzodiazepines the patient is evaluated on a monthly basis  --- The patient has been counseled today regarding the increased risk of respiratory depression with concurrent use of benzodiazepines with opiates.  These risk include but are not limited to, CNS depression, respiratory depression, and death.  The patient verbalizes understanding is willing to accept these risks.  Patient understands benzodiazepines and opiate medications should be spaced apart at least 6 hours.  ---Patient stated that she bought the Narcan that is available over-the-counter as the prescription strength was very expensive   --- Will initiate a low-dose trial of gabapentin 100 mg 1 p.o. 3 times daily with a slow titration  schedule in hopes that this will help to calm down any neuropathic component of pain that she is currently experiencing.      The urine drug screen confirmation from 2/11/2025 has been reviewed and the result is appropriate based on patient history and ROCCO report.  Patient has been counseled regarding elevated EtOH levels.     The patient signed an updated copy of the controlled substance agreement on 2/11/2025.    ROCCO REPORT  As part of the patient's treatment plan, I am prescribing controlled substances. The patient has been made aware of appropriate use of such medications, including potential risk of somnolence, limited ability to drive and/or work safely, and the potential for dependence or overdose. It has also been made clear that these medications are for use by this patient only, without concomitant use of alcohol or other substances unless prescribed.     Patient has completed prescribing agreement detailing terms of continued prescribing of controlled substances, including monitoring ROCCO reports, urine drug screening, and pill counts if necessary. The patient is aware that inappropriate use will results in cessation of prescribing such medications.    As the clinician, I personally reviewed the ROCCO from 4/14/2025 while the patient was in the office today.    History and physical exam exhibit continued safe and appropriate use of controlled substances.     Dictated utilizing Dragon dictation.

## 2025-04-25 ENCOUNTER — HOSPITAL ENCOUNTER (EMERGENCY)
Facility: HOSPITAL | Age: 65
Discharge: HOME | End: 2025-04-25
Attending: EMERGENCY MEDICINE
Payer: MEDICARE

## 2025-04-25 ENCOUNTER — APPOINTMENT (OUTPATIENT)
Dept: ULTRASOUND IMAGING | Facility: HOSPITAL | Age: 65
End: 2025-04-25
Payer: MEDICARE

## 2025-04-25 ENCOUNTER — TELEPHONE (OUTPATIENT)
Dept: FAMILY MEDICINE CLINIC | Facility: CLINIC | Age: 65
End: 2025-04-25
Payer: MEDICARE

## 2025-04-25 VITALS
HEIGHT: 65 IN | TEMPERATURE: 97.3 F | WEIGHT: 174 LBS | BODY MASS INDEX: 28.99 KG/M2 | SYSTOLIC BLOOD PRESSURE: 180 MMHG | RESPIRATION RATE: 16 BRPM | OXYGEN SATURATION: 99 % | DIASTOLIC BLOOD PRESSURE: 84 MMHG | HEART RATE: 95 BPM

## 2025-04-25 DIAGNOSIS — M79.662 PAIN OF LEFT CALF: Primary | ICD-10-CM

## 2025-04-25 PROCEDURE — G0463 HOSPITAL OUTPT CLINIC VISIT: HCPCS | Performed by: EMERGENCY MEDICINE

## 2025-04-25 PROCEDURE — 99284 EMERGENCY DEPT VISIT MOD MDM: CPT

## 2025-04-25 PROCEDURE — 93971 EXTREMITY STUDY: CPT

## 2025-04-25 PROCEDURE — 99283 EMERGENCY DEPT VISIT LOW MDM: CPT | Performed by: EMERGENCY MEDICINE

## 2025-04-25 NOTE — TELEPHONE ENCOUNTER
Couple of weeks  with  pain left calf  ,no swelling or redness no warm to the touch but pain  ,she has history of blood clots and had surgery last October ,we don't have any opening for today  for acute apt ,she is asking if a scan can be order  or what she needs to do ,please advise

## 2025-04-25 NOTE — TELEPHONE ENCOUNTER
Due to history of blood clots, I would recommend proceeding to ER to rule out DVT as safest option.

## 2025-04-25 NOTE — FSED PROVIDER NOTE
Subjective   History of Present Illness  65yo female pmh significant htn/hyperlipidemia/PE presents ED c/o 2-3wk hx nontraumatic left calf pain.  ROS neg fever/chills/chest pain/soa/hemoptysis/syncope/edema.    History provided by:  Patient  Leg Pain      Review of Systems   Constitutional: Negative.    HENT: Negative.     Eyes: Negative.    Respiratory: Negative.     Cardiovascular: Negative.    Gastrointestinal: Negative.    Musculoskeletal:  Positive for myalgias.   Allergic/Immunologic: Negative for immunocompromised state.   Neurological: Negative.    All other systems reviewed and are negative.      Past Medical History:   Diagnosis Date    Acute kidney injury     Ankle sprain     Anxiety 3 years ago    Arthritis of back     Arthritis of neck     Cancer     Squamous cell    Cervical disc disorder     Chronic pain disorder     Depression     Extremity pain     Feet    Fatty liver     Fractures     2nd toe right foot    GERD (gastroesophageal reflux disease)     H/O Leukopenia     Hernia     HIATAL    History of anemia     History of bronchitis     History of migraine     Hyperlipidemia     NO MEDS CURRENTLY. WORKING ON    Hypertension     Joint pain     Neck pain     Obesity (BMI 30.0-34.9) 06/06/2019    Osteoarthritis     Pulmonary embolism 8-    PVC (premature ventricular contraction)     Renal insufficiency     FANNY    Right foot pain     Seasonal allergies     Tear of meniscus of knee     Visual impairment     Wear bifocals       Allergies   Allergen Reactions    Celecoxib Hives, Swelling and Rash     hives    Ace Inhibitors Cough    Lisinopril Cough       Past Surgical History:   Procedure Laterality Date    BREAST BIOPSY Bilateral     BENIGN. HORACE BREAST    BUNIONECTOMY Left     BUNIONECTOMY Left     REVISION    BUNIONECTOMY N/A 10/01/2024    Procedure: right foot bunion correction with first tarsometatarsal joint fusion and distal soft tissue release and bone resection.  Right second metatarsal  phalangeal joint release and bone debridement as needed with second proximal interphalangeal joint resection/fusion.  Bone graft from calcaneus.;  Surgeon: Octaviano Mancilla MD;  Location: [facility] OR [facility] [place];  Service: Orthopedics;  Lateralit    CARDIAC CATHETERIZATION N/A 10/28/2021    Procedure: Coronary angiography;  Surgeon: Vanessa Bennett MD;  Location: [facility] CATH INVASIVE LOCATION;  Service: Cardiovascular;  Laterality: N/A;    CARDIAC CATHETERIZATION N/A 10/28/2021    Procedure: Left heart cath;  Surgeon: Vanessa Bennett MD;  Location: [facility] CATH INVASIVE LOCATION;  Service: Cardiovascular;  Laterality: N/A;    CARDIAC CATHETERIZATION N/A 10/28/2021    Procedure: Right heart cath;  Surgeon: Vanessa Bennett MD;  Location: [facility] CATH INVASIVE LOCATION;  Service: Cardiovascular;  Laterality: N/A;    COLONOSCOPY N/A 02/01/2020    Procedure: COLONOSCOPY TO CECUM AND INTO TERMINAL ILEUM WITH COLD BIOPSY POLYPECTOMY;  Surgeon: Munira Montes MD;  Location: [facility] ENDOSCOPY;  Service: Gastroenterology    COLONOSCOPY N/A 10/01/2022    Procedure: COLONOSCOPY TO CECUM/TI WITH BIOPSY AND POLYPECTOMY ( COLD BX);  Surgeon: Jovanny Simms MD;  Location: [facility] ENDOSCOPY;  Service: Gastroenterology;  Laterality: N/A;  ANEMIA  POST: COLON POLYP; TEXTURE MUCOSAL CHANGES IN ASCENDING COLON; MODERATE PREP; INTERNAL HEMORRHOIDS    COLONOSCOPY  10/01/2022    DILATATION AND CURETTAGE      AUB no cancer    ENDOSCOPY N/A 02/01/2020    Procedure: ESOPHAGOGASTRODUODENOSCOPY WITH COLD BIOPSIES;  Surgeon: Munira Montes MD;  Location: [facility] ENDOSCOPY;  Service: Gastroenterology    ENDOSCOPY N/A 09/29/2022    Procedure: ESOPHAGOGASTRODUODENOSCOPY with cold biopsies;  Surgeon: Jill Blake MD;  Location: [facility] ENDOSCOPY;  Service: Gastroenterology;  Laterality: N/A;  pre: ANEMIA, H/O GASTROPARESIS AND NAUSEA VOMITTING  post: gastritis, hiatal hernia, z-line@38cm, gastric body and fundus polpys, small bowel  granularity    KNEE ARTHROSCOPY Left 2018    Procedure: KNEE ARTHROSCOPY LATERAL MENISECTOMY DEBRIDEMENT OF MEDIAL FEMORAL CONDYLE DEFECT DEBRIDEMENT OF ARTHRITIS;  Surgeon: Graciela Crockett MD;  [place] [facility] [place] [facility] [place];  Service: Orthopedics    KNEE SURGERY      ORTHOPEDIC SURGERY      Guillermo bunionectomy, left knee arthroscopy, nonunion left foot    SALPINGO OOPHORECTOMY Left     TONSILLECTOMY  as a child    TUBAL ABDOMINAL LIGATION      UPPER GASTROINTESTINAL ENDOSCOPY      WISDOM TOOTH EXTRACTION         Family History   Problem Relation Age of Onset    Dementia Mother     Arthritis Mother     Alzheimer's disease Mother     Stroke Father     Heart disease Father     Hypertension Father     Diabetes Father     Cancer Father         Colon    Colon cancer Father     Hyperlipidemia Father     Colon polyps Father     Hearing loss Father     Coronary artery disease Father     Breast cancer Sister 65    Atrial fibrillation Sister     Alcohol abuse Sister     Asthma Sister     Hypertension Sister     Cancer Sister         Breast    Scoliosis Sister     Arthritis Sister     Ovarian cancer Maternal Aunt     Prostate cancer Brother     Cancer Brother         Prostate    Diabetes Brother     Alzheimer's disease Brother     Asthma Sister     Thyroid disease Sister     Malig Hyperthermia Neg Hx        Social History     Socioeconomic History    Marital status:     Number of children: 2    Years of education: College   Tobacco Use    Smoking status: Former     Current packs/day: 0.00     Average packs/day: 0.5 packs/day for 4.8 years (2.4 ttl pk-yrs)     Types: Cigarettes     Start date: 1983     Quit date: 1987     Years since quittin.5     Passive exposure: Past    Smokeless tobacco: Never   Vaping Use    Vaping status: Never Used   Substance and Sexual Activity    Alcohol use: Yes     Alcohol/week: 2.0 standard drinks of alcohol     Comment: rarely    Drug use: Not Currently     Types: Marijuana     Sexual activity: Yes     Partners: Male     Birth control/protection: Post-menopausal           Objective   Physical Exam  Vitals and nursing note reviewed.   Constitutional:       Appearance: Normal appearance.   HENT:      Head: Normocephalic and atraumatic.      Right Ear: External ear normal.      Left Ear: External ear normal.      Nose: Nose normal.      Mouth/Throat:      Mouth: Mucous membranes are moist.      Pharynx: Oropharynx is clear.   Eyes:      Pupils: Pupils are equal, round, and reactive to light.   Cardiovascular:      Rate and Rhythm: Normal rate and regular rhythm.      Pulses: Normal pulses.      Heart sounds: Normal heart sounds. No murmur heard.     No friction rub. No gallop.   Pulmonary:      Effort: Pulmonary effort is normal.      Breath sounds: Normal breath sounds. No wheezing, rhonchi or rales.   Abdominal:      General: Abdomen is flat. Bowel sounds are normal. There is no distension.      Palpations: Abdomen is soft.      Tenderness: There is no abdominal tenderness.   Musculoskeletal:         General: No swelling, tenderness, deformity or signs of injury. Normal range of motion.      Cervical back: Normal range of motion and neck supple. No rigidity.      Left knee: Normal.      Right lower leg: No edema.      Left lower leg: Normal. No edema.      Left ankle: Normal.      Left foot: Normal.   Lymphadenopathy:      Cervical: No cervical adenopathy.   Skin:     General: Skin is warm and dry.   Neurological:      General: No focal deficit present.      Mental Status: She is alert and oriented to person, place, and time.      GCS: GCS eye subscore is 4. GCS verbal subscore is 5. GCS motor subscore is 6.         Procedures           ED Course      US Venous Doppler Lower Extremity Left (duplex)  Result Date: 4/25/2025  Narrative: Patient: TASHA ROMANO  Time Out: 14:21 Exam(s): US VENOUS LEFT LOWER EXTREMITY EXAM:   US Duplex Left Lower Extremity Veins CLINICAL HISTORY:      Calf  Pain. TECHNIQUE:   Real-time duplex ultrasound scan of the left lower extremity veins integrating B-mode two-dimensional vascular structure, Doppler spectral analysis, color flow Doppler imaging and compression. COMPARISON:   No relevant prior studies available. FINDINGS:   Deep veins:  Unremarkable.  No Deep vein thrombosis in the visualized common femoral, femoral, proximal deep femoral or popliteal veins.  The veins demonstrate normal color flow, are normally compressible, with normal phasic flow and or augmentation response.   Superficial veins:  Unremarkable.  No thrombus in the visualized great saphenous vein.   Soft tissues:  No acute findings.  No popliteal cyst. IMPRESSION:       No deep vein thrombosis of the left lower extremity.     Impression: Electronically signed by Alaina Richardson MD on 04-25-25 at 1421                                         Medical Decision Making  Problems Addressed:  Pain of left calf: acute illness or injury        Final diagnoses:   Pain of left calf       ED Disposition  ED Disposition       ED Disposition   Discharge    Condition   Good    Comment   --               Elsie Underwood MD  [address] [address]  [phone]    In 3 days           Medication List      No changes were made to your prescriptions during this visit.

## 2025-05-09 ENCOUNTER — TRANSCRIBE ORDERS (OUTPATIENT)
Dept: ADMINISTRATIVE | Facility: HOSPITAL | Age: 65
End: 2025-05-09
Payer: MEDICARE

## 2025-05-09 DIAGNOSIS — Z12.31 VISIT FOR SCREENING MAMMOGRAM: Primary | ICD-10-CM

## 2025-05-09 DIAGNOSIS — Z78.0 POSTMENOPAUSAL: ICD-10-CM

## 2025-05-12 ENCOUNTER — OFFICE VISIT (OUTPATIENT)
Dept: PAIN MEDICINE | Facility: CLINIC | Age: 65
End: 2025-05-12
Payer: MEDICARE

## 2025-05-12 VITALS
SYSTOLIC BLOOD PRESSURE: 148 MMHG | BODY MASS INDEX: 29.59 KG/M2 | HEART RATE: 92 BPM | WEIGHT: 177.6 LBS | DIASTOLIC BLOOD PRESSURE: 92 MMHG | HEIGHT: 65 IN | OXYGEN SATURATION: 97 % | TEMPERATURE: 95.7 F

## 2025-05-12 DIAGNOSIS — Z79.899 HIGH RISK MEDICATION USE: ICD-10-CM

## 2025-05-12 DIAGNOSIS — M79.671 CHRONIC FOOT PAIN, RIGHT: Primary | ICD-10-CM

## 2025-05-12 DIAGNOSIS — M79.674 GREAT TOE PAIN, RIGHT: ICD-10-CM

## 2025-05-12 DIAGNOSIS — G89.29 CHRONIC FOOT PAIN, RIGHT: Primary | ICD-10-CM

## 2025-05-12 PROCEDURE — 1159F MED LIST DOCD IN RCRD: CPT | Performed by: PHYSICIAN ASSISTANT

## 2025-05-12 PROCEDURE — 3077F SYST BP >= 140 MM HG: CPT | Performed by: PHYSICIAN ASSISTANT

## 2025-05-12 PROCEDURE — 1160F RVW MEDS BY RX/DR IN RCRD: CPT | Performed by: PHYSICIAN ASSISTANT

## 2025-05-12 PROCEDURE — 1125F AMNT PAIN NOTED PAIN PRSNT: CPT | Performed by: PHYSICIAN ASSISTANT

## 2025-05-12 PROCEDURE — 99214 OFFICE O/P EST MOD 30 MIN: CPT | Performed by: PHYSICIAN ASSISTANT

## 2025-05-12 PROCEDURE — 3080F DIAST BP >= 90 MM HG: CPT | Performed by: PHYSICIAN ASSISTANT

## 2025-05-12 RX ORDER — HYDROCODONE BITARTRATE AND ACETAMINOPHEN 7.5; 325 MG/1; MG/1
1 TABLET ORAL 2 TIMES DAILY PRN
Qty: 60 TABLET | Refills: 0 | Status: SHIPPED | OUTPATIENT
Start: 2025-05-14

## 2025-05-12 NOTE — TELEPHONE ENCOUNTER
Pt seen today; in process of simplifying dose of Norco--seen monthly; please send rx   Tranexamic Acid Pregnancy And Lactation Text: It is unknown if this medication is safe during pregnancy or breast feeding.

## 2025-05-19 ENCOUNTER — OFFICE VISIT (OUTPATIENT)
Dept: FAMILY MEDICINE CLINIC | Facility: CLINIC | Age: 65
End: 2025-05-19
Payer: MEDICARE

## 2025-05-19 ENCOUNTER — LAB (OUTPATIENT)
Dept: LAB | Facility: HOSPITAL | Age: 65
End: 2025-05-19
Payer: MEDICARE

## 2025-05-19 VITALS
SYSTOLIC BLOOD PRESSURE: 146 MMHG | HEIGHT: 65 IN | OXYGEN SATURATION: 98 % | BODY MASS INDEX: 29.99 KG/M2 | DIASTOLIC BLOOD PRESSURE: 88 MMHG | HEART RATE: 72 BPM | TEMPERATURE: 98.2 F | WEIGHT: 180 LBS

## 2025-05-19 DIAGNOSIS — L30.4 INTERTRIGO: ICD-10-CM

## 2025-05-19 DIAGNOSIS — R73.9 ELEVATED RANDOM BLOOD GLUCOSE LEVEL: ICD-10-CM

## 2025-05-19 DIAGNOSIS — E55.9 VITAMIN D DEFICIENCY: ICD-10-CM

## 2025-05-19 DIAGNOSIS — M79.671 CHRONIC FOOT PAIN, RIGHT: ICD-10-CM

## 2025-05-19 DIAGNOSIS — G89.29 CHRONIC FOOT PAIN, RIGHT: ICD-10-CM

## 2025-05-19 DIAGNOSIS — K21.9 GASTROESOPHAGEAL REFLUX DISEASE WITHOUT ESOPHAGITIS: ICD-10-CM

## 2025-05-19 DIAGNOSIS — Z13.6 SCREENING FOR ISCHEMIC HEART DISEASE: ICD-10-CM

## 2025-05-19 DIAGNOSIS — D70.9 NEUTROPENIA, UNSPECIFIED TYPE: ICD-10-CM

## 2025-05-19 DIAGNOSIS — I10 HYPERTENSION, UNSPECIFIED TYPE: ICD-10-CM

## 2025-05-19 DIAGNOSIS — Z13.0 SCREENING FOR DEFICIENCY ANEMIA: ICD-10-CM

## 2025-05-19 DIAGNOSIS — Z00.00 ENCOUNTER FOR MEDICARE ANNUAL WELLNESS EXAM: Primary | ICD-10-CM

## 2025-05-19 LAB
25(OH)D3 SERPL-MCNC: 66.1 NG/ML (ref 30–100)
ALBUMIN SERPL-MCNC: 5 G/DL (ref 3.5–5.2)
ALBUMIN/GLOB SERPL: 2.1 G/DL
ALP SERPL-CCNC: 62 U/L (ref 39–117)
ALT SERPL W P-5'-P-CCNC: 19 U/L (ref 1–33)
ANION GAP SERPL CALCULATED.3IONS-SCNC: 8.2 MMOL/L (ref 5–15)
AST SERPL-CCNC: 21 U/L (ref 1–32)
BASOPHILS # BLD AUTO: 0.01 10*3/MM3 (ref 0–0.2)
BASOPHILS NFR BLD AUTO: 0.3 % (ref 0–1.5)
BILIRUB SERPL-MCNC: 0.6 MG/DL (ref 0–1.2)
BUN SERPL-MCNC: 13 MG/DL (ref 8–23)
BUN/CREAT SERPL: 13.7 (ref 7–25)
CALCIUM SPEC-SCNC: 10.2 MG/DL (ref 8.6–10.5)
CHLORIDE SERPL-SCNC: 106 MMOL/L (ref 98–107)
CHOLEST SERPL-MCNC: 189 MG/DL (ref 0–200)
CO2 SERPL-SCNC: 26.8 MMOL/L (ref 22–29)
CREAT SERPL-MCNC: 0.95 MG/DL (ref 0.57–1)
DEPRECATED RDW RBC AUTO: 39.6 FL (ref 37–54)
EGFRCR SERPLBLD CKD-EPI 2021: 67.9 ML/MIN/1.73
EOSINOPHIL # BLD AUTO: 0.05 10*3/MM3 (ref 0–0.4)
EOSINOPHIL NFR BLD AUTO: 1.3 % (ref 0.3–6.2)
ERYTHROCYTE [DISTWIDTH] IN BLOOD BY AUTOMATED COUNT: 11.6 % (ref 12.3–15.4)
FERRITIN SERPL-MCNC: 202.5 NG/ML (ref 13–150)
GLOBULIN UR ELPH-MCNC: 2.4 GM/DL
GLUCOSE SERPL-MCNC: 102 MG/DL (ref 65–99)
HBA1C MFR BLD: 4.9 % (ref 4.8–5.6)
HCT VFR BLD AUTO: 40.7 % (ref 34–46.6)
HDLC SERPL-MCNC: 100 MG/DL (ref 40–60)
HGB BLD-MCNC: 13.5 G/DL (ref 12–15.9)
HGB RETIC QN AUTO: 34.8 PG (ref 29.8–36.1)
IMM GRANULOCYTES # BLD AUTO: 0 10*3/MM3 (ref 0–0.05)
IMM GRANULOCYTES NFR BLD AUTO: 0 % (ref 0–0.5)
IMM RETICS NFR: 9.7 % (ref 3–15.8)
IRON 24H UR-MRATE: 111 MCG/DL (ref 37–145)
IRON SATN MFR SERPL: 27 % (ref 20–50)
LDLC SERPL CALC-MCNC: 80 MG/DL (ref 0–100)
LDLC/HDLC SERPL: 0.8 {RATIO}
LYMPHOCYTES # BLD AUTO: 1.07 10*3/MM3 (ref 0.7–3.1)
LYMPHOCYTES NFR BLD AUTO: 26.8 % (ref 19.6–45.3)
MCH RBC QN AUTO: 31.1 PG (ref 26.6–33)
MCHC RBC AUTO-ENTMCNC: 33.2 G/DL (ref 31.5–35.7)
MCV RBC AUTO: 93.8 FL (ref 79–97)
MONOCYTES # BLD AUTO: 0.36 10*3/MM3 (ref 0.1–0.9)
MONOCYTES NFR BLD AUTO: 9 % (ref 5–12)
NEUTROPHILS NFR BLD AUTO: 2.5 10*3/MM3 (ref 1.7–7)
NEUTROPHILS NFR BLD AUTO: 62.6 % (ref 42.7–76)
NRBC BLD AUTO-RTO: 0 /100 WBC (ref 0–0.2)
PLATELET # BLD AUTO: 177 10*3/MM3 (ref 140–450)
PMV BLD AUTO: 10 FL (ref 6–12)
POTASSIUM SERPL-SCNC: 5.2 MMOL/L (ref 3.5–5.2)
PROT SERPL-MCNC: 7.4 G/DL (ref 6–8.5)
RBC # BLD AUTO: 4.34 10*6/MM3 (ref 3.77–5.28)
RETICS # AUTO: 0.07 10*6/MM3 (ref 0.02–0.13)
RETICS/RBC NFR AUTO: 1.55 % (ref 0.7–1.9)
SODIUM SERPL-SCNC: 141 MMOL/L (ref 136–145)
TIBC SERPL-MCNC: 405 MCG/DL (ref 298–536)
TRANSFERRIN SERPL-MCNC: 272 MG/DL (ref 200–360)
TRIGL SERPL-MCNC: 47 MG/DL (ref 0–150)
TSH SERPL DL<=0.05 MIU/L-ACNC: 0.75 UIU/ML (ref 0.27–4.2)
VLDLC SERPL-MCNC: 9 MG/DL (ref 5–40)
WBC NRBC COR # BLD AUTO: 3.99 10*3/MM3 (ref 3.4–10.8)

## 2025-05-19 PROCEDURE — 85046 RETICYTE/HGB CONCENTRATE: CPT

## 2025-05-19 PROCEDURE — 80061 LIPID PANEL: CPT

## 2025-05-19 PROCEDURE — 36415 COLL VENOUS BLD VENIPUNCTURE: CPT

## 2025-05-19 PROCEDURE — 80053 COMPREHEN METABOLIC PANEL: CPT

## 2025-05-19 PROCEDURE — 84466 ASSAY OF TRANSFERRIN: CPT

## 2025-05-19 PROCEDURE — 84443 ASSAY THYROID STIM HORMONE: CPT

## 2025-05-19 PROCEDURE — 82728 ASSAY OF FERRITIN: CPT

## 2025-05-19 PROCEDURE — 82306 VITAMIN D 25 HYDROXY: CPT

## 2025-05-19 PROCEDURE — 83540 ASSAY OF IRON: CPT

## 2025-05-19 PROCEDURE — 85025 COMPLETE CBC W/AUTO DIFF WBC: CPT

## 2025-05-19 PROCEDURE — 83036 HEMOGLOBIN GLYCOSYLATED A1C: CPT

## 2025-05-19 RX ORDER — CLOTRIMAZOLE AND BETAMETHASONE DIPROPIONATE 10; .64 MG/G; MG/G
1 CREAM TOPICAL 2 TIMES DAILY
Qty: 45 G | Refills: 2 | Status: SHIPPED | OUTPATIENT
Start: 2025-05-19

## 2025-05-19 NOTE — PATIENT INSTRUCTIONS
[employer] does have a cash pay option for $499/month.   I have included the link below if you would like to look further into this:     [email or web address]

## 2025-05-20 PROBLEM — T14.8XXA BRUISING: Status: RESOLVED | Noted: 2022-10-13 | Resolved: 2025-05-20

## 2025-05-20 PROBLEM — M79.89 RIGHT LEG SWELLING: Status: RESOLVED | Noted: 2024-12-11 | Resolved: 2025-05-20

## 2025-05-20 PROBLEM — R20.0 RIGHT LEG NUMBNESS: Status: RESOLVED | Noted: 2021-08-16 | Resolved: 2025-05-20

## 2025-05-20 PROBLEM — R06.00 DYSPNEA: Status: RESOLVED | Noted: 2021-09-14 | Resolved: 2025-05-20

## 2025-05-20 PROBLEM — K21.9 GASTROESOPHAGEAL REFLUX DISEASE WITHOUT ESOPHAGITIS: Status: ACTIVE | Noted: 2020-01-23

## 2025-05-20 PROBLEM — K21.00 GASTROESOPHAGEAL REFLUX DISEASE WITH ESOPHAGITIS WITHOUT HEMORRHAGE: Status: RESOLVED | Noted: 2022-08-08 | Resolved: 2025-05-20

## 2025-05-20 PROBLEM — Z09 SURGERY FOLLOW-UP: Status: RESOLVED | Noted: 2025-01-23 | Resolved: 2025-05-20

## 2025-05-21 NOTE — ASSESSMENT & PLAN NOTE
Colonoscopy: last 2020, repeat 5years  Cervical Cancer Screening: UTD with GYN  Mammogram: UTD with GYN  LDCT: never smoker  DEXA: indicated at age 67    Immunizations: eligible for Shingrex, Tdap  Labs: ordered today  The 10-year ASCVD risk score (Emeterio GLASER, et al., 2019) is: 4.8%    Values used to calculate the score:      Age: 64 years      Sex: Female      Is Non- : No      Diabetic: No      Tobacco smoker: No      Systolic Blood Pressure: 146 mmHg      Is BP treated: Yes      HDL Cholesterol: 100 mg/dL      Total Cholesterol: 189 mg/dL      Patient was counseled in regards to maintaining a healthy lifestyle, rich in whole grains, fruits and vegetables. Limit high saturated fats and processed sugars. Maintain an active lifestyle to promote overall health and well being.

## 2025-05-21 NOTE — PROGRESS NOTES
Subjective   The ABCs of the Annual Wellness Visit  Medicare Wellness Visit      Amanda Sherwood is a 64 y.o. patient who presents for a Medicare Wellness Visit.    The following portions of the patient's history were reviewed and   updated as appropriate: allergies, current medications, past family history, past medical history, past social history, past surgical history, and problem list.    Compared to one year ago, the patient's physical   health is the same.  Compared to one year ago, the patient's mental   health is the same.    Recent Hospitalizations:  This patient has had a [facility] admission record on file within the last 365 days.  Current Medical Providers:  Patient Care Team:  Elsie Underwood MD as PCP - General (Family Medicine)  Yue Maradiaga MD as Consulting Physician (Cardiology)  Cori Montes APRN as Nurse Practitioner (Family Medicine)  Cori Montes APRN as Nurse Practitioner (Family Medicine)  Lan Villafuerte II, MD as Consulting Physician (Hematology and Oncology)  Jordon Seth MD as Consulting Physician (Pulmonary Disease)  Cori Montes APRN as Referring Physician (Family Medicine)    Outpatient Medications Prior to Visit   Medication Sig Dispense Refill    Biotin 5000 MCG tablet Take 5,000 mg by mouth Daily. HOLD PRIOR TO SURG      cholecalciferol 25 MCG (1000 UT) tablet HOLD PRIOR TO SURG      finasteride (PROSCAR) 5 MG tablet Take 1 tablet by mouth Daily.      gabapentin (NEURONTIN) 100 MG capsule Start 1 capsule PO QHS x 5 days; if tolerated may increase to 1 capsule PO BID x 5 days; if tolerated may increase to 1 capsule PO TID 90 capsule 0    HYDROcodone-acetaminophen (NORCO) 7.5-325 MG per tablet Take 1 tablet by mouth 2 (Two) Times a Day As Needed for Moderate Pain. 60 tablet 0    Lactobacillus (PROBIOTIC ACIDOPHILUS PO) Take 1 tablet by mouth Daily. HOLD PRIOR TO SURG      LORazepam (ATIVAN) 0.5 MG tablet Take 1 tablet by mouth 3  (Three) Times a Day As Needed for Anxiety.      melatonin 5 MG tablet tablet Take 1 tablet by mouth Every Night.      metoprolol succinate XL (TOPROL-XL) 25 MG 24 hr tablet Take 3 tablets by mouth Every Night. 270 tablet 3    Minoxidil 5 % solution Apply  topically to the appropriate area as directed 2 (Two) Times a Day.      naloxone (Narcan) 4 MG/0.1ML nasal spray Administer 1 spray into the nostril(s) as directed by provider As Needed (Respiratory depression). 1 each 1    ondansetron (Zofran) 4 MG tablet Take 1 tablet by mouth Every 8 (Eight) Hours As Needed for Nausea or Vomiting. 30 tablet 0    rosuvastatin (CRESTOR) 10 MG tablet TAKE ONE TABLET BY MOUTH ONCE NIGHTLY 90 tablet 0    spironolactone (ALDACTONE) 25 MG tablet TAKE 1 TABLET BY MOUTH DAILY 90 tablet 1    Naproxen Sodium 220 MG capsule Take 220 mg by mouth 2 (Two) Times a Day. (Patient not taking: Reported on 5/19/2025)      Omeprazole 20 MG tablet delayed-release Take 20 mg by mouth Daily. (Patient not taking: Reported on 5/19/2025)       No facility-administered medications prior to visit.     Opioid medication/s are on active medication list.  and I have evaluated her active treatment plan and pain score trends (see table).  Vitals:    05/19/25 1001   PainSc: 4    PainLoc: Foot     I have reviewed the chart for potential of high risk medication and harmful drug interactions in the elderly.        Aspirin is not on active medication list.  Aspirin use is not indicated based on review of current medical condition/s. Risk of harm outweighs potential benefits.  .    Patient Active Problem List   Diagnosis    Chronic cough    Anxiety disorder due to general medical condition with panic attack    Encounter for Medicare annual wellness exam    Hypertension    Osteoarthrosis, localized, primary, knee    PVCs (premature ventricular contractions)    Hyperlipidemia    Acute medial meniscus tear of left knee    Obesity (BMI 30.0-34.9)    FH: colon cancer     "Gastroesophageal reflux disease without esophagitis    Pulmonary nodule    Other fatigue    Gastroparesis    Hyponatremia    Symptomatic anemia    History of pulmonary embolism    Chronic anticoagulation    Stage 3a chronic kidney disease (CKD)    Nausea and vomiting    Arthritis of right foot    Instability of metatarsophalangeal joint of right foot    Hammer toe of right foot    Hallux valgus of right foot    Arthritis of metatarsophalangeal joint    Plantar fasciitis    Anterior tibial tendonitis, right    Bone marrow edema    Chronic foot pain, right    Great toe pain, right    High risk medication use     Advance Care Planning {Advance Care Planning Hyperlink:23}Advance Directive is not on file.  ACP discussion was held with the patient during this visit. Patient has an advance directive (not in EMR), copy requested.            Objective   Vitals:    05/19/25 1001   BP: 146/88   Pulse: 72   Temp: 98.2 °F (36.8 °C)   SpO2: 98%   Weight: 81.6 kg (180 lb)   Height: 165.1 cm (65\")   PainSc: 4    PainLoc: Foot       Estimated body mass index is 29.95 kg/m² as calculated from the following:    Height as of this encounter: 165.1 cm (65\").    Weight as of this encounter: 81.6 kg (180 lb).         {Help Text;  If you change Medicare Wellness reason for visit to a Welcome to Medicare reason for visit after the encounter has started, please add SmartPhrase .[email or web address] to your note for proper gait and balance documentation. This text will disappear after the note is signed:41066}       Does the patient have evidence of cognitive impairment? No  Lab Results   Component Value Date    TRIG 47 05/19/2025     (H) 05/19/2025    LDL 80 05/19/2025    VLDL 9 05/19/2025    HGBA1C 4.90 05/19/2025                                                                                                Health  Risk Assessment    Smoking Status:  Social History     Tobacco Use   Smoking Status Former    Current packs/day: 0.00    Average " packs/day: 0.5 packs/day for 4.8 years (2.4 ttl pk-yrs)    Types: Cigarettes    Start date: 1983    Quit date: 1987    Years since quittin.5    Passive exposure: Past   Smokeless Tobacco Never     Alcohol Consumption:  Social History     Substance and Sexual Activity   Alcohol Use Yes    Alcohol/week: 2.0 standard drinks of alcohol    Comment: A couple times a month       Fall Risk Screen{Jump to Steadi Fall Risk Flowsheet:23}  STEADI Fall Risk Assessment was completed, and patient is at LOW risk for falls.Assessment completed on:2025    Depression Screening{Jump to PHQ SmartForm:23}   Little interest or pleasure in doing things? Not at all   Feeling down, depressed, or hopeless? Not at all   PHQ-2 Total Score 0      Health Habits and Functional and Cognitive Screenin/13/2025     4:43 PM   Functional & Cognitive Status   Do you have difficulty preparing food and eating? No   Do you have difficulty bathing yourself, getting dressed or grooming yourself? No   Do you have difficulty using the toilet? No   Do you have difficulty moving around from place to place? No   Do you have trouble with steps or getting out of a bed or a chair? No   Current Diet Well Balanced Diet   Dental Exam Up to date   Eye Exam Up to date   Exercise (times per week) 4 times per week   Current Exercises Include House Cleaning;Light Weights;Treadmill;Walking   Do you need help using the phone?  No   Are you deaf or do you have serious difficulty hearing?  No   Do you need help to go to places out of walking distance? No   Do you need help shopping? No   Do you need help preparing meals?  No   Do you need help with housework?  No   Do you need help with laundry? No   Do you need help taking your medications? No   Do you need help managing money? No   Do you ever drive or ride in a car without wearing a seat belt? No   Have you felt unusual stress, anger or loneliness in the last month? No   Who do you live with?  Spouse   If you need help, do you have trouble finding someone available to you? No   Have you been bothered in the last four weeks by sexual problems? No   Do you have difficulty concentrating, remembering or making decisions? No           Age-appropriate Screening Schedule:  Refer to the list below for future screening recommendations based on patient's age, sex and/or medical conditions. Orders for these recommended tests are listed in the plan section. The patient has been provided with a written plan.    Health Maintenance List  Health Maintenance   Topic Date Due    Pneumococcal Vaccine 50+ (1 of 1 - PCV) Never done    ZOSTER VACCINE (1 of 2) Never done    HEPATITIS C SCREENING  Never done    COVID-19 Vaccine (3 - 2024-25 season) 09/01/2024    ANNUAL WELLNESS VISIT  02/21/2025    INFLUENZA VACCINE  07/01/2025    LIPID PANEL  05/19/2026    MAMMOGRAM  05/29/2026    PAP SMEAR  07/01/2026    COLORECTAL CANCER SCREENING  10/01/2027    TDAP/TD VACCINES (2 - Td or Tdap) 06/07/2034                                                                                                                                                CMS Preventative Services Quick Reference  Risk Factors Identified During Encounter  Chronic Pain: Natural history and expected course discussed. Questions answered.  Immunizations Discussed/Encouraged: Prevnar 20 (Pneumococcal 20-valent conjugate) and Shingrix  Dental Screening Recommended  Vision Screening Recommended    The above risks/problems have been discussed with the patient.  Pertinent information has been shared with the patient in the After Visit Summary.  An After Visit Summary and PPPS were made available to the patient.    Follow Up:{Wrapup  Review (Popup)  Advance Care Planning  Labs  CC  Problem List  Visit Diagnosis  Medications  Result Review  Imaging  Health Maintenance  Quality  BestPractice  SmartSets  SnapShot  Encounters  Notes  Media  Procedures :23}   Next  Medicare Wellness visit to be scheduled in 1 year.         Additional E&M Note during same encounter follows:  Patient has additional, significant, and separately identifiable condition(s)/problem(s) that require work above and beyond the Medicare Wellness Visit     Chief Complaint  Medicare Wellness-subsequent (Pt has request for labs that she would like to have completed. ), Obesity (Pt would like to discuss weight loss medication. ), Arm Problem (Pt complains of right arm tingling. ), Leg Problem (Pt complains of left calf pain. ), and Breast Problem (Pt complains of yeast on the left breat that she has concerns for. )    Subjective {Problem List  Visit Diagnosis   Encounters  Notes  Medications  Labs  Result Review Imaging  Media :23}{Help Text;  If performing a Preventative Medicine Visit (e.g. ) in addition to AWV, choose the 1st SmartList option below and document any ROS/PE performed.  If performing a separately identifiable E/M service (e.g. 72240), choose 2nd SmartLink option below. This information in red will not appear in the final note after note is signed:67381}  Obesity  Arm Problem  Breast Problem    Amanda is also being seen today for additional medical problem/s.        The patient presents for evaluation of left calf pain, weight management, a yeast infection on her left breast, a burning sensation in her foot, shin pain, and tingling in her arm.     Left Calf Pain:  She recently visited the ER for pain in her left calf, where a duplex scan showed no DVT. Despite the negative Doppler result, she continues to experience calf pain, described as an ache. There is no associated leg swelling. She has resumed walking on a treadmill. The calf pain predates her increased physical activity and does not worsen with walking. She has been attempting stretching exercises and applies a cream to her stomach.     Chronic arthralgia of foot:  She is currently taking Norco 7.5 mg once tablet twice a  "day, initially prescribed for her right foot, and gabapentin 100 mg nightly. She reports a reduction in Norco dosage due to persistent crushing pain in her toes and ankle, which she attributes to arthritis. The nightly gabapentin prevents early morning awakenings due to pain. She uses custom-made shoe inserts and insoles. Her second toe is fused and immobile, but she manages to walk effectively. She has completed a course of physical therapy, attending sessions twice weekly for several months.  She experiences intermittent burning sensations in her foot, likened to being poked with a hot object. She previously used a topical pain cream on her foot but discontinued it due to skin irritation.     Chronic neutropenia   Hx of anemia:  She has chronic neutropenia and continues to follow up with Dr. Villafuerte. She is fasting today and is due for lab work.     Overweight BMI 29.95  She expresses concern about her weight. She recalls weight gain during periods of inactivity following surgeries on her left foot and right knee. She maintains a healthy diet and was previously able to play tennis post-surgery but currently only walks on a treadmill.    Intertrigo   She has a yeast infection on her left breast, characterized by redness. She is using an over-the-counter antifungal cream.     GERD   She is not taking omeprazole anymore. She is taking Pepcid as prescribed by her nephrologist.        Objective   Vital Signs:  /88   Pulse 72   Temp 98.2 °F (36.8 °C)   Ht 165.1 cm (65\")   Wt 81.6 kg (180 lb)   SpO2 98%   BMI 29.95 kg/m²   Physical Exam  Constitutional:       General: She is not in acute distress.  Eyes:      Conjunctiva/sclera: Conjunctivae normal.   Cardiovascular:      Rate and Rhythm: Normal rate and regular rhythm.      Pulses: Normal pulses.   Pulmonary:      Effort: Pulmonary effort is normal. No respiratory distress.      Breath sounds: Normal breath sounds.   Abdominal:      Palpations: Abdomen is soft. " There is no mass.      Tenderness: There is no abdominal tenderness. There is no guarding or rebound.   Musculoskeletal:         General: Tenderness present. No swelling, deformity or signs of injury.      Right lower leg: No edema.      Left lower leg: No edema.   Skin:     General: Skin is warm and dry.   Neurological:      Mental Status: She is alert and oriented to person, place, and time.   Psychiatric:         Mood and Affect: Mood normal.         Behavior: Behavior normal.         The following data was reviewed by: Elsie Underwood MD on 05/19/2025:  Data reviewed : none  Common labs          11/27/2024    13:53 12/24/2024    08:29 5/19/2025    12:09   Common Labs   Glucose   102    BUN   13    Creatinine   0.95    Sodium   141    Potassium   5.2    Chloride   106    Calcium   10.2    Albumin   5.0    Total Bilirubin   0.6    Alkaline Phosphatase   62    AST (SGOT)   21    ALT (SGPT)   19    WBC 4.2  2.69  3.99    Hemoglobin 14.7  13.1  13.5    Hematocrit 44.6  41.5  40.7    Platelets 181  170  177    Total Cholesterol   189    Triglycerides   47    HDL Cholesterol   100    LDL Cholesterol    80    Hemoglobin A1C   4.90           Assessment and Plan {CC Problem List  Visit Diagnosis   ROS  Review (Popup)  Health Maintenance  Quality  BestPractice  Medications  SmartSets  SnapShot Encounters  Media :23}{Help Text; When performing an adult Preventative Medicine Visit (e.g. 47125) using the optional SmartList below can help when documenting any age-appropriate advice given.  When using the SmartList review and update the information based on the unique discussion or advice given to the patient.  As a reminder, diagnosis code Z00.00 (nl exam) or Z00.01 (abnl exam), should be added when this service is performed.  Text will disappear once the note is signed:75386}Additional age appropriate preventative wellness advice topics were discussed during today's preventative wellness exam(some topics already  addressed during AWV portion of the note above):        Encounter for Medicare annual wellness exam  Colonoscopy: last 2023, repeat 5years  Cervical Cancer Screening: UTD with GYN  Mammogram: UTD with GYN  LDCT: never smoker  DEXA: indicated at age 65    Immunizations: eligible for Shingrex, PCV20  Labs: ordered today  The 10-year ASCVD risk score (Emeterio GLASER, et al., 2019) is: 4.8%    Values used to calculate the score:      Age: 64 years      Sex: Female      Is Non- : No      Diabetic: No      Tobacco smoker: No      Systolic Blood Pressure: 146 mmHg      Is BP treated: Yes      HDL Cholesterol: 100 mg/dL      Total Cholesterol: 189 mg/dL      Patient was counseled in regards to maintaining a healthy lifestyle, rich in whole grains, fruits and vegetables. Limit high saturated fats and processed sugars. Maintain an active lifestyle to promote overall health and well being.            Elevated random blood glucose level    Orders:    ORDER: Hemoglobin A1c; Future    Lipid Panel; Future    Vitamin D deficiency    Orders:    Vitamin D,25-Hydroxy; Future    Neutropenia, unspecified type    Orders:    Retic With IRF & RET-He; Future    CBC Auto Differential; Future    Iron and TIBC; Future    Ferritin; Future    Hypertension, unspecified type  {Hypertension is (optional):[id number]}    Orders:    Comprehensive Metabolic Panel; Future    TSH; Future    Lipid Panel; Future    Gastroesophageal reflux disease without esophagitis         Chronic foot pain, right         Intertrigo    Orders:    clotrimazole-betamethasone (LOTRISONE) 1-0.05 % cream; Apply 1 Application topically to the appropriate area as directed 2 (Two) Times a Day.       1. Left calf pain.  - The etiology of the pain could be attributed to either varicose veins or muscular strain.  - Duplex ruled out VTE.  - A comprehensive stretching regimen, to be performed once or twice daily, was recommended.  - The use of a magnesium supplement  at night was suggested for potential muscle relaxation and sleep enhancement.  - If the stretching regimen proves ineffective, a venous reflux test will be considered to evaluate for venous insufficiency. In the event of a positive reflux test, a referral to a vascular surgeon will be made. Compression therapy may also be beneficial identified.     2. Weight management.  2. BMI 29.95  - complications include HTN, GERD,elevated glucose  - discussed pharmacologic and lifestyle options.   - The patient was advised to continue her treadmill walking routine.     3. Yeast infection on the left breast.  - The patient was advised to keep the area as dry as possible to prevent exacerbation of the condition.  - A prescription for clotrimazole with betamethasone cream was provided.  - If the cream proves ineffective, a referral to a dermatologist will be considered for further testing.     4. Chronic foot pain  - The use of topical lidocaine was suggested for symptom management.  - The patient is currently taking Norco 7.5 mg once tablet twice a day and gabapentin 100 mg nightly for pain management.  - She has reduced her intake of Norco due to decreased pain levels.  - continue to follow with pain mgmt and ortho     5. Chronic neutropenia.  - The patient continues to follow up with Dr. Villafuerte for chronic neutropenia.  - Lab work will be done today.     {Time Spent (Optional):12734}  Follow Up {Instructions Charge Capture  Follow-up Communications :23}  Return in about 6 months (around 11/19/2025).  Patient was given instructions and counseling regarding her condition or for health maintenance advice. Please see specific information pulled into the AVS if appropriate.

## 2025-05-21 NOTE — PROGRESS NOTES
Chief Complaint  Medicare Wellness-subsequent (Pt has request for labs that she would like to have completed. ), Obesity (Pt would like to discuss weight loss medication. ), Arm Problem (Pt complains of right arm tingling. ), Leg Problem (Pt complains of left calf pain. ), and Breast Problem (Pt complains of yeast on the left breat that she has concerns for. )    Subjective    {Problem List  Visit Diagnosis   Encounters  Notes  Medications  Labs  Result Review Imaging  Media :23}    Amanda Sherwood presents to [facility]  History of Present Illness       The patient presents for evaluation of left calf pain, weight management, a yeast infection on her left breast, a burning sensation in her foot, shin pain, and tingling in her arm.    She recently visited the ER for pain in her left calf, where a duplex scan showed no DVT. Despite the negative Doppler result, she continues to experience calf pain, described as an ache. There is no associated leg swelling, but she notes significant weight gain. She has resumed walking on a treadmill but struggles with weight loss. The calf pain predates her increased physical activity and does not worsen with walking. She has been attempting stretching exercises and applies a cream to her stomach.    She is currently taking Norco 7.5 mg once tablet twice a day, initially prescribed for her right foot, and gabapentin 100 mg nightly. She reports a reduction in Norco dosage due to persistent crushing pain in her toes and ankle, which she attributes to arthritis. The nightly gabapentin prevents early morning awakenings due to pain. She uses custom-made shoe inserts and insoles. Her second toe is fused and immobile, but she manages to walk effectively. She has completed a course of physical therapy, attending sessions twice weekly for several months.    She has chronic neutropenia and continues to follow up with Dr. Villafuerte. She is fasting today and is  "due for lab work.    She expresses concern about her weight and is considering Ozempic. She recalls weight gain during periods of inactivity following surgeries on her left foot and right knee. She maintains a healthy diet and was previously able to play tennis post-surgery but currently only walks on a treadmill.    She has a yeast infection on her left breast, characterized by redness. She is using an over-the-counter antifungal cream.    She experiences intermittent burning sensations in her foot, likened to being poked with a hot object. She previously used a topical pain cream on her foot but discontinued it due to skin irritation.    She reports constant shin pain, which she believes may be related to her foot condition. Her ability to walk normally depends on her footwear and timing of pain medication.    She experiences tingling in her arm, which she attributes to muscle issues rather than her known degenerative disc disease or neck achiness. The tingling extends to certain fingers and is more pronounced during the day. She has been lifting her 1-year-old grandson, who weighs approximately 28 to 29 pounds, with her right hand.    She is not taking omeprazole anymore. She is taking Pepcid as prescribed by her nephrologist.       Objective   Vital Signs:  /88   Pulse 72   Temp 98.2 °F (36.8 °C)   Ht 165.1 cm (65\")   Wt 81.6 kg (180 lb)   SpO2 98%   BMI 29.95 kg/m²   Estimated body mass index is 29.95 kg/m² as calculated from the following:    Height as of this encounter: 165.1 cm (65\").    Weight as of this encounter: 81.6 kg (180 lb).            Physical Exam   Result Review :{Labs  Result Review  Imaging  Med Tab  Media  Procedures :23}  {The following data was reviewed by (Optional):96187}  {Ambulatory Labs (Optional):94756}  {Data reviewed (Optional):92744:::1}          Assessment and Plan {CC Problem List  Visit Diagnosis   ROS  Review (Popup)  Health Maintenance  Quality  " BestPractice  Medications  [facility]  SnapShot Encounters  Media :23}  Diagnoses and all orders for this visit:    1. Screening for deficiency anemia (Primary)  -     Retic With IRF & RET-He; Future  -     CBC Auto Differential; Future  -     Iron and TIBC; Future  -     Ferritin; Future    2. Screening for ischemic heart disease  -     Comprehensive Metabolic Panel; Future  -     TSH; Future  -     Lipid Panel; Future    3. Elevated random blood glucose level  -     ORDER: Hemoglobin A1c; Future    4. Vitamin D deficiency  -     Vitamin D,25-Hydroxy; Future    5. Neutropenia, unspecified type  -     Retic With IRF & RET-He; Future    6. Hypertension, unspecified type  -     TSH; Future    Other orders  -     clotrimazole-betamethasone (LOTRISONE) 1-0.05 % cream; Apply 1 Application topically to the appropriate area as directed 2 (Two) Times a Day.  Dispense: 45 g; Refill: 2           1. Left calf pain.  - The etiology of the pain could be attributed to either varicose veins or a tight calf muscle.  - A comprehensive stretching regimen, to be performed once or twice daily, was recommended.  - The use of a magnesium supplement at night was suggested for potential muscle relaxation and sleep enhancement.  - If the stretching regimen proves ineffective, a venous reflux test will be considered. In the event of a positive reflux test, a referral to a vascular surgeon will be made. Compression therapy may also be beneficial if symptomatic varicose veins are identified.    2. Weight management.  - The patient was informed about the potential cost of Ozempic, which may not be covered by insurance and could amount to approximately $500 per month.  - The risk of weight regain upon discontinuation of the medication was discussed.  - A pamphlet containing a QR code was provided for her to check her insurance coverage for Ozempic. Laboratory tests will be conducted today to assess her blood sugar levels. If diagnosed with  diabetes, the treatment plan will be adjusted accordingly.  - The patient was advised to continue her treadmill walking routine.    3. Yeast infection on the left breast.  - The patient was advised to keep the area as dry as possible to prevent exacerbation of the condition.  - A prescription for clotrimazole with betamethasone cream was provided.  - If the cream proves ineffective, a referral to a dermatologist will be considered for further testing.    4. Burning sensation in the foot.  - The patient was informed that the burning sensation could persist for up to a year post-block.  - The use of topical lidocaine was suggested for symptom management.    5. Shin pain.  - The patient's shin pain is likely related to her foot condition and altered gait.  - Calf stretches were recommended for potential relief.  - The patient was advised to focus on plantar flexion exercises.  - If the pain intensifies, a return to physical therapy may be beneficial.    6. Tingling in the arm.  - The patient's symptoms suggest nerve compression of the radial nerve at the elbow, possibly due to biceps tendinopathy or tendinitis.  - The patient was advised to avoid lifting heavy objects with the affected arm and to rest the arm for 4 to 6 weeks.  - The use of Voltaren gel was recommended for symptom management.    7. Chronic neutropenia.  - The patient continues to follow up with Dr. Villafuerte for chronic neutropenia.  - Lab work will be done today, including cholesterol and blood sugar levels.    8. Medication management.  - The patient is currently taking Norco 7.5 mg once tablet twice a day and gabapentin 100 mg nightly for pain management.  - She has reduced her intake of Norco due to decreased pain levels.          {Time Spent (Optional):05954}  Follow Up {Instructions Charge Capture  Follow-up Communications :23}  Return in about 6 months (around 11/19/2025).  Patient was given instructions and counseling regarding her condition or  for health maintenance advice. Please see specific information pulled into the AVS if appropriate.     {[facility] [employer] Provider Statement:49008}

## 2025-05-21 NOTE — PROGRESS NOTES
Subjective   The ABCs of the Annual Wellness Visit  Medicare Wellness Visit      Amanda Sherwood is a 64 y.o. patient who presents for a Medicare Wellness Visit.    The following portions of the patient's history were reviewed and   updated as appropriate: allergies, current medications, past family history, past medical history, past social history, past surgical history, and problem list.    Compared to one year ago, the patient's physical   health is the same.  Compared to one year ago, the patient's mental   health is the same.    Recent Hospitalizations:  This patient has had a [facility] [place] admission record on file within the last 365 days.  Current Medical Providers:  Patient Care Team:  Elsie Underwood MD as PCP - General (Family Medicine)  Yue Maradiaga MD as Consulting Physician (Cardiology)  Cori Montes APRN as Nurse Practitioner (Family Medicine)  Cori Montes APRN as Nurse Practitioner (Family Medicine)  Lan Villafuerte II, MD as Consulting Physician (Hematology and Oncology)  Jordon Seth MD as Consulting Physician (Pulmonary Disease)  Cori Montes APRN as Referring Physician (Family Medicine)    Outpatient Medications Prior to Visit   Medication Sig Dispense Refill    Biotin 5000 MCG tablet Take 5,000 mg by mouth Daily. HOLD PRIOR TO SURG      cholecalciferol 25 MCG (1000 UT) tablet HOLD PRIOR TO SURG      finasteride (PROSCAR) 5 MG tablet Take 1 tablet by mouth Daily.      gabapentin (NEURONTIN) 100 MG capsule Start 1 capsule PO QHS x 5 days; if tolerated may increase to 1 capsule PO BID x 5 days; if tolerated may increase to 1 capsule PO TID 90 capsule 0    HYDROcodone-acetaminophen (NORCO) 7.5-325 MG per tablet Take 1 tablet by mouth 2 (Two) Times a Day As Needed for Moderate Pain. 60 tablet 0    Lactobacillus (PROBIOTIC ACIDOPHILUS PO) Take 1 tablet by mouth Daily. HOLD PRIOR TO SURG      LORazepam (ATIVAN) 0.5 MG tablet Take 1 tablet by mouth 3  (Three) Times a Day As Needed for Anxiety.      melatonin 5 MG tablet tablet Take 1 tablet by mouth Every Night.      metoprolol succinate XL (TOPROL-XL) 25 MG 24 hr tablet Take 3 tablets by mouth Every Night. 270 tablet 3    Minoxidil 5 % solution Apply  topically to the appropriate area as directed 2 (Two) Times a Day.      naloxone (Narcan) 4 MG/0.1ML nasal spray Administer 1 spray into the nostril(s) as directed by provider As Needed (Respiratory depression). 1 each 1    ondansetron (Zofran) 4 MG tablet Take 1 tablet by mouth Every 8 (Eight) Hours As Needed for Nausea or Vomiting. 30 tablet 0    rosuvastatin (CRESTOR) 10 MG tablet TAKE ONE TABLET BY MOUTH ONCE NIGHTLY 90 tablet 0    spironolactone (ALDACTONE) 25 MG tablet TAKE 1 TABLET BY MOUTH DAILY 90 tablet 1    Naproxen Sodium 220 MG capsule Take 220 mg by mouth 2 (Two) Times a Day. (Patient not taking: Reported on 5/19/2025)      Omeprazole 20 MG tablet delayed-release Take 20 mg by mouth Daily. (Patient not taking: Reported on 5/19/2025)       No facility-administered medications prior to visit.     Opioid medication/s are on active medication list.  and I have evaluated her active treatment plan and pain score trends (see table).  Vitals:    05/19/25 1001   PainSc: 4    PainLoc: Foot     I have reviewed the chart for potential of high risk medication and harmful drug interactions in the elderly.        Aspirin is not on active medication list.  Aspirin use is not indicated based on review of current medical condition/s. Risk of harm outweighs potential benefits.  .    Patient Active Problem List   Diagnosis    Chronic cough    Anxiety disorder due to general medical condition with panic attack    Encounter for Medicare annual wellness exam    Hypertension    Osteoarthrosis, localized, primary, knee    PVCs (premature ventricular contractions)    Hyperlipidemia    Acute medial meniscus tear of left knee    Obesity (BMI 30.0-34.9)    FH: colon cancer     "Gastroesophageal reflux disease without esophagitis    Pulmonary nodule    Other fatigue    Gastroparesis    Hyponatremia    Symptomatic anemia    History of pulmonary embolism    Chronic anticoagulation    Stage 3a chronic kidney disease (CKD)    Nausea and vomiting    Arthritis of right foot    Instability of metatarsophalangeal joint of right foot    Hammer toe of right foot    Hallux valgus of right foot    Arthritis of metatarsophalangeal joint    Plantar fasciitis    Anterior tibial tendonitis, right    Bone marrow edema    Chronic foot pain, right    Great toe pain, right    High risk medication use     Advance Care Planning Advance Directive is not on file.  ACP discussion was held with the patient during this visit. Patient has an advance directive (not in EMR), copy requested.            Objective   Vitals:    25 1001   BP: 146/88   Pulse: 72   Temp: 98.2 °F (36.8 °C)   SpO2: 98%   Weight: 81.6 kg (180 lb)   Height: 165.1 cm (65\")   PainSc: 4    PainLoc: Foot       Estimated body mass index is 29.95 kg/m² as calculated from the following:    Height as of this encounter: 165.1 cm (65\").    Weight as of this encounter: 81.6 kg (180 lb).                Does the patient have evidence of cognitive impairment? No  Lab Results   Component Value Date    TRIG 47 2025     (H) 2025    LDL 80 2025    VLDL 9 2025    HGBA1C 4.90 2025                                                                                                Health  Risk Assessment    Smoking Status:  Social History     Tobacco Use   Smoking Status Former    Current packs/day: 0.00    Average packs/day: 0.5 packs/day for 4.8 years (2.4 ttl pk-yrs)    Types: Cigarettes    Start date: 1983    Quit date: 1987    Years since quittin.5    Passive exposure: Past   Smokeless Tobacco Never     Alcohol Consumption:  Social History     Substance and Sexual Activity   Alcohol Use Yes    Alcohol/week: 2.0 " standard drinks of alcohol    Comment: A couple times a month       Fall Risk Screen  TREYADI Fall Risk Assessment was completed, and patient is at LOW risk for falls.Assessment completed on:2025    Depression Screening   Little interest or pleasure in doing things? Not at all   Feeling down, depressed, or hopeless? Not at all   PHQ-2 Total Score 0      Health Habits and Functional and Cognitive Screenin/13/2025     4:43 PM   Functional & Cognitive Status   Do you have difficulty preparing food and eating? No   Do you have difficulty bathing yourself, getting dressed or grooming yourself? No   Do you have difficulty using the toilet? No   Do you have difficulty moving around from place to place? No   Do you have trouble with steps or getting out of a bed or a chair? No   Current Diet Well Balanced Diet   Dental Exam Up to date   Eye Exam Up to date   Exercise (times per week) 4 times per week   Current Exercises Include House Cleaning;Light Weights;Treadmill;Walking   Do you need help using the phone?  No   Are you deaf or do you have serious difficulty hearing?  No   Do you need help to go to places out of walking distance? No   Do you need help shopping? No   Do you need help preparing meals?  No   Do you need help with housework?  No   Do you need help with laundry? No   Do you need help taking your medications? No   Do you need help managing money? No   Do you ever drive or ride in a car without wearing a seat belt? No   Have you felt unusual stress, anger or loneliness in the last month? No   Who do you live with? Spouse   If you need help, do you have trouble finding someone available to you? No   Have you been bothered in the last four weeks by sexual problems? No   Do you have difficulty concentrating, remembering or making decisions? No           Age-appropriate Screening Schedule:  Refer to the list below for future screening recommendations based on patient's age, sex and/or medical  conditions. Orders for these recommended tests are listed in the plan section. The patient has been provided with a written plan.    Health Maintenance List  Health Maintenance   Topic Date Due    Pneumococcal Vaccine 50+ (1 of 1 - PCV) Never done    ZOSTER VACCINE (1 of 2) Never done    HEPATITIS C SCREENING  Never done    COVID-19 Vaccine (3 - 2024-25 season) 09/01/2024    ANNUAL WELLNESS VISIT  02/21/2025    INFLUENZA VACCINE  07/01/2025    LIPID PANEL  05/19/2026    MAMMOGRAM  05/29/2026    PAP SMEAR  07/01/2026    COLORECTAL CANCER SCREENING  10/01/2027    TDAP/TD VACCINES (2 - Td or Tdap) 06/07/2034                                                                                                                                                CMS Preventative Services Quick Reference  Risk Factors Identified During Encounter  Chronic Pain: Natural history and expected course discussed. Questions answered.  Immunizations Discussed/Encouraged: Prevnar 20 (Pneumococcal 20-valent conjugate) and Shingrix  Dental Screening Recommended  Vision Screening Recommended    The above risks/problems have been discussed with the patient.  Pertinent information has been shared with the patient in the After Visit Summary.  An After Visit Summary and PPPS were made available to the patient.    Follow Up:   Next Medicare Wellness visit to be scheduled in 1 year.         Additional E&M Note during same encounter follows:  Patient has additional, significant, and separately identifiable condition(s)/problem(s) that require work above and beyond the Medicare Wellness Visit     Chief Complaint  Medicare Wellness-subsequent (Pt has request for labs that she would like to have completed. ), Obesity (Pt would like to discuss weight loss medication. ), Arm Problem (Pt complains of right arm tingling. ), Leg Problem (Pt complains of left calf pain. ), and Breast Problem (Pt complains of yeast on the left breat that she has concerns for.  )    Subjective   Obesity  Arm Problem  Breast Problem    Amanda is also being seen today for additional medical problem/s.        The patient presents for evaluation of left calf pain, weight management, a yeast infection on her left breast, a burning sensation in her foot, shin pain, and tingling in her arm.     Left Calf Pain:  She recently visited the ER for pain in her left calf, where a duplex scan showed no DVT. Despite the negative Doppler result, she continues to experience calf pain, described as an ache. There is no associated leg swelling. She has resumed walking on a treadmill. The calf pain predates her increased physical activity and does not worsen with walking. She has been attempting stretching exercises and applies a cream to her stomach.     Chronic arthralgia of foot:  She is currently taking Norco 7.5 mg once tablet twice a day, initially prescribed for her right foot, and gabapentin 100 mg nightly. She reports a reduction in Norco dosage due to persistent crushing pain in her toes and ankle, which she attributes to arthritis. The nightly gabapentin prevents early morning awakenings due to pain. She uses custom-made shoe inserts and insoles. Her second toe is fused and immobile, but she manages to walk effectively. She has completed a course of physical therapy, attending sessions twice weekly for several months.  She experiences intermittent burning sensations in her foot, likened to being poked with a hot object. She previously used a topical pain cream on her foot but discontinued it due to skin irritation.     Chronic neutropenia   Hx of anemia:  She has chronic neutropenia and continues to follow up with Dr. Villafuerte. She is fasting today and is due for lab work.     Overweight BMI 29.95  She expresses concern about her weight. She recalls weight gain during periods of inactivity following surgeries on her left foot and right knee. She maintains a healthy diet and was previously able to play  "tennis post-surgery but currently only walks on a treadmill.    Intertrigo   She has a yeast infection on her left breast, characterized by redness. She is using an over-the-counter antifungal cream.     GERD   She is not taking omeprazole anymore. She is taking Pepcid as prescribed by her nephrologist.        Objective   Vital Signs:  /88   Pulse 72   Temp 98.2 °F (36.8 °C)   Ht 165.1 cm (65\")   Wt 81.6 kg (180 lb)   SpO2 98%   BMI 29.95 kg/m²   Physical Exam  Constitutional:       General: She is not in acute distress.  Eyes:      Conjunctiva/sclera: Conjunctivae normal.   Cardiovascular:      Rate and Rhythm: Normal rate and regular rhythm.      Pulses: Normal pulses.   Pulmonary:      Effort: Pulmonary effort is normal. No respiratory distress.      Breath sounds: Normal breath sounds.   Abdominal:      Palpations: Abdomen is soft. There is no mass.      Tenderness: There is no abdominal tenderness. There is no guarding or rebound.   Musculoskeletal:         General: Tenderness present. No swelling, deformity or signs of injury.      Right lower leg: No edema.      Left lower leg: No edema.   Skin:     General: Skin is warm and dry.   Neurological:      Mental Status: She is alert and oriented to person, place, and time.   Psychiatric:         Mood and Affect: Mood normal.         Behavior: Behavior normal.         The following data was reviewed by: Elsie Underwood MD on 05/19/2025:  Data reviewed : none  Common labs          11/27/2024    13:53 12/24/2024    08:29 5/19/2025    12:09   Common Labs   Glucose   102    BUN   13    Creatinine   0.95    Sodium   141    Potassium   5.2    Chloride   106    Calcium   10.2    Albumin   5.0    Total Bilirubin   0.6    Alkaline Phosphatase   62    AST (SGOT)   21    ALT (SGPT)   19    WBC 4.2  2.69  3.99    Hemoglobin 14.7  13.1  13.5    Hematocrit 44.6  41.5  40.7    Platelets 181  170  177    Total Cholesterol   189    Triglycerides   47    HDL Cholesterol  "  100    LDL Cholesterol    80    Hemoglobin A1C   4.90           Assessment and Plan Additional age appropriate preventative wellness advice topics were discussed during today's preventative wellness exam(some topics already addressed during AWV portion of the note above):        Encounter for Medicare annual wellness exam  Colonoscopy: last 2020, repeat 5years  Cervical Cancer Screening: UTD with GYN  Mammogram: UTD with GYN  LDCT: never smoker  DEXA: indicated at age 67    Immunizations: eligible for Shingrex, Tdap  Labs: ordered today  The 10-year ASCVD risk score (Emeterio GLASER, et al., 2019) is: 4.8%    Values used to calculate the score:      Age: 64 years      Sex: Female      Is Non- : No      Diabetic: No      Tobacco smoker: No      Systolic Blood Pressure: 146 mmHg      Is BP treated: Yes      HDL Cholesterol: 100 mg/dL      Total Cholesterol: 189 mg/dL      Patient was counseled in regards to maintaining a healthy lifestyle, rich in whole grains, fruits and vegetables. Limit high saturated fats and processed sugars. Maintain an active lifestyle to promote overall health and well being.            Elevated random blood glucose level    Orders:    ORDER: Hemoglobin A1c; Future    Lipid Panel; Future    Vitamin D deficiency    Orders:    Vitamin D,25-Hydroxy; Future    Neutropenia, unspecified type    Orders:    Retic With IRF & RET-He; Future    CBC Auto Differential; Future    Iron and TIBC; Future    Ferritin; Future    Hypertension, unspecified type      Orders:    Comprehensive Metabolic Panel; Future    TSH; Future    Lipid Panel; Future    Gastroesophageal reflux disease without esophagitis         Chronic foot pain, right         Intertrigo    Orders:    clotrimazole-betamethasone (LOTRISONE) 1-0.05 % cream; Apply 1 Application topically to the appropriate area as directed 2 (Two) Times a Day.       1. Left calf pain.  - The etiology of the pain could be attributed to either  varicose veins or muscular strain.  - Duplex ruled out VTE.  - A comprehensive stretching regimen, to be performed once or twice daily, was recommended.  - The use of a magnesium supplement at night was suggested for potential muscle relaxation and sleep enhancement.  - If the stretching regimen proves ineffective, a venous reflux test will be considered to evaluate for venous insufficiency. In the event of a positive reflux test, a referral to a vascular surgeon will be made. Compression therapy may also be beneficial identified.     2. Weight management.  2. BMI 29.95  - complications include HTN, GERD,elevated glucose  - discussed pharmacologic and lifestyle options.   - The patient was advised to continue her treadmill walking routine.     3. Yeast infection on the left breast.  - The patient was advised to keep the area as dry as possible to prevent exacerbation of the condition.  - A prescription for clotrimazole with betamethasone cream was provided.  - If the cream proves ineffective, a referral to a dermatologist will be considered for further testing.     4. Chronic foot pain  - The use of topical lidocaine was suggested for symptom management.  - The patient is currently taking Norco 7.5 mg once tablet twice a day and gabapentin 100 mg nightly for pain management.  - She has reduced her intake of Norco due to decreased pain levels.  - continue to follow with pain mgmt and ortho     5. Chronic neutropenia.  - The patient continues to follow up with Dr. Villafuerte for chronic neutropenia.  - Lab work will be done today.       Follow Up   Return in about 6 months (around 11/19/2025).  Patient was given instructions and counseling regarding her condition or for health maintenance advice. Please see specific information pulled into the AVS if appropriate.

## 2025-05-21 NOTE — ASSESSMENT & PLAN NOTE
Colonoscopy: last 2023, repeat 5years  Cervical Cancer Screening: UTD with GYN  Mammogram: UTD with GYN  LDCT: never smoker  DEXA: indicated at age 67    Immunizations: eligible for Shingrex, PCV20  Labs: ordered today  The 10-year ASCVD risk score (Emeterio GLASER, et al., 2019) is: 4.8%    Values used to calculate the score:      Age: 64 years      Sex: Female      Is Non- : No      Diabetic: No      Tobacco smoker: No      Systolic Blood Pressure: 146 mmHg      Is BP treated: Yes      HDL Cholesterol: 100 mg/dL      Total Cholesterol: 189 mg/dL      Patient was counseled in regards to maintaining a healthy lifestyle, rich in whole grains, fruits and vegetables. Limit high saturated fats and processed sugars. Maintain an active lifestyle to promote overall health and well being.

## 2025-05-22 RX ORDER — ROSUVASTATIN CALCIUM 10 MG/1
10 TABLET, COATED ORAL NIGHTLY
Qty: 90 TABLET | Refills: 0 | Status: SHIPPED | OUTPATIENT
Start: 2025-05-22

## 2025-06-04 ENCOUNTER — OFFICE VISIT (OUTPATIENT)
Dept: CARDIOLOGY | Age: 65
End: 2025-06-04
Payer: MEDICARE

## 2025-06-04 VITALS
WEIGHT: 176 LBS | SYSTOLIC BLOOD PRESSURE: 144 MMHG | HEART RATE: 84 BPM | HEIGHT: 65 IN | BODY MASS INDEX: 29.32 KG/M2 | DIASTOLIC BLOOD PRESSURE: 82 MMHG

## 2025-06-04 DIAGNOSIS — R07.2 PRECORDIAL PAIN: ICD-10-CM

## 2025-06-04 DIAGNOSIS — I49.3 PVCS (PREMATURE VENTRICULAR CONTRACTIONS): ICD-10-CM

## 2025-06-04 DIAGNOSIS — I10 PRIMARY HYPERTENSION: Primary | ICD-10-CM

## 2025-06-04 DIAGNOSIS — E78.2 MIXED HYPERLIPIDEMIA: ICD-10-CM

## 2025-06-04 PROCEDURE — 1159F MED LIST DOCD IN RCRD: CPT | Performed by: INTERNAL MEDICINE

## 2025-06-04 PROCEDURE — 1160F RVW MEDS BY RX/DR IN RCRD: CPT | Performed by: INTERNAL MEDICINE

## 2025-06-04 PROCEDURE — 3077F SYST BP >= 140 MM HG: CPT | Performed by: INTERNAL MEDICINE

## 2025-06-04 PROCEDURE — 93000 ELECTROCARDIOGRAM COMPLETE: CPT | Performed by: INTERNAL MEDICINE

## 2025-06-04 PROCEDURE — 3079F DIAST BP 80-89 MM HG: CPT | Performed by: INTERNAL MEDICINE

## 2025-06-04 PROCEDURE — 99214 OFFICE O/P EST MOD 30 MIN: CPT | Performed by: INTERNAL MEDICINE

## 2025-06-04 RX ORDER — METOPROLOL TARTRATE 1 MG/ML
5 INJECTION, SOLUTION INTRAVENOUS
OUTPATIENT
Start: 2025-06-04

## 2025-06-04 RX ORDER — METOPROLOL TARTRATE 25 MG/1
200 TABLET, FILM COATED ORAL ONCE
OUTPATIENT
Start: 2025-06-04 | End: 2025-06-04

## 2025-06-04 RX ORDER — METOPROLOL TARTRATE 25 MG/1
100 TABLET, FILM COATED ORAL ONCE
OUTPATIENT
Start: 2025-06-04

## 2025-06-04 RX ORDER — VALSARTAN 80 MG/1
80 TABLET ORAL NIGHTLY
Qty: 90 TABLET | Refills: 3 | Status: SHIPPED | OUTPATIENT
Start: 2025-06-04

## 2025-06-04 RX ORDER — SODIUM CHLORIDE 0.9 % (FLUSH) 0.9 %
10 SYRINGE (ML) INJECTION EVERY 12 HOURS SCHEDULED
OUTPATIENT
Start: 2025-06-04

## 2025-06-04 RX ORDER — METOPROLOL TARTRATE 100 MG/1
TABLET ORAL
Qty: 2 TABLET | Refills: 0 | Status: SHIPPED | OUTPATIENT
Start: 2025-06-04

## 2025-06-04 RX ORDER — NITROGLYCERIN 0.4 MG/1
0.8 TABLET SUBLINGUAL
OUTPATIENT
Start: 2025-06-04

## 2025-06-04 RX ORDER — SODIUM CHLORIDE 0.9 % (FLUSH) 0.9 %
10 SYRINGE (ML) INJECTION AS NEEDED
OUTPATIENT
Start: 2025-06-04

## 2025-06-04 RX ORDER — METOPROLOL TARTRATE 50 MG
50 TABLET ORAL
OUTPATIENT
Start: 2025-06-04

## 2025-06-04 RX ORDER — METOPROLOL TARTRATE 25 MG/1
150 TABLET, FILM COATED ORAL ONCE
OUTPATIENT
Start: 2025-06-04

## 2025-06-04 RX ORDER — NITROGLYCERIN 0.4 MG/1
0.4 TABLET SUBLINGUAL
OUTPATIENT
Start: 2025-06-04 | End: 2025-06-04

## 2025-06-04 RX ORDER — SODIUM CHLORIDE 9 MG/ML
40 INJECTION, SOLUTION INTRAVENOUS AS NEEDED
OUTPATIENT
Start: 2025-06-04

## 2025-06-04 RX ORDER — IVABRADINE 5 MG/1
15 TABLET, FILM COATED ORAL ONCE
OUTPATIENT
Start: 2025-06-04 | End: 2025-06-04

## 2025-06-04 RX ORDER — NITROGLYCERIN 0.4 MG/1
TABLET SUBLINGUAL
Qty: 30 TABLET | Refills: 1 | Status: SHIPPED | OUTPATIENT
Start: 2025-06-04

## 2025-06-04 RX ORDER — METOPROLOL TARTRATE 25 MG/1
50 TABLET, FILM COATED ORAL ONCE
OUTPATIENT
Start: 2025-06-04

## 2025-06-04 NOTE — PROGRESS NOTES
Date of Office Visit: 2025  Encounter Provider: Yue Maradiaga MD  Place of Service: [facility] CARDIOLOGY  Patient Name: Amanda Sherwood  :1960      Patient ID:  Amanda Sherwood is a 64 y.o. female is here for  followup for hypertension        History of Present Illness    She has known hypertension, hyperlipidemia, PVCs, SVT, history of calf muscle bleed-Eliquis was stopped, gastroparesis (Jyoti) and GERD. She does get low platelets and low white counts when she is using ibuprofen.  She had pulmonary emboli 2021.     Her father had myocardial infarction when he was 60s years old requiring angioplasty and  then went on to have coronary bypass grafting. She has never had vascular screening or  coronary calcium score done.     She had bunion surgery and it failed.  She had to have a second bunion surgery and she is still dealing with the consequences of that, and because she missed so much work she lost her job at [employer].  She has gained about 25 pounds because she has not been able to exercise        She had a normal stress nuclear perfusion study and a normal echocardiogram done 2018.     She began having severe vomiting with a GI virus on 2021.  She lost 20 pounds in a week.  She then came to the emergency department on  with acute kidney injury and found to have urinary tract infection.  She was hydrated and treated for this and was discharged.  She said though she was pretty much almost bedridden for a week.  She was admitted -2021 with multisubsegmental pulmonary emboli without acute cor pulmonale.  She presented with short windedness to the [facility] on 2021.  Her lower extremity venous duplex study showed no evidence of DVT. A D-dimer was done and was elevated at 2.09.  She was sent to the emergency department.  CTA of the chest showed bilateral lower lobe branch pulmonary emboli.  I did review her images on CT.  There is not  good imaging of the left main or circumflex.  The RCA is not well visualized but the proximal LAD appears patent.  There is no calcification noted in the aorta.  She had echocardiogram done 8/29/21 changes fraction 63%, no seen valve disease, RVSP 36 mmHg, normal right ventricular size and function.  She had normal bilateral lower extremity venous duplex studies done.       Cardiac catheterization done 10/28/2021 for short windedness showed normal coronary arteries, normal left and right-sided pressures.  Echo done 10/19/2021 showed ejection fraction 66 to 70%, normal chamber sizes and normal valvular structure and function.     Labs on 3/7/2022 showed elevated free protein S antigen with normal total protein S antigen, greater than 150 protein S functional, otherwise normal CBC.       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  She is seeing a psychiatrist.      CT chest without contrast on 5/16/2023 showed stable benign-appearing micronodules of the right lung.  She had a calf muscle bleed-Eliquis was stopped.  She is now on aspirin.    Labs on 5/19/2025 show glucose 102 with otherwise normal CMP, normal hemoglobin A1c, normal TSH, total cholesterol 189, , LDL 80, VLDL 9, triglycerides 47, normal CBC.  She had normal right lower extremity venous duplex study on 12/11/2024.  CT abdomen and pelvis done 7/30/2024 showed no acute abnormalities.  CT right foot done 12/30/2024 showed arthritis.  Venous duplex study done of left lower leg/25/25 showed no DVT.    She has noticed occasional rare nonexertional precordial chest pressure which is better when she uses sublingual nitroglycerin.  She has no heart racing but occasional palpitations.  She has had no dizziness or syncope.  She has had multiple surgeries on her foot and has not been able to exercise and so has gained weight.  Because of this, her blood pressure is gone up.  She has had no passing out.  She has no orthopnea or PND.  She  has no lower extremity edema.    Past Medical History:   Diagnosis Date    Acute kidney injury     Ankle sprain     Anxiety 3 years ago    Arthritis of back     Arthritis of neck     Cancer     Squamous cell    Cervical disc disorder     Chronic pain disorder     Colon polyp     Depression     Extremity pain     Feet    Fatty liver     Fractures     2nd toe right foot    GERD (gastroesophageal reflux disease)     H/O Leukopenia     Hernia     HIATAL    History of anemia     History of bronchitis     History of migraine     Hyperlipidemia     NO MEDS CURRENTLY. WORKING ON    Hypertension     Joint pain     Liver disease     Fatty liver    Neck pain     Mild, occasional    Obesity (BMI 30.0-34.9) 06/06/2019    Osteoarthritis     Pulmonary embolism 8-    PVC (premature ventricular contraction)     Renal insufficiency     FANNY    Right foot pain     Seasonal allergies     Tear of meniscus of knee     Visual impairment     Wear bifocals         Past Surgical History:   Procedure Laterality Date    BREAST BIOPSY Bilateral     BENIGN. HORACE BREAST    BUNIONECTOMY Left     BUNIONECTOMY Left     REVISION    BUNIONECTOMY N/A 10/01/2024    Procedure: right foot bunion correction with first tarsometatarsal joint fusion and distal soft tissue release and bone resection.  Right second metatarsal phalangeal joint release and bone debridement as needed with second proximal interphalangeal joint resection/fusion.  Bone graft from calcaneus.;  Surgeon: Octaviano Mancilla MD;  [place] [facility] [place] [facility] [place]  Service: Orthopedics;  Lateralit    CARDIAC CATHETERIZATION N/A 10/28/2021    Procedure: Coronary angiography;  Surgeon: Vanessa Bennett MD;  Location: [facility] CATH INVASIVE LOCATION;  Service: Cardiovascular;  Laterality: N/A;    CARDIAC CATHETERIZATION N/A 10/28/2021    Procedure: Left heart cath;  Surgeon: Vanessa Bennett MD;  Location: [facility] CATH INVASIVE LOCATION;  Service: Cardiovascular;  Laterality: N/A;    CARDIAC  CATHETERIZATION N/A 10/28/2021    Procedure: Right heart cath;  Surgeon: Vanessa Bennett MD;  Location: [facility] CATH INVASIVE LOCATION;  Service: Cardiovascular;  Laterality: N/A;    COLONOSCOPY N/A 02/01/2020    Procedure: COLONOSCOPY TO CECUM AND INTO TERMINAL ILEUM WITH COLD BIOPSY POLYPECTOMY;  Surgeon: Munira Montes MD;  [facility];  Service: Gastroenterology    COLONOSCOPY N/A 10/01/2022    Procedure: COLONOSCOPY TO CECUM/TI WITH BIOPSY AND POLYPECTOMY ( COLD BX);  Surgeon: Jovanny Simms MD;  [facility];  Service: Gastroenterology;  Laterality: N/A;  ANEMIA  POST: COLON POLYP; TEXTURE MUCOSAL CHANGES IN ASCENDING COLON; MODERATE PREP; INTERNAL HEMORRHOIDS    COLONOSCOPY  10/01/2022    DILATATION AND CURETTAGE      AUB no cancer    ENDOSCOPY N/A 02/01/2020    Procedure: ESOPHAGOGASTRODUODENOSCOPY WITH COLD BIOPSIES;  Surgeon: Munira Montes MD;  [facility];  Service: Gastroenterology    ENDOSCOPY N/A 09/29/2022    Procedure: ESOPHAGOGASTRODUODENOSCOPY with cold biopsies;  Surgeon: Jill Blake MD;  Location: [facility] ENDOSCOPY;  Service: Gastroenterology;  Laterality: N/A;  pre: ANEMIA, H/O GASTROPARESIS AND NAUSEA VOMITTING  post: gastritis, hiatal hernia, z-line@38cm, gastric body and fundus polpys, small bowel granularity    KNEE ARTHROSCOPY Left 08/22/2018    Procedure: KNEE ARTHROSCOPY LATERAL MENISECTOMY DEBRIDEMENT OF MEDIAL FEMORAL CONDYLE DEFECT DEBRIDEMENT OF ARTHRITIS;  Surgeon: Graciela Crockett MD;  [place] [facility] [place] [facility] [place];  Service: Orthopedics    KNEE SURGERY      ORTHOPEDIC SURGERY      Guillermo bunionectomy, left knee arthroscopy, nonunion left foot    SALPINGO OOPHORECTOMY Left     TONSILLECTOMY  as a child    TUBAL ABDOMINAL LIGATION      L ovary & fallopian tube removed for a benign cyst    UPPER GASTROINTESTINAL ENDOSCOPY      WISDOM TOOTH EXTRACTION         Current Outpatient Medications on File Prior to Visit   Medication Sig  Dispense Refill    cholecalciferol 25 MCG (1000 UT) tablet HOLD PRIOR TO SURG      gabapentin (NEURONTIN) 100 MG capsule Start 1 capsule PO QHS x 5 days; if tolerated may increase to 1 capsule PO BID x 5 days; if tolerated may increase to 1 capsule PO TID 90 capsule 0    HYDROcodone-acetaminophen (NORCO) 7.5-325 MG per tablet Take 1 tablet by mouth 2 (Two) Times a Day As Needed for Moderate Pain. 60 tablet 0    Lactobacillus (PROBIOTIC ACIDOPHILUS PO) Take 1 tablet by mouth Daily. HOLD PRIOR TO SURG      LORazepam (ATIVAN) 0.5 MG tablet Take 1 tablet by mouth 3 (Three) Times a Day As Needed for Anxiety.      melatonin 5 MG tablet tablet Take 1 tablet by mouth Every Night.      metoprolol succinate XL (TOPROL-XL) 25 MG 24 hr tablet Take 3 tablets by mouth Every Night. 270 tablet 3    naloxone (Narcan) 4 MG/0.1ML nasal spray Administer 1 spray into the nostril(s) as directed by provider As Needed (Respiratory depression). 1 each 1    ondansetron (Zofran) 4 MG tablet Take 1 tablet by mouth Every 8 (Eight) Hours As Needed for Nausea or Vomiting. 30 tablet 0    rosuvastatin (CRESTOR) 10 MG tablet Take 1 tablet by mouth Every Night. 90 tablet 0    spironolactone (ALDACTONE) 25 MG tablet TAKE 1 TABLET BY MOUTH DAILY 90 tablet 1    [DISCONTINUED] Biotin 5000 MCG tablet Take 5,000 mg by mouth Daily. HOLD PRIOR TO SURG      [DISCONTINUED] clotrimazole-betamethasone (LOTRISONE) 1-0.05 % cream Apply 1 Application topically to the appropriate area as directed 2 (Two) Times a Day. 45 g 2    [DISCONTINUED] finasteride (PROSCAR) 5 MG tablet Take 1 tablet by mouth Daily.      [DISCONTINUED] Minoxidil 5 % solution Apply  topically to the appropriate area as directed 2 (Two) Times a Day.      [DISCONTINUED] Naproxen Sodium 220 MG capsule Take 220 mg by mouth 2 (Two) Times a Day. (Patient not taking: Reported on 5/19/2025)       No current facility-administered medications on file prior to visit.       Social History     Socioeconomic  "History    Marital status:     Number of children: 2    Years of education: College   Tobacco Use    Smoking status: Former     Current packs/day: 0.00     Average packs/day: 0.5 packs/day for 4.8 years (2.4 ttl pk-yrs)     Types: Cigarettes     Start date: 1983     Quit date: 1987     Years since quittin.6     Passive exposure: Past    Smokeless tobacco: Never   Vaping Use    Vaping status: Never Used   Substance and Sexual Activity    Alcohol use: Yes     Alcohol/week: 2.0 standard drinks of alcohol     Comment: A couple times a month    Drug use: Never     Types: Marijuana    Sexual activity: Not Currently     Partners: Male     Birth control/protection: Post-menopausal             Procedures    ECG 12 Lead    Date/Time: 2025 8:05 AM  Performed by: Yue Maradiaga MD    Authorized by: Yue Maradiaga MD  Comparison: compared with previous ECG   Similar to previous ECG  Rhythm: sinus rhythm    Clinical impression: normal ECG              Objective:      Vitals:    25 0750   BP: 144/82   Pulse: 84   Weight: 79.8 kg (176 lb)   Height: 165.1 cm (65\")     Body mass index is 29.29 kg/m².    Vitals reviewed.   Constitutional:       General: Not in acute distress.     Appearance: Overweight. Not diaphoretic.   Neck:      Vascular: No carotid bruit or JVD.   Pulmonary:      Effort: Pulmonary effort is normal.      Breath sounds: Normal breath sounds.   Cardiovascular:      Normal rate. Regular rhythm.      Murmurs: There is no murmur.      No gallop.  No rub.   Pulses:     Intact distal pulses.      Carotid: 2+ bilaterally.     Radial: 2+ bilaterally.     Dorsalis pedis: 2+ bilaterally.     Posterior tibial: 2+ bilaterally.  Edema:     Peripheral edema absent.   Neurological:      Cranial Nerves: No cranial nerve deficit.         Lab Review:       Assessment:      Diagnosis Plan   1. Primary hypertension  CT Angiogram Coronary    CT Angiogram Coronary-Cardiology Interpretation    " metoprolol tartrate (LOPRESSOR) tablet 200 mg    metoprolol tartrate (LOPRESSOR) tablet 150 mg    metoprolol tartrate (LOPRESSOR) tablet 100 mg    metoprolol tartrate (LOPRESSOR) tablet 50 mg    metoprolol tartrate (LOPRESSOR) tablet 50 mg    metoprolol tartrate (LOPRESSOR) injection 5 mg    nitroglycerin (NITROSTAT) SL tablet 0.4 mg    nitroglycerin (NITROSTAT) SL tablet 0.8 mg    ivabradine HCl (CORLANOR) tablet 15 mg    No Caffeine or Nicotine 4 Hours Prior to CTA Appointment    Nothing to Eat or Drink 4 Hours Prior to CTA Appointment    Do Not Take Phosphodiasterase Inhibitors in the 72 Hours Prior to Coronary CTA    Obtain Informed Consent - Computed Tomography Angiography of Chest - Angiogram of Coronary Arteries    Vital Signs Upon Arrival    Cardiac Monitoring    Verify NPO Status - Patient to Be NPO at Least 4 Hours Prior to CTA    Notify CT After Administration of metoprolol tartrate (LOPRESSOR) tablet    Notify Provider If Total Metoprolol Given Equals 300mg & Heart Rate Not At Goal    Notify Provider Prior to Administration of Nitroglycerin if Patient SBP <80    POC Creatinine    Insert Peripheral IV    Saline Lock & Maintain IV Access    sodium chloride 0.9 % flush 10 mL    sodium chloride 0.9 % flush 10 mL    sodium chloride 0.9 % infusion 40 mL    Vital Signs - See Instructions    Hold Medication Metformin (Glucophage, Glucophage XR, Fortament, Glumetza);  Metglip (metformin/glipizide);  Glucovance (metformin/glyburide); Avandamet (metformin/rosiglitazone)    Patient May Discharge Home After Procedure Complete (If Stable)    metoprolol tartrate (LOPRESSOR) 100 MG tablet    Basic Metabolic Panel      2. Mixed hyperlipidemia  CT Angiogram Coronary    CT Angiogram Coronary-Cardiology Interpretation    metoprolol tartrate (LOPRESSOR) tablet 200 mg    metoprolol tartrate (LOPRESSOR) tablet 150 mg    metoprolol tartrate (LOPRESSOR) tablet 100 mg    metoprolol tartrate (LOPRESSOR) tablet 50 mg    metoprolol  tartrate (LOPRESSOR) tablet 50 mg    metoprolol tartrate (LOPRESSOR) injection 5 mg    nitroglycerin (NITROSTAT) SL tablet 0.4 mg    nitroglycerin (NITROSTAT) SL tablet 0.8 mg    ivabradine HCl (CORLANOR) tablet 15 mg    No Caffeine or Nicotine 4 Hours Prior to CTA Appointment    Nothing to Eat or Drink 4 Hours Prior to CTA Appointment    Do Not Take Phosphodiasterase Inhibitors in the 72 Hours Prior to Coronary CTA    Obtain Informed Consent - Computed Tomography Angiography of Chest - Angiogram of Coronary Arteries    Vital Signs Upon Arrival    Cardiac Monitoring    Verify NPO Status - Patient to Be NPO at Least 4 Hours Prior to CTA    Notify CT After Administration of metoprolol tartrate (LOPRESSOR) tablet    Notify Provider If Total Metoprolol Given Equals 300mg & Heart Rate Not At Goal    Notify Provider Prior to Administration of Nitroglycerin if Patient SBP <80    POC Creatinine    Insert Peripheral IV    Saline Lock & Maintain IV Access    sodium chloride 0.9 % flush 10 mL    sodium chloride 0.9 % flush 10 mL    sodium chloride 0.9 % infusion 40 mL    Vital Signs - See Instructions    Hold Medication Metformin (Glucophage, Glucophage XR, Fortament, Glumetza);  Metglip (metformin/glipizide);  Glucovance (metformin/glyburide); Avandamet (metformin/rosiglitazone)    Patient May Discharge Home After Procedure Complete (If Stable)    metoprolol tartrate (LOPRESSOR) 100 MG tablet    Basic Metabolic Panel      3. PVCs (premature ventricular contractions)  CT Angiogram Coronary    CT Angiogram Coronary-Cardiology Interpretation    metoprolol tartrate (LOPRESSOR) tablet 200 mg    metoprolol tartrate (LOPRESSOR) tablet 150 mg    metoprolol tartrate (LOPRESSOR) tablet 100 mg    metoprolol tartrate (LOPRESSOR) tablet 50 mg    metoprolol tartrate (LOPRESSOR) tablet 50 mg    metoprolol tartrate (LOPRESSOR) injection 5 mg    nitroglycerin (NITROSTAT) SL tablet 0.4 mg    nitroglycerin (NITROSTAT) SL tablet 0.8 mg     ivabradine HCl (CORLANOR) tablet 15 mg    No Caffeine or Nicotine 4 Hours Prior to CTA Appointment    Nothing to Eat or Drink 4 Hours Prior to CTA Appointment    Do Not Take Phosphodiasterase Inhibitors in the 72 Hours Prior to Coronary CTA    Obtain Informed Consent - Computed Tomography Angiography of Chest - Angiogram of Coronary Arteries    Vital Signs Upon Arrival    Cardiac Monitoring    Verify NPO Status - Patient to Be NPO at Least 4 Hours Prior to CTA    Notify CT After Administration of metoprolol tartrate (LOPRESSOR) tablet    Notify Provider If Total Metoprolol Given Equals 300mg & Heart Rate Not At Goal    Notify Provider Prior to Administration of Nitroglycerin if Patient SBP <80    POC Creatinine    Insert Peripheral IV    Saline Lock & Maintain IV Access    sodium chloride 0.9 % flush 10 mL    sodium chloride 0.9 % flush 10 mL    sodium chloride 0.9 % infusion 40 mL    Vital Signs - See Instructions    Hold Medication Metformin (Glucophage, Glucophage XR, Fortament, Glumetza);  Metglip (metformin/glipizide);  Glucovance (metformin/glyburide); Avandamet (metformin/rosiglitazone)    Patient May Discharge Home After Procedure Complete (If Stable)    metoprolol tartrate (LOPRESSOR) 100 MG tablet    Basic Metabolic Panel      4. Precordial pain  CT Angiogram Coronary    CT Angiogram Coronary-Cardiology Interpretation    metoprolol tartrate (LOPRESSOR) tablet 200 mg    metoprolol tartrate (LOPRESSOR) tablet 150 mg    metoprolol tartrate (LOPRESSOR) tablet 100 mg    metoprolol tartrate (LOPRESSOR) tablet 50 mg    metoprolol tartrate (LOPRESSOR) tablet 50 mg    metoprolol tartrate (LOPRESSOR) injection 5 mg    nitroglycerin (NITROSTAT) SL tablet 0.4 mg    nitroglycerin (NITROSTAT) SL tablet 0.8 mg    ivabradine HCl (CORLANOR) tablet 15 mg    No Caffeine or Nicotine 4 Hours Prior to CTA Appointment    Nothing to Eat or Drink 4 Hours Prior to CTA Appointment    Do Not Take Phosphodiasterase Inhibitors in the  72 Hours Prior to Coronary CTA    Obtain Informed Consent - Computed Tomography Angiography of Chest - Angiogram of Coronary Arteries    Vital Signs Upon Arrival    Cardiac Monitoring    Verify NPO Status - Patient to Be NPO at Least 4 Hours Prior to CTA    Notify CT After Administration of metoprolol tartrate (LOPRESSOR) tablet    Notify Provider If Total Metoprolol Given Equals 300mg & Heart Rate Not At Goal    Notify Provider Prior to Administration of Nitroglycerin if Patient SBP <80    POC Creatinine    Insert Peripheral IV    Saline Lock & Maintain IV Access    sodium chloride 0.9 % flush 10 mL    sodium chloride 0.9 % flush 10 mL    sodium chloride 0.9 % infusion 40 mL    Vital Signs - See Instructions    Hold Medication Metformin (Glucophage, Glucophage XR, Fortament, Glumetza);  Metglip (metformin/glipizide);  Glucovance (metformin/glyburide); Avandamet (metformin/rosiglitazone)    Patient May Discharge Home After Procedure Complete (If Stable)    metoprolol tartrate (LOPRESSOR) 100 MG tablet    Basic Metabolic Panel          1. Hypertension, goal <120/80.  BP is higher today.  Add valsartan 80 mg nightly.  Remain on spironolactone and metoprolol.  Watch potassium level.  2. Elevated LDL. On atorvastatin.   3. PVCs. Stable.   4. Family history of cardiovascular disease in her father.  5.  Pulmonary embolism 8/2021, treated with apixaban.   6.  Gastroparesis.  7.  CKD, seeing Dr. Nair.  8.  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  She still has some anxiety but she is so much better.  9.  Precordial chest pain, atypical.  However is relieved with nitroglycerin.  Set up coronary CTA.     Plan:       Check a BMP 2 weeks after starting valsartan.  See him in 3 months, set up coronary CT as noted above for chest pain.

## 2025-06-12 ENCOUNTER — HOSPITAL ENCOUNTER (OUTPATIENT)
Dept: BONE DENSITY | Facility: HOSPITAL | Age: 65
Discharge: HOME | End: 2025-06-12
Payer: MEDICARE

## 2025-06-12 ENCOUNTER — OFFICE VISIT (OUTPATIENT)
Dept: PAIN MEDICINE | Facility: CLINIC | Age: 65
End: 2025-06-12
Payer: MEDICARE

## 2025-06-12 ENCOUNTER — HOSPITAL ENCOUNTER (OUTPATIENT)
Dept: MAMMOGRAPHY | Facility: HOSPITAL | Age: 65
Discharge: HOME | End: 2025-06-12
Payer: MEDICARE

## 2025-06-12 VITALS
OXYGEN SATURATION: 99 % | HEIGHT: 65 IN | TEMPERATURE: 97.3 F | SYSTOLIC BLOOD PRESSURE: 132 MMHG | WEIGHT: 180.8 LBS | DIASTOLIC BLOOD PRESSURE: 84 MMHG | HEART RATE: 72 BPM | BODY MASS INDEX: 30.12 KG/M2

## 2025-06-12 DIAGNOSIS — Z78.0 POSTMENOPAUSAL: ICD-10-CM

## 2025-06-12 DIAGNOSIS — M79.674 TOE PAIN, CHRONIC, RIGHT: ICD-10-CM

## 2025-06-12 DIAGNOSIS — Z12.31 VISIT FOR SCREENING MAMMOGRAM: ICD-10-CM

## 2025-06-12 DIAGNOSIS — G89.29 TOE PAIN, CHRONIC, RIGHT: ICD-10-CM

## 2025-06-12 DIAGNOSIS — G89.29 CHRONIC FOOT PAIN, RIGHT: Primary | ICD-10-CM

## 2025-06-12 DIAGNOSIS — Z79.899 HIGH RISK MEDICATION USE: ICD-10-CM

## 2025-06-12 DIAGNOSIS — M79.671 CHRONIC FOOT PAIN, RIGHT: ICD-10-CM

## 2025-06-12 DIAGNOSIS — M79.674 GREAT TOE PAIN, RIGHT: ICD-10-CM

## 2025-06-12 DIAGNOSIS — G89.29 CHRONIC FOOT PAIN, RIGHT: ICD-10-CM

## 2025-06-12 DIAGNOSIS — M79.671 CHRONIC FOOT PAIN, RIGHT: Primary | ICD-10-CM

## 2025-06-12 PROCEDURE — 77080 DXA BONE DENSITY AXIAL: CPT

## 2025-06-12 PROCEDURE — 3075F SYST BP GE 130 - 139MM HG: CPT | Performed by: PHYSICIAN ASSISTANT

## 2025-06-12 PROCEDURE — 1159F MED LIST DOCD IN RCRD: CPT | Performed by: PHYSICIAN ASSISTANT

## 2025-06-12 PROCEDURE — 77067 SCR MAMMO BI INCL CAD: CPT

## 2025-06-12 PROCEDURE — 99214 OFFICE O/P EST MOD 30 MIN: CPT | Performed by: PHYSICIAN ASSISTANT

## 2025-06-12 PROCEDURE — 1160F RVW MEDS BY RX/DR IN RCRD: CPT | Performed by: PHYSICIAN ASSISTANT

## 2025-06-12 PROCEDURE — 1125F AMNT PAIN NOTED PAIN PRSNT: CPT | Performed by: PHYSICIAN ASSISTANT

## 2025-06-12 PROCEDURE — 77063 BREAST TOMOSYNTHESIS BI: CPT

## 2025-06-12 PROCEDURE — 3079F DIAST BP 80-89 MM HG: CPT | Performed by: PHYSICIAN ASSISTANT

## 2025-06-12 RX ORDER — HYDROCODONE BITARTRATE AND ACETAMINOPHEN 7.5; 325 MG/1; MG/1
1 TABLET ORAL 2 TIMES DAILY PRN
Qty: 60 TABLET | Refills: 0 | Status: SHIPPED | OUTPATIENT
Start: 2025-06-13

## 2025-06-12 NOTE — PROGRESS NOTES
CHIEF COMPLAINT    Foot pain. The patient feels the foot pain is improved slightly from last visit.      Subjective   Amanda Sherwood is a 64 y.o. female  who presents for follow-up.  She has a history of persistent right foot pain.  Patient continues to note slight improvement of the neuropathic pain affecting the right great toe as well as the dorsal aspect of the foot with ongoing use of gabapentin 100 mg nightly.  She has successfully been able to reduce her intake of the hydrocodone to twice daily dosing only.  Patient has pain that is localized within the right midfoot region that radiates into the right great toe.  History of right bunionectomy performed 10/1/2024.    She is currently medically managed on hydrocodone 7.5 mg p.o. twice daily, gabapentin 100 mg nightly and naproxen 220 mg daily.  Tolerating this current medication regimen without adverse effects.  She is unable to tolerate gabapentin during the daytime hours as it causes a feeling of heart palpitations.  Patient is prescribed lorazepam by her psychiatrist.    Pain today 4/10 VAS in severity.      Foot Pain  Symptoms are chronic.   Onset was 6 to12 months (October 2024).   Symptoms occur constantly.   Symptoms have been improved (Slowly improving) since onset.   Symptoms include joint pain and joint swelling.    Pertinent negative symptoms include no chills, no cough, no fever, no numbness and no weakness.   Aggravating factors include walking (Activity).   Treatments tried include ice, heat and oral narcotics.   Improvement on treatment was no relief.        PEG Assessment   What number best describes your pain on average in the past week?6  What number best describes how, during the past week, pain has interfered with your enjoyment of life?5  What number best describes how, during the past week, pain has interfered with your general activity?  4    Review of Pertinent Medical Data ---  MRI RIGHT MIDFOOT AND FOREFOOT WITHOUT CONTRAST      HISTORY: Pain in the top of the right foot between the 2nd and 3rd toes.  Patient underwent realignment fusion of the 1st TMT joint with calcaneal  bone graft and bunion correction at the 1st MTP joint. Second MTP  debridement and PIP fusion surgery 10/01/2022.     TECHNIQUE: MRI includes axial T1, STIR as well as coronal T1, T2  fat-sat, and sagittal PD  fat-saturated sequences through the forefoot.     COMPARISON: CT right foot 12/30/2024, MRI right foot 02/22/2024.     FINDINGS: Dorsal staple transfixes the 1st TMT joint. Transverse medial  approach screw extends through the bases of the 1st and 2nd middle  tarsals. There is bony fusion at the 1st TMT joint. Chronic arthritis  2nd and 3rd TMT joints where there is surrounding bone marrow edema.  Marrow edema extends into the proximal shafts of the 2nd and 3rd  metatarsals. There are also arthritic changes to a lesser degree at the  4th and 5th TMT joints. There is bone marrow edema within the distal  aspect of the lateral cuneiform and cuboid.     Moderate arthritis 1st MTP joint with mild joint space narrowing and  marginal spur formation.     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  Remodeling of the articular surfaces with joint space narrowing. Bone  marrow edema throughout the 2nd proximal phalanx without evidence for  fracture. Chronic arthritis at the interphalangeal joints. Bony fusion  2nd PIP joint.     Plantar fascia appears intact. No finding to suggest interdigital  neuroma. The flexor and extensor tendons appear intact without fluid  distention of the tendon sheaths. There is tendinopathy of the distal  tibialis anterior tendon extending to its insertion.     IMPRESSION:  1. Arthrodesis 1st TMT joint where there is bony fusion.  2. Chronic arthritis TMT joints greatest at the 2nd and 3rd TMT joint  with surrounding reactive bone marrow edema without  "extend into the  shafts of the 2nd and 3rd metatarsals.  3. Chronic arthritis at the 2nd MTP joint with remodeling of articular  surfaces, joint space loss, reactive bone marrow edema throughout the  2nd proximal phalanx. Bony fusion 2nd PIP joint.  4. Moderate arthritis 1st MTP joint.  5. Tendinopathy of the distal tibialis anterior tendon.     This report was finalized on 2/21/2025 7:36 AM by José Ramos M.D  on Workstation: [id number]    The following portions of the patient's history were reviewed and updated as appropriate: allergies, current medications, past family history, past medical history, past social history, past surgical history, and problem list.    Review of Systems   Constitutional:  Negative for chills and fever.   Respiratory:  Negative for cough and shortness of breath.    Gastrointestinal:  Negative for constipation and diarrhea.   Genitourinary:  Negative for difficulty urinating.   Musculoskeletal:  Positive for joint pain.   Neurological:  Negative for dizziness, weakness, light-headedness and numbness.     I have reviewed and confirmed the accuracy of the ROS as documented by the MA/LPN/RN JED Evans  Vitals:    06/12/25 0808   BP: 132/84   BP Location: Left arm   Patient Position: Sitting   Pulse: 72   Temp: 97.3 °F (36.3 °C)   TempSrc: Temporal   SpO2: 99%   Weight: 82 kg (180 lb 12.8 oz)   Height: 165.1 cm (65\")   PainSc: 4          Objective   Physical Exam  Vitals and nursing note reviewed.   Constitutional:       Appearance: Normal appearance.   HENT:      Head: Normocephalic.   Neurological:      Mental Status: She is alert and oriented to person, place, and time.      Cranial Nerves: Cranial nerves 2-12 are intact.      Sensory: Sensation is intact.      Motor: Motor function is intact.      Gait: Gait abnormal (Ambulates with slight limp favoring right foot).   Psychiatric:         Mood and Affect: Mood normal.         Behavior: Behavior normal.         Thought " Content: Thought content normal.         Judgment: Judgment normal.             Assessment & Plan   Diagnoses and all orders for this visit:    1. Chronic foot pain, right (Primary)    2. Toe pain, chronic, right    3. Great toe pain, right    4. High risk medication use        Amanda Sherwood reports a pain score of 4.  Given her pain assessment as noted, treatment options were discussed and the following options were decided upon as a follow-up plan to address the patient's pain: continuation of current treatment plan for pain, prescription for non-opiod analgesics, prescription for opiod analgesics, and use of non-medical modalities (ice, heat, stretching and/or behavior modifications).    PHQ-2 Depression Screening  Little interest or pleasure in doing things? Not at all   Feeling down, depressed, or hopeless? Not at all   PHQ-2 Total Score 0     Tobacco Use: Medium Risk (6/12/2025)    Patient History     Smoking Tobacco Use: Former     Smokeless Tobacco Use: Never     Passive Exposure: Past           --- Follow-up in one month for medication management.  ---Refill Norco 7.5/325. Patient appears stable with current regimen. No adverse effects. Regarding continuation of opioids, there is no evidence of aberrant behavior or any red flags.  The patient continues with appropriate response to opioid therapy. ADL's remain intact by self.   ---The patient has been counseled today regarding the increased risk of respiratory depression with concurrent use of benzodiazepines with opiates.  These risk include but are not limited to, CNS depression, respiratory depression, and death.  The patient verbalizes understanding is willing to accept these risks.  Patient understands benzodiazepines and opiate medications should be spaced apart at least 6 hours.  ---Patient stated that she bought the Narcan that is available over-the-counter as the prescription strength was very expensive   --- Continue gabapentin 100 mg p.o.  nightly. No refill needed today.        The urine drug screen confirmation from 2/11/2025 has been reviewed and the result is appropriate based on patient history and ROCCO report.  Patient has been counseled regarding elevated EtOH levels.     The patient signed an updated copy of the controlled substance agreement on 2/11/2025.         ROCCO REPORT  As part of the patient's treatment plan, I am prescribing controlled substances. The patient has been made aware of appropriate use of such medications, including potential risk of somnolence, limited ability to drive and/or work safely, and the potential for dependence or overdose. It has also been made clear that these medications are for use by this patient only, without concomitant use of alcohol or other substances unless prescribed.     Patient has completed prescribing agreement detailing terms of continued prescribing of controlled substances, including monitoring ROCCO reports, urine drug screening, and pill counts if necessary. The patient is aware that inappropriate use will results in cessation of prescribing such medications.    As the clinician, I personally reviewed the ROCCO from 6/12/25 while the patient was in the office today.    History and physical exam exhibit continued safe and appropriate use of controlled substances.     Dictated utilizing Dragon dictation.

## 2025-06-13 ENCOUNTER — TELEPHONE (OUTPATIENT)
Dept: CARDIOLOGY | Facility: HOSPITAL | Age: 65
End: 2025-06-13

## 2025-06-13 NOTE — TELEPHONE ENCOUNTER
Call to Pt to review pre-CCTA checklist. PT will arrive 30 mins prior to scan time. PT had labs done on 5/19/25 and kidney function WNL (PT does have another BMP ordered for that week and wants us to draw while she's here). PT has picked up Rx for metoprolol and understands dosing instructions. PT aware to hold caffeine for 24 hours and to remain NPO 4 hours prior to scan except clears.

## 2025-06-17 ENCOUNTER — HOSPITAL ENCOUNTER (OUTPATIENT)
Dept: CARDIOLOGY | Facility: HOSPITAL | Age: 65
Discharge: HOME | End: 2025-06-17
Admitting: INTERNAL MEDICINE
Payer: MEDICARE

## 2025-06-17 ENCOUNTER — HOSPITAL ENCOUNTER (OUTPATIENT)
Dept: CARDIOLOGY | Facility: HOSPITAL | Age: 65
Discharge: HOME | End: 2025-06-17
Payer: MEDICARE

## 2025-06-17 ENCOUNTER — RESULTS FOLLOW-UP (OUTPATIENT)
Dept: CARDIOLOGY | Age: 65
End: 2025-06-17
Payer: MEDICARE

## 2025-06-17 ENCOUNTER — HOSPITAL ENCOUNTER (OUTPATIENT)
Dept: CT IMAGING | Facility: HOSPITAL | Age: 65
Discharge: HOME | End: 2025-06-17
Payer: MEDICARE

## 2025-06-17 VITALS — DIASTOLIC BLOOD PRESSURE: 94 MMHG | HEART RATE: 60 BPM | RESPIRATION RATE: 16 BRPM | SYSTOLIC BLOOD PRESSURE: 159 MMHG

## 2025-06-17 DIAGNOSIS — I10 PRIMARY HYPERTENSION: ICD-10-CM

## 2025-06-17 DIAGNOSIS — R07.2 PRECORDIAL PAIN: ICD-10-CM

## 2025-06-17 DIAGNOSIS — I49.3 PVCS (PREMATURE VENTRICULAR CONTRACTIONS): ICD-10-CM

## 2025-06-17 DIAGNOSIS — E78.2 MIXED HYPERLIPIDEMIA: ICD-10-CM

## 2025-06-17 LAB
ANION GAP SERPL CALCULATED.3IONS-SCNC: 10.7 MMOL/L (ref 5–15)
BUN SERPL-MCNC: 13 MG/DL (ref 8–23)
BUN/CREAT SERPL: 13.3 (ref 7–25)
CALCIUM SPEC-SCNC: 9.3 MG/DL (ref 8.6–10.5)
CHLORIDE SERPL-SCNC: 106 MMOL/L (ref 98–107)
CO2 SERPL-SCNC: 26.3 MMOL/L (ref 22–29)
CREAT SERPL-MCNC: 0.98 MG/DL (ref 0.57–1)
EGFRCR SERPLBLD CKD-EPI 2021: 65.4 ML/MIN/1.73
GLUCOSE SERPL-MCNC: 100 MG/DL (ref 65–99)
POTASSIUM SERPL-SCNC: 4.2 MMOL/L (ref 3.5–5.2)
SODIUM SERPL-SCNC: 143 MMOL/L (ref 136–145)

## 2025-06-17 PROCEDURE — 25510000001 IOPAMIDOL PER 1 ML: Performed by: INTERNAL MEDICINE

## 2025-06-17 PROCEDURE — 75574 CT ANGIO HRT W/3D IMAGE: CPT

## 2025-06-17 PROCEDURE — 36415 COLL VENOUS BLD VENIPUNCTURE: CPT

## 2025-06-17 PROCEDURE — 80048 BASIC METABOLIC PNL TOTAL CA: CPT | Performed by: INTERNAL MEDICINE

## 2025-06-17 RX ORDER — NITROGLYCERIN 0.4 MG/1
0.8 TABLET SUBLINGUAL ONCE
Status: COMPLETED | OUTPATIENT
Start: 2025-06-17 | End: 2025-06-17

## 2025-06-17 RX ORDER — IOPAMIDOL 755 MG/ML
100 INJECTION, SOLUTION INTRAVASCULAR
Status: COMPLETED | OUTPATIENT
Start: 2025-06-17 | End: 2025-06-17

## 2025-06-17 RX ORDER — METOPROLOL TARTRATE 100 MG/1
100 TABLET ORAL ONCE
Status: COMPLETED | OUTPATIENT
Start: 2025-06-17 | End: 2025-06-17

## 2025-06-17 RX ORDER — SODIUM CHLORIDE 0.9 % (FLUSH) 0.9 %
10 SYRINGE (ML) INJECTION AS NEEDED
Status: DISCONTINUED | OUTPATIENT
Start: 2025-06-17 | End: 2025-06-18 | Stop reason: HOSPADM

## 2025-06-17 RX ORDER — SODIUM CHLORIDE 0.9 % (FLUSH) 0.9 %
10 SYRINGE (ML) INJECTION EVERY 12 HOURS SCHEDULED
Status: DISCONTINUED | OUTPATIENT
Start: 2025-06-17 | End: 2025-06-18 | Stop reason: HOSPADM

## 2025-06-17 RX ADMIN — NITROGLYCERIN 0.8 MG: 0.4 TABLET SUBLINGUAL at 10:25

## 2025-06-17 RX ADMIN — METOPROLOL TARTRATE 100 MG: 100 TABLET, FILM COATED ORAL at 09:35

## 2025-06-17 RX ADMIN — IOPAMIDOL 100 ML: 755 INJECTION, SOLUTION INTRAVENOUS at 10:25

## 2025-06-17 NOTE — PROGRESS NOTES
"PT HR 68-70 upon arrival to [facility]. PT states that she took scheduled doses of BB last PM and this AM. Other VSS... Will medicate per protocol. BP elevated, PT states that she is \"just nervous\". Labs were drawn on 5/19/25 and kidney function WNL but PT requested that we draw scheduled lab for Dr. Maradiaga that is due this week to re-check her potassium. Drawn and sent. PT has 20g IV to RAC placed by CT tech.  Will reassess BP and HR in 30 mins.   "

## 2025-06-17 NOTE — PROGRESS NOTES
"PT returned from scan. No c/o except \"feeling a little dizzy when getting up\". Encouraged plenty of fluids today. Ready for D/C.   "

## 2025-06-18 ENCOUNTER — TELEPHONE (OUTPATIENT)
Dept: CARDIOLOGY | Age: 65
End: 2025-06-18
Payer: MEDICARE

## 2025-06-18 DIAGNOSIS — R06.02 SHORTNESS OF BREATH: ICD-10-CM

## 2025-06-18 DIAGNOSIS — M79.89 RIGHT LEG SWELLING: Primary | ICD-10-CM

## 2025-06-18 NOTE — TELEPHONE ENCOUNTER
Reviewed recommendations with patient, verbalized understanding, will call with any further questions or complaints.  Pt states that she will have labs drawn first thing in the morning at [facility].    Clarisse Carnes RN  Triage Nurse  06/18/25 16:09 EDT

## 2025-06-19 ENCOUNTER — RESULTS FOLLOW-UP (OUTPATIENT)
Dept: CARDIOLOGY | Age: 65
End: 2025-06-19
Payer: MEDICARE

## 2025-06-19 ENCOUNTER — LAB (OUTPATIENT)
Dept: LAB | Facility: HOSPITAL | Age: 65
End: 2025-06-19
Payer: MEDICARE

## 2025-06-19 DIAGNOSIS — R06.02 SHORTNESS OF BREATH: ICD-10-CM

## 2025-06-19 DIAGNOSIS — M79.89 RIGHT LEG SWELLING: ICD-10-CM

## 2025-06-19 LAB
D DIMER PPP FEU-MCNC: 0.42 MCGFEU/ML (ref 0–0.62)
NT-PROBNP SERPL-MCNC: 495 PG/ML (ref 0–900)

## 2025-06-19 PROCEDURE — 85379 FIBRIN DEGRADATION QUANT: CPT

## 2025-06-19 PROCEDURE — 36415 COLL VENOUS BLD VENIPUNCTURE: CPT

## 2025-06-19 PROCEDURE — 83880 ASSAY OF NATRIURETIC PEPTIDE: CPT

## 2025-06-19 NOTE — TELEPHONE ENCOUNTER
Amanda Sherwood returned call with additional questions, which were answered.  She verbalized understanding of results.  Encouraged her to call back with any additional concerns and she stated she will do this.    Thank you,  Karli THOMPSON RN  Triage Nurse [facility]  06/19/25 13:15 EDT

## 2025-06-24 RX ORDER — SPIRONOLACTONE 25 MG/1
25 TABLET ORAL DAILY
Qty: 90 TABLET | Refills: 3 | Status: SHIPPED | OUTPATIENT
Start: 2025-06-24

## 2025-06-24 NOTE — TELEPHONE ENCOUNTER
NOV: 9/10/25 EE  LOV: 6/4/2025  Plan:       Check a BMP 2 weeks after starting valsartan.  See him in 3 months, set up coronary CT as noted above for chest pain.

## 2025-07-03 ENCOUNTER — OFFICE VISIT (OUTPATIENT)
Dept: PAIN MEDICINE | Facility: CLINIC | Age: 65
End: 2025-07-03
Payer: MEDICARE

## 2025-07-03 VITALS
HEIGHT: 65 IN | WEIGHT: 179 LBS | RESPIRATION RATE: 16 BRPM | TEMPERATURE: 97.4 F | BODY MASS INDEX: 29.82 KG/M2 | HEART RATE: 74 BPM | DIASTOLIC BLOOD PRESSURE: 78 MMHG | SYSTOLIC BLOOD PRESSURE: 125 MMHG | OXYGEN SATURATION: 99 %

## 2025-07-03 DIAGNOSIS — M79.674 GREAT TOE PAIN, RIGHT: ICD-10-CM

## 2025-07-03 DIAGNOSIS — M79.671 CHRONIC FOOT PAIN, RIGHT: Primary | ICD-10-CM

## 2025-07-03 DIAGNOSIS — M79.671 CHRONIC FOOT PAIN, RIGHT: ICD-10-CM

## 2025-07-03 DIAGNOSIS — G89.29 CHRONIC FOOT PAIN, RIGHT: ICD-10-CM

## 2025-07-03 DIAGNOSIS — G89.29 CHRONIC FOOT PAIN, RIGHT: Primary | ICD-10-CM

## 2025-07-03 DIAGNOSIS — Z79.899 HIGH RISK MEDICATION USE: ICD-10-CM

## 2025-07-03 PROCEDURE — 3078F DIAST BP <80 MM HG: CPT | Performed by: PHYSICIAN ASSISTANT

## 2025-07-03 PROCEDURE — 99214 OFFICE O/P EST MOD 30 MIN: CPT | Performed by: PHYSICIAN ASSISTANT

## 2025-07-03 PROCEDURE — 1160F RVW MEDS BY RX/DR IN RCRD: CPT | Performed by: PHYSICIAN ASSISTANT

## 2025-07-03 PROCEDURE — 3074F SYST BP LT 130 MM HG: CPT | Performed by: PHYSICIAN ASSISTANT

## 2025-07-03 PROCEDURE — 1125F AMNT PAIN NOTED PAIN PRSNT: CPT | Performed by: PHYSICIAN ASSISTANT

## 2025-07-03 PROCEDURE — 1159F MED LIST DOCD IN RCRD: CPT | Performed by: PHYSICIAN ASSISTANT

## 2025-07-03 RX ORDER — HYDROCODONE BITARTRATE AND ACETAMINOPHEN 7.5; 325 MG/1; MG/1
1 TABLET ORAL 2 TIMES DAILY PRN
Qty: 60 TABLET | Refills: 0 | Status: SHIPPED | OUTPATIENT
Start: 2025-07-13

## 2025-07-03 RX ORDER — GABAPENTIN 100 MG/1
CAPSULE ORAL
Qty: 30 CAPSULE | Refills: 0 | Status: SHIPPED | OUTPATIENT
Start: 2025-07-03

## 2025-07-03 NOTE — TELEPHONE ENCOUNTER
ROCCO reviewed and appropriate.  Last drug screen 2/11/25 appropriate for prescribed medication, counseled on alcohol use.

## 2025-07-03 NOTE — PROGRESS NOTES
CHIEF COMPLAINT  Right foot pain  Patient reports her pain is about the same since her last ov.    Subjective   Amanda Sherwood is a 64 y.o. female  who presents for follow-up.  She has a history of persistent right foot pain.  Patient reports essentially stable pain within the right great toe as well as the dorsal aspect of the right foot with ongoing use of gabapentin 100 mg nightly and hydrocodone.  She has been successfully able to decrease hydrocodone to twice daily dosing but continues with use of the gabapentin as it is very helpful.  Patient has pain localized within the right midfoot region radiating into the right great toe.  History of right bunionectomy performed 10/1/2024.    Current medication regimen consist of hydrocodone 7.5 mg p.o. twice daily, gabapentin 100 mg nightly and Aleve as needed.  She is tolerating this medication regimen with approximate 50% pain relief allowing her to function daily.  Failed trial of gabapentin during daytime hours as it caused heart palpitations.  She is prescribed lorazepam by her psychiatrist.    Pain today 3/10 VAS in severity.      Foot Pain  Chronicity:  Chronic  Onset:  6 to12 months (October 2024)  Frequency:  Constantly  Progression since onset:  Improved (Slowly improving)  Symptoms: joint pain and joint swelling    Symptoms: no chills, no cough, no fever, no headaches, no numbness and no weakness    Aggravating factors:  Walking (Activity)  Treatments tried:  Ice, heat and oral narcotics  Improvement on treatment:  No relief       PEG Assessment   What number best describes your pain on average in the past week?5  What number best describes how, during the past week, pain has interfered with your enjoyment of life?3  What number best describes how, during the past week, pain has interfered with your general activity?  3    Review of Pertinent Medical Data ---      The following portions of the patient's history were reviewed and updated as appropriate:  "allergies, current medications, past family history, past medical history, past social history, past surgical history, and problem list.    Review of Systems   Constitutional:  Negative for activity change, chills and fever.   Respiratory:  Negative for cough.    Gastrointestinal:  Negative for constipation and diarrhea.   Genitourinary:  Negative for difficulty urinating.   Musculoskeletal:  Positive for joint pain. Negative for back pain.   Neurological:  Negative for dizziness, weakness, numbness and headaches.   Psychiatric/Behavioral:  Negative for self-injury, sleep disturbance and suicidal ideas.      I have reviewed and confirmed the accuracy of the ROS as documented by the MA/LPN/RN JED Evans  Vitals:    07/03/25 0819   BP: 125/78   Pulse: 74   Resp: 16   Temp: 97.4 °F (36.3 °C)   SpO2: 99%   Weight: 81.2 kg (179 lb)   Height: 165.1 cm (65\")   PainSc: 3          Objective   Physical Exam  Vitals and nursing note reviewed.   Constitutional:       Appearance: Normal appearance.   HENT:      Head: Normocephalic.   Neurological:      Mental Status: She is alert and oriented to person, place, and time.      Cranial Nerves: Cranial nerves 2-12 are intact.      Sensory: Sensation is intact.      Motor: Motor function is intact.      Gait: Gait abnormal (Ambulates with slight limp favoring right foot).   Psychiatric:         Mood and Affect: Mood normal.         Behavior: Behavior normal.         Thought Content: Thought content normal.         Judgment: Judgment normal.             Assessment & Plan   Diagnoses and all orders for this visit:    1. Chronic foot pain, right (Primary)  -     gabapentin (NEURONTIN) 100 MG capsule; Take 1 capsule PO QHS  Dispense: 30 capsule; Refill: 0    2. Great toe pain, right  -     gabapentin (NEURONTIN) 100 MG capsule; Take 1 capsule PO QHS  Dispense: 30 capsule; Refill: 0    3. High risk medication use        Amanda Sherwood reports a pain score of 3.  Given her pain " assessment as noted, treatment options were discussed and the following options were decided upon as a follow-up plan to address the patient's pain: continuation of current treatment plan for pain, prescription for non-opiod analgesics, prescription for opiod analgesics, and use of non-medical modalities (ice, heat, stretching and/or behavior modifications).    PHQ-2 Depression Screening  Little interest or pleasure in doing things? Not at all   Feeling down, depressed, or hopeless? Not at all   PHQ-2 Total Score 0     Tobacco Use: Medium Risk (7/3/2025)    Patient History     Smoking Tobacco Use: Former     Smokeless Tobacco Use: Never     Passive Exposure: Past             --- Follow-up in 1 month for medication management  --- Refill Norco 7.5 mg and gabapentin. Patient appears stable with current regimen. No adverse effects. Regarding continuation of opioids, there is no evidence of aberrant behavior or any red flags.  The patient continues with appropriate response to opioid therapy. ADL's remain intact by self.   --- The patient has been counseled today regarding the increased risk of respiratory depression with concurrent use of benzodiazepines with opiates.  These risk include but are not limited to, CNS depression, respiratory depression, and death.  The patient verbalizes understanding is willing to accept these risks.  Patient understands benzodiazepines and opiate medications should be spaced apart at least 6 hours.  --- The patient has been provided or has a Narcan prescription within the home in the event of accidental overdose or opiate emergency.  --- Moderate risk for opiate abuse/diversion due to concomitant use of opiate with benzodiazepine    The urine drug screen confirmation from 2/11/2025 has been reviewed and the result is appropriate based on patient history and ROCCO report.  Patient has been counseled regarding elevated EtOH levels.     The patient signed an updated copy of the controlled  substance agreement on 2/11/2025.           ROCCO REPORT  As part of the patient's treatment plan, I am prescribing controlled substances. The patient has been made aware of appropriate use of such medications, including potential risk of somnolence, limited ability to drive and/or work safely, and the potential for dependence or overdose. It has also been made clear that these medications are for use by this patient only, without concomitant use of alcohol or other substances unless prescribed.     Patient has completed prescribing agreement detailing terms of continued prescribing of controlled substances, including monitoring ROCCO reports, urine drug screening, and pill counts if necessary. The patient is aware that inappropriate use will results in cessation of prescribing such medications.    As the clinician, I personally reviewed the ROCCO from 7/3/2025 while the patient was in the office today.    History and physical exam exhibit continued safe and appropriate use of controlled substances.     Dictated utilizing Dragon dictation.

## 2025-07-08 ENCOUNTER — OFFICE VISIT (OUTPATIENT)
Dept: FAMILY MEDICINE CLINIC | Facility: CLINIC | Age: 65
End: 2025-07-08
Payer: MEDICARE

## 2025-07-08 VITALS
HEART RATE: 78 BPM | DIASTOLIC BLOOD PRESSURE: 84 MMHG | SYSTOLIC BLOOD PRESSURE: 138 MMHG | TEMPERATURE: 98.4 F | BODY MASS INDEX: 30.16 KG/M2 | OXYGEN SATURATION: 97 % | WEIGHT: 181 LBS | HEIGHT: 65 IN

## 2025-07-08 DIAGNOSIS — G89.29 CHRONIC RIGHT SHOULDER PAIN: Primary | ICD-10-CM

## 2025-07-08 DIAGNOSIS — J30.89 NON-SEASONAL ALLERGIC RHINITIS, UNSPECIFIED TRIGGER: ICD-10-CM

## 2025-07-08 DIAGNOSIS — R05.3 CHRONIC COUGH: ICD-10-CM

## 2025-07-08 DIAGNOSIS — E66.811 OBESITY (BMI 30.0-34.9): ICD-10-CM

## 2025-07-08 DIAGNOSIS — M25.511 CHRONIC RIGHT SHOULDER PAIN: Primary | ICD-10-CM

## 2025-07-08 PROCEDURE — G2211 COMPLEX E/M VISIT ADD ON: HCPCS | Performed by: STUDENT IN AN ORGANIZED HEALTH CARE EDUCATION/TRAINING PROGRAM

## 2025-07-08 PROCEDURE — 1160F RVW MEDS BY RX/DR IN RCRD: CPT | Performed by: STUDENT IN AN ORGANIZED HEALTH CARE EDUCATION/TRAINING PROGRAM

## 2025-07-08 PROCEDURE — 99214 OFFICE O/P EST MOD 30 MIN: CPT | Performed by: STUDENT IN AN ORGANIZED HEALTH CARE EDUCATION/TRAINING PROGRAM

## 2025-07-08 PROCEDURE — 3079F DIAST BP 80-89 MM HG: CPT | Performed by: STUDENT IN AN ORGANIZED HEALTH CARE EDUCATION/TRAINING PROGRAM

## 2025-07-08 PROCEDURE — 1159F MED LIST DOCD IN RCRD: CPT | Performed by: STUDENT IN AN ORGANIZED HEALTH CARE EDUCATION/TRAINING PROGRAM

## 2025-07-08 PROCEDURE — 3075F SYST BP GE 130 - 139MM HG: CPT | Performed by: STUDENT IN AN ORGANIZED HEALTH CARE EDUCATION/TRAINING PROGRAM

## 2025-07-08 PROCEDURE — 1125F AMNT PAIN NOTED PAIN PRSNT: CPT | Performed by: STUDENT IN AN ORGANIZED HEALTH CARE EDUCATION/TRAINING PROGRAM

## 2025-07-08 RX ORDER — FLUTICASONE PROPIONATE 50 MCG
2 SPRAY, SUSPENSION (ML) NASAL DAILY
Qty: 15.8 G | Refills: 5 | Status: SHIPPED | OUTPATIENT
Start: 2025-07-08 | End: 2025-08-07

## 2025-07-08 NOTE — PROGRESS NOTES
Chief Complaint  Nasal Congestion (Pt has concerns for nasal congestion that has lasted the last few months. ) and Cough (Pt has concerns for an occasional cough.)    Subjective        Amanda Sherwood presents to [facility]  History of Present Illness  64-year-old female with history of pulmonary embolism, hypertension, hyperlipidemia, GERD, chronic nausea and vomiting, CKD, anxiety who presents for evaluation of nasal congestion, shortness of breath, weight management, and arm pain.           She has been experiencing nasal congestion for the past 6 months. This has led to mouth breathing and shortness of breath. She reports no sinus fullness, postnasal drip, or eye symptoms such as watering, itchiness, or dryness. She also reports no ear fullness, decreased hearing, or wheezing. She suspects her home environment may be contributing to her symptoms, as she can visibly see dust in the air despite cleaning twice a week. She has tried Zyrtec without relief and is considering trying Flonase or Nasacort.  She has not noticed any improvement when outside the house, but she does not spend much time outdoors. She has been using saline spray 3 to 4 times daily. Recently, she has developed a cough and increased shortness of breath.    She is concerned about her weight, which she has been unable to lose. Hx of gastroparesis. He recommended increasing her physical activity instead. She has previously lost weight through tennis but regained it after surgery on her left foot. She has been tracking her calorie intake and exercise duration on the treadmill but finds it difficult to exercise due to foot pain.    She has been experiencing arm pain, which was not alleviated by Voltaren. She noticed some improvement during a 12-day period when she was not lifting her grandson but the pain returned once she resumed this activity. The pain is also triggered by reaching down or gripping the steering wheel.  "She is interested in pursuing physical therapy for this issue.        Objective   Vital Signs:  /84   Pulse 78   Temp 98.4 °F (36.9 °C)   Ht 165.1 cm (65\")   Wt 82.1 kg (181 lb)   SpO2 97%   BMI 30.12 kg/m²   Estimated body mass index is 30.12 kg/m² as calculated from the following:    Height as of this encounter: 165.1 cm (65\").    Weight as of this encounter: 82.1 kg (181 lb).            Physical Exam  Constitutional:       General: She is not in acute distress.  HENT:      Head: Normocephalic and atraumatic.      Nose: Congestion and rhinorrhea present.      Mouth/Throat:      Mouth: Mucous membranes are moist.      Pharynx: Posterior oropharyngeal erythema present. No oropharyngeal exudate.   Eyes:      General: No scleral icterus.     Conjunctiva/sclera: Conjunctivae normal.   Cardiovascular:      Rate and Rhythm: Normal rate and regular rhythm.   Pulmonary:      Effort: Pulmonary effort is normal. No respiratory distress.      Breath sounds: No wheezing or rhonchi.   Musculoskeletal:         General: No tenderness. Normal range of motion.   Skin:     General: Skin is warm and dry.      Findings: No bruising, erythema or rash.   Neurological:      Mental Status: She is alert and oriented to person, place, and time.   Psychiatric:         Mood and Affect: Mood normal.         Behavior: Behavior normal.        Result Review :  The following data was reviewed by: Elsie Underwood MD on 07/08/2025:  Common labs          12/24/2024    08:29 5/19/2025    12:09 6/17/2025    09:22   Common Labs   Glucose  102  100    BUN  13  13.0    Creatinine  0.95  0.98    Sodium  141  143    Potassium  5.2  4.2    Chloride  106  106    Calcium  10.2  9.3    Albumin  5.0     Total Bilirubin  0.6     Alkaline Phosphatase  62     AST (SGOT)  21     ALT (SGPT)  19     WBC 2.69  3.99     Hemoglobin 13.1  13.5     Hematocrit 41.5  40.7     Platelets 170  177     Total Cholesterol  189     Triglycerides  47     HDL Cholesterol  " 100     LDL Cholesterol   80     Hemoglobin A1C  4.90       Data reviewed : see HPI          Assessment and Plan   Diagnoses and all orders for this visit:    1. Chronic right shoulder pain (Primary)  -     Ambulatory Referral to Physical Therapy for Evaluation & Treatment    2. Chronic cough  -     fluticasone (FLONASE) 50 MCG/ACT nasal spray; Administer 2 sprays into the nostril(s) as directed by provider Daily for 30 days. Administer 2 sprays in each nostril daily.  Dispense: 15.8 g; Refill: 5    3. Non-seasonal allergic rhinitis, unspecified trigger  -     fluticasone (FLONASE) 50 MCG/ACT nasal spray; Administer 2 sprays into the nostril(s) as directed by provider Daily for 30 days. Administer 2 sprays in each nostril daily.  Dispense: 15.8 g; Refill: 5    4. Obesity (BMI 30.0-34.9)           1. Nasal congestion 2/2 Allergic Rhinitis  - Symptoms suggest possible sinus drainage causing irritation in the throat and nasal passages.  - The absence of visible nasal polyps or a deviated septum was noted.  - A prescription for Flonase will be sent to the pharmacy, to be used twice daily for the first week and then once daily thereafter. If symptoms worsen, the dosage can be increased to twice daily for a week before reverting to once daily. Nasal saline should be used prior to Flonase or Nasacort application.  - Regular cleaning of air filters in the home and car is advised. If there is no improvement after 2 to 4 weeks of Flonase use, allergy testing will be considered.    2. Shortness of breath.  - The recent coronary angiogram showed no pulmonary or cardiac abnormalities.  - No immediate need for chest x-ray based on recent coronary angiogram results.    3. Weight management.  Obesity BMI 30  - The basal metabolic rate is approximately 1700 calories per day.  - A target caloric intake of 1200 calories per day is recommended for weight loss. A diet rich in fruits, vegetables, lean proteins, and non-processed foods is  advised.  - Intermittent fasting with two equal meals per day is suggested. Regular exercise is encouraged, but caution is advised against incorporating it into the caloric deficit calculation.  - Patience and consistency with the weight loss plan are emphasized. The [employer] program for weight loss is another option.    4. Chronic Right Shoulder pain  - A referral for physical therapy will be made.  - If there is no improvement with physical therapy, imaging studies will be considered.              Follow Up   No follow-ups on file.  Patient was given instructions and counseling regarding her condition or for health maintenance advice. Please see specific information pulled into the AVS if appropriate.     Patient or patient representative verbalized consent for the use of Ambient Listening during the visit with  Elsie Underwood MD for chart documentation. 7/14/2025  12:49 EDT

## 2025-07-15 ENCOUNTER — PATIENT ROUNDING (BHMG ONLY) (OUTPATIENT)
Dept: FAMILY MEDICINE CLINIC | Facility: CLINIC | Age: 65
End: 2025-07-15
Payer: MEDICARE

## 2025-07-15 NOTE — PROGRESS NOTES
July 15, 2025    Hello, may I speak with Amanda Sherwood?    My name is Mary Lou Mccray      [facility]  [address] [place] [id number] [phone].    Before we get started may I verify your date of birth? 1960    I am calling to officially welcome you to our practice and ask about your recent visit. Is this a good time to talk? No, my chart message sent

## 2025-07-28 RX ORDER — METOPROLOL SUCCINATE 25 MG/1
75 TABLET, EXTENDED RELEASE ORAL NIGHTLY
Qty: 270 TABLET | Refills: 0 | Status: SHIPPED | OUTPATIENT
Start: 2025-07-28

## 2025-07-29 ENCOUNTER — TELEPHONE (OUTPATIENT)
Dept: CARDIOLOGY | Age: 65
End: 2025-07-29
Payer: MEDICARE

## 2025-07-29 RX ORDER — SPIRONOLACTONE 50 MG/1
50 TABLET, FILM COATED ORAL DAILY
Qty: 90 TABLET | Refills: 3 | Status: SHIPPED | OUTPATIENT
Start: 2025-07-29

## 2025-07-29 NOTE — TELEPHONE ENCOUNTER
Called and left VM, will continue to try to reach pt.    HUB- please put patient straight through to triage    Clarisse Carnes, RN  Triage RN  07/29/25 11:23 EDT

## 2025-07-29 NOTE — TELEPHONE ENCOUNTER
I would have her discontinue valsartan and either increase metoprolol to 100 mg nightly or increase spironolactone to 50 mg in the morning.  Find out what she feels more comfortable doing.

## 2025-07-29 NOTE — TELEPHONE ENCOUNTER
Pt called back in to triage.  Reviewed recommendations with patient, verbalized understanding, will call with any further questions or complaints.  Pt states that she will stop valsartan, and increase spironolactone to 50mg daily (2 of 25mg tablets for now, but pt aware that new prescription will be 50mg tablet).    Dr. Maradiaga- pt states that she prefers to increase spironolactone, and I sent in updated prescription for 50mg tablet daily.    Clarisse Carnes RN  Triage Nurse  07/29/25 13:16 EDT

## 2025-08-05 ENCOUNTER — OFFICE VISIT (OUTPATIENT)
Dept: PAIN MEDICINE | Facility: CLINIC | Age: 65
End: 2025-08-05
Payer: MEDICARE

## 2025-08-05 VITALS
BODY MASS INDEX: 30.32 KG/M2 | HEIGHT: 65 IN | WEIGHT: 182 LBS | SYSTOLIC BLOOD PRESSURE: 159 MMHG | RESPIRATION RATE: 18 BRPM | TEMPERATURE: 98 F | OXYGEN SATURATION: 98 % | DIASTOLIC BLOOD PRESSURE: 92 MMHG | HEART RATE: 79 BPM

## 2025-08-05 DIAGNOSIS — M79.674 GREAT TOE PAIN, RIGHT: ICD-10-CM

## 2025-08-05 DIAGNOSIS — G89.29 CHRONIC FOOT PAIN, RIGHT: ICD-10-CM

## 2025-08-05 DIAGNOSIS — M79.671 CHRONIC FOOT PAIN, RIGHT: Primary | ICD-10-CM

## 2025-08-05 DIAGNOSIS — M79.671 CHRONIC FOOT PAIN, RIGHT: ICD-10-CM

## 2025-08-05 DIAGNOSIS — G89.4 CHRONIC PAIN SYNDROME: ICD-10-CM

## 2025-08-05 DIAGNOSIS — Z79.899 HIGH RISK MEDICATION USE: ICD-10-CM

## 2025-08-05 DIAGNOSIS — G89.29 CHRONIC FOOT PAIN, RIGHT: Primary | ICD-10-CM

## 2025-08-05 LAB
POC AMPHETAMINES: NEGATIVE
POC BARBITURATES: NEGATIVE
POC BENZODIAZEPHINES: POSITIVE
POC BUPRENORPHINE: NEGATIVE
POC COCAINE: NEGATIVE
POC METHADONE: NEGATIVE
POC METHAMPHETAMINE SCREEN URINE: NEGATIVE
POC OPIATES: POSITIVE
POC OXYCODONE: NEGATIVE
POC PHENCYCLIDINE: NEGATIVE
POC PROPOXYPHENE: NEGATIVE
POC THC: NEGATIVE

## 2025-08-05 PROCEDURE — G2211 COMPLEX E/M VISIT ADD ON: HCPCS | Performed by: PHYSICIAN ASSISTANT

## 2025-08-05 PROCEDURE — 3080F DIAST BP >= 90 MM HG: CPT | Performed by: PHYSICIAN ASSISTANT

## 2025-08-05 PROCEDURE — 1159F MED LIST DOCD IN RCRD: CPT | Performed by: PHYSICIAN ASSISTANT

## 2025-08-05 PROCEDURE — 99214 OFFICE O/P EST MOD 30 MIN: CPT | Performed by: PHYSICIAN ASSISTANT

## 2025-08-05 PROCEDURE — 3077F SYST BP >= 140 MM HG: CPT | Performed by: PHYSICIAN ASSISTANT

## 2025-08-05 PROCEDURE — 1160F RVW MEDS BY RX/DR IN RCRD: CPT | Performed by: PHYSICIAN ASSISTANT

## 2025-08-05 PROCEDURE — 80305 DRUG TEST PRSMV DIR OPT OBS: CPT | Performed by: PHYSICIAN ASSISTANT

## 2025-08-05 PROCEDURE — 1125F AMNT PAIN NOTED PAIN PRSNT: CPT | Performed by: PHYSICIAN ASSISTANT

## 2025-08-05 RX ORDER — HYDROCODONE BITARTRATE AND ACETAMINOPHEN 7.5; 325 MG/1; MG/1
1 TABLET ORAL 2 TIMES DAILY PRN
Qty: 60 TABLET | Refills: 0 | Status: SHIPPED | OUTPATIENT
Start: 2025-08-12

## 2025-08-05 RX ORDER — FAMOTIDINE 10 MG
TABLET ORAL
COMMUNITY

## 2025-08-18 RX ORDER — ROSUVASTATIN CALCIUM 10 MG/1
10 TABLET, COATED ORAL NIGHTLY
Qty: 90 TABLET | Refills: 1 | Status: SHIPPED | OUTPATIENT
Start: 2025-08-18

## 2025-08-22 RX ORDER — ONDANSETRON 4 MG/1
4 TABLET, FILM COATED ORAL EVERY 8 HOURS PRN
Qty: 30 TABLET | Refills: 2 | Status: SHIPPED | OUTPATIENT
Start: 2025-08-22

## (undated) DEVICE — BNDG ELAS CO-FLEX SLF ADHR 2IN 5YD LF STRL

## (undated) DEVICE — GLIDESHEATH SLENDER STAINLESS STEEL KIT: Brand: GLIDESHEATH SLENDER

## (undated) DEVICE — PK ORTHO MINOR TOWER 40

## (undated) DEVICE — BALL PIN JURGAN .062 GRN

## (undated) DEVICE — SPNG GZ WOVN 4X4IN 12PLY 10/BX STRL

## (undated) DEVICE — TBG ARTHSCP PUMP W CONN/REDUC 8FT

## (undated) DEVICE — GOWN,SIRUS,NON REINFRCD,LARGE,SET IN SL: Brand: MEDLINE

## (undated) DEVICE — GLV SURG BIOGEL LTX PF 8

## (undated) DEVICE — BITEBLOCK OMNI BLOC

## (undated) DEVICE — KT MANIFLD CARDIAC

## (undated) DEVICE — TUBING, SUCTION, 1/4" X 10', STRAIGHT: Brand: MEDLINE

## (undated) DEVICE — BNDG GZ SOF-FORM CONFRM 2X75IN LF STRL

## (undated) DEVICE — HI-TORQUE BALANCE MIDDLEWEIGHT GUIDE WIRE .014 STRAIGHT TIP 3.0 CM X 190 CM: Brand: HI-TORQUE BALANCE MIDDLEWEIGHT

## (undated) DEVICE — PK ARTHSCP 40

## (undated) DEVICE — ANTIBACTERIAL UNDYED BRAIDED (POLYGLACTIN 910), SYNTHETIC ABSORBABLE SUTURE: Brand: COATED VICRYL

## (undated) DEVICE — DRESSING,GAUZE,XEROFORM,CURAD,1"X8",ST: Brand: CURAD

## (undated) DEVICE — CANN O2 ETCO2 FITS ALL CONN CO2 SMPL A/ 7IN DISP LF

## (undated) DEVICE — IMPLANTABLE DEVICE
Type: IMPLANTABLE DEVICE | Site: FOOT | Status: NON-FUNCTIONAL
Removed: 2024-10-01

## (undated) DEVICE — THE TORRENT IRRIGATION SCOPE CONNECTOR IS USED WITH THE TORRENT IRRIGATION TUBING TO PROVIDE IRRIGATION FLUIDS SUCH AS STERILE WATER DURING GASTROINTESTINAL ENDOSCOPIC PROCEDURES WHEN USED IN CONJUNCTION WITH AN IRRIGATION PUMP (OR ELECTROSURGICAL UNIT).: Brand: TORRENT

## (undated) DEVICE — BALN PRESS WEDGE 5F 110CM

## (undated) DEVICE — BNDG ELAS ELITE V/CLOSE 4IN 5YD LF STRL

## (undated) DEVICE — SINGLE-USE BIOPSY FORCEPS: Brand: RADIAL JAW 4

## (undated) DEVICE — KT FLTR GAS LN OXYGENATOR 1/4IN STRL

## (undated) DEVICE — FRCP BX RADJAW4 NDL 2.8 240CM LG OG BX40

## (undated) DEVICE — SENSR O2 OXIMAX FNGR A/ 18IN NONSTR

## (undated) DEVICE — Device

## (undated) DEVICE — GW EMR FIX EXCHG J STD .035 3MM 260CM

## (undated) DEVICE — PK CATH CARD 40

## (undated) DEVICE — SUT ETHLN 3/0 PS1 18IN 1663H

## (undated) DEVICE — TR BAND RADIAL ARTERY COMPRESSION DEVICE: Brand: TR BAND

## (undated) DEVICE — CATH DIAG IMPULSE FL3.5 5F 100CM

## (undated) DEVICE — PRECISION THIN (9.0 X 0.38 X 25.0MM)

## (undated) DEVICE — LN SMPL CO2 SHTRM SD STREAM W/M LUER

## (undated) DEVICE — DISPOSABLE TOURNIQUET CUFF SINGLE BLADDER, SINGLE PORT AND QUICK CONNECT CONNECTOR: Brand: COLOR CUFF

## (undated) DEVICE — APPL CHLORAPREP HI/LITE 26ML ORNG

## (undated) DEVICE — UNDERCAST PADDING: Brand: DEROYAL

## (undated) DEVICE — BIT DRL FOR USE W LOCK SCRW3MM 2.2MM

## (undated) DEVICE — ADAPT CLN BIOGUARD AIR/H2O DISP

## (undated) DEVICE — PATIENT RETURN ELECTRODE, SINGLE-USE, CONTACT QUALITY MONITORING, ADULT, WITH 9FT CORD, FOR PATIENTS WEIGING OVER 33LBS. (15KG): Brand: MEGADYNE

## (undated) DEVICE — CATH VENT MIV RADL PIG ST TIP 5F 110CM

## (undated) DEVICE — SYS PERFUS SEP PLATLT W TIPS CUST

## (undated) DEVICE — BALN PRESS WEDGE 6F 110CM

## (undated) DEVICE — BLD SCLPL BEAVR MINI STR 2BVL 180D LF

## (undated) DEVICE — BLANKT WARM UPPR/BDY ARM/OUT 57X196CM

## (undated) DEVICE — PENCL SMOKE/EVAC MEGADYNE TELESCP 10FT

## (undated) DEVICE — SKIN PREP TRAY W/CHG: Brand: MEDLINE INDUSTRIES, INC.

## (undated) DEVICE — ELECTRD NDL EZ CLN MOD 2.75IN

## (undated) DEVICE — 8MM BONE GRAFT DRILL: Brand: ACUMED

## (undated) DEVICE — CATH DIAG IMPULSE FR4 5F 100CM

## (undated) DEVICE — GLV SURG SENSICARE POLYISPRN W/ALOE PF LF 6.5 GRN STRL

## (undated) DEVICE — COVER,C-ARM,41X74: Brand: MEDLINE

## (undated) DEVICE — PREP SOL POVIDONE/IODINE BT 4OZ

## (undated) DEVICE — DRAPE,U/ SHT,SPLIT,PLAS,STERIL: Brand: MEDLINE

## (undated) DEVICE — GLV SURG BIOGEL LTX PF 6 1/2

## (undated) DEVICE — BLD DISSCT COOL CUT SJ CRVD 4MM 13CM

## (undated) DEVICE — WEBRIL* CAST PADDING: Brand: DEROYAL

## (undated) DEVICE — GLV SURG SIGNATURE ESSENTIAL PF LTX SZ8

## (undated) DEVICE — MSK ENDO PORT O2 POM ELITE CURAPLEX A/

## (undated) DEVICE — DRSNG WND GZ CURAD OIL EMULSION 3X3IN STRL

## (undated) DEVICE — SHOE P/OP/CAST O/T FML MD 6/8

## (undated) DEVICE — GLIDESHEATH BASIC HYDROPHILIC COATED INTRODUCER SHEATH: Brand: GLIDESHEATH

## (undated) DEVICE — KT ORCA ORCAPOD DISP STRL

## (undated) DEVICE — GOWN,NON-REINFORCED,SIRUS,SET IN SLV,XXL: Brand: MEDLINE

## (undated) DEVICE — ABL ASP APOLLO RF XL 90D